# Patient Record
Sex: MALE | Race: WHITE | Employment: OTHER | URBAN - METROPOLITAN AREA
[De-identification: names, ages, dates, MRNs, and addresses within clinical notes are randomized per-mention and may not be internally consistent; named-entity substitution may affect disease eponyms.]

---

## 2017-01-10 RX ORDER — ATORVASTATIN CALCIUM 10 MG/1
TABLET, FILM COATED ORAL
Qty: 90 TABLET | Refills: 0 | Status: SHIPPED | OUTPATIENT
Start: 2017-01-10 | End: 2017-05-14 | Stop reason: SDUPTHER

## 2017-01-10 RX ORDER — LOSARTAN POTASSIUM 50 MG/1
TABLET ORAL
Qty: 90 TABLET | Refills: 0 | Status: SHIPPED | OUTPATIENT
Start: 2017-01-10 | End: 2017-05-20 | Stop reason: SDUPTHER

## 2017-01-11 RX ORDER — DILTIAZEM HYDROCHLORIDE 300 MG/1
CAPSULE, EXTENDED RELEASE ORAL
Qty: 90 CAPSULE | Refills: 0 | Status: SHIPPED | OUTPATIENT
Start: 2017-01-11 | End: 2017-05-14 | Stop reason: SDUPTHER

## 2017-01-12 ENCOUNTER — TELEPHONE (OUTPATIENT)
Dept: INTERNAL MEDICINE CLINIC | Age: 77
End: 2017-01-12

## 2017-01-13 ENCOUNTER — TELEPHONE (OUTPATIENT)
Dept: INTERNAL MEDICINE CLINIC | Age: 77
End: 2017-01-13

## 2017-01-13 RX ORDER — SCOLOPAMINE TRANSDERMAL SYSTEM 1 MG/1
1 PATCH, EXTENDED RELEASE TRANSDERMAL
Qty: 3 PATCH | Refills: 0 | Status: SHIPPED | OUTPATIENT
Start: 2017-01-13 | End: 2018-01-13

## 2017-01-13 RX ORDER — PREDNISONE 10 MG/1
TABLET ORAL
Qty: 28 TABLET | Refills: 0 | Status: SHIPPED | OUTPATIENT
Start: 2017-01-13 | End: 2017-01-23

## 2017-01-13 RX ORDER — LEVOFLOXACIN 500 MG/1
500 TABLET, FILM COATED ORAL DAILY
Qty: 10 TABLET | Refills: 0 | Status: SHIPPED | OUTPATIENT
Start: 2017-01-13 | End: 2017-01-23

## 2017-03-01 ENCOUNTER — OFFICE VISIT (OUTPATIENT)
Dept: INTERNAL MEDICINE CLINIC | Age: 77
End: 2017-03-01

## 2017-03-01 ENCOUNTER — TELEPHONE (OUTPATIENT)
Dept: INTERNAL MEDICINE CLINIC | Age: 77
End: 2017-03-01

## 2017-03-01 VITALS
BODY MASS INDEX: 31.39 KG/M2 | HEIGHT: 67 IN | RESPIRATION RATE: 24 BRPM | OXYGEN SATURATION: 93 % | SYSTOLIC BLOOD PRESSURE: 190 MMHG | WEIGHT: 200 LBS | DIASTOLIC BLOOD PRESSURE: 72 MMHG | HEART RATE: 112 BPM

## 2017-03-01 DIAGNOSIS — J41.0 SIMPLE CHRONIC BRONCHITIS (HCC): ICD-10-CM

## 2017-03-01 DIAGNOSIS — I10 ESSENTIAL HYPERTENSION: Primary | ICD-10-CM

## 2017-03-01 DIAGNOSIS — R06.02 SHORTNESS OF BREATH: ICD-10-CM

## 2017-03-01 PROBLEM — J96.01 ACUTE RESPIRATORY FAILURE WITH HYPOXEMIA (HCC): Status: ACTIVE | Noted: 2017-03-01

## 2017-03-01 PROBLEM — J10.1 INFLUENZA B: Status: ACTIVE | Noted: 2017-03-01

## 2017-03-01 PROCEDURE — 99999 PR OFFICE/OUTPT VISIT,PROCEDURE ONLY: CPT | Performed by: INTERNAL MEDICINE

## 2017-03-01 PROCEDURE — 1036F TOBACCO NON-USER: CPT | Performed by: INTERNAL MEDICINE

## 2017-03-01 ASSESSMENT — ENCOUNTER SYMPTOMS
ABDOMINAL PAIN: 0
SHORTNESS OF BREATH: 1

## 2017-03-02 PROBLEM — G47.33 OSA ON CPAP: Status: ACTIVE | Noted: 2017-03-02

## 2017-03-02 PROBLEM — Z99.89 OSA ON CPAP: Status: ACTIVE | Noted: 2017-03-02

## 2017-03-03 PROBLEM — R73.09 ABNORMAL GLUCOSE: Status: ACTIVE | Noted: 2017-03-03

## 2017-03-06 ENCOUNTER — TELEPHONE (OUTPATIENT)
Dept: INTERNAL MEDICINE CLINIC | Age: 77
End: 2017-03-06

## 2017-03-10 ENCOUNTER — TELEPHONE (OUTPATIENT)
Dept: INTERNAL MEDICINE CLINIC | Age: 77
End: 2017-03-10

## 2017-03-13 ENCOUNTER — TELEPHONE (OUTPATIENT)
Dept: INTERNAL MEDICINE CLINIC | Age: 77
End: 2017-03-13

## 2017-03-16 ENCOUNTER — TELEPHONE (OUTPATIENT)
Dept: INTERNAL MEDICINE CLINIC | Age: 77
End: 2017-03-16

## 2017-03-17 ENCOUNTER — CARE COORDINATION (OUTPATIENT)
Dept: CASE MANAGEMENT | Age: 77
End: 2017-03-17

## 2017-03-17 ENCOUNTER — TELEPHONE (OUTPATIENT)
Dept: PHARMACY | Facility: CLINIC | Age: 77
End: 2017-03-17

## 2017-03-19 ENCOUNTER — EPISODE CHANGES (OUTPATIENT)
Dept: CASE MANAGEMENT | Age: 77
End: 2017-03-19

## 2017-03-27 ENCOUNTER — OFFICE VISIT (OUTPATIENT)
Dept: INTERNAL MEDICINE CLINIC | Age: 77
End: 2017-03-27

## 2017-03-27 VITALS
RESPIRATION RATE: 20 BRPM | OXYGEN SATURATION: 96 % | DIASTOLIC BLOOD PRESSURE: 52 MMHG | SYSTOLIC BLOOD PRESSURE: 116 MMHG | WEIGHT: 191 LBS | BODY MASS INDEX: 29.91 KG/M2 | HEART RATE: 92 BPM

## 2017-03-27 DIAGNOSIS — J96.01 ACUTE RESPIRATORY FAILURE WITH HYPOXEMIA (HCC): Primary | ICD-10-CM

## 2017-03-27 DIAGNOSIS — J18.9 PNEUMONIA OF BOTH LUNGS DUE TO INFECTIOUS ORGANISM, UNSPECIFIED PART OF LUNG: ICD-10-CM

## 2017-03-27 DIAGNOSIS — J44.9 COPD, VERY SEVERE (HCC): ICD-10-CM

## 2017-03-27 DIAGNOSIS — J44.1 COPD EXACERBATION (HCC): ICD-10-CM

## 2017-03-27 DIAGNOSIS — J10.1 INFLUENZA B: ICD-10-CM

## 2017-03-27 LAB
ANION GAP SERPL CALCULATED.3IONS-SCNC: 15 MMOL/L (ref 3–16)
BUN BLDV-MCNC: 12 MG/DL (ref 7–20)
CALCIUM SERPL-MCNC: 9.1 MG/DL (ref 8.3–10.6)
CHLORIDE BLD-SCNC: 97 MMOL/L (ref 99–110)
CO2: 25 MMOL/L (ref 21–32)
CREAT SERPL-MCNC: 0.8 MG/DL (ref 0.8–1.3)
GFR AFRICAN AMERICAN: >60
GFR NON-AFRICAN AMERICAN: >60
GLUCOSE BLD-MCNC: 109 MG/DL (ref 70–99)
POTASSIUM SERPL-SCNC: 4.6 MMOL/L (ref 3.5–5.1)
SODIUM BLD-SCNC: 137 MMOL/L (ref 136–145)

## 2017-03-27 RX ORDER — MOMETASONE FUROATE AND FORMOTEROL FUMARATE DIHYDRATE 200; 5 UG/1; UG/1
AEROSOL RESPIRATORY (INHALATION)
Qty: 39 G | Refills: 0 | Status: SHIPPED | OUTPATIENT
Start: 2017-03-27 | End: 2017-06-13 | Stop reason: SDUPTHER

## 2017-03-27 RX ORDER — POTASSIUM CHLORIDE 20 MEQ/1
20 TABLET, EXTENDED RELEASE ORAL EVERY OTHER DAY
Qty: 60 TABLET | Refills: 3 | Status: SHIPPED
Start: 2017-03-27 | End: 2018-06-01 | Stop reason: SDUPTHER

## 2017-03-27 RX ORDER — FUROSEMIDE 40 MG/1
40 TABLET ORAL EVERY OTHER DAY
Qty: 60 TABLET | Refills: 3 | Status: SHIPPED
Start: 2017-03-27 | End: 2017-05-15 | Stop reason: SDUPTHER

## 2017-03-27 ASSESSMENT — ENCOUNTER SYMPTOMS
SHORTNESS OF BREATH: 0
ABDOMINAL PAIN: 0

## 2017-03-29 ENCOUNTER — OFFICE VISIT (OUTPATIENT)
Dept: PULMONOLOGY | Age: 77
End: 2017-03-29

## 2017-03-29 VITALS
SYSTOLIC BLOOD PRESSURE: 116 MMHG | TEMPERATURE: 97.9 F | RESPIRATION RATE: 18 BRPM | BODY MASS INDEX: 30.13 KG/M2 | WEIGHT: 192 LBS | DIASTOLIC BLOOD PRESSURE: 54 MMHG | HEART RATE: 96 BPM | HEIGHT: 67 IN | OXYGEN SATURATION: 94 %

## 2017-03-29 DIAGNOSIS — G47.33 OSA (OBSTRUCTIVE SLEEP APNEA): ICD-10-CM

## 2017-03-29 DIAGNOSIS — J96.11 CHRONIC HYPOXEMIC RESPIRATORY FAILURE (HCC): ICD-10-CM

## 2017-03-29 DIAGNOSIS — J44.9 COPD, VERY SEVERE (HCC): Primary | ICD-10-CM

## 2017-03-29 PROCEDURE — 1111F DSCHRG MED/CURRENT MED MERGE: CPT | Performed by: INTERNAL MEDICINE

## 2017-03-29 PROCEDURE — 3023F SPIROM DOC REV: CPT | Performed by: INTERNAL MEDICINE

## 2017-03-29 PROCEDURE — G8484 FLU IMMUNIZE NO ADMIN: HCPCS | Performed by: INTERNAL MEDICINE

## 2017-03-29 PROCEDURE — G8417 CALC BMI ABV UP PARAM F/U: HCPCS | Performed by: INTERNAL MEDICINE

## 2017-03-29 PROCEDURE — 1123F ACP DISCUSS/DSCN MKR DOCD: CPT | Performed by: INTERNAL MEDICINE

## 2017-03-29 PROCEDURE — G8926 SPIRO NO PERF OR DOC: HCPCS | Performed by: INTERNAL MEDICINE

## 2017-03-29 PROCEDURE — 1036F TOBACCO NON-USER: CPT | Performed by: INTERNAL MEDICINE

## 2017-03-29 PROCEDURE — 3017F COLORECTAL CA SCREEN DOC REV: CPT | Performed by: INTERNAL MEDICINE

## 2017-03-29 PROCEDURE — 4040F PNEUMOC VAC/ADMIN/RCVD: CPT | Performed by: INTERNAL MEDICINE

## 2017-03-29 PROCEDURE — 99214 OFFICE O/P EST MOD 30 MIN: CPT | Performed by: INTERNAL MEDICINE

## 2017-03-29 PROCEDURE — G8427 DOCREV CUR MEDS BY ELIG CLIN: HCPCS | Performed by: INTERNAL MEDICINE

## 2017-03-29 RX ORDER — CLONIDINE HYDROCHLORIDE 0.1 MG/1
TABLET ORAL
COMMUNITY
Start: 2017-03-16 | End: 2017-05-02

## 2017-03-29 ASSESSMENT — SLEEP AND FATIGUE QUESTIONNAIRES
HOW LIKELY ARE YOU TO NOD OFF OR FALL ASLEEP IN A CAR, WHILE STOPPED FOR A FEW MINUTES IN TRAFFIC: 1
HOW LIKELY ARE YOU TO NOD OFF OR FALL ASLEEP WHILE LYING DOWN TO REST IN THE AFTERNOON WHEN CIRCUMSTANCES PERMIT: 1
ESS TOTAL SCORE: 8
HOW LIKELY ARE YOU TO NOD OFF OR FALL ASLEEP WHILE SITTING QUIETLY AFTER LUNCH WITHOUT ALCOHOL: 1
HOW LIKELY ARE YOU TO NOD OFF OR FALL ASLEEP WHILE SITTING AND READING: 1
HOW LIKELY ARE YOU TO NOD OFF OR FALL ASLEEP WHEN YOU ARE A PASSENGER IN A CAR FOR AN HOUR WITHOUT A BREAK: 1
NECK CIRCUMFERENCE (INCHES): 19.75
HOW LIKELY ARE YOU TO NOD OFF OR FALL ASLEEP WHILE WATCHING TV: 1
HOW LIKELY ARE YOU TO NOD OFF OR FALL ASLEEP WHILE SITTING AND TALKING TO SOMEONE: 1
HOW LIKELY ARE YOU TO NOD OFF OR FALL ASLEEP WHILE SITTING INACTIVE IN A PUBLIC PLACE: 1

## 2017-04-04 ENCOUNTER — TELEPHONE (OUTPATIENT)
Dept: INTERNAL MEDICINE CLINIC | Age: 77
End: 2017-04-04

## 2017-04-06 ENCOUNTER — TELEPHONE (OUTPATIENT)
Dept: INTERNAL MEDICINE CLINIC | Age: 77
End: 2017-04-06

## 2017-04-10 RX ORDER — ESCITALOPRAM OXALATE 10 MG/1
TABLET ORAL
Qty: 45 TABLET | Refills: 2 | Status: SHIPPED | OUTPATIENT
Start: 2017-04-10 | End: 2017-05-02

## 2017-04-12 ENCOUNTER — TELEPHONE (OUTPATIENT)
Dept: INTERNAL MEDICINE CLINIC | Age: 77
End: 2017-04-12

## 2017-04-14 PROCEDURE — 99495 TRANSJ CARE MGMT MOD F2F 14D: CPT | Performed by: INTERNAL MEDICINE

## 2017-04-19 ENCOUNTER — TELEPHONE (OUTPATIENT)
Dept: INTERNAL MEDICINE CLINIC | Age: 77
End: 2017-04-19

## 2017-04-28 ASSESSMENT — ENCOUNTER SYMPTOMS
SHORTNESS OF BREATH: 0
ABDOMINAL PAIN: 0

## 2017-05-02 ENCOUNTER — OFFICE VISIT (OUTPATIENT)
Dept: INTERNAL MEDICINE CLINIC | Age: 77
End: 2017-05-02

## 2017-05-02 VITALS
OXYGEN SATURATION: 94 % | DIASTOLIC BLOOD PRESSURE: 68 MMHG | HEIGHT: 67 IN | WEIGHT: 192 LBS | RESPIRATION RATE: 16 BRPM | HEART RATE: 85 BPM | SYSTOLIC BLOOD PRESSURE: 142 MMHG | BODY MASS INDEX: 30.13 KG/M2

## 2017-05-02 DIAGNOSIS — J41.0 SIMPLE CHRONIC BRONCHITIS (HCC): Primary | ICD-10-CM

## 2017-05-02 DIAGNOSIS — E78.00 HYPERCHOLESTEROLEMIA: ICD-10-CM

## 2017-05-02 DIAGNOSIS — I10 ESSENTIAL HYPERTENSION: ICD-10-CM

## 2017-05-02 DIAGNOSIS — I47.1 PAT (PAROXYSMAL ATRIAL TACHYCARDIA) (HCC): ICD-10-CM

## 2017-05-02 LAB
A/G RATIO: 1.7 (ref 1.1–2.2)
ALBUMIN SERPL-MCNC: 4.3 G/DL (ref 3.4–5)
ALP BLD-CCNC: 74 U/L (ref 40–129)
ALT SERPL-CCNC: 25 U/L (ref 10–40)
ANION GAP SERPL CALCULATED.3IONS-SCNC: 15 MMOL/L (ref 3–16)
AST SERPL-CCNC: 16 U/L (ref 15–37)
BILIRUB SERPL-MCNC: 0.3 MG/DL (ref 0–1)
BUN BLDV-MCNC: 11 MG/DL (ref 7–20)
CALCIUM SERPL-MCNC: 9.9 MG/DL (ref 8.3–10.6)
CHLORIDE BLD-SCNC: 102 MMOL/L (ref 99–110)
CHOLESTEROL, TOTAL: 172 MG/DL (ref 0–199)
CO2: 25 MMOL/L (ref 21–32)
CREAT SERPL-MCNC: 0.9 MG/DL (ref 0.8–1.3)
GFR AFRICAN AMERICAN: >60
GFR NON-AFRICAN AMERICAN: >60
GLOBULIN: 2.6 G/DL
GLUCOSE BLD-MCNC: 107 MG/DL (ref 70–99)
HDLC SERPL-MCNC: 58 MG/DL (ref 40–60)
LDL CHOLESTEROL CALCULATED: 79 MG/DL
POTASSIUM SERPL-SCNC: 4.2 MMOL/L (ref 3.5–5.1)
SODIUM BLD-SCNC: 142 MMOL/L (ref 136–145)
TOTAL PROTEIN: 6.9 G/DL (ref 6.4–8.2)
TRIGL SERPL-MCNC: 175 MG/DL (ref 0–150)
VLDLC SERPL CALC-MCNC: 35 MG/DL

## 2017-05-02 PROCEDURE — 1036F TOBACCO NON-USER: CPT | Performed by: INTERNAL MEDICINE

## 2017-05-02 PROCEDURE — G8428 CUR MEDS NOT DOCUMENT: HCPCS | Performed by: INTERNAL MEDICINE

## 2017-05-02 PROCEDURE — G8417 CALC BMI ABV UP PARAM F/U: HCPCS | Performed by: INTERNAL MEDICINE

## 2017-05-02 PROCEDURE — 3017F COLORECTAL CA SCREEN DOC REV: CPT | Performed by: INTERNAL MEDICINE

## 2017-05-02 PROCEDURE — 1123F ACP DISCUSS/DSCN MKR DOCD: CPT | Performed by: INTERNAL MEDICINE

## 2017-05-02 PROCEDURE — G8926 SPIRO NO PERF OR DOC: HCPCS | Performed by: INTERNAL MEDICINE

## 2017-05-02 PROCEDURE — 4040F PNEUMOC VAC/ADMIN/RCVD: CPT | Performed by: INTERNAL MEDICINE

## 2017-05-02 PROCEDURE — 3023F SPIROM DOC REV: CPT | Performed by: INTERNAL MEDICINE

## 2017-05-02 PROCEDURE — 99214 OFFICE O/P EST MOD 30 MIN: CPT | Performed by: INTERNAL MEDICINE

## 2017-05-07 DIAGNOSIS — J32.1 CHRONIC FRONTAL SINUSITIS: ICD-10-CM

## 2017-05-08 RX ORDER — IPRATROPIUM BROMIDE 21 UG/1
SPRAY, METERED NASAL
Qty: 30 ML | Refills: 0 | Status: SHIPPED | OUTPATIENT
Start: 2017-05-08 | End: 2017-06-27 | Stop reason: SDUPTHER

## 2017-05-12 ENCOUNTER — TELEPHONE (OUTPATIENT)
Dept: INTERNAL MEDICINE CLINIC | Age: 77
End: 2017-05-12

## 2017-05-12 RX ORDER — PREDNISONE 10 MG/1
TABLET ORAL
Qty: 28 TABLET | Refills: 0 | Status: SHIPPED | OUTPATIENT
Start: 2017-05-12 | End: 2017-05-22

## 2017-05-15 RX ORDER — FUROSEMIDE 40 MG/1
TABLET ORAL
Qty: 90 TABLET | Refills: 3 | Status: ON HOLD | OUTPATIENT
Start: 2017-05-15 | End: 2018-02-13

## 2017-05-15 RX ORDER — THEOPHYLLINE 400 MG/1
TABLET, EXTENDED RELEASE ORAL
Qty: 90 TABLET | Refills: 3 | Status: SHIPPED | OUTPATIENT
Start: 2017-05-15 | End: 2018-05-31 | Stop reason: SDUPTHER

## 2017-05-15 RX ORDER — DILTIAZEM HYDROCHLORIDE 300 MG/1
CAPSULE, EXTENDED RELEASE ORAL
Qty: 90 CAPSULE | Refills: 3 | Status: SHIPPED | OUTPATIENT
Start: 2017-05-15 | End: 2017-06-27

## 2017-05-15 RX ORDER — ATORVASTATIN CALCIUM 10 MG/1
TABLET, FILM COATED ORAL
Qty: 90 TABLET | Refills: 3 | Status: SHIPPED | OUTPATIENT
Start: 2017-05-15 | End: 2018-05-10 | Stop reason: SDUPTHER

## 2017-05-22 RX ORDER — LOSARTAN POTASSIUM 50 MG/1
TABLET ORAL
Qty: 90 TABLET | Refills: 3 | Status: SHIPPED | OUTPATIENT
Start: 2017-05-22 | End: 2018-05-10 | Stop reason: SDUPTHER

## 2017-06-10 DIAGNOSIS — I10 HTN (HYPERTENSION): ICD-10-CM

## 2017-06-13 DIAGNOSIS — J44.9 COPD, VERY SEVERE (HCC): ICD-10-CM

## 2017-06-13 DIAGNOSIS — J32.1 CHRONIC FRONTAL SINUSITIS: ICD-10-CM

## 2017-06-13 RX ORDER — IPRATROPIUM BROMIDE 21 UG/1
SPRAY, METERED NASAL
Qty: 1 BOTTLE | Refills: 3 | Status: SHIPPED | OUTPATIENT
Start: 2017-06-13 | End: 2018-01-08 | Stop reason: SDUPTHER

## 2017-06-14 RX ORDER — MOMETASONE FUROATE AND FORMOTEROL FUMARATE DIHYDRATE 200; 5 UG/1; UG/1
AEROSOL RESPIRATORY (INHALATION)
Qty: 1 INHALER | Refills: 5 | Status: SHIPPED | OUTPATIENT
Start: 2017-06-14 | End: 2017-12-18 | Stop reason: SDUPTHER

## 2017-06-27 ENCOUNTER — OFFICE VISIT (OUTPATIENT)
Dept: PULMONOLOGY | Age: 77
End: 2017-06-27

## 2017-06-27 VITALS
BODY MASS INDEX: 31.23 KG/M2 | SYSTOLIC BLOOD PRESSURE: 134 MMHG | RESPIRATION RATE: 16 BRPM | HEIGHT: 67 IN | DIASTOLIC BLOOD PRESSURE: 60 MMHG | OXYGEN SATURATION: 95 % | WEIGHT: 199 LBS | TEMPERATURE: 98.1 F | HEART RATE: 90 BPM

## 2017-06-27 DIAGNOSIS — G47.33 OSA (OBSTRUCTIVE SLEEP APNEA): ICD-10-CM

## 2017-06-27 DIAGNOSIS — J44.9 COPD, VERY SEVERE (HCC): Primary | ICD-10-CM

## 2017-06-27 DIAGNOSIS — Z78.9 FULL CODE STATUS: ICD-10-CM

## 2017-06-27 DIAGNOSIS — J96.11 CHRONIC HYPOXEMIC RESPIRATORY FAILURE (HCC): ICD-10-CM

## 2017-06-27 PROCEDURE — 3023F SPIROM DOC REV: CPT | Performed by: INTERNAL MEDICINE

## 2017-06-27 PROCEDURE — 3017F COLORECTAL CA SCREEN DOC REV: CPT | Performed by: INTERNAL MEDICINE

## 2017-06-27 PROCEDURE — 99214 OFFICE O/P EST MOD 30 MIN: CPT | Performed by: INTERNAL MEDICINE

## 2017-06-27 PROCEDURE — 4040F PNEUMOC VAC/ADMIN/RCVD: CPT | Performed by: INTERNAL MEDICINE

## 2017-06-27 PROCEDURE — 1123F ACP DISCUSS/DSCN MKR DOCD: CPT | Performed by: INTERNAL MEDICINE

## 2017-06-27 PROCEDURE — G8427 DOCREV CUR MEDS BY ELIG CLIN: HCPCS | Performed by: INTERNAL MEDICINE

## 2017-06-27 PROCEDURE — 1036F TOBACCO NON-USER: CPT | Performed by: INTERNAL MEDICINE

## 2017-06-27 PROCEDURE — G8926 SPIRO NO PERF OR DOC: HCPCS | Performed by: INTERNAL MEDICINE

## 2017-06-27 PROCEDURE — G8417 CALC BMI ABV UP PARAM F/U: HCPCS | Performed by: INTERNAL MEDICINE

## 2017-06-27 ASSESSMENT — SLEEP AND FATIGUE QUESTIONNAIRES
HOW LIKELY ARE YOU TO NOD OFF OR FALL ASLEEP WHILE SITTING QUIETLY AFTER LUNCH WITHOUT ALCOHOL: 0
ESS TOTAL SCORE: 4
HOW LIKELY ARE YOU TO NOD OFF OR FALL ASLEEP WHILE SITTING AND READING: 1
HOW LIKELY ARE YOU TO NOD OFF OR FALL ASLEEP WHILE SITTING INACTIVE IN A PUBLIC PLACE: 1
HOW LIKELY ARE YOU TO NOD OFF OR FALL ASLEEP WHILE SITTING AND TALKING TO SOMEONE: 0
HOW LIKELY ARE YOU TO NOD OFF OR FALL ASLEEP IN A CAR, WHILE STOPPED FOR A FEW MINUTES IN TRAFFIC: 0
NECK CIRCUMFERENCE (INCHES): 20.5
HOW LIKELY ARE YOU TO NOD OFF OR FALL ASLEEP WHILE WATCHING TV: 1
HOW LIKELY ARE YOU TO NOD OFF OR FALL ASLEEP WHILE LYING DOWN TO REST IN THE AFTERNOON WHEN CIRCUMSTANCES PERMIT: 0
HOW LIKELY ARE YOU TO NOD OFF OR FALL ASLEEP WHEN YOU ARE A PASSENGER IN A CAR FOR AN HOUR WITHOUT A BREAK: 1

## 2017-06-28 ENCOUNTER — TELEPHONE (OUTPATIENT)
Dept: PULMONOLOGY | Age: 77
End: 2017-06-28

## 2017-07-03 ENCOUNTER — OFFICE VISIT (OUTPATIENT)
Dept: INTERNAL MEDICINE CLINIC | Age: 77
End: 2017-07-03

## 2017-07-03 VITALS
RESPIRATION RATE: 16 BRPM | HEART RATE: 110 BPM | BODY MASS INDEX: 31.01 KG/M2 | TEMPERATURE: 98.4 F | SYSTOLIC BLOOD PRESSURE: 130 MMHG | DIASTOLIC BLOOD PRESSURE: 70 MMHG | HEIGHT: 67 IN | WEIGHT: 197.6 LBS

## 2017-07-03 DIAGNOSIS — H69.82 EUSTACHIAN TUBE DYSFUNCTION, LEFT: Primary | ICD-10-CM

## 2017-07-03 PROCEDURE — 3017F COLORECTAL CA SCREEN DOC REV: CPT | Performed by: INTERNAL MEDICINE

## 2017-07-03 PROCEDURE — 1123F ACP DISCUSS/DSCN MKR DOCD: CPT | Performed by: INTERNAL MEDICINE

## 2017-07-03 PROCEDURE — G8427 DOCREV CUR MEDS BY ELIG CLIN: HCPCS | Performed by: INTERNAL MEDICINE

## 2017-07-03 PROCEDURE — 99213 OFFICE O/P EST LOW 20 MIN: CPT | Performed by: INTERNAL MEDICINE

## 2017-07-03 PROCEDURE — G8417 CALC BMI ABV UP PARAM F/U: HCPCS | Performed by: INTERNAL MEDICINE

## 2017-07-03 PROCEDURE — 4040F PNEUMOC VAC/ADMIN/RCVD: CPT | Performed by: INTERNAL MEDICINE

## 2017-07-03 PROCEDURE — 1036F TOBACCO NON-USER: CPT | Performed by: INTERNAL MEDICINE

## 2017-07-03 RX ORDER — PREDNISONE 10 MG/1
TABLET ORAL
Qty: 22 TABLET | Refills: 0 | Status: SHIPPED | OUTPATIENT
Start: 2017-07-03 | End: 2017-07-21

## 2017-07-03 ASSESSMENT — PATIENT HEALTH QUESTIONNAIRE - PHQ9
SUM OF ALL RESPONSES TO PHQ QUESTIONS 1-9: 0
1. LITTLE INTEREST OR PLEASURE IN DOING THINGS: 0
2. FEELING DOWN, DEPRESSED OR HOPELESS: 0
SUM OF ALL RESPONSES TO PHQ9 QUESTIONS 1 & 2: 0

## 2017-07-05 ENCOUNTER — TELEPHONE (OUTPATIENT)
Dept: PULMONOLOGY | Age: 77
End: 2017-07-05

## 2017-07-05 RX ORDER — DILTIAZEM HYDROCHLORIDE 300 MG/1
300 CAPSULE, COATED, EXTENDED RELEASE ORAL DAILY
Qty: 90 CAPSULE | Refills: 3
Start: 2017-07-05 | End: 2018-06-04 | Stop reason: SDUPTHER

## 2017-07-17 ASSESSMENT — ENCOUNTER SYMPTOMS
ABDOMINAL PAIN: 0
SHORTNESS OF BREATH: 0

## 2017-07-19 ENCOUNTER — TELEPHONE (OUTPATIENT)
Dept: PULMONOLOGY | Age: 77
End: 2017-07-19

## 2017-07-19 DIAGNOSIS — J96.01 ACUTE RESPIRATORY FAILURE WITH HYPOXEMIA (HCC): ICD-10-CM

## 2017-07-19 DIAGNOSIS — J41.0 SIMPLE CHRONIC BRONCHITIS (HCC): Primary | ICD-10-CM

## 2017-07-21 ENCOUNTER — OFFICE VISIT (OUTPATIENT)
Dept: INTERNAL MEDICINE CLINIC | Age: 77
End: 2017-07-21

## 2017-07-21 VITALS
OXYGEN SATURATION: 95 % | RESPIRATION RATE: 24 BRPM | HEIGHT: 67 IN | WEIGHT: 198 LBS | HEART RATE: 96 BPM | BODY MASS INDEX: 31.08 KG/M2 | SYSTOLIC BLOOD PRESSURE: 138 MMHG | DIASTOLIC BLOOD PRESSURE: 74 MMHG

## 2017-07-21 DIAGNOSIS — I10 ESSENTIAL HYPERTENSION: ICD-10-CM

## 2017-07-21 DIAGNOSIS — N40.0 BENIGN NON-NODULAR PROSTATIC HYPERPLASIA WITHOUT LOWER URINARY TRACT SYMPTOMS: ICD-10-CM

## 2017-07-21 DIAGNOSIS — E78.00 HYPERCHOLESTEROLEMIA: ICD-10-CM

## 2017-07-21 DIAGNOSIS — J41.0 SIMPLE CHRONIC BRONCHITIS (HCC): Primary | ICD-10-CM

## 2017-07-21 PROCEDURE — 1036F TOBACCO NON-USER: CPT | Performed by: INTERNAL MEDICINE

## 2017-07-21 PROCEDURE — G8417 CALC BMI ABV UP PARAM F/U: HCPCS | Performed by: INTERNAL MEDICINE

## 2017-07-21 PROCEDURE — 4040F PNEUMOC VAC/ADMIN/RCVD: CPT | Performed by: INTERNAL MEDICINE

## 2017-07-21 PROCEDURE — G8926 SPIRO NO PERF OR DOC: HCPCS | Performed by: INTERNAL MEDICINE

## 2017-07-21 PROCEDURE — G8428 CUR MEDS NOT DOCUMENT: HCPCS | Performed by: INTERNAL MEDICINE

## 2017-07-21 PROCEDURE — 99214 OFFICE O/P EST MOD 30 MIN: CPT | Performed by: INTERNAL MEDICINE

## 2017-07-21 PROCEDURE — 3023F SPIROM DOC REV: CPT | Performed by: INTERNAL MEDICINE

## 2017-07-21 PROCEDURE — 1123F ACP DISCUSS/DSCN MKR DOCD: CPT | Performed by: INTERNAL MEDICINE

## 2017-08-10 ENCOUNTER — HOSPITAL ENCOUNTER (OUTPATIENT)
Dept: OTHER | Age: 77
Discharge: HOME | End: 2017-08-10
Attending: INTERNAL MEDICINE | Admitting: INTERNAL MEDICINE

## 2017-08-10 ENCOUNTER — HOSPITAL ENCOUNTER (OUTPATIENT)
Dept: CARDIAC REHAB | Age: 77
Discharge: HOME | End: 2017-08-11

## 2017-08-10 PROCEDURE — 94620 PR PULMONARY STRESS TESTING,SIMPLE: CPT | Performed by: INTERNAL MEDICINE

## 2017-08-21 ENCOUNTER — TELEPHONE (OUTPATIENT)
Dept: PULMONOLOGY | Age: 77
End: 2017-08-21

## 2017-09-08 RX ORDER — TIOTROPIUM BROMIDE 18 UG/1
CAPSULE ORAL; RESPIRATORY (INHALATION)
Qty: 90 CAPSULE | Refills: 3 | Status: SHIPPED | OUTPATIENT
Start: 2017-09-08 | End: 2018-04-17

## 2017-09-19 ENCOUNTER — OFFICE VISIT (OUTPATIENT)
Dept: PULMONOLOGY | Age: 77
End: 2017-09-19

## 2017-09-19 VITALS
SYSTOLIC BLOOD PRESSURE: 130 MMHG | HEART RATE: 96 BPM | HEIGHT: 67 IN | BODY MASS INDEX: 30.17 KG/M2 | RESPIRATION RATE: 16 BRPM | WEIGHT: 192.2 LBS | DIASTOLIC BLOOD PRESSURE: 64 MMHG | TEMPERATURE: 98.9 F | OXYGEN SATURATION: 95 %

## 2017-09-19 DIAGNOSIS — J44.9 COPD, VERY SEVERE (HCC): Primary | ICD-10-CM

## 2017-09-19 DIAGNOSIS — G47.33 OSA (OBSTRUCTIVE SLEEP APNEA): ICD-10-CM

## 2017-09-19 DIAGNOSIS — Z23 NEEDS FLU SHOT: ICD-10-CM

## 2017-09-19 DIAGNOSIS — J96.11 CHRONIC HYPOXEMIC RESPIRATORY FAILURE (HCC): ICD-10-CM

## 2017-09-19 DIAGNOSIS — J32.1 CHRONIC FRONTAL SINUSITIS: ICD-10-CM

## 2017-09-19 PROCEDURE — G0008 ADMIN INFLUENZA VIRUS VAC: HCPCS | Performed by: INTERNAL MEDICINE

## 2017-09-19 PROCEDURE — 99214 OFFICE O/P EST MOD 30 MIN: CPT | Performed by: INTERNAL MEDICINE

## 2017-09-19 PROCEDURE — G8926 SPIRO NO PERF OR DOC: HCPCS | Performed by: INTERNAL MEDICINE

## 2017-09-19 PROCEDURE — 1123F ACP DISCUSS/DSCN MKR DOCD: CPT | Performed by: INTERNAL MEDICINE

## 2017-09-19 PROCEDURE — G8427 DOCREV CUR MEDS BY ELIG CLIN: HCPCS | Performed by: INTERNAL MEDICINE

## 2017-09-19 PROCEDURE — G8417 CALC BMI ABV UP PARAM F/U: HCPCS | Performed by: INTERNAL MEDICINE

## 2017-09-19 PROCEDURE — 90662 IIV NO PRSV INCREASED AG IM: CPT | Performed by: INTERNAL MEDICINE

## 2017-09-19 PROCEDURE — 1036F TOBACCO NON-USER: CPT | Performed by: INTERNAL MEDICINE

## 2017-09-19 PROCEDURE — 4040F PNEUMOC VAC/ADMIN/RCVD: CPT | Performed by: INTERNAL MEDICINE

## 2017-09-19 PROCEDURE — 3023F SPIROM DOC REV: CPT | Performed by: INTERNAL MEDICINE

## 2017-09-19 RX ORDER — AZITHROMYCIN 250 MG/1
TABLET, FILM COATED ORAL
Qty: 1 PACKET | Refills: 0 | Status: ON HOLD | OUTPATIENT
Start: 2017-09-19 | End: 2018-02-13 | Stop reason: HOSPADM

## 2017-09-19 RX ORDER — PREDNISONE 10 MG/1
TABLET ORAL
Qty: 30 TABLET | Refills: 0 | Status: SHIPPED | OUTPATIENT
Start: 2017-09-19 | End: 2017-09-29

## 2017-09-19 ASSESSMENT — SLEEP AND FATIGUE QUESTIONNAIRES
ESS TOTAL SCORE: 2
HOW LIKELY ARE YOU TO NOD OFF OR FALL ASLEEP WHILE LYING DOWN TO REST IN THE AFTERNOON WHEN CIRCUMSTANCES PERMIT: 0
HOW LIKELY ARE YOU TO NOD OFF OR FALL ASLEEP WHILE WATCHING TV: 1
HOW LIKELY ARE YOU TO NOD OFF OR FALL ASLEEP WHILE SITTING QUIETLY AFTER LUNCH WITHOUT ALCOHOL: 0
HOW LIKELY ARE YOU TO NOD OFF OR FALL ASLEEP WHILE SITTING AND TALKING TO SOMEONE: 0
NECK CIRCUMFERENCE (INCHES): 19
HOW LIKELY ARE YOU TO NOD OFF OR FALL ASLEEP WHILE SITTING AND READING: 1
HOW LIKELY ARE YOU TO NOD OFF OR FALL ASLEEP WHILE SITTING INACTIVE IN A PUBLIC PLACE: 0
HOW LIKELY ARE YOU TO NOD OFF OR FALL ASLEEP WHEN YOU ARE A PASSENGER IN A CAR FOR AN HOUR WITHOUT A BREAK: 0
HOW LIKELY ARE YOU TO NOD OFF OR FALL ASLEEP IN A CAR, WHILE STOPPED FOR A FEW MINUTES IN TRAFFIC: 0

## 2017-10-03 ASSESSMENT — ENCOUNTER SYMPTOMS
SHORTNESS OF BREATH: 0
ABDOMINAL PAIN: 0

## 2017-10-03 NOTE — PROGRESS NOTES
Subjective:      Patient ID: Caitlyn Hansen is a 69 y.o. male. HPI  1. Simple chronic bronchitis ([address] --Stable--no new issues      2. Essential hypertension --Stable--no new issues      3. Hypercholesterolemia --Stable--no new issues----lab noted and reviewed      No med changes -- on fluticasone nasally--stuffy with CPAP at [facility] lemon --now off O2 per dr lemon--shelia have a tank for prn use--   UTD on immun--reviewed   [place]--2016--rek 2019--   Feels stronger now vs sev years ago--   Review of Systems   Respiratory: Negative for shortness of breath. Cardiovascular: Negative for chest pain. Gastrointestinal: Negative for abdominal pain. Objective:   Physical Exam   Constitutional: He appears well-developed. HENT:   Head: Normocephalic. Eyes: Pupils are equal, round, and reactive to light. Neck: Normal range of motion. Cardiovascular: Normal rate and normal heart sounds. Pulmonary/Chest: Effort normal. He has wheezes. He has no rales. Wheeze after RUBY and exertion    Genitourinary: Rectum normal. Rectal exam shows guaiac negative stool. Genitourinary Comments: Tr bph--no masses    Musculoskeletal: He exhibits no edema. Neurological: He is alert. Skin: Skin is warm. Assessment:      1. Simple chronic bronchitis ([address] --cont to see dr Edda Flood     2. Essential hypertension --Continue current therapy      3.  Hypercholesterolemia --Continue current therapy      UTD on immin ands colo--  Amena here in 6 mos   Ret for labs this week       Plan:

## 2017-10-09 ENCOUNTER — OFFICE VISIT (OUTPATIENT)
Dept: INTERNAL MEDICINE CLINIC | Age: 77
End: 2017-10-09

## 2017-10-09 VITALS
BODY MASS INDEX: 30.61 KG/M2 | OXYGEN SATURATION: 96 % | HEIGHT: 67 IN | DIASTOLIC BLOOD PRESSURE: 62 MMHG | WEIGHT: 195 LBS | SYSTOLIC BLOOD PRESSURE: 128 MMHG | RESPIRATION RATE: 24 BRPM | HEART RATE: 92 BPM

## 2017-10-09 DIAGNOSIS — E78.00 HYPERCHOLESTEROLEMIA: ICD-10-CM

## 2017-10-09 DIAGNOSIS — N40.0 BENIGN NON-NODULAR PROSTATIC HYPERPLASIA WITHOUT LOWER URINARY TRACT SYMPTOMS: ICD-10-CM

## 2017-10-09 DIAGNOSIS — I10 ESSENTIAL HYPERTENSION: ICD-10-CM

## 2017-10-09 DIAGNOSIS — J41.0 SIMPLE CHRONIC BRONCHITIS (HCC): Primary | ICD-10-CM

## 2017-10-09 PROCEDURE — G8417 CALC BMI ABV UP PARAM F/U: HCPCS | Performed by: INTERNAL MEDICINE

## 2017-10-09 PROCEDURE — 1036F TOBACCO NON-USER: CPT | Performed by: INTERNAL MEDICINE

## 2017-10-09 PROCEDURE — 1123F ACP DISCUSS/DSCN MKR DOCD: CPT | Performed by: INTERNAL MEDICINE

## 2017-10-09 PROCEDURE — G8926 SPIRO NO PERF OR DOC: HCPCS | Performed by: INTERNAL MEDICINE

## 2017-10-09 PROCEDURE — G8428 CUR MEDS NOT DOCUMENT: HCPCS | Performed by: INTERNAL MEDICINE

## 2017-10-09 PROCEDURE — 4040F PNEUMOC VAC/ADMIN/RCVD: CPT | Performed by: INTERNAL MEDICINE

## 2017-10-09 PROCEDURE — G8484 FLU IMMUNIZE NO ADMIN: HCPCS | Performed by: INTERNAL MEDICINE

## 2017-10-09 PROCEDURE — 3023F SPIROM DOC REV: CPT | Performed by: INTERNAL MEDICINE

## 2017-10-09 PROCEDURE — 99214 OFFICE O/P EST MOD 30 MIN: CPT | Performed by: INTERNAL MEDICINE

## 2017-10-09 RX ORDER — FLUTICASONE PROPIONATE 50 MCG
1 SPRAY, SUSPENSION (ML) NASAL DAILY
Status: ON HOLD | COMMUNITY
End: 2018-02-04

## 2017-10-13 LAB
A/G RATIO: 1.3 (ref 1.1–2.2)
ALBUMIN SERPL-MCNC: 4 G/DL (ref 3.4–5)
ALP BLD-CCNC: 84 U/L (ref 40–129)
ALT SERPL-CCNC: 20 U/L (ref 10–40)
ANION GAP SERPL CALCULATED.3IONS-SCNC: 13 MMOL/L (ref 3–16)
AST SERPL-CCNC: 15 U/L (ref 15–37)
BILIRUB SERPL-MCNC: 0.6 MG/DL (ref 0–1)
BUN BLDV-MCNC: 14 MG/DL (ref 7–20)
CALCIUM SERPL-MCNC: 10 MG/DL (ref 8.3–10.6)
CHLORIDE BLD-SCNC: 98 MMOL/L (ref 99–110)
CHOLESTEROL, TOTAL: 161 MG/DL (ref 0–199)
CO2: 28 MMOL/L (ref 21–32)
CREAT SERPL-MCNC: 0.9 MG/DL (ref 0.8–1.3)
GFR AFRICAN AMERICAN: >60
GFR NON-AFRICAN AMERICAN: >60
GLOBULIN: 3 G/DL
GLUCOSE BLD-MCNC: 107 MG/DL (ref 70–99)
HDLC SERPL-MCNC: 49 MG/DL (ref 40–60)
LDL CHOLESTEROL CALCULATED: 92 MG/DL
POTASSIUM SERPL-SCNC: 4.6 MMOL/L (ref 3.5–5.1)
PROSTATE SPECIFIC ANTIGEN: 0.76 NG/ML (ref 0–4)
SODIUM BLD-SCNC: 139 MMOL/L (ref 136–145)
TOTAL PROTEIN: 7 G/DL (ref 6.4–8.2)
TRIGL SERPL-MCNC: 98 MG/DL (ref 0–150)
VLDLC SERPL CALC-MCNC: 20 MG/DL

## 2017-11-13 DIAGNOSIS — J41.0 SIMPLE CHRONIC BRONCHITIS (HCC): ICD-10-CM

## 2017-11-13 DIAGNOSIS — J45.909 UNCOMPLICATED ASTHMA: ICD-10-CM

## 2017-11-13 RX ORDER — MONTELUKAST SODIUM 10 MG/1
TABLET ORAL
Qty: 90 TABLET | Refills: 3 | Status: SHIPPED | OUTPATIENT
Start: 2017-11-13 | End: 2018-10-29 | Stop reason: SDUPTHER

## 2017-12-08 ENCOUNTER — OFFICE VISIT (OUTPATIENT)
Dept: INTERNAL MEDICINE CLINIC | Age: 77
End: 2017-12-08

## 2017-12-08 VITALS
SYSTOLIC BLOOD PRESSURE: 125 MMHG | WEIGHT: 195.2 LBS | OXYGEN SATURATION: 96 % | TEMPERATURE: 97.6 F | BODY MASS INDEX: 30.64 KG/M2 | DIASTOLIC BLOOD PRESSURE: 60 MMHG | HEART RATE: 83 BPM | RESPIRATION RATE: 16 BRPM | HEIGHT: 67 IN

## 2017-12-08 DIAGNOSIS — J01.80 OTHER ACUTE SINUSITIS, RECURRENCE NOT SPECIFIED: Primary | ICD-10-CM

## 2017-12-08 DIAGNOSIS — J41.0 SIMPLE CHRONIC BRONCHITIS (HCC): ICD-10-CM

## 2017-12-08 PROCEDURE — 99213 OFFICE O/P EST LOW 20 MIN: CPT | Performed by: INTERNAL MEDICINE

## 2017-12-08 RX ORDER — AMOXICILLIN AND CLAVULANATE POTASSIUM 875; 125 MG/1; MG/1
1 TABLET, FILM COATED ORAL 2 TIMES DAILY
Qty: 20 TABLET | Refills: 0 | Status: SHIPPED | OUTPATIENT
Start: 2017-12-08 | End: 2017-12-18

## 2017-12-08 ASSESSMENT — ENCOUNTER SYMPTOMS
SINUS PAIN: 1
COUGH: 0
GASTROINTESTINAL NEGATIVE: 1
EYES NEGATIVE: 1
WHEEZING: 1
HEMOPTYSIS: 0
SPUTUM PRODUCTION: 0
SHORTNESS OF BREATH: 1

## 2017-12-08 NOTE — PATIENT INSTRUCTIONS
(Tylenol) can be harmful. · Breathe warm, moist air from a steamy shower, a hot bath, or a sink filled with hot water. Avoid cold, dry air. Using a humidifier in your home may help. Follow the directions for cleaning the machine. · Use saline (saltwater) nasal washes to help keep your nasal passages open and wash out mucus and bacteria. You can buy saline nose drops at a grocery store or drugstore. Or you can make your own at home by adding 1 teaspoon of salt and 1 teaspoon of baking soda to 2 cups of distilled water. If you make your own, fill a bulb syringe with the solution, insert the tip into your nostril, and squeeze gently. Kelleen Lewis your nose. · Put a hot, wet towel or a warm gel pack on your face 3 or 4 times a day for 5 to 10 minutes each time. · Try a decongestant nasal spray like oxymetazoline (Afrin). Do not use it for more than 3 days in a row. Using it for more than 3 days can make your congestion worse. When should you call for help? Call your doctor now or seek immediate medical care if:  ? · You have new or worse swelling or redness in your face or around your eyes. ? · You have a new or higher fever. ? Watch closely for changes in your health, and be sure to contact your doctor if:  ? · You have new or worse facial pain. ? · The mucus from your nose becomes thicker (like pus) or has new blood in it. ? · You are not getting better as expected. Where can you learn more? Go to https://emerepeNewforma.[employer]. org and sign in to your [employer] account. Enter Z228 in the [facility] box to learn more about \"Sinusitis: Care Instructions. \"     If you do not have an account, please click on the \"Sign Up Now\" link. Current as of: May 12, 2017  Content Version: 11.4  © 9183-8203 Healthwise, Incorporated. Care instructions adapted under license by [facility] ([facility]).  If you have questions about a medical condition or this instruction, always ask your healthcare professional. [employer] disclaims any warranty or liability for your use of this information.

## 2017-12-08 NOTE — PROGRESS NOTES
OUTPATIENT PROGRESS NOTE    Date of Service:  12/8/2017  Address: [address] [place] [address] INTERNAL MEDICINE  [address] [address]  Dept: [phone]    Subjective:      Patient ID: [id number]  Nicole Boss is a 69 y.o. male with:  Chief Complaint   Patient presents with    Sinus Problem     C/o nasal drainage going on for 5 days. Associated with chest congestion, SOB, sinus pressure, watery eyes and sore throat. Pt does have a hx of COPD and all of the drainage is making it hard for him to catch his breath. HPI: Carlos Harper comes in with nasal drainage going on for 5 days. Sinus pressure, watery eyes and sore throat. Pt does have a hx of COPD and all of the drainage is making it hard for him to catch his breath. Review of Systems   Constitutional: Positive for malaise/fatigue. Negative for chills, diaphoresis, fever and weight loss. HENT: Positive for congestion and sinus pain. Eyes: Negative. Respiratory: Positive for shortness of breath and wheezing. Negative for cough, hemoptysis and sputum production. Cardiovascular: Negative. Gastrointestinal: Negative. Genitourinary: Negative. Musculoskeletal: Negative. Skin: Negative. Neurological: Positive for weakness. Endo/Heme/Allergies: Negative. Psychiatric/Behavioral: Negative. Objective: Wt Readings from Last 3 Encounters:   12/08/17 195 lb 3.2 oz (88.5 kg)   10/09/17 195 lb (88.5 kg)   09/19/17 192 lb 3.2 oz (87.2 kg)     BP Readings from Last 3 Encounters:   12/08/17 125/60   10/09/17 128/62   09/19/17 130/64      Vitals:    12/08/17 1035   BP: 125/60   Site: Left Arm   Position: Sitting   Cuff Size: Large Adult   Pulse: 83   Resp: 16   Temp: 97.6 °F (36.4 °C)   TempSrc: Oral   SpO2: 96%   Weight: 195 lb 3.2 oz (88.5 kg)   Height: 5' 7\" (1.702 m)     Body mass index is 30.57 kg/m².     Physical Exam   HENT:   Nose: Right sinus exhibits maxillary sinus tenderness and frontal sinus tenderness. Left sinus exhibits maxillary sinus tenderness and frontal sinus tenderness. Pulmonary/Chest: He has decreased breath sounds in the right lower field and the left lower field. He has wheezes in the right upper field, the right middle field, the left upper field and the left middle field. Assessment/Plan:      Encounter Diagnoses   Name Primary?  Other acute sinusitis, recurrence not specified Yes    Simple chronic bronchitis ([address]        1. Other acute sinusitis, recurrence not specified  He has prednisone at home, advised to start if sx worsen, augmentin 10 days, sinus rinse  - amoxicillin-clavulanate (AUGMENTIN) 875-125 MG per tablet; Take 1 tablet by mouth 2 times daily for 10 days  Dispense: 20 tablet; Refill: 0    2. Simple chronic bronchitis (HCC)  Cont inhalers at home. - amoxicillin-clavulanate (AUGMENTIN) 875-125 MG per tablet; Take 1 tablet by mouth 2 times daily for 10 days  Dispense: 20 tablet; Refill: 0        Additional Orders:      No orders of the defined types were placed in this encounter. Orders Placed This Encounter   Medications    amoxicillin-clavulanate (AUGMENTIN) 875-125 MG per tablet     Sig: Take 1 tablet by mouth 2 times daily for 10 days     Dispense:  20 tablet     Refill:  0       DISPOSITION:      Return if symptoms worsen or fail to improve.   1. Greater than 15 minutes spent with patient and >10 minutes on medication dosing, use and lifestyle modifications, home safety    Na Alba MD

## 2017-12-09 ENCOUNTER — TELEPHONE (OUTPATIENT)
Dept: INTERNAL MEDICINE CLINIC | Age: 77
End: 2017-12-09

## 2017-12-09 NOTE — TELEPHONE ENCOUNTER
I have returned pt call and advised to start taking prednisone after antibiotics if not feeling better. Message was left on voicemail for pt to call in Monday if any questions.

## 2017-12-12 ENCOUNTER — TELEPHONE (OUTPATIENT)
Dept: INTERNAL MEDICINE CLINIC | Age: 77
End: 2017-12-12

## 2017-12-12 RX ORDER — PREDNISONE 10 MG/1
TABLET ORAL
Qty: 28 TABLET | Refills: 0 | Status: SHIPPED | OUTPATIENT
Start: 2017-12-12 | End: 2017-12-22

## 2017-12-12 NOTE — TELEPHONE ENCOUNTER
Pt was seen a few days ago by Dr. Danilo Shelton for chest congestion and told dr he had some Prednisone left at home but pt states he actually only had a few pills left at home and he is out of town and asking if  will call the script to [facility] at [phone], pt is asking if Dr Fabiana De Oliveira will address his message today because pt and his wife will only be near this pharmacy for today only, if  has any questions for pt he can be reached on his cell phone at [phone].

## 2017-12-18 ENCOUNTER — TELEPHONE (OUTPATIENT)
Dept: PULMONOLOGY | Age: 77
End: 2017-12-18

## 2017-12-18 DIAGNOSIS — J44.9 COPD, VERY SEVERE (HCC): ICD-10-CM

## 2017-12-19 ENCOUNTER — OFFICE VISIT (OUTPATIENT)
Dept: PULMONOLOGY | Age: 77
End: 2017-12-19

## 2017-12-19 VITALS
HEIGHT: 67 IN | DIASTOLIC BLOOD PRESSURE: 64 MMHG | HEART RATE: 86 BPM | TEMPERATURE: 98.1 F | OXYGEN SATURATION: 95 % | BODY MASS INDEX: 30.76 KG/M2 | SYSTOLIC BLOOD PRESSURE: 126 MMHG | WEIGHT: 196 LBS | RESPIRATION RATE: 16 BRPM

## 2017-12-19 DIAGNOSIS — J01.90 ACUTE BACTERIAL SINUSITIS: Primary | ICD-10-CM

## 2017-12-19 DIAGNOSIS — J44.9 COPD, VERY SEVERE (HCC): ICD-10-CM

## 2017-12-19 DIAGNOSIS — G47.33 OSA (OBSTRUCTIVE SLEEP APNEA): ICD-10-CM

## 2017-12-19 DIAGNOSIS — K59.00 CONSTIPATION, UNSPECIFIED CONSTIPATION TYPE: ICD-10-CM

## 2017-12-19 DIAGNOSIS — B96.89 ACUTE BACTERIAL SINUSITIS: Primary | ICD-10-CM

## 2017-12-19 PROCEDURE — 3023F SPIROM DOC REV: CPT | Performed by: INTERNAL MEDICINE

## 2017-12-19 PROCEDURE — 99214 OFFICE O/P EST MOD 30 MIN: CPT | Performed by: INTERNAL MEDICINE

## 2017-12-19 PROCEDURE — G8427 DOCREV CUR MEDS BY ELIG CLIN: HCPCS | Performed by: INTERNAL MEDICINE

## 2017-12-19 PROCEDURE — 1123F ACP DISCUSS/DSCN MKR DOCD: CPT | Performed by: INTERNAL MEDICINE

## 2017-12-19 PROCEDURE — 4040F PNEUMOC VAC/ADMIN/RCVD: CPT | Performed by: INTERNAL MEDICINE

## 2017-12-19 PROCEDURE — G8484 FLU IMMUNIZE NO ADMIN: HCPCS | Performed by: INTERNAL MEDICINE

## 2017-12-19 PROCEDURE — 1036F TOBACCO NON-USER: CPT | Performed by: INTERNAL MEDICINE

## 2017-12-19 PROCEDURE — G8417 CALC BMI ABV UP PARAM F/U: HCPCS | Performed by: INTERNAL MEDICINE

## 2017-12-19 PROCEDURE — G8926 SPIRO NO PERF OR DOC: HCPCS | Performed by: INTERNAL MEDICINE

## 2017-12-19 RX ORDER — AMOXICILLIN 250 MG
2 CAPSULE ORAL DAILY PRN
Qty: 60 TABLET | Refills: 0 | Status: ON HOLD | OUTPATIENT
Start: 2017-12-19 | End: 2018-02-04

## 2017-12-19 RX ORDER — AMOXICILLIN AND CLAVULANATE POTASSIUM 875; 125 MG/1; MG/1
1 TABLET, FILM COATED ORAL 2 TIMES DAILY
Qty: 20 TABLET | Refills: 0 | Status: SHIPPED | OUTPATIENT
Start: 2017-12-19 | End: 2017-12-29

## 2017-12-19 NOTE — PROGRESS NOTES
REASON FOR CONSULTATION/CC: ZAHEER      CONSULTING PHYSICIAN: Kaden Nassar MD   PCP: Kaden Nassar MD    HISTORY OF PRESENT ILLNESS: Charlie Huston is a 69y.o. year old male with a history of ZAHEER who presents :     Sleep history:               He is having increased sinus congestion and discharged. treatment with antibiotics and prednisone with 12 day taper. Had significant side effects with pred with bloating, vision changes, difficulty with urinating. Using Flonase  And nasal ipratropium  At this time. ipratropium  Helps a lot. Using mucinex with green phlegm. Copd. Spiriva Dulera theophylline and albuterol prn.     Zaheer. Using cpap. Diamond Sleepiness Scale:    Sleep Medicine 9/19/2017 6/27/2017 3/29/2017 11/28/2016 5/23/2016 11/12/2015 6/9/2015   Sitting and reading 1 1 1 1 1 1 1   Watching TV 1 1 1 2 1 1 1   Sitting, inactive in a public place (e.g. a theatre or a meeting) 0 1 1 0 1 1 1   As a passenger in a car for an hour without a break 0 1 1 0 1 1 1   Lying down to rest in the afternoon when circumstances permit 0 0 1 0 0 1 1   Sitting and talking to someone 0 0 1 0 0 1 1   Sitting quietly after a lunch without alcohol 0 0 1 0 0 1 1   In a car, while stopped for a few minutes in traffic 0 0 1 0 0 1 1   Total score 2 4 8 3 4 8 8   Neck circumference 19 20.5 19.75 - - - -         0 = no chance of dozing  1 = slight chance of dozing  2 = moderate chance of dozing  3 = high chance of dozing    Interpretation:   0-7: It is unlikely that you are abnormally sleepy. 8-9:You have an average amount of daytime sleepiness. 10-15: You may be excessively sleepy depending on the situation. You may want to consider   seeking medical attention. 16-24: You are excessively sleepy and should consider seeking medical attention      PAST MEDICAL HISTORY:  Past Medical History:   Diagnosis Date    Basal cell carcinoma of skin 06/16/2017    right cheek    COPD (chronic obstructive pulmonary

## 2018-01-08 ENCOUNTER — TELEPHONE (OUTPATIENT)
Dept: INTERNAL MEDICINE CLINIC | Age: 78
End: 2018-01-08

## 2018-01-08 DIAGNOSIS — J32.1 CHRONIC FRONTAL SINUSITIS: ICD-10-CM

## 2018-01-08 RX ORDER — IPRATROPIUM BROMIDE 21 UG/1
2 SPRAY, METERED NASAL 4 TIMES DAILY
Qty: 1 BOTTLE | Refills: 3 | Status: SHIPPED | OUTPATIENT
Start: 2018-01-08 | End: 2018-01-09 | Stop reason: SDUPTHER

## 2018-01-09 DIAGNOSIS — J32.1 CHRONIC FRONTAL SINUSITIS: ICD-10-CM

## 2018-01-09 RX ORDER — IPRATROPIUM BROMIDE 21 UG/1
2 SPRAY, METERED NASAL 4 TIMES DAILY
Qty: 3 BOTTLE | Refills: 1 | Status: SHIPPED | OUTPATIENT
Start: 2018-01-09 | End: 2018-08-19 | Stop reason: SDUPTHER

## 2018-01-12 DIAGNOSIS — J44.9 COPD, VERY SEVERE (HCC): ICD-10-CM

## 2018-01-12 RX ORDER — ALBUTEROL SULFATE 2.5 MG/3ML
2.5 SOLUTION RESPIRATORY (INHALATION) EVERY 6 HOURS PRN
Qty: 120 EACH | Refills: 3 | Status: SHIPPED | OUTPATIENT
Start: 2018-01-12 | End: 2018-01-31 | Stop reason: SDUPTHER

## 2018-01-17 ENCOUNTER — HOSPITAL ENCOUNTER (OUTPATIENT)
Dept: OTHER | Age: 78
Discharge: HOME | End: 2018-01-17
Attending: INTERNAL MEDICINE | Admitting: INTERNAL MEDICINE

## 2018-01-17 ENCOUNTER — OFFICE VISIT (OUTPATIENT)
Dept: INTERNAL MEDICINE CLINIC | Age: 78
End: 2018-01-17

## 2018-01-17 VITALS
TEMPERATURE: 97.7 F | DIASTOLIC BLOOD PRESSURE: 60 MMHG | OXYGEN SATURATION: 98 % | RESPIRATION RATE: 16 BRPM | SYSTOLIC BLOOD PRESSURE: 146 MMHG | HEART RATE: 102 BPM

## 2018-01-17 DIAGNOSIS — I10 ESSENTIAL HYPERTENSION: ICD-10-CM

## 2018-01-17 DIAGNOSIS — J41.0 SIMPLE CHRONIC BRONCHITIS (HCC): Primary | ICD-10-CM

## 2018-01-17 DIAGNOSIS — R68.89 FLU-LIKE SYMPTOMS: ICD-10-CM

## 2018-01-17 DIAGNOSIS — J41.0 SIMPLE CHRONIC BRONCHITIS (HCC): ICD-10-CM

## 2018-01-17 PROCEDURE — 3023F SPIROM DOC REV: CPT | Performed by: INTERNAL MEDICINE

## 2018-01-17 PROCEDURE — 1036F TOBACCO NON-USER: CPT | Performed by: INTERNAL MEDICINE

## 2018-01-17 PROCEDURE — G8428 CUR MEDS NOT DOCUMENT: HCPCS | Performed by: INTERNAL MEDICINE

## 2018-01-17 PROCEDURE — 1123F ACP DISCUSS/DSCN MKR DOCD: CPT | Performed by: INTERNAL MEDICINE

## 2018-01-17 PROCEDURE — G8484 FLU IMMUNIZE NO ADMIN: HCPCS | Performed by: INTERNAL MEDICINE

## 2018-01-17 PROCEDURE — 99213 OFFICE O/P EST LOW 20 MIN: CPT | Performed by: INTERNAL MEDICINE

## 2018-01-17 PROCEDURE — 4040F PNEUMOC VAC/ADMIN/RCVD: CPT | Performed by: INTERNAL MEDICINE

## 2018-01-17 PROCEDURE — G8417 CALC BMI ABV UP PARAM F/U: HCPCS | Performed by: INTERNAL MEDICINE

## 2018-01-17 PROCEDURE — G8926 SPIRO NO PERF OR DOC: HCPCS | Performed by: INTERNAL MEDICINE

## 2018-01-17 RX ORDER — PROMETHAZINE HYDROCHLORIDE AND CODEINE PHOSPHATE 6.25; 1 MG/5ML; MG/5ML
5 SYRUP ORAL EVERY 4 HOURS PRN
Qty: 120 ML | Refills: 1 | Status: SHIPPED | OUTPATIENT
Start: 2018-01-17 | End: 2018-01-24

## 2018-01-17 RX ORDER — PREDNISONE 10 MG/1
TABLET ORAL
Qty: 28 TABLET | Refills: 0 | Status: SHIPPED | OUTPATIENT
Start: 2018-01-17 | End: 2018-01-24

## 2018-01-17 RX ORDER — LEVOFLOXACIN 500 MG/1
500 TABLET, FILM COATED ORAL DAILY
Qty: 10 TABLET | Refills: 0 | Status: SHIPPED | OUTPATIENT
Start: 2018-01-17 | End: 2018-01-27

## 2018-01-17 ASSESSMENT — ENCOUNTER SYMPTOMS
SHORTNESS OF BREATH: 0
ABDOMINAL PAIN: 0

## 2018-01-17 NOTE — PROGRESS NOTES
Subjective:      Patient ID: Nehal Shen is a 69 y.o. male. HPI  1. Simple chronic bronchitis (HCC) --COPD--stable     2. Flu-like symptoms --fever and cough --yellow mucus--rt chest kamala--1 day-- fever--none-- took mucinex-- not achy--     3. Essential hypertension --Stable--no new issues      Nasal kamala 2 weeks ago--saw dr lemon--added pred and augmentin     Review of Systems   Respiratory: Negative for shortness of breath. Cardiovascular: Negative for chest pain. Gastrointestinal: Negative for abdominal pain. Objective:   Physical Exam   HENT:   Head: Normocephalic. Mod pnd--yellow mucus    Eyes: Pupils are equal, round, and reactive to light. Neck: Normal range of motion. Cardiovascular: Normal rate and normal heart sounds. Pulmonary/Chest: Effort normal. He has wheezes. He has no rales. Harsh cough    Abdominal: Soft. Musculoskeletal: He exhibits no edema. Neurological: He is alert. Assessment:      1. Simple chronic bronchitis ([address] --Continue current therapy      2. Flu-like symptoms --bronchitis---ck cxr---add pred taper and levaquin x 10 days and P&C    3.  Essential hypertension --Continue current therapy      [place] out if has pna --dwp   Wife with sim sx --rx also       Plan:

## 2018-01-24 ENCOUNTER — TELEPHONE (OUTPATIENT)
Dept: INTERNAL MEDICINE CLINIC | Age: 78
End: 2018-01-24

## 2018-01-24 ENCOUNTER — HOSPITAL ENCOUNTER (OUTPATIENT)
Dept: OTHER | Age: 78
Discharge: HOME | End: 2018-01-24
Attending: INTERNAL MEDICINE | Admitting: INTERNAL MEDICINE

## 2018-01-24 ENCOUNTER — OFFICE VISIT (OUTPATIENT)
Dept: INTERNAL MEDICINE CLINIC | Age: 78
End: 2018-01-24

## 2018-01-24 VITALS
OXYGEN SATURATION: 95 % | HEART RATE: 112 BPM | TEMPERATURE: 98.2 F | SYSTOLIC BLOOD PRESSURE: 160 MMHG | RESPIRATION RATE: 24 BRPM | DIASTOLIC BLOOD PRESSURE: 72 MMHG

## 2018-01-24 DIAGNOSIS — J44.1 COPD EXACERBATION (HCC): ICD-10-CM

## 2018-01-24 DIAGNOSIS — R06.02 SHORTNESS OF BREATH: ICD-10-CM

## 2018-01-24 DIAGNOSIS — J45.909 UNCOMPLICATED ASTHMA, UNSPECIFIED ASTHMA SEVERITY, UNSPECIFIED WHETHER PERSISTENT: ICD-10-CM

## 2018-01-24 DIAGNOSIS — J44.1 COPD EXACERBATION (HCC): Primary | ICD-10-CM

## 2018-01-24 PROCEDURE — G8428 CUR MEDS NOT DOCUMENT: HCPCS | Performed by: INTERNAL MEDICINE

## 2018-01-24 PROCEDURE — G8484 FLU IMMUNIZE NO ADMIN: HCPCS | Performed by: INTERNAL MEDICINE

## 2018-01-24 PROCEDURE — 3023F SPIROM DOC REV: CPT | Performed by: INTERNAL MEDICINE

## 2018-01-24 PROCEDURE — 4040F PNEUMOC VAC/ADMIN/RCVD: CPT | Performed by: INTERNAL MEDICINE

## 2018-01-24 PROCEDURE — 99214 OFFICE O/P EST MOD 30 MIN: CPT | Performed by: INTERNAL MEDICINE

## 2018-01-24 PROCEDURE — 1036F TOBACCO NON-USER: CPT | Performed by: INTERNAL MEDICINE

## 2018-01-24 PROCEDURE — 1123F ACP DISCUSS/DSCN MKR DOCD: CPT | Performed by: INTERNAL MEDICINE

## 2018-01-24 PROCEDURE — G8417 CALC BMI ABV UP PARAM F/U: HCPCS | Performed by: INTERNAL MEDICINE

## 2018-01-24 PROCEDURE — G8926 SPIRO NO PERF OR DOC: HCPCS | Performed by: INTERNAL MEDICINE

## 2018-01-24 RX ORDER — AMOXICILLIN AND CLAVULANATE POTASSIUM 875; 125 MG/1; MG/1
1 TABLET, FILM COATED ORAL 2 TIMES DAILY
Qty: 20 TABLET | Refills: 0 | Status: SHIPPED | OUTPATIENT
Start: 2018-01-24 | End: 2018-02-04 | Stop reason: ALTCHOICE

## 2018-01-24 RX ORDER — PREDNISONE 10 MG/1
TABLET ORAL
Qty: 28 TABLET | Refills: 0 | Status: ON HOLD | OUTPATIENT
Start: 2018-01-24 | End: 2018-02-04 | Stop reason: ALTCHOICE

## 2018-01-24 ASSESSMENT — ENCOUNTER SYMPTOMS
EYES NEGATIVE: 1
GASTROINTESTINAL NEGATIVE: 1
COUGH: 1
SHORTNESS OF BREATH: 1

## 2018-01-31 DIAGNOSIS — J44.9 COPD, VERY SEVERE (HCC): ICD-10-CM

## 2018-01-31 RX ORDER — ALBUTEROL SULFATE 2.5 MG/3ML
2.5 SOLUTION RESPIRATORY (INHALATION) EVERY 6 HOURS PRN
Qty: 120 EACH | Refills: 3 | Status: SHIPPED | OUTPATIENT
Start: 2018-01-31 | End: 2019-06-16 | Stop reason: SDUPTHER

## 2018-02-01 ENCOUNTER — TELEPHONE (OUTPATIENT)
Dept: INTERNAL MEDICINE CLINIC | Age: 78
End: 2018-02-01

## 2018-02-01 RX ORDER — OSELTAMIVIR PHOSPHATE 75 MG/1
75 CAPSULE ORAL DAILY
Qty: 10 CAPSULE | Refills: 0 | Status: ON HOLD | OUTPATIENT
Start: 2018-02-01 | End: 2018-02-13 | Stop reason: HOSPADM

## 2018-02-03 ENCOUNTER — TELEPHONE (OUTPATIENT)
Dept: INTERNAL MEDICINE CLINIC | Age: 78
End: 2018-02-03

## 2018-02-04 PROBLEM — J40 BRONCHITIS: Status: ACTIVE | Noted: 2018-02-04

## 2018-02-05 PROBLEM — R93.89 ABNORMAL CT OF THE CHEST: Status: ACTIVE | Noted: 2018-02-05

## 2018-02-05 PROBLEM — J44.9 COPD, VERY SEVERE (HCC): Status: ACTIVE | Noted: 2018-02-05

## 2018-02-05 PROBLEM — J43.2 CENTRILOBULAR EMPHYSEMA (HCC): Status: ACTIVE | Noted: 2018-02-05

## 2018-02-05 PROBLEM — R91.8 PULMONARY NODULES: Status: ACTIVE | Noted: 2018-02-05

## 2018-02-08 PROBLEM — J18.9 ATYPICAL PNEUMONIA: Status: ACTIVE | Noted: 2018-02-08

## 2018-02-08 PROBLEM — A49.8 INFECTION DUE TO STENOTROPHOMONAS MALTOPHILIA: Status: ACTIVE | Noted: 2018-02-08

## 2018-02-12 ENCOUNTER — TELEPHONE (OUTPATIENT)
Dept: INTERNAL MEDICINE CLINIC | Age: 78
End: 2018-02-12

## 2018-02-14 ENCOUNTER — CARE COORDINATION (OUTPATIENT)
Dept: CASE MANAGEMENT | Age: 78
End: 2018-02-14

## 2018-02-14 DIAGNOSIS — J18.9 ATYPICAL PNEUMONIA: Primary | ICD-10-CM

## 2018-02-14 PROCEDURE — 1111F DSCHRG MED/CURRENT MED MERGE: CPT

## 2018-02-22 ENCOUNTER — TELEPHONE (OUTPATIENT)
Dept: FAMILY MEDICINE CLINIC | Age: 78
End: 2018-02-22

## 2018-02-22 ENCOUNTER — CARE COORDINATION (OUTPATIENT)
Dept: CASE MANAGEMENT | Age: 78
End: 2018-02-22

## 2018-02-22 NOTE — TELEPHONE ENCOUNTER
Struggles at night with breathing when he is up and around. Order for  Evaluation for pt and ot.     Please advise

## 2018-02-22 NOTE — CARE COORDINATION
[facility] Transitions Follow Up Call    2018    Patient: Suraj Primus  Patient : 1940   MRN: [id number]  Reason for Admission: There are no discharge diagnoses documented for the most recent discharge. Discharge Date: 18 RARS: Risk Score: 21.5       Spoke with: Lia Wong (patient)    Care Transitions Subsequent and Final Call    Subsequent and Final Calls  Do you have any ongoing symptoms?:  Yes  Onset of Patient-reported symptoms: In the past 7 days  Patient-reported symptoms:  Shortness of Breath  Interventions for patient-reported symptoms:  Notified PCP/Physician, Notified Home Care  Have your medications changed?:  No  Do you have any questions related to your medications?:  No  Do you currently have any active services?:  Yes  Are you currently active with any services?:  Home Health  Do you have any needs or concerns that I can assist you with?:  No  Identified Barriers:  None  Care Transitions Interventions  Other Interventions: Follow Up: Spoke with Jose Salazar. He states he is doing okay - he feels that in general he is improving a little each day. [facility] remains active - he is expecting a caregiver any minute now. Jose Salazar states he is always SOB - he thinks it might be worse than before coming to the hospital. Writer suggested we call the physician - Jose Salazar declines. He states he is taking the meds as ordered - he sees his PCP next week - and he is using the [facility] for treatments 4 times per day with relief. Jose Salazar uses his cpap routinely. Jose Salazar denies any needs or concerns for writer today. He is agreeable to f/u per CTC.   Future Appointments  Date Time Provider Alka Fregoso   2018 4:45 PM Jamel Estrada MD [place] [facility]   3/14/2018 8:15 AM Lisy Stack MD [facility]   2018 2:20 PM Shel[facility] Hoffman [facility]       Radha Urena RN

## 2018-02-24 ASSESSMENT — ENCOUNTER SYMPTOMS
SHORTNESS OF BREATH: 0
ABDOMINAL PAIN: 0

## 2018-02-27 ENCOUNTER — OFFICE VISIT (OUTPATIENT)
Dept: INTERNAL MEDICINE CLINIC | Age: 78
End: 2018-02-27

## 2018-02-27 ENCOUNTER — CARE COORDINATION (OUTPATIENT)
Dept: CASE MANAGEMENT | Age: 78
End: 2018-02-27

## 2018-02-27 VITALS
RESPIRATION RATE: 24 BRPM | HEART RATE: 115 BPM | BODY MASS INDEX: 30.13 KG/M2 | HEIGHT: 67 IN | SYSTOLIC BLOOD PRESSURE: 134 MMHG | DIASTOLIC BLOOD PRESSURE: 62 MMHG | WEIGHT: 192 LBS | OXYGEN SATURATION: 95 %

## 2018-02-27 DIAGNOSIS — B44.9 ASPERGILLUS PNEUMONIA (HCC): ICD-10-CM

## 2018-02-27 DIAGNOSIS — J96.01 ACUTE RESPIRATORY FAILURE WITH HYPOXEMIA (HCC): Primary | ICD-10-CM

## 2018-02-27 DIAGNOSIS — I10 ESSENTIAL HYPERTENSION: ICD-10-CM

## 2018-02-27 PROCEDURE — 99495 TRANSJ CARE MGMT MOD F2F 14D: CPT | Performed by: INTERNAL MEDICINE

## 2018-03-02 ENCOUNTER — TELEPHONE (OUTPATIENT)
Dept: INTERNAL MEDICINE CLINIC | Age: 78
End: 2018-03-02

## 2018-03-02 NOTE — TELEPHONE ENCOUNTER
Wife is concerned that 2 lpm is not enough oxygen. Don's level was 89 today after use.     Dr. Sushma Carlson said it is okay to increase to 3 LPM.    Wife advised

## 2018-03-05 ENCOUNTER — TELEPHONE (OUTPATIENT)
Dept: INFECTIOUS DISEASES | Age: 78
End: 2018-03-05

## 2018-03-05 ENCOUNTER — CARE COORDINATION (OUTPATIENT)
Dept: CASE MANAGEMENT | Age: 78
End: 2018-03-05

## 2018-03-05 RX ORDER — VORICONAZOLE 200 MG/1
200 TABLET, FILM COATED ORAL 2 TIMES DAILY
Qty: 60 TABLET | Refills: 6 | Status: SHIPPED | OUTPATIENT
Start: 2018-03-05 | End: 2018-04-04

## 2018-03-05 NOTE — CARE COORDINATION
[facility] Trans[facility] Follow Up Call    3/5/2018    Patient: Marii Beckham  Patient : 1940   MRN: [id number]  Reason for Admission: There are no discharge diagnoses documented for the most recent discharge. Discharge Date: 18 RARS: Risk Score: 21.5       Spoke with: no one    Care Transitions Subsequent and Final Call    Subsequent and Final Calls  Are you currently active with any services?:  Home Health  Care Transitions Interventions  Other Interventions: Follow Up: Unable to reach patient. Left message. Informed Francineon Obey this would be the final transition call. Encouraged him to call PCP with any future issues or concerns. Call placed to [facility] to discuss patient.   Future Appointments  Date Time Provider Alka Fregoso   3/14/2018 8:15 AM Mandy Restrepo MD [facility]   3/27/2018 1:00 PM Linnea Carmen MD [place] [facility]   2018 2:20 PM Bernardino Mota MD [facility]       Jossy Grant RN

## 2018-03-09 DIAGNOSIS — A49.8 INFECTION DUE TO STENOTROPHOMONAS MALTOPHILIA: ICD-10-CM

## 2018-03-09 DIAGNOSIS — R93.89 ABNORMAL CT OF THE CHEST: ICD-10-CM

## 2018-03-09 DIAGNOSIS — B44.9 ASPERGILLUS PNEUMONIA (HCC): ICD-10-CM

## 2018-03-09 LAB
A/G RATIO: 1.5 (ref 1.1–2.2)
ALBUMIN SERPL-MCNC: 4.3 G/DL (ref 3.4–5)
ALP BLD-CCNC: 95 U/L (ref 40–129)
ALT SERPL-CCNC: 22 U/L (ref 10–40)
ANION GAP SERPL CALCULATED.3IONS-SCNC: 16 MMOL/L (ref 3–16)
AST SERPL-CCNC: 15 U/L (ref 15–37)
BASOPHILS ABSOLUTE: 0.1 K/UL (ref 0–0.2)
BASOPHILS RELATIVE PERCENT: 1 %
BILIRUB SERPL-MCNC: <0.2 MG/DL (ref 0–1)
BUN BLDV-MCNC: 9 MG/DL (ref 7–20)
CALCIUM SERPL-MCNC: 9.9 MG/DL (ref 8.3–10.6)
CHLORIDE BLD-SCNC: 99 MMOL/L (ref 99–110)
CO2: 27 MMOL/L (ref 21–32)
CREAT SERPL-MCNC: 0.8 MG/DL (ref 0.8–1.3)
EOSINOPHILS ABSOLUTE: 0.2 K/UL (ref 0–0.6)
EOSINOPHILS RELATIVE PERCENT: 2 %
GFR AFRICAN AMERICAN: >60
GFR NON-AFRICAN AMERICAN: >60
GLOBULIN: 2.8 G/DL
GLUCOSE BLD-MCNC: 185 MG/DL (ref 70–99)
HCT VFR BLD CALC: 43.1 % (ref 40.5–52.5)
HEMOGLOBIN: 14.6 G/DL (ref 13.5–17.5)
LYMPHOCYTES ABSOLUTE: 2.6 K/UL (ref 1–5.1)
LYMPHOCYTES RELATIVE PERCENT: 21.3 %
MCH RBC QN AUTO: 29.7 PG (ref 26–34)
MCHC RBC AUTO-ENTMCNC: 33.8 G/DL (ref 31–36)
MCV RBC AUTO: 88.1 FL (ref 80–100)
MONOCYTES ABSOLUTE: 1.4 K/UL (ref 0–1.3)
MONOCYTES RELATIVE PERCENT: 11.3 %
NEUTROPHILS ABSOLUTE: 7.9 K/UL (ref 1.7–7.7)
NEUTROPHILS RELATIVE PERCENT: 64.4 %
PDW BLD-RTO: 14.6 % (ref 12.4–15.4)
PLATELET # BLD: 613 K/UL (ref 135–450)
PMV BLD AUTO: 6.7 FL (ref 5–10.5)
POTASSIUM SERPL-SCNC: 4.9 MMOL/L (ref 3.5–5.1)
RBC # BLD: 4.89 M/UL (ref 4.2–5.9)
SODIUM BLD-SCNC: 142 MMOL/L (ref 136–145)
TOTAL PROTEIN: 7.1 G/DL (ref 6.4–8.2)
WBC # BLD: 12.3 K/UL (ref 4–11)

## 2018-03-14 ENCOUNTER — OFFICE VISIT (OUTPATIENT)
Dept: INFECTIOUS DISEASES | Age: 78
End: 2018-03-14

## 2018-03-14 VITALS
HEIGHT: 67 IN | BODY MASS INDEX: 30.13 KG/M2 | DIASTOLIC BLOOD PRESSURE: 64 MMHG | SYSTOLIC BLOOD PRESSURE: 128 MMHG | HEART RATE: 101 BPM | OXYGEN SATURATION: 95 % | WEIGHT: 192 LBS | TEMPERATURE: 97.5 F

## 2018-03-14 DIAGNOSIS — R93.89 ABNORMAL CT OF THE CHEST: ICD-10-CM

## 2018-03-14 DIAGNOSIS — B44.9 ASPERGILLUS PNEUMONIA (HCC): Primary | ICD-10-CM

## 2018-03-14 DIAGNOSIS — J44.1 ACUTE EXACERBATION OF CHRONIC OBSTRUCTIVE PULMONARY DISEASE (COPD) (HCC): ICD-10-CM

## 2018-03-14 PROCEDURE — 1036F TOBACCO NON-USER: CPT | Performed by: INTERNAL MEDICINE

## 2018-03-14 PROCEDURE — G8417 CALC BMI ABV UP PARAM F/U: HCPCS | Performed by: INTERNAL MEDICINE

## 2018-03-14 PROCEDURE — 99214 OFFICE O/P EST MOD 30 MIN: CPT | Performed by: INTERNAL MEDICINE

## 2018-03-14 PROCEDURE — G8427 DOCREV CUR MEDS BY ELIG CLIN: HCPCS | Performed by: INTERNAL MEDICINE

## 2018-03-14 PROCEDURE — 3023F SPIROM DOC REV: CPT | Performed by: INTERNAL MEDICINE

## 2018-03-14 PROCEDURE — 1123F ACP DISCUSS/DSCN MKR DOCD: CPT | Performed by: INTERNAL MEDICINE

## 2018-03-14 PROCEDURE — 1111F DSCHRG MED/CURRENT MED MERGE: CPT | Performed by: INTERNAL MEDICINE

## 2018-03-14 PROCEDURE — 4040F PNEUMOC VAC/ADMIN/RCVD: CPT | Performed by: INTERNAL MEDICINE

## 2018-03-14 PROCEDURE — G8926 SPIRO NO PERF OR DOC: HCPCS | Performed by: INTERNAL MEDICINE

## 2018-03-14 PROCEDURE — G8482 FLU IMMUNIZE ORDER/ADMIN: HCPCS | Performed by: INTERNAL MEDICINE

## 2018-03-23 ASSESSMENT — ENCOUNTER SYMPTOMS
SHORTNESS OF BREATH: 0
ABDOMINAL PAIN: 0

## 2018-03-27 ENCOUNTER — OFFICE VISIT (OUTPATIENT)
Dept: INTERNAL MEDICINE CLINIC | Age: 78
End: 2018-03-27

## 2018-03-27 VITALS
HEART RATE: 112 BPM | DIASTOLIC BLOOD PRESSURE: 64 MMHG | SYSTOLIC BLOOD PRESSURE: 154 MMHG | OXYGEN SATURATION: 90 % | RESPIRATION RATE: 28 BRPM

## 2018-03-27 DIAGNOSIS — R73.09 ABNORMAL GLUCOSE: ICD-10-CM

## 2018-03-27 DIAGNOSIS — I10 ESSENTIAL HYPERTENSION: ICD-10-CM

## 2018-03-27 DIAGNOSIS — E78.00 HYPERCHOLESTEROLEMIA: ICD-10-CM

## 2018-03-27 DIAGNOSIS — B44.9 ASPERGILLUS PNEUMONIA (HCC): Primary | ICD-10-CM

## 2018-03-27 LAB — HBA1C MFR BLD: 6.9 %

## 2018-03-27 PROCEDURE — 4040F PNEUMOC VAC/ADMIN/RCVD: CPT | Performed by: INTERNAL MEDICINE

## 2018-03-27 PROCEDURE — 1036F TOBACCO NON-USER: CPT | Performed by: INTERNAL MEDICINE

## 2018-03-27 PROCEDURE — G8428 CUR MEDS NOT DOCUMENT: HCPCS | Performed by: INTERNAL MEDICINE

## 2018-03-27 PROCEDURE — G8482 FLU IMMUNIZE ORDER/ADMIN: HCPCS | Performed by: INTERNAL MEDICINE

## 2018-03-27 PROCEDURE — G8417 CALC BMI ABV UP PARAM F/U: HCPCS | Performed by: INTERNAL MEDICINE

## 2018-03-27 PROCEDURE — 99214 OFFICE O/P EST MOD 30 MIN: CPT | Performed by: INTERNAL MEDICINE

## 2018-03-27 PROCEDURE — 1123F ACP DISCUSS/DSCN MKR DOCD: CPT | Performed by: INTERNAL MEDICINE

## 2018-03-27 PROCEDURE — 83036 HEMOGLOBIN GLYCOSYLATED A1C: CPT | Performed by: INTERNAL MEDICINE

## 2018-03-27 NOTE — PROGRESS NOTES
Component Value Date    WBC 12.3 (H) 03/09/2018    HGB 14.6 03/09/2018    HCT 43.1 03/09/2018    MCV 88.1 03/09/2018     (H) 03/09/2018     Lab Results   Component Value Date    CREATININE 0.8 03/09/2018    BUN 9 03/09/2018     03/09/2018    K 4.9 03/09/2018    CL 99 03/09/2018    CO2 27 03/09/2018       Hepatic Function Panel:   Lab Results   Component Value Date    ALKPHOS 95 03/09/2018    ALT 22 03/09/2018    AST 15 03/09/2018    PROT 7.1 03/09/2018    PROT 6.7 03/14/2013    BILITOT <0.2 03/09/2018    LABALBU 4.3 03/09/2018     UA:  Lab Results   Component Value Date    COLORU YELLOW 03/08/2017    CLARITYU Clear 03/08/2017    GLUCOSEU 500 03/08/2017    BILIRUBINUR Negative 03/08/2017    KETUA Negative 03/08/2017    SPECGRAV 1.030 03/08/2017    BLOODU Negative 03/08/2017    PHUR 6.5 03/08/2017    PROTEINU TRACE 03/08/2017    UROBILINOGEN 0.2 03/08/2017    NITRU Negative 03/08/2017    LEUKOCYTESUR Negative 03/08/2017    LABMICR YES 03/08/2017    URINETYPE Not Specified 03/08/2017        No orders to display     .  Lung, Bronchial Washing:  - No malignant cells identified. - Acute inflammation present.  - GMS stain is positive for fungal hyphae, see comment. B.  Lung, Left Lower Lobe, Bronchial Lavage:  - No malignant cells identified. - Acute inflammation present.  - GMS stain is negative for fungal and pneumocystis jirovecii organisms.         12/2018  7:43 AM - Pushpa Waldron Incoming Results [facility]     Component Results     Component Collected Lab   Fungus (Mycology) Culture 02/06/2018  8:54 AM [facility] Lab   Fungus Stain 02/06/2018  8:54 AM [address] Lab   No Fungal elements seen    Organism  (Abnormal) 02/06/2018  8:54 AM [facility] Lab   Aspergillus species     Fungus (Mycology) Culture 02/06/2018  8:54 AM  - [facility],[address] for   Sensitivities completed by [facility]   See reference report for sensitivity results     Organism  (Abnormal) 02/06/2018  8:54 AM

## 2018-03-28 ENCOUNTER — PATIENT MESSAGE (OUTPATIENT)
Dept: INFECTIOUS DISEASES | Age: 78
End: 2018-03-28

## 2018-04-05 ENCOUNTER — TELEPHONE (OUTPATIENT)
Dept: INFECTIOUS DISEASES | Age: 78
End: 2018-04-05

## 2018-04-05 DIAGNOSIS — R09.02 HYPOXEMIA: ICD-10-CM

## 2018-04-05 DIAGNOSIS — B44.9 ASPERGILLUS PNEUMONIA (HCC): Primary | ICD-10-CM

## 2018-04-09 ENCOUNTER — HOSPITAL ENCOUNTER (OUTPATIENT)
Dept: CT IMAGING | Age: 78
Discharge: HOME | End: 2018-04-09
Attending: INTERNAL MEDICINE | Admitting: INTERNAL MEDICINE

## 2018-04-09 DIAGNOSIS — B44.9 ASPERGILLOSIS (HCC): ICD-10-CM

## 2018-04-09 DIAGNOSIS — B44.9 ASPERGILLUS PNEUMONIA (HCC): ICD-10-CM

## 2018-04-10 ENCOUNTER — TELEPHONE (OUTPATIENT)
Dept: PULMONOLOGY | Age: 78
End: 2018-04-10

## 2018-04-10 RX ORDER — PREDNISONE 10 MG/1
TABLET ORAL
Qty: 30 TABLET | Refills: 0 | Status: SHIPPED | OUTPATIENT
Start: 2018-04-10 | End: 2018-04-20

## 2018-04-12 ENCOUNTER — TELEPHONE (OUTPATIENT)
Dept: INFECTIOUS DISEASES | Age: 78
End: 2018-04-12

## 2018-04-13 ENCOUNTER — TELEPHONE (OUTPATIENT)
Dept: INFECTIOUS DISEASES | Age: 78
End: 2018-04-13

## 2018-04-17 ENCOUNTER — OFFICE VISIT (OUTPATIENT)
Dept: PULMONOLOGY | Age: 78
End: 2018-04-17

## 2018-04-17 VITALS
BODY MASS INDEX: 29.29 KG/M2 | TEMPERATURE: 97.1 F | HEIGHT: 67 IN | WEIGHT: 186.6 LBS | SYSTOLIC BLOOD PRESSURE: 104 MMHG | HEART RATE: 100 BPM | RESPIRATION RATE: 16 BRPM | OXYGEN SATURATION: 93 % | DIASTOLIC BLOOD PRESSURE: 58 MMHG

## 2018-04-17 DIAGNOSIS — J44.9 COPD, VERY SEVERE (HCC): Primary | ICD-10-CM

## 2018-04-17 DIAGNOSIS — G47.33 OSA (OBSTRUCTIVE SLEEP APNEA): ICD-10-CM

## 2018-04-17 PROCEDURE — 1123F ACP DISCUSS/DSCN MKR DOCD: CPT | Performed by: INTERNAL MEDICINE

## 2018-04-17 PROCEDURE — 99213 OFFICE O/P EST LOW 20 MIN: CPT | Performed by: INTERNAL MEDICINE

## 2018-04-17 PROCEDURE — G8926 SPIRO NO PERF OR DOC: HCPCS | Performed by: INTERNAL MEDICINE

## 2018-04-17 PROCEDURE — 4040F PNEUMOC VAC/ADMIN/RCVD: CPT | Performed by: INTERNAL MEDICINE

## 2018-04-17 PROCEDURE — 3023F SPIROM DOC REV: CPT | Performed by: INTERNAL MEDICINE

## 2018-04-17 PROCEDURE — G8427 DOCREV CUR MEDS BY ELIG CLIN: HCPCS | Performed by: INTERNAL MEDICINE

## 2018-04-17 PROCEDURE — 1036F TOBACCO NON-USER: CPT | Performed by: INTERNAL MEDICINE

## 2018-04-17 PROCEDURE — G8417 CALC BMI ABV UP PARAM F/U: HCPCS | Performed by: INTERNAL MEDICINE

## 2018-04-17 ASSESSMENT — SLEEP AND FATIGUE QUESTIONNAIRES
HOW LIKELY ARE YOU TO NOD OFF OR FALL ASLEEP WHEN YOU ARE A PASSENGER IN A CAR FOR AN HOUR WITHOUT A BREAK: 1
HOW LIKELY ARE YOU TO NOD OFF OR FALL ASLEEP WHILE SITTING QUIETLY AFTER LUNCH WITHOUT ALCOHOL: 1
HOW LIKELY ARE YOU TO NOD OFF OR FALL ASLEEP WHILE LYING DOWN TO REST IN THE AFTERNOON WHEN CIRCUMSTANCES PERMIT: 1
HOW LIKELY ARE YOU TO NOD OFF OR FALL ASLEEP WHILE SITTING AND READING: 1
HOW LIKELY ARE YOU TO NOD OFF OR FALL ASLEEP IN A CAR, WHILE STOPPED FOR A FEW MINUTES IN TRAFFIC: 1
ESS TOTAL SCORE: 8
HOW LIKELY ARE YOU TO NOD OFF OR FALL ASLEEP WHILE WATCHING TV: 1
HOW LIKELY ARE YOU TO NOD OFF OR FALL ASLEEP WHILE SITTING INACTIVE IN A PUBLIC PLACE: 1
HOW LIKELY ARE YOU TO NOD OFF OR FALL ASLEEP WHILE SITTING AND TALKING TO SOMEONE: 1

## 2018-04-19 ENCOUNTER — TELEPHONE (OUTPATIENT)
Dept: INTERNAL MEDICINE CLINIC | Age: 78
End: 2018-04-19

## 2018-04-24 ENCOUNTER — TELEPHONE (OUTPATIENT)
Dept: INFECTIOUS DISEASES | Age: 78
End: 2018-04-24

## 2018-04-26 ENCOUNTER — TELEPHONE (OUTPATIENT)
Dept: PULMONOLOGY | Age: 78
End: 2018-04-26

## 2018-04-26 DIAGNOSIS — J44.1 ACUTE EXACERBATION OF CHRONIC OBSTRUCTIVE PULMONARY DISEASE (COPD) (HCC): Primary | ICD-10-CM

## 2018-05-01 ENCOUNTER — TELEPHONE (OUTPATIENT)
Dept: INFECTIOUS DISEASES | Age: 78
End: 2018-05-01

## 2018-05-02 DIAGNOSIS — J44.1 ACUTE EXACERBATION OF CHRONIC OBSTRUCTIVE PULMONARY DISEASE (COPD) (HCC): ICD-10-CM

## 2018-05-02 DIAGNOSIS — B44.9 ASPERGILLUS PNEUMONIA (HCC): ICD-10-CM

## 2018-05-02 LAB
A/G RATIO: 1.8 (ref 1.1–2.2)
ALBUMIN SERPL-MCNC: 4.4 G/DL (ref 3.4–5)
ALP BLD-CCNC: 90 U/L (ref 40–129)
ALT SERPL-CCNC: 18 U/L (ref 10–40)
ANION GAP SERPL CALCULATED.3IONS-SCNC: 13 MMOL/L (ref 3–16)
AST SERPL-CCNC: 13 U/L (ref 15–37)
BASOPHILS ABSOLUTE: 0.1 K/UL (ref 0–0.2)
BASOPHILS RELATIVE PERCENT: 0.6 %
BILIRUB SERPL-MCNC: 0.3 MG/DL (ref 0–1)
BUN BLDV-MCNC: 9 MG/DL (ref 7–20)
CALCIUM SERPL-MCNC: 9.6 MG/DL (ref 8.3–10.6)
CHLORIDE BLD-SCNC: 101 MMOL/L (ref 99–110)
CO2: 27 MMOL/L (ref 21–32)
CREAT SERPL-MCNC: 0.7 MG/DL (ref 0.8–1.3)
EOSINOPHILS ABSOLUTE: 0.3 K/UL (ref 0–0.6)
EOSINOPHILS RELATIVE PERCENT: 2.6 %
GFR AFRICAN AMERICAN: >60
GFR NON-AFRICAN AMERICAN: >60
GLOBULIN: 2.5 G/DL
GLUCOSE BLD-MCNC: 78 MG/DL (ref 70–99)
HCT VFR BLD CALC: 42.2 % (ref 40.5–52.5)
HEMOGLOBIN: 14.5 G/DL (ref 13.5–17.5)
LYMPHOCYTES ABSOLUTE: 2.2 K/UL (ref 1–5.1)
LYMPHOCYTES RELATIVE PERCENT: 22 %
MCH RBC QN AUTO: 29.6 PG (ref 26–34)
MCHC RBC AUTO-ENTMCNC: 34.5 G/DL (ref 31–36)
MCV RBC AUTO: 85.9 FL (ref 80–100)
MONOCYTES ABSOLUTE: 1.3 K/UL (ref 0–1.3)
MONOCYTES RELATIVE PERCENT: 13.1 %
NEUTROPHILS ABSOLUTE: 6.1 K/UL (ref 1.7–7.7)
NEUTROPHILS RELATIVE PERCENT: 61.7 %
PDW BLD-RTO: 14 % (ref 12.4–15.4)
PLATELET # BLD: 389 K/UL (ref 135–450)
PMV BLD AUTO: 7 FL (ref 5–10.5)
POTASSIUM SERPL-SCNC: 4.3 MMOL/L (ref 3.5–5.1)
RBC # BLD: 4.91 M/UL (ref 4.2–5.9)
SODIUM BLD-SCNC: 141 MMOL/L (ref 136–145)
TOTAL PROTEIN: 6.9 G/DL (ref 6.4–8.2)
WBC # BLD: 9.9 K/UL (ref 4–11)

## 2018-05-03 ENCOUNTER — TELEPHONE (OUTPATIENT)
Dept: INTERNAL MEDICINE CLINIC | Age: 78
End: 2018-05-03

## 2018-05-03 RX ORDER — BENZONATATE 100 MG/1
100 CAPSULE ORAL 3 TIMES DAILY PRN
Qty: 20 CAPSULE | Refills: 0 | Status: SHIPPED | OUTPATIENT
Start: 2018-05-03 | End: 2018-05-10

## 2018-05-04 ENCOUNTER — OFFICE VISIT (OUTPATIENT)
Dept: INTERNAL MEDICINE CLINIC | Age: 78
End: 2018-05-04

## 2018-05-04 VITALS
SYSTOLIC BLOOD PRESSURE: 138 MMHG | WEIGHT: 188 LBS | TEMPERATURE: 97.7 F | DIASTOLIC BLOOD PRESSURE: 60 MMHG | RESPIRATION RATE: 20 BRPM | OXYGEN SATURATION: 95 % | BODY MASS INDEX: 29.44 KG/M2 | HEART RATE: 75 BPM

## 2018-05-04 DIAGNOSIS — J40 BRONCHITIS: ICD-10-CM

## 2018-05-04 DIAGNOSIS — R05.8 PRODUCTIVE COUGH: Primary | ICD-10-CM

## 2018-05-04 DIAGNOSIS — R73.09 ELEVATED HEMOGLOBIN A1C: ICD-10-CM

## 2018-05-04 DIAGNOSIS — R09.89 CHEST CONGESTION: ICD-10-CM

## 2018-05-04 PROCEDURE — 99214 OFFICE O/P EST MOD 30 MIN: CPT | Performed by: INTERNAL MEDICINE

## 2018-05-04 RX ORDER — AZITHROMYCIN 250 MG/1
TABLET, FILM COATED ORAL
Qty: 1 PACKET | Refills: 0 | Status: SHIPPED | OUTPATIENT
Start: 2018-05-04 | End: 2018-05-14

## 2018-05-04 RX ORDER — METHYLPREDNISOLONE 4 MG/1
TABLET ORAL
Qty: 1 KIT | Refills: 0 | Status: SHIPPED | OUTPATIENT
Start: 2018-05-04 | End: 2018-05-10

## 2018-05-09 ENCOUNTER — OFFICE VISIT (OUTPATIENT)
Dept: INFECTIOUS DISEASES | Age: 78
End: 2018-05-09

## 2018-05-09 VITALS
WEIGHT: 184 LBS | TEMPERATURE: 97.6 F | BODY MASS INDEX: 28.88 KG/M2 | DIASTOLIC BLOOD PRESSURE: 69 MMHG | HEART RATE: 86 BPM | HEIGHT: 67 IN | SYSTOLIC BLOOD PRESSURE: 129 MMHG | OXYGEN SATURATION: 96 %

## 2018-05-09 DIAGNOSIS — R93.89 ABNORMAL CT OF THE CHEST: ICD-10-CM

## 2018-05-09 DIAGNOSIS — B44.9 ASPERGILLUS PNEUMONIA (HCC): Primary | ICD-10-CM

## 2018-05-09 DIAGNOSIS — J44.1 ACUTE EXACERBATION OF CHRONIC OBSTRUCTIVE PULMONARY DISEASE (COPD) (HCC): ICD-10-CM

## 2018-05-09 PROCEDURE — G8926 SPIRO NO PERF OR DOC: HCPCS | Performed by: INTERNAL MEDICINE

## 2018-05-09 PROCEDURE — 1036F TOBACCO NON-USER: CPT | Performed by: INTERNAL MEDICINE

## 2018-05-09 PROCEDURE — 4040F PNEUMOC VAC/ADMIN/RCVD: CPT | Performed by: INTERNAL MEDICINE

## 2018-05-09 PROCEDURE — G8427 DOCREV CUR MEDS BY ELIG CLIN: HCPCS | Performed by: INTERNAL MEDICINE

## 2018-05-09 PROCEDURE — 99214 OFFICE O/P EST MOD 30 MIN: CPT | Performed by: INTERNAL MEDICINE

## 2018-05-09 PROCEDURE — G8417 CALC BMI ABV UP PARAM F/U: HCPCS | Performed by: INTERNAL MEDICINE

## 2018-05-09 PROCEDURE — 1123F ACP DISCUSS/DSCN MKR DOCD: CPT | Performed by: INTERNAL MEDICINE

## 2018-05-09 PROCEDURE — 3023F SPIROM DOC REV: CPT | Performed by: INTERNAL MEDICINE

## 2018-05-10 RX ORDER — ATORVASTATIN CALCIUM 10 MG/1
TABLET, FILM COATED ORAL
Qty: 90 TABLET | Refills: 0 | Status: SHIPPED | OUTPATIENT
Start: 2018-05-10 | End: 2018-08-17 | Stop reason: SDUPTHER

## 2018-05-10 RX ORDER — DILTIAZEM HYDROCHLORIDE 300 MG/1
CAPSULE, EXTENDED RELEASE ORAL
Qty: 90 CAPSULE | Refills: 0 | Status: SHIPPED | OUTPATIENT
Start: 2018-05-10 | End: 2018-08-21 | Stop reason: SDUPTHER

## 2018-05-10 RX ORDER — LOSARTAN POTASSIUM 50 MG/1
TABLET ORAL
Qty: 90 TABLET | Refills: 0 | Status: SHIPPED | OUTPATIENT
Start: 2018-05-10 | End: 2018-08-17 | Stop reason: SDUPTHER

## 2018-05-10 ASSESSMENT — ENCOUNTER SYMPTOMS
SINUS PRESSURE: 1
ABDOMINAL PAIN: 0
SHORTNESS OF BREATH: 0
TROUBLE SWALLOWING: 0
VOMITING: 0
WHEEZING: 0
COUGH: 1
SORE THROAT: 0
NAUSEA: 0
DIARRHEA: 0
RHINORRHEA: 1

## 2018-05-31 RX ORDER — THEOPHYLLINE 400 MG/1
TABLET, EXTENDED RELEASE ORAL
Qty: 90 TABLET | Refills: 0 | Status: SHIPPED | OUTPATIENT
Start: 2018-05-31 | End: 2018-08-17 | Stop reason: SDUPTHER

## 2018-06-01 RX ORDER — POTASSIUM CHLORIDE 20 MEQ/1
TABLET, EXTENDED RELEASE ORAL
Qty: 60 TABLET | Refills: 0 | Status: SHIPPED | OUTPATIENT
Start: 2018-06-01 | End: 2018-06-08 | Stop reason: SDUPTHER

## 2018-06-04 ENCOUNTER — SCHEDULED TELEPHONE ENCOUNTER (OUTPATIENT)
Dept: PHARMACY | Facility: CLINIC | Age: 78
End: 2018-06-04

## 2018-06-05 ASSESSMENT — ENCOUNTER SYMPTOMS
SHORTNESS OF BREATH: 1
ABDOMINAL PAIN: 0

## 2018-06-06 DIAGNOSIS — E78.00 HYPERCHOLESTEROLEMIA: ICD-10-CM

## 2018-06-06 LAB
CHOLESTEROL, TOTAL: 169 MG/DL (ref 0–199)
HDLC SERPL-MCNC: 51 MG/DL (ref 40–60)
LDL CHOLESTEROL CALCULATED: 90 MG/DL
TRIGL SERPL-MCNC: 141 MG/DL (ref 0–150)
VLDLC SERPL CALC-MCNC: 28 MG/DL

## 2018-06-08 ENCOUNTER — OFFICE VISIT (OUTPATIENT)
Dept: INTERNAL MEDICINE CLINIC | Age: 78
End: 2018-06-08

## 2018-06-08 VITALS
RESPIRATION RATE: 16 BRPM | SYSTOLIC BLOOD PRESSURE: 122 MMHG | TEMPERATURE: 97.6 F | BODY MASS INDEX: 28.56 KG/M2 | HEART RATE: 83 BPM | OXYGEN SATURATION: 96 % | DIASTOLIC BLOOD PRESSURE: 78 MMHG | HEIGHT: 67 IN | WEIGHT: 182 LBS

## 2018-06-08 DIAGNOSIS — A49.8 INFECTION DUE TO STENOTROPHOMONAS MALTOPHILIA: ICD-10-CM

## 2018-06-08 DIAGNOSIS — R73.09 ABNORMAL GLUCOSE: ICD-10-CM

## 2018-06-08 DIAGNOSIS — N40.0 BENIGN PROSTATIC HYPERPLASIA WITHOUT LOWER URINARY TRACT SYMPTOMS: ICD-10-CM

## 2018-06-08 DIAGNOSIS — I10 ESSENTIAL HYPERTENSION: Primary | ICD-10-CM

## 2018-06-08 DIAGNOSIS — J44.9 COPD, VERY SEVERE (HCC): ICD-10-CM

## 2018-06-08 DIAGNOSIS — I47.1 PSVT (PAROXYSMAL SUPRAVENTRICULAR TACHYCARDIA) (HCC): ICD-10-CM

## 2018-06-08 DIAGNOSIS — E78.00 HYPERCHOLESTEREMIA: ICD-10-CM

## 2018-06-08 PROCEDURE — 4040F PNEUMOC VAC/ADMIN/RCVD: CPT | Performed by: INTERNAL MEDICINE

## 2018-06-08 PROCEDURE — 3023F SPIROM DOC REV: CPT | Performed by: INTERNAL MEDICINE

## 2018-06-08 PROCEDURE — 1123F ACP DISCUSS/DSCN MKR DOCD: CPT | Performed by: INTERNAL MEDICINE

## 2018-06-08 PROCEDURE — G8417 CALC BMI ABV UP PARAM F/U: HCPCS | Performed by: INTERNAL MEDICINE

## 2018-06-08 PROCEDURE — 99214 OFFICE O/P EST MOD 30 MIN: CPT | Performed by: INTERNAL MEDICINE

## 2018-06-08 PROCEDURE — 1036F TOBACCO NON-USER: CPT | Performed by: INTERNAL MEDICINE

## 2018-06-08 PROCEDURE — G8427 DOCREV CUR MEDS BY ELIG CLIN: HCPCS | Performed by: INTERNAL MEDICINE

## 2018-06-08 PROCEDURE — G8926 SPIRO NO PERF OR DOC: HCPCS | Performed by: INTERNAL MEDICINE

## 2018-06-08 RX ORDER — FUROSEMIDE 40 MG/1
TABLET ORAL
Qty: 90 TABLET | Refills: 3
Start: 2018-06-08 | End: 2018-08-17

## 2018-06-08 RX ORDER — POTASSIUM CHLORIDE 20 MEQ/1
TABLET, EXTENDED RELEASE ORAL
Qty: 60 TABLET | Refills: 0
Start: 2018-06-08 | End: 2018-10-05

## 2018-06-11 PROBLEM — J96.01 ACUTE RESPIRATORY FAILURE WITH HYPOXEMIA (HCC): Status: RESOLVED | Noted: 2017-03-01 | Resolved: 2018-06-11

## 2018-06-11 PROBLEM — J10.1 INFLUENZA B: Status: RESOLVED | Noted: 2017-03-01 | Resolved: 2018-06-11

## 2018-06-12 ENCOUNTER — OFFICE VISIT (OUTPATIENT)
Dept: PULMONOLOGY | Age: 78
End: 2018-06-12

## 2018-06-12 VITALS
OXYGEN SATURATION: 96 % | RESPIRATION RATE: 16 BRPM | HEIGHT: 67 IN | HEART RATE: 80 BPM | TEMPERATURE: 98.1 F | BODY MASS INDEX: 28.25 KG/M2 | DIASTOLIC BLOOD PRESSURE: 62 MMHG | SYSTOLIC BLOOD PRESSURE: 114 MMHG | WEIGHT: 180 LBS

## 2018-06-12 DIAGNOSIS — G47.33 OSA (OBSTRUCTIVE SLEEP APNEA): Primary | ICD-10-CM

## 2018-06-12 DIAGNOSIS — B44.9 ASPERGILLUS PNEUMONIA (HCC): Primary | ICD-10-CM

## 2018-06-12 DIAGNOSIS — B44.9 ASPERGILLUS PNEUMONIA (HCC): ICD-10-CM

## 2018-06-12 DIAGNOSIS — R09.02 HYPOXIA: ICD-10-CM

## 2018-06-12 DIAGNOSIS — J44.9 COPD, VERY SEVERE (HCC): ICD-10-CM

## 2018-06-12 LAB
BASOPHILS ABSOLUTE: 0.1 K/UL (ref 0–0.2)
BASOPHILS RELATIVE PERCENT: 0.9 %
EOSINOPHILS ABSOLUTE: 0.4 K/UL (ref 0–0.6)
EOSINOPHILS RELATIVE PERCENT: 4.1 %
HCT VFR BLD CALC: 43.4 % (ref 40.5–52.5)
HEMOGLOBIN: 15.2 G/DL (ref 13.5–17.5)
LYMPHOCYTES ABSOLUTE: 2.7 K/UL (ref 1–5.1)
LYMPHOCYTES RELATIVE PERCENT: 28.2 %
MCH RBC QN AUTO: 29.5 PG (ref 26–34)
MCHC RBC AUTO-ENTMCNC: 34.9 G/DL (ref 31–36)
MCV RBC AUTO: 84.5 FL (ref 80–100)
MONOCYTES ABSOLUTE: 1 K/UL (ref 0–1.3)
MONOCYTES RELATIVE PERCENT: 10.6 %
NEUTROPHILS ABSOLUTE: 5.4 K/UL (ref 1.7–7.7)
NEUTROPHILS RELATIVE PERCENT: 56.2 %
PDW BLD-RTO: 14.4 % (ref 12.4–15.4)
PLATELET # BLD: 409 K/UL (ref 135–450)
PMV BLD AUTO: 7.2 FL (ref 5–10.5)
RBC # BLD: 5.14 M/UL (ref 4.2–5.9)
WBC # BLD: 9.6 K/UL (ref 4–11)

## 2018-06-12 PROCEDURE — 1036F TOBACCO NON-USER: CPT | Performed by: INTERNAL MEDICINE

## 2018-06-12 PROCEDURE — 3023F SPIROM DOC REV: CPT | Performed by: INTERNAL MEDICINE

## 2018-06-12 PROCEDURE — 99214 OFFICE O/P EST MOD 30 MIN: CPT | Performed by: INTERNAL MEDICINE

## 2018-06-12 PROCEDURE — 4040F PNEUMOC VAC/ADMIN/RCVD: CPT | Performed by: INTERNAL MEDICINE

## 2018-06-12 PROCEDURE — G8417 CALC BMI ABV UP PARAM F/U: HCPCS | Performed by: INTERNAL MEDICINE

## 2018-06-12 PROCEDURE — 1123F ACP DISCUSS/DSCN MKR DOCD: CPT | Performed by: INTERNAL MEDICINE

## 2018-06-12 PROCEDURE — G8427 DOCREV CUR MEDS BY ELIG CLIN: HCPCS | Performed by: INTERNAL MEDICINE

## 2018-06-12 PROCEDURE — G8926 SPIRO NO PERF OR DOC: HCPCS | Performed by: INTERNAL MEDICINE

## 2018-06-12 ASSESSMENT — SLEEP AND FATIGUE QUESTIONNAIRES
HOW LIKELY ARE YOU TO NOD OFF OR FALL ASLEEP WHEN YOU ARE A PASSENGER IN A CAR FOR AN HOUR WITHOUT A BREAK: 1
ESS TOTAL SCORE: 8
NECK CIRCUMFERENCE (INCHES): 19
HOW LIKELY ARE YOU TO NOD OFF OR FALL ASLEEP WHILE WATCHING TV: 1
HOW LIKELY ARE YOU TO NOD OFF OR FALL ASLEEP WHILE SITTING INACTIVE IN A PUBLIC PLACE: 1
HOW LIKELY ARE YOU TO NOD OFF OR FALL ASLEEP WHILE SITTING AND TALKING TO SOMEONE: 1
HOW LIKELY ARE YOU TO NOD OFF OR FALL ASLEEP WHILE SITTING QUIETLY AFTER LUNCH WITHOUT ALCOHOL: 1
HOW LIKELY ARE YOU TO NOD OFF OR FALL ASLEEP WHILE SITTING AND READING: 1
HOW LIKELY ARE YOU TO NOD OFF OR FALL ASLEEP WHILE LYING DOWN TO REST IN THE AFTERNOON WHEN CIRCUMSTANCES PERMIT: 1
HOW LIKELY ARE YOU TO NOD OFF OR FALL ASLEEP IN A CAR, WHILE STOPPED FOR A FEW MINUTES IN TRAFFIC: 1

## 2018-06-13 LAB
A/G RATIO: 1.6 (ref 1.1–2.2)
ALBUMIN SERPL-MCNC: 4.6 G/DL (ref 3.4–5)
ALP BLD-CCNC: 99 U/L (ref 40–129)
ALT SERPL-CCNC: 23 U/L (ref 10–40)
ANION GAP SERPL CALCULATED.3IONS-SCNC: 19 MMOL/L (ref 3–16)
AST SERPL-CCNC: 16 U/L (ref 15–37)
BILIRUB SERPL-MCNC: <0.2 MG/DL (ref 0–1)
BUN BLDV-MCNC: 13 MG/DL (ref 7–20)
CALCIUM SERPL-MCNC: 10.2 MG/DL (ref 8.3–10.6)
CHLORIDE BLD-SCNC: 97 MMOL/L (ref 99–110)
CO2: 24 MMOL/L (ref 21–32)
CREAT SERPL-MCNC: 1 MG/DL (ref 0.8–1.3)
GFR AFRICAN AMERICAN: >60
GFR NON-AFRICAN AMERICAN: >60
GLOBULIN: 2.8 G/DL
GLUCOSE BLD-MCNC: 104 MG/DL (ref 70–99)
POTASSIUM SERPL-SCNC: 4.4 MMOL/L (ref 3.5–5.1)
SODIUM BLD-SCNC: 140 MMOL/L (ref 136–145)
TOTAL PROTEIN: 7.4 G/DL (ref 6.4–8.2)

## 2018-06-20 ENCOUNTER — OFFICE VISIT (OUTPATIENT)
Dept: INFECTIOUS DISEASES | Age: 78
End: 2018-06-20

## 2018-06-20 VITALS
TEMPERATURE: 97.6 F | WEIGHT: 183 LBS | SYSTOLIC BLOOD PRESSURE: 133 MMHG | OXYGEN SATURATION: 98 % | BODY MASS INDEX: 28.72 KG/M2 | HEIGHT: 67 IN | DIASTOLIC BLOOD PRESSURE: 66 MMHG | HEART RATE: 79 BPM

## 2018-06-20 DIAGNOSIS — R93.89 ABNORMAL CT OF THE CHEST: ICD-10-CM

## 2018-06-20 DIAGNOSIS — B44.9 ASPERGILLUS PNEUMONIA (HCC): Primary | ICD-10-CM

## 2018-06-20 DIAGNOSIS — R91.8 PULMONARY NODULES: ICD-10-CM

## 2018-06-20 PROCEDURE — G8427 DOCREV CUR MEDS BY ELIG CLIN: HCPCS | Performed by: INTERNAL MEDICINE

## 2018-06-20 PROCEDURE — G8417 CALC BMI ABV UP PARAM F/U: HCPCS | Performed by: INTERNAL MEDICINE

## 2018-06-20 PROCEDURE — 4040F PNEUMOC VAC/ADMIN/RCVD: CPT | Performed by: INTERNAL MEDICINE

## 2018-06-20 PROCEDURE — 99214 OFFICE O/P EST MOD 30 MIN: CPT | Performed by: INTERNAL MEDICINE

## 2018-06-20 PROCEDURE — 1036F TOBACCO NON-USER: CPT | Performed by: INTERNAL MEDICINE

## 2018-06-20 PROCEDURE — 1123F ACP DISCUSS/DSCN MKR DOCD: CPT | Performed by: INTERNAL MEDICINE

## 2018-06-20 PROCEDURE — 1101F PT FALLS ASSESS-DOCD LE1/YR: CPT | Performed by: INTERNAL MEDICINE

## 2018-07-10 ENCOUNTER — TELEPHONE (OUTPATIENT)
Dept: INFECTIOUS DISEASES | Age: 78
End: 2018-07-10

## 2018-07-10 NOTE — TELEPHONE ENCOUNTER
Patient called wanting to know if he needed lab work done before next follow up visit in August.  He stated that Dr Jaswinder Andrade has wanted this previously. If so, which labs?

## 2018-07-15 NOTE — PROGRESS NOTES
Infectious Diseases Outpatient Follow-up Note      Primary Care Physician:  Juancarlos Reinoso MD       History Obtained From:   Patient, EPIC    CHIEF COMPLAINT / ID problem:    Chief Complaint   Patient presents with    Follow-up     Aspergillus pneumonia ([facility]       HISTORY OF PRESENT ILLNESS / Interval history:  Follow up on Aspergillus PNA and he has severe COPD has been on noctural O2 now using intermittent and breathing slowly improving no fevers no chills intermittent cough some sob WITH extertion, he is on Cresemba for Aspergillus infection as he did not tolerate voriconazole. Repeat CT chest with some nodules and improvement noted. He is due to repeat labs to check lFTS.          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    Skin cancer 2014    both upper thighs    Squamous cell cancer of skin of nose 5/2016       Past Surgical History:    Past Surgical History:   Procedure Laterality Date    CATARACT REMOVAL Bilateral     5/2016    COLONOSCOPY  08/04/2016    dr Chandrakant Santacruz    3 years    SKIN BIOPSY  5/2016    Nose     TONSILLECTOMY AND ADENOIDECTOMY      TOOTH EXTRACTION  5/2016    UPPER GASTROINTESTINAL ENDOSCOPY  12/2/2015    dr Fadi Kim       Current Medications:    Current Outpatient Prescriptions   Medication Sig Dispense Refill    potassium chloride (KLOR-CON M) 20 MEQ extended release tablet TAKE 1 TABLET EVERY OTHER DAY 60 tablet 0    furosemide (LASIX) 40 MG tablet TAKE 1 TABLET QOD 90 tablet 3    theophylline (UNIPHYL) 400 MG extended release tablet TAKE 1/2 TABLET TWICE DAILY 90 tablet 0    atorvastatin (LIPITOR) 10 MG tablet TAKE 1 TABLET BY MOUTH EVERY DAY 90 tablet 0    CARTIA  MG extended release capsule TAKE 1 CAPSULE BY MOUTH DAILY 90 capsule 0    name: N/A    Number of children: N/A    Years of education: N/A     Social History Main Topics    Smoking status: Former Smoker     Packs/day: 1.00     Years: 45.00     Types: Cigarettes     Quit date: 1/1/2002    Smokeless tobacco: Never Used    Alcohol use Yes      Comment: rarely     Drug use: No    Sexual activity: Yes     Partners: Female     Other Topics Concern    None     Social History Narrative    ** Merged History Encounter **          Family History   Problem Relation Age of Onset    Diabetes Mother         DM    Coronary Art Dis Mother          REVIEW OF SYSTEMS:    No fever / chills / sweats. No weight loss. No visual change, eye pain, eye discharge. No oral lesion, sore throat, dysphagia. Denies cough / sputum. Denies chest pain, palpitations. Denies n / v / abd pain. No diarrhea. Denies dysuria or change in urinary function. Denies joint swelling or pain. No myalgia, arthralgia. Denies focal weakness, sensory change or other neurologic symptom  No lymph node swelling or tenderness. No symptoms endocrinopathy. No symptoms hematologic disease.   COUGH + SPUTUM + NO hemoptysis   PHYSICAL EXAM:    Vitals:    /66 (Site: Left Arm, Position: Sitting, Cuff Size: Large Adult)   Pulse 79   Temp 97.6 °F (36.4 °C) (Oral)   Ht 5' 7\" (1.702 m)   Wt 183 lb (83 kg)   SpO2 98%   BMI 28.66 kg/m²   General Appearance: alert,  in no acute distress, +  pallor, no icterus   Skin: warm and dry, no rash or erythema  Head: normocephalic and atraumatic  Eyes: pupils equal, round, and reactive to light, conjunctivae normal  ENT: tympanic membrane, external ear and ear canal normal bilaterally, nose without deformity, nasal mucosa and turbinates normal without polyps  Neck: supple and non-tender without mass, no thyromegaly or thyroid nodules, no cervical lymphadenopathy  Pulmonary/Chest: reduced air entry and some wheeze+, rales or rhonchi, normal air movement,  Cardiovascular: normal 11 mm hepatic lesion within left hepatic lobe.               Labs, Microbiology and  Radiology results pertinent to this visit and from care every where  reviewed in detail by me as part of the consultation     IMPRESSION:     Diagnosis Orders   1. Aspergillus pneumonia ([address]  CT Chest WO Contrast    Comprehensive metabolic panel   2. Abnormal CT of the chest  CT Chest WO Contrast    Comprehensive metabolic panel   3. Pulmonary nodules  CT Chest WO Contrast    Comprehensive metabolic panel   Aspergillus PNA on therapy did not tolerate voriconazole now on Cresemba and tolerating it with out issues, was back on low dose Prednisone due to breathing difficulties thinks its helping and over all improvement with Cresemba. PLAN:    1. Cont Cresemba as before  2. Labs as before   3. CT chest results d/w pt    4. Follow up in x 6 weeks          Thanks for allowing me to participate in your patient's care and please do not hesitate to  call me with any questions or concerns.     Theodore Wong MD  Infectious Disease  [facility]) Physician  Phone: [phone]   Fax : [phone]

## 2018-07-16 DIAGNOSIS — J18.9 ATYPICAL PNEUMONIA: Primary | ICD-10-CM

## 2018-08-10 ENCOUNTER — HOSPITAL ENCOUNTER (OUTPATIENT)
Dept: CT IMAGING | Age: 78
Discharge: HOME | End: 2018-08-10
Attending: INTERNAL MEDICINE | Admitting: INTERNAL MEDICINE

## 2018-08-10 DIAGNOSIS — R93.89 ABNORMAL FINDINGS ON DIAGNOSTIC IMAGING OF OTHER SPECIFIED BODY STRUCTURES: ICD-10-CM

## 2018-08-10 DIAGNOSIS — B44.9 ASPERGILLUS PNEUMONIA (HCC): ICD-10-CM

## 2018-08-10 DIAGNOSIS — R91.8 PULMONARY NODULES: ICD-10-CM

## 2018-08-10 DIAGNOSIS — R93.89 ABNORMAL CT OF THE CHEST: ICD-10-CM

## 2018-08-10 DIAGNOSIS — J18.9 ATYPICAL PNEUMONIA: ICD-10-CM

## 2018-08-10 LAB
A/G RATIO: 1.8 (ref 1.1–2.2)
ALBUMIN SERPL-MCNC: 4.3 G/DL (ref 3.4–5)
ALP BLD-CCNC: 86 U/L (ref 40–129)
ALT SERPL-CCNC: 19 U/L (ref 10–40)
ANION GAP SERPL CALCULATED.3IONS-SCNC: 14 MMOL/L (ref 3–16)
AST SERPL-CCNC: 15 U/L (ref 15–37)
BASOPHILS ABSOLUTE: 0.1 K/UL (ref 0–0.2)
BASOPHILS RELATIVE PERCENT: 0.7 %
BILIRUB SERPL-MCNC: 0.4 MG/DL (ref 0–1)
BUN BLDV-MCNC: 12 MG/DL (ref 7–20)
CALCIUM SERPL-MCNC: 9.9 MG/DL (ref 8.3–10.6)
CHLORIDE BLD-SCNC: 100 MMOL/L (ref 99–110)
CO2: 26 MMOL/L (ref 21–32)
CREAT SERPL-MCNC: 0.9 MG/DL (ref 0.8–1.3)
EOSINOPHILS ABSOLUTE: 0.3 K/UL (ref 0–0.6)
EOSINOPHILS RELATIVE PERCENT: 3.3 %
GFR AFRICAN AMERICAN: >60
GFR NON-AFRICAN AMERICAN: >60
GLOBULIN: 2.4 G/DL
GLUCOSE BLD-MCNC: 93 MG/DL (ref 70–99)
HCT VFR BLD CALC: 42.5 % (ref 40.5–52.5)
HEMOGLOBIN: 14.3 G/DL (ref 13.5–17.5)
LYMPHOCYTES ABSOLUTE: 2.3 K/UL (ref 1–5.1)
LYMPHOCYTES RELATIVE PERCENT: 26.3 %
MCH RBC QN AUTO: 29.4 PG (ref 26–34)
MCHC RBC AUTO-ENTMCNC: 33.7 G/DL (ref 31–36)
MCV RBC AUTO: 87.2 FL (ref 80–100)
MONOCYTES ABSOLUTE: 0.9 K/UL (ref 0–1.3)
MONOCYTES RELATIVE PERCENT: 9.7 %
NEUTROPHILS ABSOLUTE: 5.4 K/UL (ref 1.7–7.7)
NEUTROPHILS RELATIVE PERCENT: 60 %
PDW BLD-RTO: 15.5 % (ref 12.4–15.4)
PLATELET # BLD: 366 K/UL (ref 135–450)
PMV BLD AUTO: 7.2 FL (ref 5–10.5)
POTASSIUM SERPL-SCNC: 4.8 MMOL/L (ref 3.5–5.1)
RBC # BLD: 4.87 M/UL (ref 4.2–5.9)
SODIUM BLD-SCNC: 140 MMOL/L (ref 136–145)
TOTAL PROTEIN: 6.7 G/DL (ref 6.4–8.2)
WBC # BLD: 8.9 K/UL (ref 4–11)

## 2018-08-17 RX ORDER — THEOPHYLLINE 400 MG/1
TABLET, EXTENDED RELEASE ORAL
Qty: 90 TABLET | Refills: 0 | Status: SHIPPED | OUTPATIENT
Start: 2018-08-17 | End: 2018-11-20 | Stop reason: SDUPTHER

## 2018-08-17 RX ORDER — FUROSEMIDE 40 MG/1
TABLET ORAL
Qty: 90 TABLET | Refills: 0 | Status: SHIPPED | OUTPATIENT
Start: 2018-08-17 | End: 2019-03-07 | Stop reason: SDUPTHER

## 2018-08-17 RX ORDER — LOSARTAN POTASSIUM 50 MG/1
TABLET ORAL
Qty: 90 TABLET | Refills: 0 | Status: SHIPPED | OUTPATIENT
Start: 2018-08-17 | End: 2018-10-27 | Stop reason: SDUPTHER

## 2018-08-17 RX ORDER — ATORVASTATIN CALCIUM 10 MG/1
TABLET, FILM COATED ORAL
Qty: 90 TABLET | Refills: 0 | Status: SHIPPED | OUTPATIENT
Start: 2018-08-17 | End: 2018-11-20 | Stop reason: SDUPTHER

## 2018-08-19 DIAGNOSIS — J32.1 CHRONIC FRONTAL SINUSITIS: ICD-10-CM

## 2018-08-20 RX ORDER — IPRATROPIUM BROMIDE 21 UG/1
SPRAY, METERED NASAL
Qty: 90 ML | Refills: 0 | Status: SHIPPED | OUTPATIENT
Start: 2018-08-20 | End: 2018-10-05 | Stop reason: SDUPTHER

## 2018-08-21 RX ORDER — DILTIAZEM HYDROCHLORIDE 300 MG/1
CAPSULE, EXTENDED RELEASE ORAL
Qty: 90 CAPSULE | Refills: 0 | Status: SHIPPED | OUTPATIENT
Start: 2018-08-21 | End: 2018-11-20 | Stop reason: SDUPTHER

## 2018-08-22 ENCOUNTER — OFFICE VISIT (OUTPATIENT)
Dept: INFECTIOUS DISEASES | Age: 78
End: 2018-08-22

## 2018-08-22 VITALS
OXYGEN SATURATION: 97 % | WEIGHT: 178 LBS | SYSTOLIC BLOOD PRESSURE: 122 MMHG | DIASTOLIC BLOOD PRESSURE: 54 MMHG | TEMPERATURE: 98 F | HEART RATE: 88 BPM | BODY MASS INDEX: 27.94 KG/M2 | HEIGHT: 67 IN

## 2018-08-22 DIAGNOSIS — B44.9 ASPERGILLUS PNEUMONIA (HCC): Primary | ICD-10-CM

## 2018-08-22 DIAGNOSIS — R93.89 ABNORMAL CT OF THE CHEST: ICD-10-CM

## 2018-08-22 DIAGNOSIS — R91.8 PULMONARY NODULES: ICD-10-CM

## 2018-08-22 PROCEDURE — 99214 OFFICE O/P EST MOD 30 MIN: CPT | Performed by: INTERNAL MEDICINE

## 2018-08-22 PROCEDURE — G8417 CALC BMI ABV UP PARAM F/U: HCPCS | Performed by: INTERNAL MEDICINE

## 2018-08-22 PROCEDURE — G8427 DOCREV CUR MEDS BY ELIG CLIN: HCPCS | Performed by: INTERNAL MEDICINE

## 2018-08-22 PROCEDURE — 1101F PT FALLS ASSESS-DOCD LE1/YR: CPT | Performed by: INTERNAL MEDICINE

## 2018-08-22 PROCEDURE — 1123F ACP DISCUSS/DSCN MKR DOCD: CPT | Performed by: INTERNAL MEDICINE

## 2018-08-22 PROCEDURE — 4040F PNEUMOC VAC/ADMIN/RCVD: CPT | Performed by: INTERNAL MEDICINE

## 2018-08-22 PROCEDURE — 1036F TOBACCO NON-USER: CPT | Performed by: INTERNAL MEDICINE

## 2018-08-22 RX ORDER — AZITHROMYCIN 250 MG/1
TABLET, FILM COATED ORAL
Qty: 1 PACKET | Refills: 0 | Status: SHIPPED | OUTPATIENT
Start: 2018-08-22 | End: 2018-08-26

## 2018-09-09 NOTE — PROGRESS NOTES
Infectious Diseases Outpatient Follow-up Note      Primary Care Physician:  Benja Christine MD       History Obtained From:   Patient, EPIC    CHIEF COMPLAINT / ID problem:    Chief Complaint   Patient presents with    Follow-up     Aspergillus pneumonia ([address]       HISTORY OF PRESENT ILLNESS / Interval history:    Here for follow up on Aspergillus PNA and Lung nodules on CT scan tolerating Cresemba well no side effects on therapy he has been off O2 and cough is better breathing is better OFF nasal cannula. No fevers no chills and cough intermittent no hospitalization thus far on antifungal therapy plans on vacation coming up worried about PNA and would like to have antibiotics if necessary.  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    Skin cancer 2014    both upper thighs    Squamous cell cancer of skin of nose 5/2016       Past Surgical History:    Past Surgical History:   Procedure Laterality Date    CATARACT REMOVAL Bilateral     5/2016    COLONOSCOPY  08/04/2016    dr Daylin Mcdaniel    3 years    SKIN BIOPSY  5/2016    Nose     TONSILLECTOMY AND ADENOIDECTOMY      TOOTH EXTRACTION  5/2016    UPPER GASTROINTESTINAL ENDOSCOPY  12/2/2015    dr Samuel Caceres       Current Medications:    Current Outpatient Prescriptions   Medication Sig Dispense Refill    CARTIA  MG extended release capsule TAKE 1 CAPSULE BY MOUTH DAILY 90 capsule 0    ipratropium (ATROVENT) 0.03 % nasal spray USE 2 SPRAYS NASALLY FOUR TIMES DAILY 90 mL 0    theophylline (UNIPHYL) 400 MG extended release tablet TAKE 1/2 TABLET BY MOUTH TWICE DAILY 90 tablet 0    losartan (COZAAR) 50 MG tablet TAKE 1 TABLET BY MOUTH DAILY 90 tablet 0    by me as part of the consultation     IMPRESSION:     Diagnosis Orders   1. Aspergillus pneumonia ([address]  MISCELLANEOUS SENDOUT 1   2. Pulmonary nodules     3. Abnormal CT of the chest       Tolerating Cresemba well and given CT chest findings will have to cont Antifungal therapy and will cont x Q 6 weekly labs as before ,   CT results d/w pt follow up labs stable       PLAN:    1. Cont Cresemab as before x 186 mg cap  X bid  2. CMP x q 6 weeks  3. CT chest results noted   4. Follow up 2-3 months  5. Oral Azithromycin for Bronchitis if necessary  6. Side effects d/w pt             Thanks for allowing me to participate in your patient's care and please do not hesitate to  call me with any questions or concerns.     Aracelis Pa MD  Infectious Disease  [facility]) Physician  Phone: [phone]   Fax : [phone]

## 2018-09-13 ENCOUNTER — OFFICE VISIT (OUTPATIENT)
Dept: PULMONOLOGY | Age: 78
End: 2018-09-13

## 2018-09-13 VITALS
HEIGHT: 67 IN | OXYGEN SATURATION: 96 % | TEMPERATURE: 98.2 F | RESPIRATION RATE: 16 BRPM | BODY MASS INDEX: 27.47 KG/M2 | WEIGHT: 175 LBS | SYSTOLIC BLOOD PRESSURE: 114 MMHG | DIASTOLIC BLOOD PRESSURE: 62 MMHG | HEART RATE: 80 BPM

## 2018-09-13 DIAGNOSIS — G47.33 OSA (OBSTRUCTIVE SLEEP APNEA): ICD-10-CM

## 2018-09-13 DIAGNOSIS — R09.02 HYPOXIA: ICD-10-CM

## 2018-09-13 DIAGNOSIS — J44.9 COPD, VERY SEVERE (HCC): ICD-10-CM

## 2018-09-13 PROCEDURE — 1123F ACP DISCUSS/DSCN MKR DOCD: CPT | Performed by: INTERNAL MEDICINE

## 2018-09-13 PROCEDURE — 4040F PNEUMOC VAC/ADMIN/RCVD: CPT | Performed by: INTERNAL MEDICINE

## 2018-09-13 PROCEDURE — 99214 OFFICE O/P EST MOD 30 MIN: CPT | Performed by: INTERNAL MEDICINE

## 2018-09-13 PROCEDURE — G8427 DOCREV CUR MEDS BY ELIG CLIN: HCPCS | Performed by: INTERNAL MEDICINE

## 2018-09-13 PROCEDURE — G8926 SPIRO NO PERF OR DOC: HCPCS | Performed by: INTERNAL MEDICINE

## 2018-09-13 PROCEDURE — 1101F PT FALLS ASSESS-DOCD LE1/YR: CPT | Performed by: INTERNAL MEDICINE

## 2018-09-13 PROCEDURE — 1036F TOBACCO NON-USER: CPT | Performed by: INTERNAL MEDICINE

## 2018-09-13 PROCEDURE — G8417 CALC BMI ABV UP PARAM F/U: HCPCS | Performed by: INTERNAL MEDICINE

## 2018-09-13 PROCEDURE — 3023F SPIROM DOC REV: CPT | Performed by: INTERNAL MEDICINE

## 2018-09-13 ASSESSMENT — SLEEP AND FATIGUE QUESTIONNAIRES
HOW LIKELY ARE YOU TO NOD OFF OR FALL ASLEEP WHILE SITTING AND TALKING TO SOMEONE: 1
HOW LIKELY ARE YOU TO NOD OFF OR FALL ASLEEP WHILE SITTING AND READING: 1
HOW LIKELY ARE YOU TO NOD OFF OR FALL ASLEEP WHILE LYING DOWN TO REST IN THE AFTERNOON WHEN CIRCUMSTANCES PERMIT: 1
HOW LIKELY ARE YOU TO NOD OFF OR FALL ASLEEP IN A CAR, WHILE STOPPED FOR A FEW MINUTES IN TRAFFIC: 1
ESS TOTAL SCORE: 8
HOW LIKELY ARE YOU TO NOD OFF OR FALL ASLEEP WHILE WATCHING TV: 1
HOW LIKELY ARE YOU TO NOD OFF OR FALL ASLEEP WHILE SITTING QUIETLY AFTER LUNCH WITHOUT ALCOHOL: 1
HOW LIKELY ARE YOU TO NOD OFF OR FALL ASLEEP WHILE SITTING INACTIVE IN A PUBLIC PLACE: 1
NECK CIRCUMFERENCE (INCHES): 18.5
HOW LIKELY ARE YOU TO NOD OFF OR FALL ASLEEP WHEN YOU ARE A PASSENGER IN A CAR FOR AN HOUR WITHOUT A BREAK: 1

## 2018-09-13 NOTE — ASSESSMENT & PLAN NOTE
Feeling significant better with his current treatment for Aspergillus and Arnuity and theophylline with albuterol as needed. Check theophylline level on next visit.

## 2018-09-13 NOTE — PROGRESS NOTES
colonization 02/04/2018    Skin cancer 2014    both upper thighs    Squamous cell cancer of skin of nose 5/2016       PAST SURGICAL HISTORY:  Past Surgical History:   Procedure Laterality Date    CATARACT REMOVAL Bilateral     5/2016    COLONOSCOPY  08/04/2016    dr Chiara Calvillo    3 years    SKIN BIOPSY  5/2016    Nose     TONSILLECTOMY AND ADENOIDECTOMY      TOOTH EXTRACTION  5/2016    UPPER GASTROINTESTINAL ENDOSCOPY  12/2/2015    dr Ligia Hoffman       FAMILY HISTORY:  family history includes Coronary Art Dis in his mother; Diabetes in his mother. SOCIAL HISTORY:   reports that he quit smoking about 16 years ago. His smoking use included Cigarettes. He has a 45.00 pack-year smoking history. He has never used smokeless tobacco.      ALLERGIES:  Patient has No Known Allergies. REVIEW OF SYSTEMS:  Constitutional: Negative for fever    HENT: Negative for sore throat + sinus drainage  Eyes: Negative for redness   Respiratory: + for dyspnea, +cough (wax and wane)  Cardiovascular: Negative for chest pain  Gastrointestinal: Negative for vomiting, diarrhea   Genitourinary: Negative for hematuria   Musculoskeletal: Negative for arthralgias   Skin: Negative for rash  Neurological: Negative for syncope  Hematological: Negative for adenopathy  Psychiatric/Behavorial: Negative for anxiety    Objective:   PHYSICAL EXAM:  Blood pressure 114/62, pulse 80, temperature 98.2 °F (36.8 °C), temperature source Oral, resp. rate 16, height 5' 7\" (1.702 m), weight 175 lb (79.4 kg), SpO2 96 %.'  Gen: No distress. Eyes: PERRL. No sclera icterus. No conjunctival injection. ENT: No discharge. Pharynx clear. External appearance of ears and nose normal.    Neck: Trachea midline. No obvious mass. Resp: No accessory muscle use. No crackles. Slight wheezes. No rhonchi. CV: Regular rate. Regular rhythm. No murmur or rub. No edema. Skin: Warm, dry, normal texture and turgor. No nodule on exposed extremities.

## 2018-10-05 ENCOUNTER — TELEPHONE (OUTPATIENT)
Dept: PULMONOLOGY | Age: 78
End: 2018-10-05

## 2018-10-05 DIAGNOSIS — J32.1 CHRONIC FRONTAL SINUSITIS: ICD-10-CM

## 2018-10-05 DIAGNOSIS — R73.09 ABNORMAL GLUCOSE: ICD-10-CM

## 2018-10-05 DIAGNOSIS — N40.0 BENIGN PROSTATIC HYPERPLASIA WITHOUT LOWER URINARY TRACT SYMPTOMS: ICD-10-CM

## 2018-10-05 DIAGNOSIS — E78.00 HYPERCHOLESTEREMIA: ICD-10-CM

## 2018-10-05 DIAGNOSIS — B44.9 ASPERGILLUS PNEUMONIA (HCC): ICD-10-CM

## 2018-10-05 LAB
A/G RATIO: 2 (ref 1.1–2.2)
ALBUMIN SERPL-MCNC: 4.5 G/DL (ref 3.4–5)
ALP BLD-CCNC: 89 U/L (ref 40–129)
ALT SERPL-CCNC: 19 U/L (ref 10–40)
ANION GAP SERPL CALCULATED.3IONS-SCNC: 13 MMOL/L (ref 3–16)
AST SERPL-CCNC: 16 U/L (ref 15–37)
BILIRUB SERPL-MCNC: 0.5 MG/DL (ref 0–1)
BUN BLDV-MCNC: 13 MG/DL (ref 7–20)
CALCIUM SERPL-MCNC: 9.7 MG/DL (ref 8.3–10.6)
CHLORIDE BLD-SCNC: 100 MMOL/L (ref 99–110)
CHOLESTEROL, TOTAL: 183 MG/DL (ref 0–199)
CO2: 28 MMOL/L (ref 21–32)
CREAT SERPL-MCNC: 0.9 MG/DL (ref 0.8–1.3)
ESTIMATED AVERAGE GLUCOSE: 122.6 MG/DL
GFR AFRICAN AMERICAN: >60
GFR NON-AFRICAN AMERICAN: >60
GLOBULIN: 2.3 G/DL
GLUCOSE BLD-MCNC: 102 MG/DL (ref 70–99)
HBA1C MFR BLD: 5.9 %
HDLC SERPL-MCNC: 52 MG/DL (ref 40–60)
LDL CHOLESTEROL CALCULATED: 102 MG/DL
POTASSIUM SERPL-SCNC: 4.4 MMOL/L (ref 3.5–5.1)
PROSTATE SPECIFIC ANTIGEN: 0.61 NG/ML (ref 0–4)
SODIUM BLD-SCNC: 141 MMOL/L (ref 136–145)
TOTAL PROTEIN: 6.8 G/DL (ref 6.4–8.2)
TRIGL SERPL-MCNC: 145 MG/DL (ref 0–150)
VLDLC SERPL CALC-MCNC: 29 MG/DL

## 2018-10-05 RX ORDER — POTASSIUM CHLORIDE 20 MEQ/1
TABLET, EXTENDED RELEASE ORAL
Qty: 60 TABLET | Refills: 0 | Status: SHIPPED | OUTPATIENT
Start: 2018-10-05 | End: 2019-02-10 | Stop reason: SDUPTHER

## 2018-10-05 RX ORDER — IPRATROPIUM BROMIDE 21 UG/1
2 SPRAY, METERED NASAL 4 TIMES DAILY
Qty: 90 ML | Refills: 1 | Status: SHIPPED | OUTPATIENT
Start: 2018-10-05 | End: 2018-10-05 | Stop reason: SDUPTHER

## 2018-10-07 LAB
(1,3)-BETA-D-GLUCAN (FUNGITELL) INTERPRETATION: NEGATIVE
(1,3)-BETA-D-GLUCAN (FUNGITELL): <31 PG/ML

## 2018-10-17 ENCOUNTER — OFFICE VISIT (OUTPATIENT)
Dept: INFECTIOUS DISEASES | Age: 78
End: 2018-10-17
Payer: MEDICARE

## 2018-10-17 VITALS
BODY MASS INDEX: 27.47 KG/M2 | HEART RATE: 82 BPM | DIASTOLIC BLOOD PRESSURE: 54 MMHG | SYSTOLIC BLOOD PRESSURE: 124 MMHG | OXYGEN SATURATION: 97 % | TEMPERATURE: 98 F | HEIGHT: 67 IN | WEIGHT: 175 LBS

## 2018-10-17 DIAGNOSIS — R91.8 PULMONARY NODULES: ICD-10-CM

## 2018-10-17 DIAGNOSIS — B44.9 ASPERGILLUS PNEUMONIA (HCC): Primary | ICD-10-CM

## 2018-10-17 DIAGNOSIS — R93.89 ABNORMAL CT OF THE CHEST: ICD-10-CM

## 2018-10-17 PROCEDURE — 1101F PT FALLS ASSESS-DOCD LE1/YR: CPT | Performed by: INTERNAL MEDICINE

## 2018-10-17 PROCEDURE — G8427 DOCREV CUR MEDS BY ELIG CLIN: HCPCS | Performed by: INTERNAL MEDICINE

## 2018-10-17 PROCEDURE — G8417 CALC BMI ABV UP PARAM F/U: HCPCS | Performed by: INTERNAL MEDICINE

## 2018-10-17 PROCEDURE — 1036F TOBACCO NON-USER: CPT | Performed by: INTERNAL MEDICINE

## 2018-10-17 PROCEDURE — 99213 OFFICE O/P EST LOW 20 MIN: CPT | Performed by: INTERNAL MEDICINE

## 2018-10-17 PROCEDURE — G8482 FLU IMMUNIZE ORDER/ADMIN: HCPCS | Performed by: INTERNAL MEDICINE

## 2018-10-17 PROCEDURE — 4040F PNEUMOC VAC/ADMIN/RCVD: CPT | Performed by: INTERNAL MEDICINE

## 2018-10-17 PROCEDURE — 1123F ACP DISCUSS/DSCN MKR DOCD: CPT | Performed by: INTERNAL MEDICINE

## 2018-10-17 RX ORDER — GUAIFENESIN 600 MG/1
1200 TABLET, EXTENDED RELEASE ORAL 2 TIMES DAILY
Status: ON HOLD | COMMUNITY
End: 2021-10-21 | Stop reason: ALTCHOICE

## 2018-10-17 RX ORDER — PREDNISONE 1 MG/1
5 TABLET ORAL 4 TIMES DAILY
COMMUNITY
End: 2018-12-07

## 2018-10-17 RX ORDER — AZITHROMYCIN 250 MG/1
250 TABLET, FILM COATED ORAL DAILY
COMMUNITY
End: 2018-11-28 | Stop reason: ALTCHOICE

## 2018-10-29 DIAGNOSIS — J45.909 UNCOMPLICATED ASTHMA, UNSPECIFIED ASTHMA SEVERITY, UNSPECIFIED WHETHER PERSISTENT: ICD-10-CM

## 2018-10-29 DIAGNOSIS — J41.0 SIMPLE CHRONIC BRONCHITIS (HCC): ICD-10-CM

## 2018-10-29 RX ORDER — LOSARTAN POTASSIUM 50 MG/1
TABLET ORAL
Qty: 90 TABLET | Refills: 0 | Status: SHIPPED | OUTPATIENT
Start: 2018-10-29 | End: 2019-02-10 | Stop reason: SDUPTHER

## 2018-10-30 RX ORDER — MONTELUKAST SODIUM 10 MG/1
TABLET ORAL
Qty: 90 TABLET | Refills: 3 | Status: SHIPPED | OUTPATIENT
Start: 2018-10-30 | End: 2019-12-03 | Stop reason: SDUPTHER

## 2018-11-04 NOTE — PROGRESS NOTES
1 TABLET DAILY 60 tablet 0    ipratropium (ATROVENT) 0.03 % nasal spray USE 2 SPRAYS IN EACH NOSTRIL FOUR TIMES DAILY 120 mL 1    CARTIA  MG extended release capsule TAKE 1 CAPSULE BY MOUTH DAILY 90 capsule 0    theophylline (UNIPHYL) 400 MG extended release tablet TAKE 1/2 TABLET BY MOUTH TWICE DAILY 90 tablet 0    atorvastatin (LIPITOR) 10 MG tablet TAKE 1 TABLET BY MOUTH EVERY DAY 90 tablet 0    furosemide (LASIX) 40 MG tablet TAKE 1 TABLET DAILY 90 tablet 0    fluticasone (ARNUITY ELLIPTA) 200 MCG/ACT AEPB Inhale 1 Inhaler into the lungs daily 1 each 0    albuterol (PROVENTIL) (2.5 MG/3ML) 0.083% nebulizer solution Take 3 mLs by nebulization every 6 hours as needed for Wheezing or Shortness of Breath 120 each 3    VENTOLIN  (90 BASE) MCG/ACT inhaler USE 2 INHALATIONS ORALLY   INTO THE LUNGS EVERY 6     HOURS AS NEEDED 54 g 3    Nebulizers (COMPRESSOR/NEBULIZER) MISC Use every 6 hours as needed, DX COPD J44.9 1 each 0    omeprazole (PRILOSEC) 40 MG capsule Take 40 mg by mouth daily      Spacer/Aero-Holding Chambers KIT Please dispense 1 spacer 1 kit 0    cycloSPORINE (RESTASIS) 0.05 % ophthalmic emulsion Place 1 drop into both eyes 2 times daily       montelukast (SINGULAIR) 10 MG tablet TAKE 1 TABLET EVERY EVENING 90 tablet 3    losartan (COZAAR) 50 MG tablet TAKE 1 TABLET BY MOUTH DAILY 90 tablet 0    Isavuconazonium Sulfate (CRESEMBA) 186 MG CAPS Take 372 mg by mouth daily Stop voriconazole once you start this medicine 180 capsule 2     No current facility-administered medications for this visit. Allergies:  Patient has no known allergies.       Immunizations :   Immunization History   Administered Date(s) Administered    Influenza Vaccine, unspecified formulation 10/22/2014    Influenza Virus Vaccine 10/13/2013    Influenza Whole 09/29/2010    Influenza, High Dose (Fluzone 65 yrs and older) 09/19/2017, 10/08/2018    Influenza, Quadv, 3 yrs and older, IM, PF (Fluzone 3

## 2018-11-20 RX ORDER — THEOPHYLLINE 400 MG/1
TABLET, EXTENDED RELEASE ORAL
Qty: 90 TABLET | Refills: 0 | Status: SHIPPED | OUTPATIENT
Start: 2018-11-20 | End: 2019-03-07 | Stop reason: SDUPTHER

## 2018-11-20 RX ORDER — DILTIAZEM HYDROCHLORIDE 300 MG/1
CAPSULE, EXTENDED RELEASE ORAL
Qty: 90 CAPSULE | Refills: 0 | Status: SHIPPED | OUTPATIENT
Start: 2018-11-20 | End: 2019-02-10 | Stop reason: SDUPTHER

## 2018-11-20 RX ORDER — ATORVASTATIN CALCIUM 10 MG/1
TABLET, FILM COATED ORAL
Qty: 90 TABLET | Refills: 0 | Status: SHIPPED | OUTPATIENT
Start: 2018-11-20 | End: 2019-02-10 | Stop reason: SDUPTHER

## 2018-11-28 ENCOUNTER — OFFICE VISIT (OUTPATIENT)
Dept: PULMONOLOGY | Age: 78
End: 2018-11-28
Payer: MEDICARE

## 2018-11-28 VITALS
OXYGEN SATURATION: 95 % | DIASTOLIC BLOOD PRESSURE: 86 MMHG | HEART RATE: 100 BPM | RESPIRATION RATE: 106 BRPM | WEIGHT: 177 LBS | TEMPERATURE: 97.9 F | SYSTOLIC BLOOD PRESSURE: 136 MMHG | HEIGHT: 67 IN | BODY MASS INDEX: 27.78 KG/M2

## 2018-11-28 DIAGNOSIS — J44.9 COPD, VERY SEVERE (HCC): Primary | ICD-10-CM

## 2018-11-28 DIAGNOSIS — J06.9 VIRAL URI WITH COUGH: ICD-10-CM

## 2018-11-28 DIAGNOSIS — G47.33 OSA (OBSTRUCTIVE SLEEP APNEA): ICD-10-CM

## 2018-11-28 PROCEDURE — G8926 SPIRO NO PERF OR DOC: HCPCS | Performed by: INTERNAL MEDICINE

## 2018-11-28 PROCEDURE — 4040F PNEUMOC VAC/ADMIN/RCVD: CPT | Performed by: INTERNAL MEDICINE

## 2018-11-28 PROCEDURE — 1101F PT FALLS ASSESS-DOCD LE1/YR: CPT | Performed by: INTERNAL MEDICINE

## 2018-11-28 PROCEDURE — 1036F TOBACCO NON-USER: CPT | Performed by: INTERNAL MEDICINE

## 2018-11-28 PROCEDURE — G8482 FLU IMMUNIZE ORDER/ADMIN: HCPCS | Performed by: INTERNAL MEDICINE

## 2018-11-28 PROCEDURE — G8427 DOCREV CUR MEDS BY ELIG CLIN: HCPCS | Performed by: INTERNAL MEDICINE

## 2018-11-28 PROCEDURE — 99214 OFFICE O/P EST MOD 30 MIN: CPT | Performed by: INTERNAL MEDICINE

## 2018-11-28 PROCEDURE — 1123F ACP DISCUSS/DSCN MKR DOCD: CPT | Performed by: INTERNAL MEDICINE

## 2018-11-28 PROCEDURE — 3023F SPIROM DOC REV: CPT | Performed by: INTERNAL MEDICINE

## 2018-11-28 PROCEDURE — G8417 CALC BMI ABV UP PARAM F/U: HCPCS | Performed by: INTERNAL MEDICINE

## 2018-11-28 RX ORDER — PROMETHAZINE HYDROCHLORIDE AND CODEINE PHOSPHATE 6.25; 1 MG/5ML; MG/5ML
5 SYRUP ORAL EVERY 4 HOURS PRN
Qty: 118 ML | Refills: 0 | Status: SHIPPED | OUTPATIENT
Start: 2018-11-28 | End: 2018-12-05

## 2018-11-28 RX ORDER — PREDNISONE 20 MG/1
40 TABLET ORAL DAILY
Qty: 10 TABLET | Refills: 0 | Status: SHIPPED | OUTPATIENT
Start: 2018-11-28 | End: 2018-12-03

## 2018-11-28 RX ORDER — DOXYCYCLINE HYCLATE 100 MG/1
100 CAPSULE ORAL 2 TIMES DAILY
Qty: 20 CAPSULE | Refills: 0 | Status: SHIPPED | OUTPATIENT
Start: 2018-11-28 | End: 2018-12-07

## 2018-11-28 NOTE — PROGRESS NOTES
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 SOCIAL HISTORY:   reports that he quit smoking about 16 years ago. His smoking use included Cigarettes. He has a 45.00 pack-year smoking history. He has never used smokeless tobacco.      ALLERGIES:  Patient has No Known Allergies. REVIEW OF SYSTEMS:  Constitutional: Negative for fever   HENT: Negative for sore throat  Respiratory: Increased shortness of breath and cough for the last 2 days. Cardiovascular: Negative for chest pain  Gastrointestinal: Negative for vomiting, diarrhea   Skin: Negative for rash  Psychiatric/Behavorial: Negative for anxiety    Objective:   PHYSICAL EXAM:  Blood pressure 136/86, pulse 100, temperature 97.9 °F (36.6 °C), temperature source Oral, resp. rate (!) 106, height 5' 7\" (1.702 m), weight 177 lb (80.3 kg), SpO2 95 %.'  Gen: No distress. Resp: No accessory muscle use. No crackles. ++ wheezes. No rhonchi. CV: Regular rate. Regular rhythm. No murmur or rub. No edema. Skin: Warm, dry, normal texture and turgor. No nodule on exposed extremities. M/S: No cyanosis. No clubbing. No joint deformity. Psych: Oriented x 3. No anxiety. Awake. Alert. Intact judgement and insight. Good Mood / Affect.   Memory appears in tact        Current Outpatient Prescriptions   Medication Sig Dispense Refill    predniSONE (DELTASONE) 20 MG tablet

## 2018-12-06 ENCOUNTER — TELEPHONE (OUTPATIENT)
Dept: PULMONOLOGY | Age: 78
End: 2018-12-06

## 2018-12-07 ENCOUNTER — OFFICE VISIT (OUTPATIENT)
Dept: PULMONOLOGY | Age: 78
End: 2018-12-07
Payer: MEDICARE

## 2018-12-07 VITALS
BODY MASS INDEX: 27.91 KG/M2 | SYSTOLIC BLOOD PRESSURE: 134 MMHG | HEIGHT: 67 IN | OXYGEN SATURATION: 95 % | DIASTOLIC BLOOD PRESSURE: 73 MMHG | TEMPERATURE: 97 F | WEIGHT: 177.8 LBS | HEART RATE: 100 BPM | RESPIRATION RATE: 24 BRPM

## 2018-12-07 DIAGNOSIS — J40 BRONCHITIS: Primary | ICD-10-CM

## 2018-12-07 DIAGNOSIS — J43.2 CENTRILOBULAR EMPHYSEMA (HCC): ICD-10-CM

## 2018-12-07 PROCEDURE — 1101F PT FALLS ASSESS-DOCD LE1/YR: CPT | Performed by: INTERNAL MEDICINE

## 2018-12-07 PROCEDURE — 1123F ACP DISCUSS/DSCN MKR DOCD: CPT | Performed by: INTERNAL MEDICINE

## 2018-12-07 PROCEDURE — G8427 DOCREV CUR MEDS BY ELIG CLIN: HCPCS | Performed by: INTERNAL MEDICINE

## 2018-12-07 PROCEDURE — 3023F SPIROM DOC REV: CPT | Performed by: INTERNAL MEDICINE

## 2018-12-07 PROCEDURE — 1036F TOBACCO NON-USER: CPT | Performed by: INTERNAL MEDICINE

## 2018-12-07 PROCEDURE — 4040F PNEUMOC VAC/ADMIN/RCVD: CPT | Performed by: INTERNAL MEDICINE

## 2018-12-07 PROCEDURE — G8482 FLU IMMUNIZE ORDER/ADMIN: HCPCS | Performed by: INTERNAL MEDICINE

## 2018-12-07 PROCEDURE — G8417 CALC BMI ABV UP PARAM F/U: HCPCS | Performed by: INTERNAL MEDICINE

## 2018-12-07 PROCEDURE — G8926 SPIRO NO PERF OR DOC: HCPCS | Performed by: INTERNAL MEDICINE

## 2018-12-07 PROCEDURE — 99213 OFFICE O/P EST LOW 20 MIN: CPT | Performed by: INTERNAL MEDICINE

## 2018-12-07 RX ORDER — LEVOFLOXACIN 750 MG/1
750 TABLET ORAL DAILY
Qty: 7 TABLET | Refills: 0 | Status: SHIPPED | OUTPATIENT
Start: 2018-12-07 | End: 2018-12-14

## 2018-12-07 RX ORDER — PREDNISONE 20 MG/1
40 TABLET ORAL DAILY
Qty: 10 TABLET | Refills: 0 | Status: SHIPPED | OUTPATIENT
Start: 2018-12-07 | End: 2018-12-12

## 2018-12-07 RX ORDER — MOXIFLOXACIN HYDROCHLORIDE 400 MG/1
400 TABLET ORAL DAILY
Qty: 10 TABLET | Refills: 0 | Status: SHIPPED | OUTPATIENT
Start: 2018-12-07 | End: 2018-12-17

## 2018-12-07 RX ORDER — AZITHROMYCIN 250 MG/1
TABLET, FILM COATED ORAL
Qty: 1 PACKET | Refills: 0 | Status: SHIPPED | OUTPATIENT
Start: 2018-12-07 | End: 2019-01-09

## 2018-12-09 NOTE — ASSESSMENT & PLAN NOTE
Give  prescription for antibiotics but epic is stated multiple antibiotics not formulary so Three prescription sent in and he is to only fill one. The patient expressed understanding for all.

## 2019-01-02 ENCOUNTER — TELEPHONE (OUTPATIENT)
Dept: PULMONOLOGY | Age: 79
End: 2019-01-02

## 2019-01-02 RX ORDER — DOXYCYCLINE HYCLATE 100 MG/1
100 CAPSULE ORAL 2 TIMES DAILY
Qty: 14 CAPSULE | Refills: 0 | Status: SHIPPED | OUTPATIENT
Start: 2019-01-02 | End: 2019-01-09

## 2019-01-08 ENCOUNTER — PATIENT MESSAGE (OUTPATIENT)
Dept: PULMONOLOGY | Age: 79
End: 2019-01-08

## 2019-01-09 RX ORDER — LEVOFLOXACIN 750 MG/1
750 TABLET ORAL DAILY
Qty: 7 TABLET | Refills: 0 | Status: SHIPPED | OUTPATIENT
Start: 2019-01-09 | End: 2019-01-16

## 2019-02-11 RX ORDER — LOSARTAN POTASSIUM 50 MG/1
TABLET ORAL
Qty: 90 TABLET | Refills: 0 | Status: SHIPPED | OUTPATIENT
Start: 2019-02-11 | End: 2019-05-01 | Stop reason: SDUPTHER

## 2019-02-11 RX ORDER — POTASSIUM CHLORIDE 20 MEQ/1
TABLET, EXTENDED RELEASE ORAL
Qty: 60 TABLET | Refills: 0 | Status: SHIPPED | OUTPATIENT
Start: 2019-02-11 | End: 2019-05-01 | Stop reason: SDUPTHER

## 2019-02-11 RX ORDER — DILTIAZEM HYDROCHLORIDE 300 MG/1
CAPSULE, COATED, EXTENDED RELEASE ORAL
Qty: 90 CAPSULE | Refills: 0 | Status: SHIPPED | OUTPATIENT
Start: 2019-02-11 | End: 2019-05-01 | Stop reason: SDUPTHER

## 2019-02-11 RX ORDER — ATORVASTATIN CALCIUM 10 MG/1
TABLET, FILM COATED ORAL
Qty: 90 TABLET | Refills: 0 | Status: SHIPPED | OUTPATIENT
Start: 2019-02-11 | End: 2019-05-24 | Stop reason: SDUPTHER

## 2019-03-07 RX ORDER — THEOPHYLLINE 400 MG/1
TABLET, EXTENDED RELEASE ORAL
Qty: 90 TABLET | Refills: 0 | Status: SHIPPED | OUTPATIENT
Start: 2019-03-07 | End: 2019-05-01 | Stop reason: SDUPTHER

## 2019-03-07 RX ORDER — FUROSEMIDE 40 MG/1
TABLET ORAL
Qty: 90 TABLET | Refills: 0 | Status: ON HOLD | OUTPATIENT
Start: 2019-03-07 | End: 2019-07-31 | Stop reason: SDUPTHER

## 2019-03-14 ENCOUNTER — OFFICE VISIT (OUTPATIENT)
Dept: PULMONOLOGY | Age: 79
End: 2019-03-14
Payer: MEDICARE

## 2019-03-14 VITALS
RESPIRATION RATE: 16 BRPM | WEIGHT: 184 LBS | BODY MASS INDEX: 28.88 KG/M2 | HEIGHT: 67 IN | DIASTOLIC BLOOD PRESSURE: 66 MMHG | HEART RATE: 94 BPM | TEMPERATURE: 97.6 F | OXYGEN SATURATION: 94 % | SYSTOLIC BLOOD PRESSURE: 136 MMHG

## 2019-03-14 DIAGNOSIS — G47.33 OSA (OBSTRUCTIVE SLEEP APNEA): ICD-10-CM

## 2019-03-14 DIAGNOSIS — E87.6 HYPOKALEMIA: ICD-10-CM

## 2019-03-14 DIAGNOSIS — J44.9 COPD, VERY SEVERE (HCC): Primary | ICD-10-CM

## 2019-03-14 DIAGNOSIS — J44.9 COPD, VERY SEVERE (HCC): ICD-10-CM

## 2019-03-14 PROBLEM — R68.89 FLU-LIKE SYMPTOMS: Status: RESOLVED | Noted: 2018-01-17 | Resolved: 2019-03-14

## 2019-03-14 LAB
ALBUMIN SERPL-MCNC: 4.7 G/DL (ref 3.4–5)
ANION GAP SERPL CALCULATED.3IONS-SCNC: 17 MMOL/L (ref 3–16)
BUN BLDV-MCNC: 11 MG/DL (ref 7–20)
CALCIUM SERPL-MCNC: 9.9 MG/DL (ref 8.3–10.6)
CHLORIDE BLD-SCNC: 101 MMOL/L (ref 99–110)
CO2: 25 MMOL/L (ref 21–32)
CREAT SERPL-MCNC: 1 MG/DL (ref 0.8–1.3)
GFR AFRICAN AMERICAN: >60
GFR NON-AFRICAN AMERICAN: >60
GLUCOSE BLD-MCNC: 91 MG/DL (ref 70–99)
PHOSPHORUS: 2.9 MG/DL (ref 2.5–4.9)
POTASSIUM SERPL-SCNC: 4.3 MMOL/L (ref 3.5–5.1)
SODIUM BLD-SCNC: 143 MMOL/L (ref 136–145)
THEOPHYLLINE LEVEL: 5.9 UG/ML (ref 8–20)

## 2019-03-14 PROCEDURE — 4040F PNEUMOC VAC/ADMIN/RCVD: CPT | Performed by: INTERNAL MEDICINE

## 2019-03-14 PROCEDURE — 99214 OFFICE O/P EST MOD 30 MIN: CPT | Performed by: INTERNAL MEDICINE

## 2019-03-14 PROCEDURE — G8417 CALC BMI ABV UP PARAM F/U: HCPCS | Performed by: INTERNAL MEDICINE

## 2019-03-14 PROCEDURE — 1036F TOBACCO NON-USER: CPT | Performed by: INTERNAL MEDICINE

## 2019-03-14 PROCEDURE — 1123F ACP DISCUSS/DSCN MKR DOCD: CPT | Performed by: INTERNAL MEDICINE

## 2019-03-14 PROCEDURE — 1101F PT FALLS ASSESS-DOCD LE1/YR: CPT | Performed by: INTERNAL MEDICINE

## 2019-03-14 PROCEDURE — 3023F SPIROM DOC REV: CPT | Performed by: INTERNAL MEDICINE

## 2019-03-14 PROCEDURE — G8926 SPIRO NO PERF OR DOC: HCPCS | Performed by: INTERNAL MEDICINE

## 2019-03-14 PROCEDURE — G8482 FLU IMMUNIZE ORDER/ADMIN: HCPCS | Performed by: INTERNAL MEDICINE

## 2019-03-14 PROCEDURE — G8427 DOCREV CUR MEDS BY ELIG CLIN: HCPCS | Performed by: INTERNAL MEDICINE

## 2019-03-14 RX ORDER — PREDNISONE 20 MG/1
40 TABLET ORAL DAILY
Qty: 10 TABLET | Refills: 0 | Status: SHIPPED | OUTPATIENT
Start: 2019-03-14 | End: 2019-03-19

## 2019-03-14 ASSESSMENT — SLEEP AND FATIGUE QUESTIONNAIRES
HOW LIKELY ARE YOU TO NOD OFF OR FALL ASLEEP WHILE SITTING INACTIVE IN A PUBLIC PLACE: 1
HOW LIKELY ARE YOU TO NOD OFF OR FALL ASLEEP WHILE SITTING QUIETLY AFTER LUNCH WITHOUT ALCOHOL: 1
HOW LIKELY ARE YOU TO NOD OFF OR FALL ASLEEP WHILE WATCHING TV: 1
HOW LIKELY ARE YOU TO NOD OFF OR FALL ASLEEP WHILE SITTING AND TALKING TO SOMEONE: 1
HOW LIKELY ARE YOU TO NOD OFF OR FALL ASLEEP IN A CAR, WHILE STOPPED FOR A FEW MINUTES IN TRAFFIC: 0
ESS TOTAL SCORE: 6
HOW LIKELY ARE YOU TO NOD OFF OR FALL ASLEEP WHEN YOU ARE A PASSENGER IN A CAR FOR AN HOUR WITHOUT A BREAK: 1
HOW LIKELY ARE YOU TO NOD OFF OR FALL ASLEEP WHILE LYING DOWN TO REST IN THE AFTERNOON WHEN CIRCUMSTANCES PERMIT: 0
HOW LIKELY ARE YOU TO NOD OFF OR FALL ASLEEP WHILE SITTING AND READING: 1

## 2019-03-25 ENCOUNTER — TELEPHONE (OUTPATIENT)
Dept: PULMONOLOGY | Age: 79
End: 2019-03-25

## 2019-05-01 RX ORDER — POTASSIUM CHLORIDE 20 MEQ/1
TABLET, EXTENDED RELEASE ORAL
Qty: 60 TABLET | Refills: 0 | Status: SHIPPED | OUTPATIENT
Start: 2019-05-01 | End: 2019-10-02 | Stop reason: ALTCHOICE

## 2019-05-01 RX ORDER — LOSARTAN POTASSIUM 50 MG/1
TABLET ORAL
Qty: 90 TABLET | Refills: 0 | Status: SHIPPED | OUTPATIENT
Start: 2019-05-01 | End: 2019-07-22 | Stop reason: SDUPTHER

## 2019-05-01 RX ORDER — THEOPHYLLINE 400 MG/1
TABLET, EXTENDED RELEASE ORAL
Qty: 90 TABLET | Refills: 0 | Status: SHIPPED | OUTPATIENT
Start: 2019-05-01 | End: 2019-09-16 | Stop reason: SDUPTHER

## 2019-05-01 RX ORDER — DILTIAZEM HYDROCHLORIDE 300 MG/1
CAPSULE, COATED, EXTENDED RELEASE ORAL
Qty: 90 CAPSULE | Refills: 0 | Status: SHIPPED | OUTPATIENT
Start: 2019-05-01 | End: 2019-08-23 | Stop reason: SDUPTHER

## 2019-05-02 DIAGNOSIS — J44.1 ACUTE EXACERBATION OF CHRONIC OBSTRUCTIVE PULMONARY DISEASE (COPD) (HCC): ICD-10-CM

## 2019-05-24 RX ORDER — ATORVASTATIN CALCIUM 10 MG/1
TABLET, FILM COATED ORAL
Qty: 90 TABLET | Refills: 0 | Status: SHIPPED | OUTPATIENT
Start: 2019-05-24 | End: 2019-08-23 | Stop reason: SDUPTHER

## 2019-06-16 DIAGNOSIS — J32.1 CHRONIC FRONTAL SINUSITIS: ICD-10-CM

## 2019-06-16 DIAGNOSIS — J44.9 COPD, VERY SEVERE (HCC): ICD-10-CM

## 2019-06-17 PROBLEM — J98.01 BRONCHOSPASM: Status: ACTIVE | Noted: 2019-06-17

## 2019-06-17 RX ORDER — ALBUTEROL SULFATE 2.5 MG/3ML
SOLUTION RESPIRATORY (INHALATION)
Qty: 360 ML | Refills: 0 | Status: SHIPPED | OUTPATIENT
Start: 2019-06-17 | End: 2019-07-18 | Stop reason: SDUPTHER

## 2019-06-17 RX ORDER — IPRATROPIUM BROMIDE 21 UG/1
SPRAY, METERED NASAL
Qty: 1 BOTTLE | Refills: 3 | Status: SHIPPED | OUTPATIENT
Start: 2019-06-17 | End: 2019-11-11 | Stop reason: SDUPTHER

## 2019-07-09 ENCOUNTER — TELEPHONE (OUTPATIENT)
Dept: PULMONOLOGY | Age: 79
End: 2019-07-09

## 2019-07-09 ENCOUNTER — HOSPITAL ENCOUNTER (OUTPATIENT)
Dept: GENERAL RADIOLOGY | Age: 79
Discharge: HOME | End: 2019-07-09
Payer: MEDICARE

## 2019-07-09 ENCOUNTER — HOSPITAL ENCOUNTER (OUTPATIENT)
Age: 79
Discharge: HOME | End: 2019-07-09
Payer: MEDICARE

## 2019-07-09 ENCOUNTER — HOSPITAL ENCOUNTER (OUTPATIENT)
Dept: CARDIAC REHAB | Age: 79
Setting detail: THERAPIES SERIES
Discharge: HOME | End: 2019-07-09
Payer: MEDICARE

## 2019-07-09 ENCOUNTER — OFFICE VISIT (OUTPATIENT)
Dept: PULMONOLOGY | Age: 79
End: 2019-07-09
Payer: MEDICARE

## 2019-07-09 VITALS
TEMPERATURE: 98.3 F | BODY MASS INDEX: 29.03 KG/M2 | OXYGEN SATURATION: 92 % | WEIGHT: 185 LBS | RESPIRATION RATE: 18 BRPM | HEART RATE: 100 BPM | HEIGHT: 67 IN | SYSTOLIC BLOOD PRESSURE: 114 MMHG | DIASTOLIC BLOOD PRESSURE: 64 MMHG

## 2019-07-09 DIAGNOSIS — J44.9 COPD, VERY SEVERE (HCC): ICD-10-CM

## 2019-07-09 DIAGNOSIS — J18.9 COMMUNITY ACQUIRED PNEUMONIA OF RIGHT LOWER LOBE OF LUNG: Primary | ICD-10-CM

## 2019-07-09 DIAGNOSIS — J44.9 COPD, VERY SEVERE (HCC): Primary | ICD-10-CM

## 2019-07-09 LAB — THEOPHYLLINE LEVEL: 8.6 UG/ML (ref 8–20)

## 2019-07-09 PROCEDURE — 1036F TOBACCO NON-USER: CPT | Performed by: INTERNAL MEDICINE

## 2019-07-09 PROCEDURE — 3023F SPIROM DOC REV: CPT | Performed by: INTERNAL MEDICINE

## 2019-07-09 PROCEDURE — 94618 PULMONARY STRESS TESTING: CPT

## 2019-07-09 PROCEDURE — 1123F ACP DISCUSS/DSCN MKR DOCD: CPT | Performed by: INTERNAL MEDICINE

## 2019-07-09 PROCEDURE — 4040F PNEUMOC VAC/ADMIN/RCVD: CPT | Performed by: INTERNAL MEDICINE

## 2019-07-09 PROCEDURE — 99214 OFFICE O/P EST MOD 30 MIN: CPT | Performed by: INTERNAL MEDICINE

## 2019-07-09 PROCEDURE — 94618 PULMONARY STRESS TESTING: CPT | Performed by: INTERNAL MEDICINE

## 2019-07-09 PROCEDURE — 71046 X-RAY EXAM CHEST 2 VIEWS: CPT

## 2019-07-09 PROCEDURE — G8417 CALC BMI ABV UP PARAM F/U: HCPCS | Performed by: INTERNAL MEDICINE

## 2019-07-09 PROCEDURE — G8926 SPIRO NO PERF OR DOC: HCPCS | Performed by: INTERNAL MEDICINE

## 2019-07-09 PROCEDURE — G8427 DOCREV CUR MEDS BY ELIG CLIN: HCPCS | Performed by: INTERNAL MEDICINE

## 2019-07-09 RX ORDER — CLARITHROMYCIN 250 MG/1
250 TABLET, FILM COATED ORAL 2 TIMES DAILY
Qty: 14 TABLET | Refills: 0 | Status: SHIPPED | OUTPATIENT
Start: 2019-07-09 | End: 2019-07-16

## 2019-07-09 RX ORDER — PREDNISONE 10 MG/1
TABLET ORAL
Qty: 30 TABLET | Refills: 0 | Status: SHIPPED | OUTPATIENT
Start: 2019-07-09 | End: 2019-07-19

## 2019-07-09 NOTE — PROGRESS NOTES
REASON FOR CONSULTATION/CC: ZAHEER      CONSULTING PHYSICIAN: Christian Garnett MD   PCP: Christian Garnett MD    HISTORY OF PRESENT ILLNESS: Candi Mays is a 69y.o. year old male with a history of ZAHEER who presents :     Sleep history:        COPD  Worsening shortness of breath for the last 2 months. Using neb. Had a lot of dyspnea at [place]. After trip, became sick. This took ~ 1 month to shake. He used prednisone x 2  Times. Using more breathing treatments since. Will wake and test o2 and will 84-87%. Will note tachycardia as well. nocturnal oxygen issues with using cpap. Arnuity and theophylline . He is concerned for pneumonia. ZAHEER  Using nightly. [place] Sleepiness Scale:    Sleep Medicine 3/14/2019 9/13/2018 6/12/2018 4/17/2018 9/19/2017 6/27/2017 3/29/2017   Sitting and reading 1 1 1 1 1 1 1   Watching TV 1 1 1 1 1 1 1   Sitting, inactive in a public place (e.g. a theatre or a meeting) 1 1 1 1 0 1 1   As a passenger in a car for an hour without a break 1 1 1 1 0 1 1   Lying down to rest in the afternoon when circumstances permit 0 1 1 1 0 0 1   Sitting and talking to someone 1 1 1 1 0 0 1   Sitting quietly after a lunch without alcohol 1 1 1 1 0 0 1   In a car, while stopped for a few minutes in traffic 0 1 1 1 0 0 1   Total score 6 8 8 8 2 4 8   Neck circumference - 18.5 19 - 19 20.5 19.75         0 = no chance of dozing  1 = slight chance of dozing  2 = moderate chance of dozing  3 = high chance of dozing    Interpretation:   0-7: It is unlikely that you are abnormally sleepy. 8-9:You have an average amount of daytime sleepiness. 10-15: You may be excessively sleepy depending on the situation. You may want to consider   seeking medical attention. 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 SOCIAL HISTORY:   reports that he quit smoking about 17 years ago. His smoking use included cigarettes. He has a 45.00 pack-year smoking history. He has never used smokeless tobacco.      ALLERGIES:  Patient is allergic to eucerin [aquaderm treatment-moisturizer]. REVIEW OF SYSTEMS:  Constitutional: Negative for fever   HENT: Negative for sore throat  Respiratory: ++ for dyspnea, ++ cough  Cardiovascular: Negative for chest pain  Gastrointestinal: Negative for vomiting, diarrhea   Skin: Negative for rash  Psychiatric/Behavorial: Negative for anxiety   Objective:   PHYSICAL EXAM:  Blood pressure 114/64, pulse 100, temperature 98.3 °F (36.8 °C), temperature source Oral, resp. rate 18, height 5' 7\" (1.702 m), weight 185 lb (83.9 kg), SpO2 92 %.'  Gen: No distress. ENT:   Resp: No accessory muscle use. No crackles. No wheezes. No rhonchi. distant  CV: Regular rate. Regular rhythm. No murmur or rub. No edema. Skin: Warm, dry, normal texture and turgor. No nodule on exposed extremities. M/S: No cyanosis. No clubbing. No joint deformity. Psych: Oriented x 3. No anxiety. Awake. Alert. Intact judgement and insight. Good Mood / Affect.   Memory appears in tact           Current Outpatient Medications   Medication Sig Dispense Refill    umeclidinium-vilanterol (ANORO ELLIPTA) 62.5-25 MCG/INH AEPB inhaler Inhale 1 puff into the lungs daily 3 puff 3    predniSONE (DELTASONE) 10 MG tablet Take 40 mg by mouth for 3 days 30 mg for 3 days 20 mg for 3 days 10 mg for 3 days.  30 tablet 0    umeclidinium-vilanterol (ANORO ELLIPTA) 62.5-25 MCG/INH AEPB inhaler Inhale 1 puff into the lungs daily 1 each 0    albuterol (PROVENTIL) (2.5 MG/3ML) 0.083% nebulizer solution USE 1 VIAL VIA NEBULIZER EVERY 6 HOURS AS NEEDED FOR WHEEZING OR SHORTNESS OF BREATH 360 mL 0    ipratropium (ATROVENT) 0.03 % nasal spray USE 2 SPRAYS NASALLY FOUR TIMES DAILY 1 Bottle 3    atorvastatin (LIPITOR) 10 MG tablet TAKE 1 TABLET BY MOUTH EVERY DAY 90 tablet 0    fluticasone (ARNUITY ELLIPTA) 200 MCG/ACT AEPB Inhale 1 puff into the lungs daily 3 each 3    diltiazem (CARDIZEM CD) 300 MG extended release capsule TAKE 1 CAPSULE BY MOUTH DAILY 90 capsule 0    losartan (COZAAR) 50 MG tablet TAKE 1 TABLET BY MOUTH DAILY 90 tablet 0    theophylline (UNIPHYL) 400 MG extended release tablet TAKE 1/2 TABLET BY MOUTH TWICE DAILY 90 tablet 0    potassium chloride (KLOR-CON M) 20 MEQ extended release tablet TAKE 1 TABLET DAILY 60 tablet 0    furosemide (LASIX) 40 MG tablet TAKE 1 TABLET BY MOUTH DAILY 90 tablet 0    montelukast (SINGULAIR) 10 MG tablet TAKE 1 TABLET EVERY EVENING 90 tablet 3    guaiFENesin (MUCINEX) 600 MG extended release tablet Take 1,200 mg by mouth 2 times daily      VENTOLIN  (90 BASE) MCG/ACT inhaler USE 2 INHALATIONS ORALLY   INTO THE LUNGS EVERY 6     HOURS AS NEEDED 54 g 3    Nebulizers (COMPRESSOR/NEBULIZER) MISC Use every 6 hours as needed, DX COPD J44.9 1 each 0    omeprazole (PRILOSEC) 40 MG capsule Take 40 mg by mouth daily      Spacer/Aero-Holding Chambers KIT Please dispense 1 spacer 1 kit 0    cycloSPORINE (RESTASIS) 0.05 % ophthalmic emulsion Place 1 drop into both eyes 2 times daily        No current facility-administered medications for

## 2019-07-09 NOTE — LETTER
[facility] Pulmonology  [address] [place] [address]. [facility]  Phone: [phone]  Fax: [phone]     7/9/2019    Dr. Yuliet Clifton MD    I have seen your patient, Katy Fragoso on follow up. Here is the assessment and plan for your patient. Any question, please do not hesitate to call. Problem List Items Addressed This Visit     COPD, very severe ([address] - Primary     He has worsening shortness of breath. Is not clear whether this is secondary to worsening COPD, hypoxemia, return of pneumonia/aspergillus pneumonia, or new cardiac issue with issues with lower extremity edema. He prior was doing well with theophylline and Arnuity. I will stop this up adding Anoro to this regimen. Check theophylline level. Follow-up chest x-ray to assess for pneumonia. Prednisone x12 days was given. He currently does not want to take this so he has a paper prescription to fill if needed. I advised him to get a portable nebulizer for travel. I will assess for nocturnal hypoxemia with use of CPAP and daytime exertional hypoxemia. He would like to have a portable concentrator if possible. I will consider a echocardiogram and CT chest if above work-up is unrevealing. Since his shortness of breath happened before going to [place], PE is less likely. 30 minutes spent with patient.          Relevant Medications    umeclidinium-vilanterol (ANORO ELLIPTA) 62.5-25 MCG/INH AEPB inhaler    predniSONE (DELTASONE) 10 MG tablet    umeclidinium-vilanterol (ANORO ELLIPTA) 62.5-25 MCG/INH AEPB inhaler    Other Relevant Orders    XR CHEST STANDARD (2 VW)    THEOPHYLLINE LEVEL    6 Minute Walk Test            Sincerely,    Tr[facility] Olvera [facility]  [phone]

## 2019-07-09 NOTE — TELEPHONE ENCOUNTER
Dr Ronda Bee these are the results of Edwin's 6MWT today  Please advise.     See Epic for CXR results    Also waiting for Overnight oximetry results

## 2019-07-10 DIAGNOSIS — J18.9 COMMUNITY ACQUIRED PNEUMONIA OF RIGHT LOWER LOBE OF LUNG: Primary | ICD-10-CM

## 2019-07-10 RX ORDER — LEVOFLOXACIN 500 MG/1
500 TABLET, FILM COATED ORAL DAILY
Qty: 7 TABLET | Refills: 0 | Status: SHIPPED | OUTPATIENT
Start: 2019-07-10 | End: 2019-07-17

## 2019-07-11 ENCOUNTER — TELEPHONE (OUTPATIENT)
Dept: PULMONOLOGY | Age: 79
End: 2019-07-11

## 2019-07-11 NOTE — TELEPHONE ENCOUNTER
Received results from overnight oximetry on cpap  Scanned into [facility] and routed to Dr Glen Levy to review

## 2019-07-12 ENCOUNTER — TELEPHONE (OUTPATIENT)
Dept: PULMONOLOGY | Age: 79
End: 2019-07-12

## 2019-07-12 DIAGNOSIS — I10 HTN (HYPERTENSION): ICD-10-CM

## 2019-07-12 NOTE — TELEPHONE ENCOUNTER
Patient called and needs a refill of     Patient called requesting a refill for Ventolin /HFA  Dosage 2 puff  Quantity 3   Pharmacy? [facility]  Last OV with provider? 7/9/19  Next scheduled appt?  7/24/19

## 2019-07-14 RX ORDER — ALBUTEROL SULFATE 90 UG/1
2 AEROSOL, METERED RESPIRATORY (INHALATION) EVERY 4 HOURS PRN
Qty: 54 G | Refills: 1 | Status: SHIPPED | OUTPATIENT
Start: 2019-07-14 | End: 2021-09-28 | Stop reason: SDUPTHER

## 2019-07-17 ENCOUNTER — TELEPHONE (OUTPATIENT)
Dept: PULMONOLOGY | Age: 79
End: 2019-07-17

## 2019-07-17 DIAGNOSIS — J18.9 PNEUMONIA OF RIGHT LOWER LOBE DUE TO INFECTIOUS ORGANISM: Primary | ICD-10-CM

## 2019-07-17 RX ORDER — DOXYCYCLINE HYCLATE 100 MG
100 TABLET ORAL 2 TIMES DAILY
Qty: 14 TABLET | Refills: 0 | Status: ON HOLD | OUTPATIENT
Start: 2019-07-17 | End: 2019-07-31 | Stop reason: HOSPADM

## 2019-07-18 ENCOUNTER — TELEPHONE (OUTPATIENT)
Dept: PULMONOLOGY | Age: 79
End: 2019-07-18

## 2019-07-18 DIAGNOSIS — B37.0 THRUSH: Primary | ICD-10-CM

## 2019-07-18 DIAGNOSIS — J44.9 COPD, VERY SEVERE (HCC): ICD-10-CM

## 2019-07-18 RX ORDER — FLUCONAZOLE 100 MG/1
100 TABLET ORAL DAILY
Qty: 3 TABLET | Refills: 0 | Status: SHIPPED | OUTPATIENT
Start: 2019-07-18 | End: 2019-07-21

## 2019-07-18 RX ORDER — ALBUTEROL SULFATE 2.5 MG/3ML
SOLUTION RESPIRATORY (INHALATION)
Qty: 360 ML | Refills: 0 | Status: SHIPPED | OUTPATIENT
Start: 2019-07-18 | End: 2019-09-02 | Stop reason: SDUPTHER

## 2019-07-18 NOTE — TELEPHONE ENCOUNTER
I advised the Magic mouthwash is not really for thrush. It does sound like he has thrush based on message. I sent in Diflucan x3 days. Called but only reached voicemail. Message left.

## 2019-07-22 RX ORDER — LOSARTAN POTASSIUM 50 MG/1
TABLET ORAL
Qty: 90 TABLET | Refills: 0 | Status: SHIPPED | OUTPATIENT
Start: 2019-07-22 | End: 2019-11-26 | Stop reason: SDUPTHER

## 2019-07-24 ENCOUNTER — HOSPITAL ENCOUNTER (INPATIENT)
Age: 79
LOS: 7 days | Discharge: HOME HEALTH | DRG: 193 | End: 2019-07-31
Attending: INTERNAL MEDICINE | Admitting: INTERNAL MEDICINE
Payer: MEDICARE

## 2019-07-24 ENCOUNTER — OFFICE VISIT (OUTPATIENT)
Dept: PULMONOLOGY | Age: 79
End: 2019-07-24
Payer: MEDICARE

## 2019-07-24 ENCOUNTER — APPOINTMENT (OUTPATIENT)
Dept: CT IMAGING | Age: 79
DRG: 193 | End: 2019-07-24
Payer: MEDICARE

## 2019-07-24 ENCOUNTER — OFFICE VISIT (OUTPATIENT)
Dept: INFECTIOUS DISEASES | Age: 79
End: 2019-07-24
Payer: MEDICARE

## 2019-07-24 ENCOUNTER — APPOINTMENT (OUTPATIENT)
Dept: GENERAL RADIOLOGY | Age: 79
DRG: 193 | End: 2019-07-24
Payer: MEDICARE

## 2019-07-24 VITALS
WEIGHT: 180 LBS | RESPIRATION RATE: 22 BRPM | BODY MASS INDEX: 28.25 KG/M2 | DIASTOLIC BLOOD PRESSURE: 64 MMHG | TEMPERATURE: 98.1 F | HEIGHT: 67 IN | HEART RATE: 98 BPM | SYSTOLIC BLOOD PRESSURE: 120 MMHG | OXYGEN SATURATION: 93 %

## 2019-07-24 VITALS
TEMPERATURE: 98.1 F | BODY MASS INDEX: 28.25 KG/M2 | WEIGHT: 180 LBS | HEIGHT: 67 IN | HEART RATE: 112 BPM | DIASTOLIC BLOOD PRESSURE: 64 MMHG | RESPIRATION RATE: 18 BRPM | OXYGEN SATURATION: 92 % | SYSTOLIC BLOOD PRESSURE: 120 MMHG

## 2019-07-24 DIAGNOSIS — G47.33 OSA (OBSTRUCTIVE SLEEP APNEA): ICD-10-CM

## 2019-07-24 DIAGNOSIS — J44.9 COPD, VERY SEVERE (HCC): ICD-10-CM

## 2019-07-24 DIAGNOSIS — J43.2 CENTRILOBULAR EMPHYSEMA (HCC): ICD-10-CM

## 2019-07-24 DIAGNOSIS — R09.02 HYPOXEMIA: ICD-10-CM

## 2019-07-24 DIAGNOSIS — J18.9 PNEUMONIA OF RIGHT LOWER LOBE DUE TO INFECTIOUS ORGANISM: Primary | ICD-10-CM

## 2019-07-24 DIAGNOSIS — R09.02 HYPOXIA: ICD-10-CM

## 2019-07-24 DIAGNOSIS — J18.9 PNEUMONIA OF RIGHT LOWER LOBE DUE TO INFECTIOUS ORGANISM: ICD-10-CM

## 2019-07-24 DIAGNOSIS — R91.8 PULMONARY NODULES: ICD-10-CM

## 2019-07-24 DIAGNOSIS — R06.00 DYSPNEA, UNSPECIFIED TYPE: Primary | ICD-10-CM

## 2019-07-24 DIAGNOSIS — J18.9 COMMUNITY ACQUIRED PNEUMONIA OF LEFT LOWER LOBE OF LUNG: ICD-10-CM

## 2019-07-24 DIAGNOSIS — B44.9 ASPERGILLUS PNEUMONIA (HCC): ICD-10-CM

## 2019-07-24 PROBLEM — J98.01 BRONCHOSPASM: Status: RESOLVED | Noted: 2019-06-17 | Resolved: 2019-07-24

## 2019-07-24 LAB
A/G RATIO: 1.1 (ref 1.1–2.2)
ALBUMIN SERPL-MCNC: 4 G/DL (ref 3.4–5)
ALP BLD-CCNC: 77 U/L (ref 40–129)
ALT SERPL-CCNC: 16 U/L (ref 10–40)
ANION GAP SERPL CALCULATED.3IONS-SCNC: 14 MMOL/L (ref 3–16)
ANION GAP SERPL CALCULATED.3IONS-SCNC: 15 MMOL/L (ref 3–16)
AST SERPL-CCNC: 13 U/L (ref 15–37)
ATYPICAL LYMPHOCYTE RELATIVE PERCENT: 2 % (ref 0–6)
BASOPHILS ABSOLUTE: 0 K/UL (ref 0–0.2)
BASOPHILS RELATIVE PERCENT: 0 %
BILIRUB SERPL-MCNC: 0.3 MG/DL (ref 0–1)
BILIRUBIN URINE: NEGATIVE
BLOOD, URINE: NEGATIVE
BUN BLDV-MCNC: 10 MG/DL (ref 7–20)
BUN BLDV-MCNC: 10 MG/DL (ref 7–20)
CALCIUM SERPL-MCNC: 10.1 MG/DL (ref 8.3–10.6)
CALCIUM SERPL-MCNC: 10.3 MG/DL (ref 8.3–10.6)
CHLORIDE BLD-SCNC: 97 MMOL/L (ref 99–110)
CHLORIDE BLD-SCNC: 98 MMOL/L (ref 99–110)
CLARITY: CLEAR
CO2: 28 MMOL/L (ref 21–32)
CO2: 28 MMOL/L (ref 21–32)
COLOR: YELLOW
CREAT SERPL-MCNC: 0.9 MG/DL (ref 0.8–1.3)
CREAT SERPL-MCNC: 1 MG/DL (ref 0.8–1.3)
EKG ATRIAL RATE: 104 BPM
EKG DIAGNOSIS: NORMAL
EKG P-R INTERVAL: 136 MS
EKG Q-T INTERVAL: 328 MS
EKG QRS DURATION: 82 MS
EKG QTC CALCULATION (BAZETT): 431 MS
EKG R AXIS: 98 DEGREES
EKG T AXIS: 34 DEGREES
EKG VENTRICULAR RATE: 104 BPM
EOSINOPHILS ABSOLUTE: 0.3 K/UL (ref 0–0.6)
EOSINOPHILS RELATIVE PERCENT: 2 %
GFR AFRICAN AMERICAN: >60
GFR AFRICAN AMERICAN: >60
GFR NON-AFRICAN AMERICAN: >60
GFR NON-AFRICAN AMERICAN: >60
GLOBULIN: 3.7 G/DL
GLUCOSE BLD-MCNC: 128 MG/DL (ref 70–99)
GLUCOSE BLD-MCNC: 129 MG/DL (ref 70–99)
GLUCOSE URINE: NEGATIVE MG/DL
HCT VFR BLD CALC: 44.9 % (ref 40.5–52.5)
HEMOGLOBIN: 14.8 G/DL (ref 13.5–17.5)
KETONES, URINE: NEGATIVE MG/DL
LACTIC ACID: 2.3 MMOL/L (ref 0.4–2)
LEUKOCYTE ESTERASE, URINE: NEGATIVE
LYMPHOCYTES ABSOLUTE: 2.5 K/UL (ref 1–5.1)
LYMPHOCYTES RELATIVE PERCENT: 18 %
MCH RBC QN AUTO: 28.3 PG (ref 26–34)
MCHC RBC AUTO-ENTMCNC: 32.9 G/DL (ref 31–36)
MCV RBC AUTO: 86 FL (ref 80–100)
MICROSCOPIC EXAMINATION: NORMAL
MONOCYTES ABSOLUTE: 1.3 K/UL (ref 0–1.3)
MONOCYTES RELATIVE PERCENT: 10 %
NEUTROPHILS ABSOLUTE: 8.6 K/UL (ref 1.7–7.7)
NEUTROPHILS RELATIVE PERCENT: 68 %
NITRITE, URINE: NEGATIVE
PDW BLD-RTO: 14.3 % (ref 12.4–15.4)
PH UA: 7 (ref 5–8)
PLATELET # BLD: 672 K/UL (ref 135–450)
PMV BLD AUTO: 6.6 FL (ref 5–10.5)
POTASSIUM REFLEX MAGNESIUM: 3.9 MMOL/L (ref 3.5–5.1)
POTASSIUM SERPL-SCNC: 3.9 MMOL/L (ref 3.5–5.1)
PRO-BNP: 14 PG/ML (ref 0–449)
PROTEIN UA: NEGATIVE MG/DL
RBC # BLD: 5.21 M/UL (ref 4.2–5.9)
SODIUM BLD-SCNC: 139 MMOL/L (ref 136–145)
SODIUM BLD-SCNC: 141 MMOL/L (ref 136–145)
SPECIFIC GRAVITY UA: 1.01 (ref 1–1.03)
TOTAL PROTEIN: 7.7 G/DL (ref 6.4–8.2)
TROPONIN: <0.01 NG/ML
URINE REFLEX TO CULTURE: NORMAL
URINE TYPE: NORMAL
UROBILINOGEN, URINE: 0.2 E.U./DL
WBC # BLD: 12.7 K/UL (ref 4–11)

## 2019-07-24 PROCEDURE — 84484 ASSAY OF TROPONIN QUANT: CPT

## 2019-07-24 PROCEDURE — 87449 NOS EACH ORGANISM AG IA: CPT

## 2019-07-24 PROCEDURE — 4040F PNEUMOC VAC/ADMIN/RCVD: CPT | Performed by: INTERNAL MEDICINE

## 2019-07-24 PROCEDURE — 2580000003 HC RX 258: Performed by: PHYSICIAN ASSISTANT

## 2019-07-24 PROCEDURE — 6370000000 HC RX 637 (ALT 250 FOR IP): Performed by: EMERGENCY MEDICINE

## 2019-07-24 PROCEDURE — 99214 OFFICE O/P EST MOD 30 MIN: CPT | Performed by: INTERNAL MEDICINE

## 2019-07-24 PROCEDURE — 96361 HYDRATE IV INFUSION ADD-ON: CPT

## 2019-07-24 PROCEDURE — 86606 ASPERGILLUS ANTIBODY: CPT

## 2019-07-24 PROCEDURE — 96365 THER/PROPH/DIAG IV INF INIT: CPT

## 2019-07-24 PROCEDURE — 96375 TX/PRO/DX INJ NEW DRUG ADDON: CPT

## 2019-07-24 PROCEDURE — 81003 URINALYSIS AUTO W/O SCOPE: CPT

## 2019-07-24 PROCEDURE — 1123F ACP DISCUSS/DSCN MKR DOCD: CPT | Performed by: INTERNAL MEDICINE

## 2019-07-24 PROCEDURE — 6360000002 HC RX W HCPCS: Performed by: INTERNAL MEDICINE

## 2019-07-24 PROCEDURE — 96367 TX/PROPH/DG ADDL SEQ IV INF: CPT

## 2019-07-24 PROCEDURE — 1036F TOBACCO NON-USER: CPT | Performed by: INTERNAL MEDICINE

## 2019-07-24 PROCEDURE — 93010 ELECTROCARDIOGRAM REPORT: CPT | Performed by: INTERNAL MEDICINE

## 2019-07-24 PROCEDURE — 2060000000 HC ICU INTERMEDIATE R&B

## 2019-07-24 PROCEDURE — G8417 CALC BMI ABV UP PARAM F/U: HCPCS | Performed by: INTERNAL MEDICINE

## 2019-07-24 PROCEDURE — 6370000000 HC RX 637 (ALT 250 FOR IP): Performed by: PHYSICIAN ASSISTANT

## 2019-07-24 PROCEDURE — 2700000000 HC OXYGEN THERAPY PER DAY

## 2019-07-24 PROCEDURE — 6370000000 HC RX 637 (ALT 250 FOR IP): Performed by: INTERNAL MEDICINE

## 2019-07-24 PROCEDURE — 71046 X-RAY EXAM CHEST 2 VIEWS: CPT

## 2019-07-24 PROCEDURE — 94761 N-INVAS EAR/PLS OXIMETRY MLT: CPT

## 2019-07-24 PROCEDURE — 3023F SPIROM DOC REV: CPT | Performed by: INTERNAL MEDICINE

## 2019-07-24 PROCEDURE — 99223 1ST HOSP IP/OBS HIGH 75: CPT | Performed by: INTERNAL MEDICINE

## 2019-07-24 PROCEDURE — 71250 CT THORAX DX C-: CPT

## 2019-07-24 PROCEDURE — 83880 ASSAY OF NATRIURETIC PEPTIDE: CPT

## 2019-07-24 PROCEDURE — G8926 SPIRO NO PERF OR DOC: HCPCS | Performed by: INTERNAL MEDICINE

## 2019-07-24 PROCEDURE — 6360000002 HC RX W HCPCS: Performed by: PHYSICIAN ASSISTANT

## 2019-07-24 PROCEDURE — 87040 BLOOD CULTURE FOR BACTERIA: CPT

## 2019-07-24 PROCEDURE — 83605 ASSAY OF LACTIC ACID: CPT

## 2019-07-24 PROCEDURE — 87305 ASPERGILLUS AG IA: CPT

## 2019-07-24 PROCEDURE — 86635 COCCIDIOIDES ANTIBODY: CPT

## 2019-07-24 PROCEDURE — G8427 DOCREV CUR MEDS BY ELIG CLIN: HCPCS | Performed by: INTERNAL MEDICINE

## 2019-07-24 PROCEDURE — 93005 ELECTROCARDIOGRAM TRACING: CPT | Performed by: INTERNAL MEDICINE

## 2019-07-24 PROCEDURE — 99285 EMERGENCY DEPT VISIT HI MDM: CPT

## 2019-07-24 PROCEDURE — 86698 HISTOPLASMA ANTIBODY: CPT

## 2019-07-24 PROCEDURE — 2580000003 HC RX 258: Performed by: INTERNAL MEDICINE

## 2019-07-24 PROCEDURE — 85025 COMPLETE CBC W/AUTO DIFF WBC: CPT

## 2019-07-24 PROCEDURE — 94640 AIRWAY INHALATION TREATMENT: CPT

## 2019-07-24 PROCEDURE — 86612 BLASTOMYCES ANTIBODY: CPT

## 2019-07-24 PROCEDURE — 80053 COMPREHEN METABOLIC PANEL: CPT

## 2019-07-24 RX ORDER — POLYVINYL ALCOHOL 14 MG/ML
2 SOLUTION/ DROPS OPHTHALMIC EVERY 4 HOURS PRN
Status: DISCONTINUED | OUTPATIENT
Start: 2019-07-24 | End: 2019-07-31 | Stop reason: HOSPADM

## 2019-07-24 RX ORDER — IPRATROPIUM BROMIDE AND ALBUTEROL SULFATE 2.5; .5 MG/3ML; MG/3ML
1 SOLUTION RESPIRATORY (INHALATION) ONCE
Status: COMPLETED | OUTPATIENT
Start: 2019-07-24 | End: 2019-07-24

## 2019-07-24 RX ORDER — FLUTICASONE PROPIONATE 110 UG/1
1 AEROSOL, METERED RESPIRATORY (INHALATION) 2 TIMES DAILY
Status: DISCONTINUED | OUTPATIENT
Start: 2019-07-25 | End: 2019-07-31 | Stop reason: HOSPADM

## 2019-07-24 RX ORDER — SODIUM CHLORIDE 0.9 % (FLUSH) 0.9 %
10 SYRINGE (ML) INJECTION EVERY 12 HOURS SCHEDULED
Status: DISCONTINUED | OUTPATIENT
Start: 2019-07-24 | End: 2019-07-31 | Stop reason: HOSPADM

## 2019-07-24 RX ORDER — SODIUM CHLORIDE 9 MG/ML
INJECTION, SOLUTION INTRAVENOUS CONTINUOUS
Status: DISCONTINUED | OUTPATIENT
Start: 2019-07-24 | End: 2019-07-26

## 2019-07-24 RX ORDER — METHYLPREDNISOLONE SODIUM SUCCINATE 125 MG/2ML
125 INJECTION, POWDER, LYOPHILIZED, FOR SOLUTION INTRAMUSCULAR; INTRAVENOUS ONCE
Status: COMPLETED | OUTPATIENT
Start: 2019-07-24 | End: 2019-07-24

## 2019-07-24 RX ORDER — 0.9 % SODIUM CHLORIDE 0.9 %
1000 INTRAVENOUS SOLUTION INTRAVENOUS ONCE
Status: DISCONTINUED | OUTPATIENT
Start: 2019-07-24 | End: 2019-07-24

## 2019-07-24 RX ORDER — DILTIAZEM HYDROCHLORIDE 300 MG/1
300 CAPSULE, COATED, EXTENDED RELEASE ORAL DAILY
Status: DISCONTINUED | OUTPATIENT
Start: 2019-07-25 | End: 2019-07-31 | Stop reason: HOSPADM

## 2019-07-24 RX ORDER — SODIUM CHLORIDE 9 MG/ML
INJECTION, SOLUTION INTRAVENOUS ONCE
Status: COMPLETED | OUTPATIENT
Start: 2019-07-24 | End: 2019-07-24

## 2019-07-24 RX ORDER — LOSARTAN POTASSIUM 50 MG/1
50 TABLET ORAL DAILY
Status: DISCONTINUED | OUTPATIENT
Start: 2019-07-25 | End: 2019-07-31 | Stop reason: HOSPADM

## 2019-07-24 RX ORDER — IPRATROPIUM BROMIDE AND ALBUTEROL SULFATE 2.5; .5 MG/3ML; MG/3ML
2 SOLUTION RESPIRATORY (INHALATION) ONCE
Status: COMPLETED | OUTPATIENT
Start: 2019-07-24 | End: 2019-07-24

## 2019-07-24 RX ORDER — SODIUM CHLORIDE 0.9 % (FLUSH) 0.9 %
10 SYRINGE (ML) INJECTION PRN
Status: DISCONTINUED | OUTPATIENT
Start: 2019-07-24 | End: 2019-07-31 | Stop reason: HOSPADM

## 2019-07-24 RX ORDER — MONTELUKAST SODIUM 10 MG/1
10 TABLET ORAL NIGHTLY
Status: DISCONTINUED | OUTPATIENT
Start: 2019-07-24 | End: 2019-07-31 | Stop reason: HOSPADM

## 2019-07-24 RX ORDER — METHYLPREDNISOLONE SODIUM SUCCINATE 125 MG/2ML
80 INJECTION, POWDER, LYOPHILIZED, FOR SOLUTION INTRAMUSCULAR; INTRAVENOUS EVERY 8 HOURS
Status: DISCONTINUED | OUTPATIENT
Start: 2019-07-24 | End: 2019-07-26

## 2019-07-24 RX ORDER — ACETAMINOPHEN 325 MG/1
650 TABLET ORAL EVERY 4 HOURS PRN
Status: DISCONTINUED | OUTPATIENT
Start: 2019-07-24 | End: 2019-07-31 | Stop reason: HOSPADM

## 2019-07-24 RX ORDER — FORMOTEROL FUMARATE 20 UG/2ML
20 SOLUTION RESPIRATORY (INHALATION) 2 TIMES DAILY
Status: DISCONTINUED | OUTPATIENT
Start: 2019-07-25 | End: 2019-07-31 | Stop reason: HOSPADM

## 2019-07-24 RX ORDER — ATORVASTATIN CALCIUM 10 MG/1
10 TABLET, FILM COATED ORAL DAILY
Status: DISCONTINUED | OUTPATIENT
Start: 2019-07-25 | End: 2019-07-31 | Stop reason: HOSPADM

## 2019-07-24 RX ORDER — FUROSEMIDE 40 MG/1
40 TABLET ORAL DAILY
Status: DISCONTINUED | OUTPATIENT
Start: 2019-07-25 | End: 2019-07-26

## 2019-07-24 RX ORDER — ONDANSETRON 2 MG/ML
4 INJECTION INTRAMUSCULAR; INTRAVENOUS EVERY 6 HOURS PRN
Status: DISCONTINUED | OUTPATIENT
Start: 2019-07-24 | End: 2019-07-31 | Stop reason: HOSPADM

## 2019-07-24 RX ORDER — THEOPHYLLINE 400 MG/1
200 TABLET, EXTENDED RELEASE ORAL 2 TIMES DAILY
Status: DISCONTINUED | OUTPATIENT
Start: 2019-07-24 | End: 2019-07-31 | Stop reason: HOSPADM

## 2019-07-24 RX ORDER — PANTOPRAZOLE SODIUM 40 MG/1
40 TABLET, DELAYED RELEASE ORAL
Status: DISCONTINUED | OUTPATIENT
Start: 2019-07-25 | End: 2019-07-31 | Stop reason: HOSPADM

## 2019-07-24 RX ADMIN — THEOPHYLLINE 200 MG: 400 TABLET, EXTENDED RELEASE ORAL at 20:56

## 2019-07-24 RX ADMIN — IPRATROPIUM BROMIDE AND ALBUTEROL SULFATE 1 AMPULE: .5; 3 SOLUTION RESPIRATORY (INHALATION) at 12:35

## 2019-07-24 RX ADMIN — MONTELUKAST 10 MG: 10 TABLET, FILM COATED ORAL at 20:57

## 2019-07-24 RX ADMIN — SODIUM CHLORIDE: 9 INJECTION, SOLUTION INTRAVENOUS at 19:10

## 2019-07-24 RX ADMIN — FLUTICASONE PROPIONATE 1 PUFF: 110 AEROSOL, METERED RESPIRATORY (INHALATION) at 21:05

## 2019-07-24 RX ADMIN — Medication 10 ML: at 21:42

## 2019-07-24 RX ADMIN — ENOXAPARIN SODIUM 30 MG: 30 INJECTION SUBCUTANEOUS at 20:56

## 2019-07-24 RX ADMIN — VANCOMYCIN HYDROCHLORIDE 1000 MG: 1 INJECTION, POWDER, LYOPHILIZED, FOR SOLUTION INTRAVENOUS at 13:22

## 2019-07-24 RX ADMIN — DEXTROSE MONOHYDRATE 485 MG: 50 INJECTION, SOLUTION INTRAVENOUS at 21:42

## 2019-07-24 RX ADMIN — CEFEPIME HYDROCHLORIDE 1 G: 1 INJECTION, POWDER, FOR SOLUTION INTRAMUSCULAR; INTRAVENOUS at 14:40

## 2019-07-24 RX ADMIN — FORMOTEROL FUMARATE DIHYDRATE 20 MCG: 20 SOLUTION RESPIRATORY (INHALATION) at 21:04

## 2019-07-24 RX ADMIN — POLYVINYL ALCOHOL 2 DROP: 14 SOLUTION/ DROPS OPHTHALMIC at 23:26

## 2019-07-24 RX ADMIN — METHYLPREDNISOLONE SODIUM SUCCINATE 125 MG: 125 INJECTION, POWDER, FOR SOLUTION INTRAMUSCULAR; INTRAVENOUS at 13:29

## 2019-07-24 RX ADMIN — IPRATROPIUM BROMIDE AND ALBUTEROL SULFATE 2 AMPULE: .5; 3 SOLUTION RESPIRATORY (INHALATION) at 13:32

## 2019-07-24 RX ADMIN — SODIUM CHLORIDE: 9 INJECTION, SOLUTION INTRAVENOUS at 14:41

## 2019-07-24 RX ADMIN — METHYLPREDNISOLONE SODIUM SUCCINATE 80 MG: 125 INJECTION, POWDER, FOR SOLUTION INTRAMUSCULAR; INTRAVENOUS at 20:56

## 2019-07-24 ASSESSMENT — PAIN DESCRIPTION - LOCATION
LOCATION: ABDOMEN
LOCATION: ABDOMEN

## 2019-07-24 ASSESSMENT — ENCOUNTER SYMPTOMS
NAUSEA: 0
CHOKING: 0
BACK PAIN: 0
EYE DISCHARGE: 0
EYE REDNESS: 0
FACIAL SWELLING: 0
ABDOMINAL PAIN: 0
APNEA: 0
VOMITING: 0
SHORTNESS OF BREATH: 1

## 2019-07-24 ASSESSMENT — PAIN DESCRIPTION - FREQUENCY: FREQUENCY: CONTINUOUS

## 2019-07-24 ASSESSMENT — PAIN DESCRIPTION - ONSET: ONSET: ON-GOING

## 2019-07-24 ASSESSMENT — PAIN DESCRIPTION - PAIN TYPE
TYPE: ACUTE PAIN
TYPE: ACUTE PAIN

## 2019-07-24 ASSESSMENT — PAIN SCALES - GENERAL
PAINLEVEL_OUTOF10: 4
PAINLEVEL_OUTOF10: 3
PAINLEVEL_OUTOF10: 4
PAINLEVEL_OUTOF10: 0

## 2019-07-24 ASSESSMENT — PAIN DESCRIPTION - ORIENTATION: ORIENTATION: MID

## 2019-07-24 ASSESSMENT — PAIN DESCRIPTION - PROGRESSION: CLINICAL_PROGRESSION: NOT CHANGED

## 2019-07-24 ASSESSMENT — PAIN - FUNCTIONAL ASSESSMENT: PAIN_FUNCTIONAL_ASSESSMENT: PREVENTS OR INTERFERES SOME ACTIVE ACTIVITIES AND ADLS

## 2019-07-24 ASSESSMENT — PAIN DESCRIPTION - DESCRIPTORS
DESCRIPTORS: ACHING
DESCRIPTORS: ACHING

## 2019-07-24 NOTE — PROGRESS NOTES
Patient arrived to room 5253 via stretcher at [phone]. Telemetry applied. [facility] verified sinus tach. Oriented to room and call light. VSS.   Electronically signed by Stephen Harper RN on 7/24/2019 at 4:58 PM

## 2019-07-24 NOTE — ED TRIAGE NOTES
Pt to ED with c/o increased sob. Recently dx with pneumonia. On 2L o2/nc at baseline. O2 sats 88-90% upon ED arrival, placed on 4 Lo2/nc with sats increasing to 97%. RT called and PA at bedside upon arrival to .

## 2019-07-24 NOTE — ED PROVIDER NOTES
LABS:  Labs Reviewed   CBC WITH AUTO DIFFERENTIAL - Abnormal; Notable for the following components:       Result Value    WBC 12.7 (*)     Platelets 893 (*)     Neutrophils # 8.6 (*)     All other components within normal limits    Narrative:     Performed at:  [address] [employer]   Phone [phone]   BASIC METABOLIC PANEL W/ REFLEX TO MG FOR LOW K - Abnormal; Notable for the following components:    Chloride 98 (*)     Glucose 128 (*)     All other components within normal limits    Narrative:     Performed at:  [address] [employer]   Phone [phone]   COMPREHENSIVE METABOLIC PANEL - Abnormal; Notable for the following components:    Chloride 97 (*)     Glucose 129 (*)     AST 13 (*)     All other components within normal limits    Narrative:     Performed at:  [address] [employer]   Phone [phone]   LACTIC ACID, PLASMA - Abnormal; Notable for the following components:    Lactic Acid 2.3 (*)     All other components within normal limits    Narrative:     Performed at:  [address] [employer]   Phone [phone]   CULTURE BLOOD #1   CULTURE BLOOD #2   BRAIN NATRIURETIC PEPTIDE    Narrative:     Performed at:  [address] [employer]   Phone [phone]   TROPONIN    Narrative:     Performed at:  [facility]  [address] [place] [address] [employer]   Phone [phone]   URINE RT REFLEX TO CULTURE    Narrative:     Performed at:  [address] [employer]   Phone [phone] of labs reviewed and werenegative at this time or not returned at the time

## 2019-07-24 NOTE — PROGRESS NOTES
Respiratory eval completed in ER, two duonebs ordered per protocol.   4lpm 98% 103HR wheezes t/o  Electronically signed by Michelle Hill on 7/24/2019 at 12:38 PM

## 2019-07-24 NOTE — PROGRESS NOTES
Infectious Diseases Outpatient Follow-up Note      Primary Care Physician:  Kendra Saab MD       History Obtained From:   Patient, EPIC    CHIEF COMPLAINT / ID problem:    Chief Complaint   Patient presents with    Follow-up     Pneumonia and Resp failure not responding to oral antibiotics     HISTORY OF PRESENT ILLNESS / Interval history:    Here for acute evaluation of Pneumonia and resp failure, he has severe COPD and now using O2 nasal cannula problems started x 6 weeks ago his wife had URI and Bronchitis and he felt ill after that and never recovered and progressed to PNA with Rt LL infiltrate on CXR from  7/9 was seen by  and placed on a course of Doxycycline and Prednisone taper and had to be on Levofloxacin and Clarithromycin course with out improvement and not  Much sputum cough+ wheeze+ sob+ and no fever but HR is high and poor appetite and did not feel well for the past week. Here to see Radha Poplar as well. He has h/o Aspergillus Lung infection, MRSA colonization and Stenotrophomonas in the past         Past Medical History:    Past Medical History:   Diagnosis Date    Basal cell carcinoma of skin 06/16/2017    right cheek    COPD (chronic obstructive pulmonary disease) ([facility] [address]     Hyperlipidemia     Hypertension 10/2012    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/04/2018    Skin cancer 2014    both upper thighs    Squamous cell cancer of skin of nose 5/2016       Past Surgical History:    Past Surgical History:   Procedure Laterality Date    CATARACT REMOVAL Bilateral     5/2016    COLONOSCOPY  08/04/2016    dr Coley Begun    3 years    SKIN BIOPSY  5/2016    Nose     TONSILLECTOMY AND ADENOIDECTOMY      TOOTH EXTRACTION  5/2016    UPPER GASTROINTESTINAL ENDOSCOPY  12/2/2015    dr Sharmaine Clark       Current Medications:    No current facility-administered medications for this visit. No current outpatient medications on file.      Facility-Administered Medications Ordered in Other BMI 28.19 kg/m²   General Appearance: alert,  in  acute distress, no pallor, no icterus sitting in wheelchair, ill appearing man  nasal cannula  Skin: warm and dry, no rash or erythema  Head: normocephalic and atraumatic  Eyes: pupils equal, round, and reactive to light, conjunctivae normal  ENT: tympanic membrane, external ear and ear canal normal bilaterally, nose without deformity, nasal mucosa and turbinates normal without polyps  Neck: supple and non-tender without mass, no thyromegaly or thyroid nodules, no cervical lymphadenopathy  Pulmonary/Chest: Bibasilar crepitus auscultation bilaterally- ++ wheezes, rales or rhonchi, reduced air  movement,  Cardiovascular: Heart rate high S1 and S2, no murmurs, rubs, clicks, or gallops,   Abdomen: soft, non-tender, non-distended, normal bowel sounds, no masses or organomegaly  Extremities: no cyanosis, clubbing or edema  Musculoskeletal: normal range of motion, no joint swelling, deformity or tenderness  Neurologic: reflexes normal and symmetric, no cranial nerve deficit,   Psych:  Orientation, sensorium, mood normal           DATA:    See EPIC  Lab Results   Component Value Date    WBC 12.7 (H) 07/24/2019    HGB 14.8 07/24/2019    HCT 44.9 07/24/2019    MCV 86.0 07/24/2019     (H) 07/24/2019     Lab Results   Component Value Date    CREATININE 1.0 07/24/2019    CREATININE 0.9 07/24/2019    BUN 10 07/24/2019    BUN 10 07/24/2019     07/24/2019     07/24/2019    K 3.9 07/24/2019    K 3.9 07/24/2019    CL 98 (L) 07/24/2019    CL 97 (L) 07/24/2019    CO2 28 07/24/2019    CO2 28 07/24/2019       Hepatic Function Panel:   Lab Results   Component Value Date    ALKPHOS 77 07/24/2019    ALT 16 07/24/2019    AST 13 07/24/2019    PROT 7.7 07/24/2019    PROT 6.7 03/14/2013    BILITOT 0.3 07/24/2019    LABALBU 4.0 07/24/2019     UA:  Lab Results   Component Value Date    COLORU YELLOW 07/24/2019    CLARITYU Clear 07/24/2019    GLUCOSEU Negative 07/24/2019 BILIRUBINUR Negative 07/24/2019    KETUA Negative 07/24/2019    SPECGRAV 1.010 07/24/2019    BLOODU Negative 07/24/2019    PHUR 7.0 07/24/2019    PROTEINU Negative 07/24/2019    UROBILINOGEN 0.2 07/24/2019    NITRU Negative 07/24/2019    LEUKOCYTESUR Negative 07/24/2019    LABMICR Not Indicated 07/24/2019    URINETYPE Not Specified 07/24/2019        No orders to display     FINDINGS:   There is airspace disease involving the medial right lung base which appears   to correspond to the right lower lobe.  Remainder of the lungs are clear. Mild biapical pleural thickening.  Trachea midline.  Atherosclerotic   calcification of the thoracic aorta.  Cardiac size within normal limits.  No   sizable pleural effusions.  No pneumothorax.  No free air.  Mild diffuse   degenerative changes throughout the spine.           Impression   Findings concerning for a right lower lobe pneumonia.  Follow-up is   recommended to document resolution.       The findings were sent to the Radiology Results [address] at 12:46   pm on 7/9/2019to be communicated to a licensed caregiver.           Labs, Microbiology and  Radiology results pertinent to this visit and from care every where  reviewed in detail by me as part of the consultation     IMPRESSION:     Diagnosis Orders   1. Pneumonia of right lower lobe due to infectious organism ([address]     2. Pulmonary nodules     3. Aspergillus pneumonia ([address]     4. Hypoxemia     5. COPD, very severe ([address]       Ongoing respiratory symptoms of pneumonia not responding to oral antibiotic therapy and outpatient, he has significant history of aspergillus pneumonia in the past completed antifungal therapy now comes back with worsening of lung symptoms with setting of pneumonia with a severe COPD, concern would be relapse of the fungal infection versus atypical infection.   Given his clinical exam I think he will need admission to the hospital, Dr. Itz Fowler -pulmonologist agrees with the decision, most likely he may do bronchoscopy tomorrow to get BAL cultures to help with antibiotic therapy. PLAN:    1. Recommend admission to the hospital  2. I spoke to ER physician about IV antibiotics  3. Start IV cefepime x 2 gm Q 12 HRS  4.  Start IV vancomycin  5. Restart loading dose of IV voriconazole  6. Check sputum culture  7. Fungal serologies, B-D glucan, Aspergillus antigen  8. MRSA probe  9. Steroids per pulmonary  10. May go for bronchoscopy tomorrow    D/W      More than 50% of Face-to- Face time was spent in counseling and/or coordination of care including  reviewing data, discussing multiple problems and medications, formulating a plan. D/w Patient in detail at  the time of consultation. Thanks for allowing me to participate in your patient's care and please do not hesitate to  call me with any questions or concerns.     Brayden Culver MD  Infectious Disease  [facility]) Physician  Phone: [phone]   Fax : [phone]

## 2019-07-25 ENCOUNTER — APPOINTMENT (OUTPATIENT)
Dept: GENERAL RADIOLOGY | Age: 79
DRG: 193 | End: 2019-07-25
Payer: MEDICARE

## 2019-07-25 PROBLEM — R06.00 DYSPNEA: Status: ACTIVE | Noted: 2017-03-01

## 2019-07-25 LAB
A/G RATIO: 1 (ref 1.1–2.2)
ALBUMIN SERPL-MCNC: 3.6 G/DL (ref 3.4–5)
ALP BLD-CCNC: 72 U/L (ref 40–129)
ALT SERPL-CCNC: 15 U/L (ref 10–40)
ANION GAP SERPL CALCULATED.3IONS-SCNC: 14 MMOL/L (ref 3–16)
AST SERPL-CCNC: 13 U/L (ref 15–37)
BASOPHILS ABSOLUTE: 0 K/UL (ref 0–0.2)
BASOPHILS RELATIVE PERCENT: 0.2 %
BILIRUB SERPL-MCNC: <0.2 MG/DL (ref 0–1)
BUN BLDV-MCNC: 10 MG/DL (ref 7–20)
CALCIUM SERPL-MCNC: 9.9 MG/DL (ref 8.3–10.6)
CHLORIDE BLD-SCNC: 99 MMOL/L (ref 99–110)
CO2: 23 MMOL/L (ref 21–32)
CREAT SERPL-MCNC: 0.8 MG/DL (ref 0.8–1.3)
EOSINOPHILS ABSOLUTE: 0 K/UL (ref 0–0.6)
EOSINOPHILS RELATIVE PERCENT: 0 %
GFR AFRICAN AMERICAN: >60
GFR NON-AFRICAN AMERICAN: >60
GLOBULIN: 3.7 G/DL
GLUCOSE BLD-MCNC: 170 MG/DL (ref 70–99)
HCT VFR BLD CALC: 42.6 % (ref 40.5–52.5)
HEMOGLOBIN: 14.3 G/DL (ref 13.5–17.5)
L. PNEUMOPHILA SEROGP 1 UR AG: NORMAL
LACTIC ACID: 3.3 MMOL/L (ref 0.4–2)
LYMPHOCYTES ABSOLUTE: 1.1 K/UL (ref 1–5.1)
LYMPHOCYTES RELATIVE PERCENT: 8.6 %
MCH RBC QN AUTO: 28.7 PG (ref 26–34)
MCHC RBC AUTO-ENTMCNC: 33.5 G/DL (ref 31–36)
MCV RBC AUTO: 85.7 FL (ref 80–100)
MONOCYTES ABSOLUTE: 0.5 K/UL (ref 0–1.3)
MONOCYTES RELATIVE PERCENT: 3.7 %
NEUTROPHILS ABSOLUTE: 11.5 K/UL (ref 1.7–7.7)
NEUTROPHILS RELATIVE PERCENT: 87.5 %
PDW BLD-RTO: 14.2 % (ref 12.4–15.4)
PLATELET # BLD: 614 K/UL (ref 135–450)
PMV BLD AUTO: 6.5 FL (ref 5–10.5)
POTASSIUM SERPL-SCNC: 4.4 MMOL/L (ref 3.5–5.1)
PROCALCITONIN: 0.04 NG/ML (ref 0–0.15)
RBC # BLD: 4.97 M/UL (ref 4.2–5.9)
SODIUM BLD-SCNC: 136 MMOL/L (ref 136–145)
STREP PNEUMONIAE ANTIGEN, URINE: NORMAL
TOTAL PROTEIN: 7.3 G/DL (ref 6.4–8.2)
WBC # BLD: 13.1 K/UL (ref 4–11)

## 2019-07-25 PROCEDURE — 6360000002 HC RX W HCPCS: Performed by: INTERNAL MEDICINE

## 2019-07-25 PROCEDURE — 7100000011 HC PHASE II RECOVERY - ADDTL 15 MIN: Performed by: INTERNAL MEDICINE

## 2019-07-25 PROCEDURE — 87206 SMEAR FLUORESCENT/ACID STAI: CPT

## 2019-07-25 PROCEDURE — 6370000000 HC RX 637 (ALT 250 FOR IP): Performed by: INTERNAL MEDICINE

## 2019-07-25 PROCEDURE — 87641 MR-STAPH DNA AMP PROBE: CPT

## 2019-07-25 PROCEDURE — 87102 FUNGUS ISOLATION CULTURE: CPT

## 2019-07-25 PROCEDURE — 87252 VIRUS INOCULATION TISSUE: CPT

## 2019-07-25 PROCEDURE — 83605 ASSAY OF LACTIC ACID: CPT

## 2019-07-25 PROCEDURE — 88185 FLOWCYTOMETRY/TC ADD-ON: CPT

## 2019-07-25 PROCEDURE — 2580000003 HC RX 258: Performed by: INTERNAL MEDICINE

## 2019-07-25 PROCEDURE — 87015 SPECIMEN INFECT AGNT CONCNTJ: CPT

## 2019-07-25 PROCEDURE — 99152 MOD SED SAME PHYS/QHP 5/>YRS: CPT | Performed by: INTERNAL MEDICINE

## 2019-07-25 PROCEDURE — 85025 COMPLETE CBC W/AUTO DIFF WBC: CPT

## 2019-07-25 PROCEDURE — 36415 COLL VENOUS BLD VENIPUNCTURE: CPT

## 2019-07-25 PROCEDURE — 94761 N-INVAS EAR/PLS OXIMETRY MLT: CPT

## 2019-07-25 PROCEDURE — 87254 VIRUS INOCULATION SHELL VIA: CPT

## 2019-07-25 PROCEDURE — 31624 DX BRONCHOSCOPE/LAVAGE: CPT | Performed by: INTERNAL MEDICINE

## 2019-07-25 PROCEDURE — 2700000000 HC OXYGEN THERAPY PER DAY

## 2019-07-25 PROCEDURE — 99223 1ST HOSP IP/OBS HIGH 75: CPT | Performed by: INTERNAL MEDICINE

## 2019-07-25 PROCEDURE — 2500000003 HC RX 250 WO HCPCS: Performed by: INTERNAL MEDICINE

## 2019-07-25 PROCEDURE — 7100000010 HC PHASE II RECOVERY - FIRST 15 MIN: Performed by: INTERNAL MEDICINE

## 2019-07-25 PROCEDURE — G0500 MOD SEDAT ENDO SERVICE >5YRS: HCPCS | Performed by: INTERNAL MEDICINE

## 2019-07-25 PROCEDURE — 71045 X-RAY EXAM CHEST 1 VIEW: CPT

## 2019-07-25 PROCEDURE — 94640 AIRWAY INHALATION TREATMENT: CPT

## 2019-07-25 PROCEDURE — 87116 MYCOBACTERIA CULTURE: CPT

## 2019-07-25 PROCEDURE — 0B9F8ZX DRAINAGE OF RIGHT LOWER LUNG LOBE, VIA NATURAL OR ARTIFICIAL OPENING ENDOSCOPIC, DIAGNOSTIC: ICD-10-PCS | Performed by: INTERNAL MEDICINE

## 2019-07-25 PROCEDURE — 2709999900 HC NON-CHARGEABLE SUPPLY: Performed by: INTERNAL MEDICINE

## 2019-07-25 PROCEDURE — 99233 SBSQ HOSP IP/OBS HIGH 50: CPT | Performed by: INTERNAL MEDICINE

## 2019-07-25 PROCEDURE — 1200000000 HC SEMI PRIVATE

## 2019-07-25 PROCEDURE — 87081 CULTURE SCREEN ONLY: CPT

## 2019-07-25 PROCEDURE — 84145 PROCALCITONIN (PCT): CPT

## 2019-07-25 PROCEDURE — 87205 SMEAR GRAM STAIN: CPT

## 2019-07-25 PROCEDURE — 3609010800 HC BRONCHOSCOPY ALVEOLAR LAVAGE: Performed by: INTERNAL MEDICINE

## 2019-07-25 PROCEDURE — 80053 COMPREHEN METABOLIC PANEL: CPT

## 2019-07-25 PROCEDURE — 0BDM8ZX EXTRACTION OF BILATERAL LUNGS, VIA NATURAL OR ARTIFICIAL OPENING ENDOSCOPIC, DIAGNOSTIC: ICD-10-PCS | Performed by: INTERNAL MEDICINE

## 2019-07-25 PROCEDURE — 87070 CULTURE OTHR SPECIMN AEROBIC: CPT

## 2019-07-25 PROCEDURE — 88184 FLOWCYTOMETRY/ TC 1 MARKER: CPT

## 2019-07-25 RX ORDER — LIDOCAINE HYDROCHLORIDE 20 MG/ML
INJECTION, SOLUTION EPIDURAL; INFILTRATION; INTRACAUDAL; PERINEURAL
Status: COMPLETED | OUTPATIENT
Start: 2019-07-25 | End: 2019-07-25

## 2019-07-25 RX ORDER — MIDAZOLAM HYDROCHLORIDE 1 MG/ML
INJECTION INTRAMUSCULAR; INTRAVENOUS
Status: COMPLETED | OUTPATIENT
Start: 2019-07-25 | End: 2019-07-25

## 2019-07-25 RX ORDER — LIDOCAINE HYDROCHLORIDE 40 MG/ML
INJECTION, SOLUTION RETROBULBAR; TOPICAL
Status: COMPLETED | OUTPATIENT
Start: 2019-07-25 | End: 2019-07-25

## 2019-07-25 RX ORDER — FENTANYL CITRATE 50 UG/ML
INJECTION, SOLUTION INTRAMUSCULAR; INTRAVENOUS
Status: COMPLETED | OUTPATIENT
Start: 2019-07-25 | End: 2019-07-25

## 2019-07-25 RX ORDER — ALBUTEROL SULFATE 2.5 MG/3ML
2.5 SOLUTION RESPIRATORY (INHALATION) EVERY 4 HOURS PRN
Status: DISCONTINUED | OUTPATIENT
Start: 2019-07-25 | End: 2019-07-31 | Stop reason: HOSPADM

## 2019-07-25 RX ORDER — POLYETHYLENE GLYCOL 3350 17 G/17G
17 POWDER, FOR SOLUTION ORAL DAILY
Status: DISCONTINUED | OUTPATIENT
Start: 2019-07-25 | End: 2019-07-31 | Stop reason: HOSPADM

## 2019-07-25 RX ORDER — ALBUTEROL SULFATE 2.5 MG/3ML
2.5 SOLUTION RESPIRATORY (INHALATION)
Status: DISCONTINUED | OUTPATIENT
Start: 2019-07-25 | End: 2019-07-31 | Stop reason: HOSPADM

## 2019-07-25 RX ADMIN — METHYLPREDNISOLONE SODIUM SUCCINATE 80 MG: 125 INJECTION, POWDER, FOR SOLUTION INTRAMUSCULAR; INTRAVENOUS at 05:52

## 2019-07-25 RX ADMIN — POLYETHYLENE GLYCOL 3350 17 G: 17 POWDER, FOR SOLUTION ORAL at 10:40

## 2019-07-25 RX ADMIN — METHYLPREDNISOLONE SODIUM SUCCINATE 80 MG: 125 INJECTION, POWDER, FOR SOLUTION INTRAMUSCULAR; INTRAVENOUS at 21:26

## 2019-07-25 RX ADMIN — MONTELUKAST 10 MG: 10 TABLET, FILM COATED ORAL at 21:24

## 2019-07-25 RX ADMIN — CEFEPIME HYDROCHLORIDE 2 G: 2 INJECTION, POWDER, FOR SOLUTION INTRAVENOUS at 13:30

## 2019-07-25 RX ADMIN — TIOTROPIUM BROMIDE 18 MCG: 18 CAPSULE ORAL; RESPIRATORY (INHALATION) at 11:21

## 2019-07-25 RX ADMIN — METHYLPREDNISOLONE SODIUM SUCCINATE 80 MG: 125 INJECTION, POWDER, FOR SOLUTION INTRAMUSCULAR; INTRAVENOUS at 13:30

## 2019-07-25 RX ADMIN — THEOPHYLLINE 200 MG: 400 TABLET, EXTENDED RELEASE ORAL at 10:40

## 2019-07-25 RX ADMIN — PANTOPRAZOLE SODIUM 40 MG: 40 TABLET, DELAYED RELEASE ORAL at 10:40

## 2019-07-25 RX ADMIN — ATORVASTATIN CALCIUM 10 MG: 10 TABLET, FILM COATED ORAL at 10:40

## 2019-07-25 RX ADMIN — DEXTROSE MONOHYDRATE 325 MG: 50 INJECTION, SOLUTION INTRAVENOUS at 21:49

## 2019-07-25 RX ADMIN — DEXTROSE MONOHYDRATE 485 MG: 50 INJECTION, SOLUTION INTRAVENOUS at 09:54

## 2019-07-25 RX ADMIN — CEFEPIME HYDROCHLORIDE 2 G: 2 INJECTION, POWDER, FOR SOLUTION INTRAVENOUS at 02:01

## 2019-07-25 RX ADMIN — ALBUTEROL SULFATE 2.5 MG: 2.5 SOLUTION RESPIRATORY (INHALATION) at 11:21

## 2019-07-25 RX ADMIN — VANCOMYCIN HYDROCHLORIDE 750 MG: 750 INJECTION, POWDER, LYOPHILIZED, FOR SOLUTION INTRAVENOUS at 11:44

## 2019-07-25 RX ADMIN — FLUTICASONE PROPIONATE 1 PUFF: 110 AEROSOL, METERED RESPIRATORY (INHALATION) at 20:35

## 2019-07-25 RX ADMIN — FLUTICASONE PROPIONATE 1 PUFF: 110 AEROSOL, METERED RESPIRATORY (INHALATION) at 11:21

## 2019-07-25 RX ADMIN — FORMOTEROL FUMARATE DIHYDRATE 20 MCG: 20 SOLUTION RESPIRATORY (INHALATION) at 20:35

## 2019-07-25 RX ADMIN — LOSARTAN POTASSIUM 50 MG: 50 TABLET ORAL at 10:40

## 2019-07-25 RX ADMIN — ALBUTEROL SULFATE 2.5 MG: 2.5 SOLUTION RESPIRATORY (INHALATION) at 20:35

## 2019-07-25 RX ADMIN — POLYVINYL ALCOHOL 2 DROP: 14 SOLUTION/ DROPS OPHTHALMIC at 06:55

## 2019-07-25 RX ADMIN — FUROSEMIDE 40 MG: 40 TABLET ORAL at 10:40

## 2019-07-25 RX ADMIN — SODIUM CHLORIDE: 9 INJECTION, SOLUTION INTRAVENOUS at 05:55

## 2019-07-25 RX ADMIN — DILTIAZEM HYDROCHLORIDE 300 MG: 300 CAPSULE, COATED, EXTENDED RELEASE ORAL at 10:40

## 2019-07-25 RX ADMIN — ALBUTEROL SULFATE 2.5 MG: 2.5 SOLUTION RESPIRATORY (INHALATION) at 16:15

## 2019-07-25 RX ADMIN — ENOXAPARIN SODIUM 30 MG: 30 INJECTION SUBCUTANEOUS at 21:24

## 2019-07-25 RX ADMIN — FORMOTEROL FUMARATE DIHYDRATE 20 MCG: 20 SOLUTION RESPIRATORY (INHALATION) at 11:21

## 2019-07-25 RX ADMIN — Medication 10 ML: at 21:34

## 2019-07-25 RX ADMIN — THEOPHYLLINE 200 MG: 400 TABLET, EXTENDED RELEASE ORAL at 21:24

## 2019-07-25 ASSESSMENT — PAIN - FUNCTIONAL ASSESSMENT: PAIN_FUNCTIONAL_ASSESSMENT: 0-10

## 2019-07-25 ASSESSMENT — PAIN SCALES - WONG BAKER
WONGBAKER_NUMERICALRESPONSE: 0
WONGBAKER_NUMERICALRESPONSE: 0

## 2019-07-25 ASSESSMENT — PAIN SCALES - GENERAL
PAINLEVEL_OUTOF10: 0

## 2019-07-25 NOTE — OP NOTE
Bronchoscopy Procedure Note    Date of Operation: 7/25/2019    Pre-op Diagnosis: pneumonia     Post-op Diagnosis: same      Surgeon: Marylu Loya    Anesthesia: Versed 3 mg Fentanyl 75 mcg    ASA: 3    Mallampati : 3    Operation: Flexible fiberoptic bronchoscopy, diagnostic / therapeutic    Estimated Blood Loss: Minimal    Complications: None    Indications and History:  The patient is a 67 y.o. male with pneumonia . The risks, benefits, complications, treatment options and expected outcomes were discussed with the patient. The possibilities of reaction to medication, pulmonary aspiration, perforation of a viscus(Pneumothorax), bleeding, respiratory failure and failure to diagnose a condition and creating a complication requiring transfusion or operation were discussed with the patient who freely signed the consent. Description of Procedure: The patient was taken to the endoscopy suite, identified as Antonio Clements and the procedure verified as Flexible Fiberoptic Bronchoscopy. A Time Out was held and the above information confirmed. After the induction of topical nasopharyngeal anesthesia, the patient was positioned supine and the bronchoscope was passed through the mouth . The vocal cords were visualized, and 1% lidocaine was topically placed onto the cords. The cords were normal . The scope was then passed into the trachea. Additional 1% lidocaine was used topically within the airway. Careful inspection of the tracheal lumen was accomplished. The scope was sequentially passed into all airways.        Endobronchial findings:    Trachea: Normal mucosa  Miley: Normal mucosa  Right main bronchus: Normal mucosa  Right upper lobe bronchus including anterior, apical and posterior until the fourth generation bronchi: Normal mucosa  Right intermedius bronchus: Normal mucosa  Right middle lobe: Normal mucosa  Right lower lobe superior segment uptill 3rd generation bronchi: Normal mucosa  Left

## 2019-07-25 NOTE — PROGRESS NOTES
[facility] Progress Note  7/25/2019 8:43 AM  Subjective:   Admit Date: 7/24/2019      Chief Complaint: Sl less cough    Interval History: Stable overnite--for bronchoscopy today --down there now --idid visit in RR --alert and conversant   Labs are stable   dw wife at bedside     Diet: DIET CARDIAC;  Medications:   Scheduled Meds:   albuterol  2.5 mg Nebulization Q4H WA    atorvastatin  10 mg Oral Daily    diltiazem  300 mg Oral Daily    furosemide  40 mg Oral Daily    losartan  50 mg Oral Daily    montelukast  10 mg Oral Nightly    pantoprazole  40 mg Oral QAM AC    theophylline  200 mg Oral BID    sodium chloride flush  10 mL Intravenous 2 times per day    enoxaparin  30 mg Subcutaneous Nightly    cefepime  2 g Intravenous Q12H    vancomycin  750 mg Intravenous Q24H    voriconazole  6 mg/kg Intravenous Q12H    voriconazole  4 mg/kg Intravenous Q12H    fluticasone  1 puff Inhalation BID    tiotropium  18 mcg Inhalation Daily    formoterol  20 mcg Nebulization BID    vancomycin (VANCOCIN) intermittent dosing (placeholder)   Other RX Placeholder    methylPREDNISolone  80 mg Intravenous Q8H     Continuous Infusions:   sodium chloride 50 mL/hr at 07/25/19 0555       Review of Systems -   General ROS: afebrile  Respiratory ROS: positive for - cough  Cardiovascular ROS: no chest pain  Musculoskeletal ROS:positive for - low back pain  Neurological ROS: no TIA or stroke symptoms    Objective:   Vitals: BP (!) 157/63   Pulse 96   Temp 97.8 °F (36.6 °C) (Axillary)   Resp 16   Ht 5' 6\" (1.676 m)   Wt 179 lb 0.2 oz (81.2 kg)   SpO2 100%   BMI 28.89 kg/m²   General appearance: alert and cooperative with exam  HEENT: Head: Normocephalic, no lesions, without obvious abnormality.   Neck: no adenopathy, no carotid bruit, no JVD, supple, symmetrical, trachea midline and thyroid not enlarged, symmetric, no tenderness/mass/nodules  Lungs: rhonchi bilaterally  Heart: regular rate and rhythm, S1, S2 normal, no

## 2019-07-25 NOTE — H&P
H&P dictated--IMO: Principal Problem:    Pneumonia--RLL -atypical--persisting sx x 2 weeks--non-responsive to o/p rx--prior hx aspergillus pna---  Active Problems:    ZAHEER (obstructive sleep apnea)--now req O2 for tx     Lactic acidosis--swathi in AM after iv fluids     COPD, very severe (HCC)--cont HHN/O2/IV steroids     Essential hypertension--Continue current therapy      Centrilobular emphysema (HCC)  Resolved Problems:    * No resolved hospital problems.  *    Admit--iv cefepime/vanco/steroids/HHN--pulm and ID consults --    Marcus Alexis
139,  potassium 3.9, blood sugar was 128. ProBNP was 43 and troponin less  than 0.01. Serum lactate was elevated at 2.3. IMPRESSION AT THE TIME OF THIS DICTATION:  1. Pneumonia with right lower lobe, atypical, persisting, and  nonresponsive to outpatient therapy. Prior history of aspergillosis  pneumonia, treated by Dr. Evangelista of the Infectious Disease Service. 2.  Obstructive sleep apnea. 3.  Lactic acidosis. 4.  Severe COPD. 5.  Hypertension. 6.  Centrilobular emphysema. PLAN:  He will be admitted. He will be begun on IV cefepime, IV  vancomycin, IV Solu-Medrol and handheld nebulizers. Pulmonary and  Infectious Disease have been consulted.         Crystal Ospina MD    D: 07/24/2019 19:11:16       T: 07/24/2019 22:13:19     [id number]  Job#: [id number]     Doc#: [id number]    CC:  Abel Cole MD

## 2019-07-25 NOTE — PLAN OF CARE
Nutrition Problem: Inadequate oral intake  Intervention: Food and/or Nutrient Delivery: Continue current diet, Start ONS(pt declined supplementation)  Nutritional Goals: consume >/= to 50 %

## 2019-07-25 NOTE — CONSULTS
REASON FOR CONSULTATION/CC: copd / pneumonia       Consult at request of  Melissa Santos MD     PCP: Cynthia Rausch MD  Established Pulmonologist: Dr. Betancourt Night: Gifty Brown is a 67y.o. year old male with a history of COPD who was seen in the office yesterday for increasing shortness of breath cough phlegm and fatigue who was sent to the emergency room for further evaluation. He was admitted for community acquired pneumonia. PAST MEDICAL HISTORY:  Past Medical History:   Diagnosis Date    Basal cell carcinoma of skin 06/16/2017    right cheek    COPD (chronic obstructive pulmonary disease) ([address]     Hyperlipidemia     Hypertension 10/2012    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/04/2018    Skin cancer 2014    both upper thighs    Squamous cell cancer of skin of nose 5/2016       PAST SURGICAL HISTORY:  Past Surgical History:   Procedure Laterality Date    CATARACT REMOVAL Bilateral     5/2016    COLONOSCOPY  08/04/2016    dr Collette Montague    3 years    SKIN BIOPSY  5/2016    Nose     TONSILLECTOMY AND ADENOIDECTOMY      TOOTH EXTRACTION  5/2016    UPPER GASTROINTESTINAL ENDOSCOPY  12/2/2015    dr Aldair Harris       FAMILY HISTORY:  family history includes Coronary Art Dis in his mother; Diabetes in his mother. SOCIAL HISTORY:   reports that he quit smoking about 17 years ago. His smoking use included cigarettes. He has a 45.00 pack-year smoking history.  He has never used smokeless tobacco.    Scheduled Meds:   atorvastatin  10 mg Oral Daily    diltiazem  300 mg Oral Daily    furosemide  40 mg Oral Daily    losartan  50 mg Oral Daily    montelukast  10 mg Oral Nightly    pantoprazole  40 mg Oral QAM AC    theophylline  200 mg Oral BID    sodium chloride flush  10 mL Intravenous 2 times per day    enoxaparin  30 mg Subcutaneous Nightly    cefepime  2 g Intravenous Q12H    vancomycin  750 mg Intravenous Q24H    voriconazole  6 mg/kg Intravenous Q12H    voriconazole  4 mg/kg Intravenous Q12H    fluticasone  1 puff Inhalation BID    tiotropium  18 mcg Inhalation Daily    formoterol  20 mcg Nebulization BID    vancomycin (VANCOCIN) intermittent dosing (placeholder)   Other RX Placeholder    methylPREDNISolone  80 mg Intravenous Q8H       Continuous Infusions:   sodium chloride 50 mL/hr at 07/25/19 0555       PRN Meds:  polyvinyl alcohol, sodium chloride flush, magnesium hydroxide, ondansetron, acetaminophen    ALLERGIES:  Patient is allergic to eucerin [aquaderm treatment-moisturizer]. REVIEW OF SYSTEMS:  Constitutional: Negative for fever  HENT: Negative for sore throat  Eyes: Negative for redness   Respiratory: Shortness of breath and cough  Cardiovascular: Negative for chest pain  Gastrointestinal: Negative for vomiting, diarrhea   Genitourinary: Negative for hematuria   Musculoskeletal: Negative for arthralgias   Skin: Negative for rash  Neurological: Negative for syncope  Hematological: Negative for adenopathy  Psychiatric/Behavorial: Negative for anxiety    Objective:   PHYSICAL EXAM:  Blood pressure 138/75, pulse 88, temperature 97.7 °F (36.5 °C), temperature source Oral, resp. rate 18, height 5' 6\" (1.676 m), weight 179 lb 0.2 oz (81.2 kg), SpO2 96 %.'  Gen: No distress. Eyes: PERRL. No sclera icterus. No conjunctival injection. ENT: No discharge. Pharynx clear. External appearance of ears and nose normal.  Neck: Trachea midline. No obvious mass. Resp: No accessory muscle use. No crackles. ++ wheezes. No rhonchi. CV: Regular rate. Regular rhythm. No murmur or rub. No edema. GI: Non-tender. Non-distended. No hernia. Skin: Warm, dry, normal texture and turgor. No nodule on exposed extremities. Lymph: No cervical LAD. No supraclavicular LAD. M/S: No cyanosis. No clubbing. No joint deformity. Neuro: Moves all four extremities. Psych: Oriented x 3. No anxiety. Awake. Alert. Intact judgement and insight.       Data Reviewed: LABS:  CBC:   Recent Labs     07/24/19  1303   WBC 12.7*   HGB 14.8   HCT 44.9   MCV 86.0   *     BMP:   Recent Labs     07/24/19  1303     141   K 3.9  3.9   CL 97*  98*   CO2 28  28   BUN 10  10   CREATININE 0.9  1.0     LIVER PROFILE:   Recent Labs     07/24/19  1303   AST 13*   ALT 16   BILITOT 0.3   ALKPHOS 77     PT/INR: No results for input(s): PROTIME, INR in the last 72 hours. APTT: No results for input(s): APTT in the last 72 hours. UA:  Recent Labs     07/24/19  1303   COLORU YELLOW   PHUR 7.0   CLARITYU Clear   SPECGRAV 1.010   LEUKOCYTESUR Negative   UROBILINOGEN 0.2   BILIRUBINUR Negative   BLOODU Negative   GLUCOSEU Negative     No results for input(s): PHART, REL3ZPK, PO2ART in the last 72 hours. Radiology Review:  Pertinent images / reports were reviewed as a part of this visit. CT Chest w/ contrast: No results found for this or any previous visit. CT Chest w/o contrast:   Results for orders placed during the hospital encounter of 07/24/19   CT CHEST WO CONTRAST    Narrative EXAMINATION:  CT OF THE CHEST WITHOUT CONTRAST 7/24/2019 1:57 pm    TECHNIQUE:  CT of the chest was performed without the administration of intravenous  contrast. Multiplanar reformatted images are provided for review. Dose  modulation, iterative reconstruction, and/or weight based adjustment of the  mA/kV was utilized to reduce the radiation dose to as low as reasonably  achievable. COMPARISON:  Multiple prior CT studies between 08/10/2018 and 09/23/2014    HISTORY:  ORDERING SYSTEM PROVIDED HISTORY: Shortness of breath with hypoxia  TECHNOLOGIST PROVIDED HISTORY:  Reason for Exam: Shortness of Breath (Dx with pneumonia, increasing shortness  of breath)  Acuity: Acute  Type of Exam: Initial    FINDINGS:  Mediastinum: Suspected 2.2 cm left thyroid nodule that is stable from prior  imaging. This is isodense to the surrounding thyroid parenchyma.   Shotty  mediastinal and hilar lymph nodes are relatively stable with an 11 mm index  right suprahilar node. Moderate atherosclerotic calcification of the aortic  valve. Mild atherosclerotic calcification of coronary arteries. Heart size  is normal.  Aorta and pulmonary arteries are normal in caliber. The  esophagus is normal.    Lungs/pleura: The airways are patent. No pleural fluid. Moderate  centrilobular emphysema with biapical fibrotic changes. There is a dense  band parenchymal change in the right lower lobe posterior segment. Cluster  of tree in bud nodularity seen within the right lower lobe adjacent to this  band. Innumerable tiny pulmonary nodules are otherwise observed  predominantly in the left lower lobe in a centrilobular distribution. The  largest individual nodule measures 5 mm in the left lower lobe on image 57. This is stable. Upper Abdomen: Mild adrenal thickening appears stable with no discrete mass  or nodule. Technical note is made that the adrenal glands are not completely  included in the field of view on the current study. Several hepatic cysts  are observed measuring up to 1.3 cm in the left hepatic lobe. Soft Tissues/Bones: No skeletal abnormalities are appreciated within the  chest.      Impression 1. Dense band of parenchymal opacification in the right lower lobe with  surrounding tree in bud nodularity. This is new from prior imaging and is  suspected to represent acute pneumonia. Follow-up imaging to ensure  resolution after a course of therapy advised. 2. Otherwise stable chronic changes of centrilobular emphysema with numerous  centrilobular nodules, predominantly in the left lower lobe. This pattern is  suspected to represent a chronic infectious or inflammatory process. 3. 2.2 cm left thyroid nodule suspected, stable from prior imaging. Ultrasound may be considered for follow-up, but stability would imply a  benign process.   4. Limited evaluation of the adrenal glands in the field of view of right lower lobe capacity. Bronchoscopy this morning with his history of stenotrophomonas, fungal infection, and multiple bacterial infections. COPD with exacerbation  -This appears to be triggered by infection  -Continue Flovent, Perforomist, Spiriva, theophylline  -Solu-Medrol 80 every 8,  -Albuterol scheduled every 4 while awake and every 4 as needed      Chronic hypoxemia  -We did 90% saturation. This is a recent need. Has pneumonia and COPD improve, he may build to weaned off to room air. Sleep apnea  -CPAP at night       This note was transcribed using [id number] [employer]. Please disregard any translational errors.     Thank you for the consult    Triny Olvera Pulmonary, Sleep and Critical Care  [phone]

## 2019-07-25 NOTE — PLAN OF CARE
Nutrition Problem: Inadequate oral intake  Intervention: Food / Nutrition delivery  start supplementation  Nutritional Goals: consume >/= to 50 %

## 2019-07-25 NOTE — CONSULTS
lb 0.2 oz (81.2 kg)   SpO2 93%   BMI 28.89 kg/m²     General Appearance: alert,  in  acute distress, no pallor, no icterus, ill appearing man  nasal cannula  Skin: warm and dry, no rash or erythema  Head: normocephalic and atraumatic  Eyes: pupils equal, round, and reactive to light, conjunctivae normal  ENT: tympanic membrane, external ear and ear canal normal bilaterally, nose without deformity, nasal mucosa and turbinates normal without polyps  Neck: supple and non-tender without mass, no thyromegaly or thyroid nodules, no cervical lymphadenopathy  Pulmonary/Chest: Bibasilar crepitus auscultation bilaterally- ++ wheezes, rales or rhonchi, reduced air  movement,  Cardiovascular: Heart rate high S1 and S2, no murmurs, rubs, clicks, or gallops,   Abdomen: soft, non-tender, non-distended, normal bowel sounds, no masses or organomegaly  Extremities: no cyanosis, clubbing or edema  Musculoskeletal: normal range of motion, no joint swelling, deformity or tenderness  Neurologic: reflexes normal and symmetric, no cranial nerve deficit,   Psych:  Orientation, sensorium, mood normal        DATA:    Lab Results   Component Value Date    WBC 13.1 (H) 07/25/2019    HGB 14.3 07/25/2019    HCT 42.6 07/25/2019    MCV 85.7 07/25/2019     (H) 07/25/2019     Lab Results   Component Value Date    CREATININE 0.8 07/25/2019    BUN 10 07/25/2019     07/25/2019    K 4.4 07/25/2019    CL 99 07/25/2019    CO2 23 07/25/2019       Hepatic Function Panel:   Lab Results   Component Value Date    ALKPHOS 72 07/25/2019    ALT 15 07/25/2019    AST 13 07/25/2019    PROT 7.3 07/25/2019    PROT 6.7 03/14/2013    BILITOT <0.2 07/25/2019    LABALBU 3.6 07/25/2019     UA:  Lab Results   Component Value Date    COLORU YELLOW 07/24/2019    CLARITYU Clear 07/24/2019    GLUCOSEU Negative 07/24/2019    BILIRUBINUR Negative 07/24/2019    KETUA Negative 07/24/2019    SPECGRAV 1.010 07/24/2019    BLOODU Negative 07/24/2019    PHUR 7.0 07/24/2019 Bronchial Washing Updated: 07/25/19 0954   Fungus Culture [[id number]] Collected: 07/25/19 0329   Order Status: Sent Specimen: Respiratory from BAL- Bronch. Lavage Updated: 07/25/19 0947   CMV Culture [[id number]] Collected: 07/25/19 0629   Order Status: Sent Specimen: Respiratory from BAL- Bronch. Lavage Updated: 07/25/19 0946   Legionella Culture [[id number]] Collected: 07/25/19 0907   Order Status: Sent Specimen: Respiratory from BAL- Bronch. Lavage Updated: 07/25/19 0945   Acid Fast Culture With Smear [[id number]] Collected: 07/25/19 0827   Order Status: Sent Specimen: Respiratory from BAL- Bronch. Lavage Updated: 07/25/19 0944   Culture, Viral Respiratory [[id number]] Collected: 07/25/19 0907   Order Status: Sent Specimen: Respiratory from BAL- Bronch. Lavage Updated: 07/25/19 0943   Respiratory Culture [[id number]] Collected: 07/25/19 0907   Order Status: Sent Specimen: Respiratory from BAL- Bronch. Lavage Updated: 07/25/19 0941   MRSA DNA Probe, Nasal [[id number]]    Order Status: Sent Specimen: Nose from Nares    Respiratory Culture [[id number]]    Order Status: Sent Specimen: Sputum Expectorated    Culture Blood #2 [[id number]] Collected: 07/24/19 1303   Order Status: Sent Specimen: Blood Updated: 07/24/19 1342   Culture Blood #1 [[id number]] Collected: 07/24/19 1303   Order Status: Sent Specimen: Blood Updated: 07/24/19 1342         Urine Culture  No results for input(s): Theresa Perches in the last 72 hours. Imaging:   XR CHEST PORTABLE   Final Result   Mild interval worsening of infiltrate in the right lower lobe. CT CHEST WO CONTRAST   Final Result   1. Dense band of parenchymal opacification in the right lower lobe with   surrounding tree in bud nodularity. This is new from prior imaging and is   suspected to represent acute pneumonia. Follow-up imaging to ensure   resolution after a course of therapy advised.    2. Otherwise stable chronic changes of centrilobular emphysema with numerous   centrilobular

## 2019-07-26 LAB
(1,3)-BETA-D-GLUCAN (FUNGITELL) INTERPRETATION: NEGATIVE
(1,3)-BETA-D-GLUCAN (FUNGITELL): <31 PG/ML
ASPERGILLUS GALACTO AG: NEGATIVE
ASPERGILLUS GALACTO INDEX: 0.27
MRSA SCREEN RT-PCR: NORMAL
PROCALCITONIN: 0.05 NG/ML (ref 0–0.15)
VANCOMYCIN TROUGH: 12.5 UG/ML (ref 10–20)

## 2019-07-26 PROCEDURE — 84145 PROCALCITONIN (PCT): CPT

## 2019-07-26 PROCEDURE — 6360000002 HC RX W HCPCS: Performed by: INTERNAL MEDICINE

## 2019-07-26 PROCEDURE — 99233 SBSQ HOSP IP/OBS HIGH 50: CPT | Performed by: INTERNAL MEDICINE

## 2019-07-26 PROCEDURE — 94761 N-INVAS EAR/PLS OXIMETRY MLT: CPT

## 2019-07-26 PROCEDURE — 2580000003 HC RX 258: Performed by: INTERNAL MEDICINE

## 2019-07-26 PROCEDURE — 94660 CPAP INITIATION&MGMT: CPT

## 2019-07-26 PROCEDURE — 6370000000 HC RX 637 (ALT 250 FOR IP): Performed by: INTERNAL MEDICINE

## 2019-07-26 PROCEDURE — 2700000000 HC OXYGEN THERAPY PER DAY

## 2019-07-26 PROCEDURE — 1200000000 HC SEMI PRIVATE

## 2019-07-26 PROCEDURE — 36415 COLL VENOUS BLD VENIPUNCTURE: CPT

## 2019-07-26 PROCEDURE — 94640 AIRWAY INHALATION TREATMENT: CPT

## 2019-07-26 PROCEDURE — 80202 ASSAY OF VANCOMYCIN: CPT

## 2019-07-26 RX ORDER — POTASSIUM CHLORIDE 20 MEQ/1
20 TABLET, EXTENDED RELEASE ORAL EVERY OTHER DAY
Status: DISCONTINUED | OUTPATIENT
Start: 2019-07-26 | End: 2019-07-31 | Stop reason: HOSPADM

## 2019-07-26 RX ORDER — FUROSEMIDE 40 MG/1
40 TABLET ORAL EVERY OTHER DAY
Status: DISCONTINUED | OUTPATIENT
Start: 2019-07-26 | End: 2019-07-31 | Stop reason: HOSPADM

## 2019-07-26 RX ORDER — METHYLPREDNISOLONE SODIUM SUCCINATE 125 MG/2ML
60 INJECTION, POWDER, LYOPHILIZED, FOR SOLUTION INTRAMUSCULAR; INTRAVENOUS EVERY 8 HOURS
Status: DISCONTINUED | OUTPATIENT
Start: 2019-07-26 | End: 2019-07-28

## 2019-07-26 RX ADMIN — POLYVINYL ALCOHOL 2 DROP: 14 SOLUTION/ DROPS OPHTHALMIC at 08:18

## 2019-07-26 RX ADMIN — METHYLPREDNISOLONE SODIUM SUCCINATE 60 MG: 125 INJECTION, POWDER, FOR SOLUTION INTRAMUSCULAR; INTRAVENOUS at 21:18

## 2019-07-26 RX ADMIN — FLUTICASONE PROPIONATE 1 PUFF: 110 AEROSOL, METERED RESPIRATORY (INHALATION) at 09:13

## 2019-07-26 RX ADMIN — ALBUTEROL SULFATE 2.5 MG: 2.5 SOLUTION RESPIRATORY (INHALATION) at 09:14

## 2019-07-26 RX ADMIN — TIOTROPIUM BROMIDE 18 MCG: 18 CAPSULE ORAL; RESPIRATORY (INHALATION) at 09:14

## 2019-07-26 RX ADMIN — DILTIAZEM HYDROCHLORIDE 300 MG: 300 CAPSULE, COATED, EXTENDED RELEASE ORAL at 08:17

## 2019-07-26 RX ADMIN — POTASSIUM CHLORIDE 20 MEQ: 20 TABLET, EXTENDED RELEASE ORAL at 08:17

## 2019-07-26 RX ADMIN — CEFEPIME HYDROCHLORIDE 2 G: 2 INJECTION, POWDER, FOR SOLUTION INTRAVENOUS at 15:00

## 2019-07-26 RX ADMIN — Medication 10 ML: at 08:18

## 2019-07-26 RX ADMIN — LOSARTAN POTASSIUM 50 MG: 50 TABLET ORAL at 08:17

## 2019-07-26 RX ADMIN — MAGNESIUM HYDROXIDE 30 ML: 400 SUSPENSION ORAL at 10:06

## 2019-07-26 RX ADMIN — THEOPHYLLINE 200 MG: 400 TABLET, EXTENDED RELEASE ORAL at 08:17

## 2019-07-26 RX ADMIN — METHYLPREDNISOLONE SODIUM SUCCINATE 60 MG: 125 INJECTION, POWDER, FOR SOLUTION INTRAMUSCULAR; INTRAVENOUS at 15:00

## 2019-07-26 RX ADMIN — ALBUTEROL SULFATE 2.5 MG: 2.5 SOLUTION RESPIRATORY (INHALATION) at 16:39

## 2019-07-26 RX ADMIN — METHYLPREDNISOLONE SODIUM SUCCINATE 80 MG: 125 INJECTION, POWDER, FOR SOLUTION INTRAMUSCULAR; INTRAVENOUS at 06:37

## 2019-07-26 RX ADMIN — Medication 10 ML: at 21:18

## 2019-07-26 RX ADMIN — MONTELUKAST 10 MG: 10 TABLET, FILM COATED ORAL at 21:17

## 2019-07-26 RX ADMIN — DEXTROSE MONOHYDRATE 325 MG: 50 INJECTION, SOLUTION INTRAVENOUS at 21:17

## 2019-07-26 RX ADMIN — FORMOTEROL FUMARATE DIHYDRATE 20 MCG: 20 SOLUTION RESPIRATORY (INHALATION) at 09:13

## 2019-07-26 RX ADMIN — CEFEPIME HYDROCHLORIDE 2 G: 2 INJECTION, POWDER, FOR SOLUTION INTRAVENOUS at 02:53

## 2019-07-26 RX ADMIN — PANTOPRAZOLE SODIUM 40 MG: 40 TABLET, DELAYED RELEASE ORAL at 06:38

## 2019-07-26 RX ADMIN — THEOPHYLLINE 200 MG: 400 TABLET, EXTENDED RELEASE ORAL at 21:17

## 2019-07-26 RX ADMIN — FUROSEMIDE 40 MG: 40 TABLET ORAL at 08:17

## 2019-07-26 RX ADMIN — VANCOMYCIN HYDROCHLORIDE 750 MG: 750 INJECTION, POWDER, LYOPHILIZED, FOR SOLUTION INTRAVENOUS at 11:24

## 2019-07-26 RX ADMIN — POLYETHYLENE GLYCOL 3350 17 G: 17 POWDER, FOR SOLUTION ORAL at 08:18

## 2019-07-26 RX ADMIN — ALBUTEROL SULFATE 2.5 MG: 2.5 SOLUTION RESPIRATORY (INHALATION) at 12:51

## 2019-07-26 RX ADMIN — POLYVINYL ALCOHOL 2 DROP: 14 SOLUTION/ DROPS OPHTHALMIC at 00:01

## 2019-07-26 RX ADMIN — ATORVASTATIN CALCIUM 10 MG: 10 TABLET, FILM COATED ORAL at 08:17

## 2019-07-26 RX ADMIN — ALBUTEROL SULFATE 2.5 MG: 2.5 SOLUTION RESPIRATORY (INHALATION) at 19:47

## 2019-07-26 RX ADMIN — FORMOTEROL FUMARATE DIHYDRATE 20 MCG: 20 SOLUTION RESPIRATORY (INHALATION) at 19:47

## 2019-07-26 RX ADMIN — DEXTROSE MONOHYDRATE 325 MG: 50 INJECTION, SOLUTION INTRAVENOUS at 08:55

## 2019-07-26 RX ADMIN — FLUTICASONE PROPIONATE 1 PUFF: 110 AEROSOL, METERED RESPIRATORY (INHALATION) at 19:49

## 2019-07-26 RX ADMIN — ENOXAPARIN SODIUM 30 MG: 30 INJECTION SUBCUTANEOUS at 21:17

## 2019-07-26 ASSESSMENT — PAIN SCALES - GENERAL
PAINLEVEL_OUTOF10: 0
PAINLEVEL_OUTOF10: 0

## 2019-07-26 ASSESSMENT — PAIN SCALES - WONG BAKER
WONGBAKER_NUMERICALRESPONSE: 0

## 2019-07-26 NOTE — PROGRESS NOTES
[facility] Progress Note  7/26/2019 7:45 AM  Subjective:   Admit Date: 7/24/2019      Chief Complaint: Sl less SOB    Interval History: Bronch yest--cx pending--ID and pulm are following   He feels sl leee SOB   Appetite is sl improved--?? Steroid effect? ?   Diet: DIET CARDIAC; Dietary Nutrition Supplements: Standard High Calorie Oral Supplement  Medications:   Scheduled Meds:   albuterol  2.5 mg Nebulization Q4H WA    polyethylene glycol  17 g Oral Daily    atorvastatin  10 mg Oral Daily    diltiazem  300 mg Oral Daily    furosemide  40 mg Oral Daily    losartan  50 mg Oral Daily    montelukast  10 mg Oral Nightly    pantoprazole  40 mg Oral QAM AC    theophylline  200 mg Oral BID    sodium chloride flush  10 mL Intravenous 2 times per day    enoxaparin  30 mg Subcutaneous Nightly    cefepime  2 g Intravenous Q12H    vancomycin  750 mg Intravenous Q24H    voriconazole  4 mg/kg Intravenous Q12H    fluticasone  1 puff Inhalation BID    tiotropium  18 mcg Inhalation Daily    formoterol  20 mcg Nebulization BID    vancomycin (VANCOCIN) intermittent dosing (placeholder)   Other RX Placeholder    methylPREDNISolone  80 mg Intravenous Q8H     Continuous Infusions:   sodium chloride 50 mL/hr at 07/25/19 0555       Review of Systems -   General ROS: afebrile  Respiratory ROS: positive for - cough  Cardiovascular ROS: no chest pain  Musculoskeletal ROS:positive for - :joint pain  Neurological ROS: no TIA or stroke symptoms    Objective:   Vitals: BP (!) 116/54   Pulse 76   Temp 97.7 °F (36.5 °C) (Oral)   Resp 18   Ht 5' 6\" (1.676 m)   Wt 179 lb 7.3 oz (81.4 kg)   SpO2 96%   BMI 28.96 kg/m²   General appearance: alert and cooperative with exam  HEENT: Head: Normocephalic, no lesions, without obvious abnormality.   Neck: no adenopathy, no carotid bruit, no JVD, supple, symmetrical, trachea midline and thyroid not enlarged, symmetric, no tenderness/mass/nodules  Lungs: rhonchi bilaterally  Heart:

## 2019-07-26 NOTE — PROGRESS NOTES
treatment-moisturizer]    Immunizations :   Immunization History   Administered Date(s) Administered    Influenza Vaccine, unspecified formulation 10/22/2014    Influenza Virus Vaccine 10/13/2013    Influenza Whole 2010    Influenza, High Dose (Fluzone 65 yrs and older) 2017, 10/08/2018    Influenza, Amy Hurst, 3 yrs and older, IM, PF (Fluzone 3 yrs and older or Afluria 5 yrs and older) 2016    Pneumococcal Conjugate 13-valent ([id number]) 2015    Pneumococcal Polysaccharide ([id number]) 2016    Tdap (Boostrix, Adacel) 2012    Zoster Live (Zostavax) 2012    Zoster Recombinant (Shingrix) 2019       Social History:     Social History     Tobacco Use    Smoking status: Former Smoker     Packs/day: 1.00     Years: 45.00     Pack years: 45.00     Types: Cigarettes     Last attempt to quit: 2002     Years since quittin.5    Smokeless tobacco: Never Used   Substance Use Topics    Alcohol use: Yes     Comment: rarely     Drug use: No     Social History     Tobacco Use   Smoking Status Former Smoker    Packs/day: 1.00    Years: 45.00    Pack years: 45.00    Types: Cigarettes    Last attempt to quit: 2002    Years since quittin.5   Smokeless Tobacco Never Used      Family History   Problem Relation Age of Onset    Diabetes Mother         DM    Coronary Art Dis Mother          REVIEW OF SYSTEMS:    No fever / chills / sweats. No weight loss. No visual change, eye pain, eye discharge. No oral lesion, sore throat, dysphagia. Denies cough / sputum/Sob   Denies chest pain, palpitations/ dizziness  Denies nausea/ vomiting/abdominal pain/diarrhea. Denies dysuria or change in urinary function. Denies joint swelling or pain. No myalgia, arthralgia. No rashes, skin lesions   Denies focal weakness, sensory change or other neurologic symptoms  No lymph node swelling or tenderness.     Cough, sob+ wheeze+ poor appetite    PHYSICAL EXAM: draw pediatric bottle. ~Blood Culture #1  Performed at:  [facility]  [address]   Phone [phone]   Culture Blood #2 [[id number]] Collected: 07/24/19 1303   Order Status: Completed Specimen: Blood Updated: 07/25/19 1515    Culture, Blood 2 No Growth to date.  Any change in status will be called. Narrative:     ORDER#: [id number]                          ORDERED BY: Michael Yi  SOURCE: CXBL2                              COLLECTED:  07/24/19 13:03  ANTIBIOTICS AT [facility]. :                      RECEIVED :  07/24/19 13:33  If child <=2 yrs old please draw pediatric bottle. ~Blood Culture #2  Performed at:  [facility]  [address]   Phone [phone]   Strep Pneumoniae Antigen [[id number]] Collected: 07/24/19 1715   Order Status: Completed Specimen: Urine, clean catch Updated: 07/25/19 1038    STREP PNEUMONIAE ANTIGEN, URINE --    Presumptive Negative   Presumptive negative suggests no current or recent   pneumococcal infection. Infection due to Strep pneumoniae   cannot be ruled out since the antigen present in the sample   may be below the detection limit of the test.   Normal Range:Presumptive Negative    Narrative:     ORDER#: [id number]                          ORDERED BY: Chaparrita Melgar  SOURCE: Urine Clean Catch                  COLLECTED:  07/24/19 17:15  ANTIBIOTICS AT [facility]. :                      RECEIVED :  07/24/19 17:38  Performed at:  [facility]  [address]   Phone [phone]   Legionella Antigen, Urine [[id number]] Collected: 07/24/19 1715   Order Status: Completed Specimen: Urine, clean catch Updated: 07/25/19 1038    L. pneumophila Serogp 1 Ur Ag --    Presumptive Negative   No Legionella pneumophilia serogroup 1 antigens detected.    A negative result does not exclude infection with   Legionella

## 2019-07-26 NOTE — PROGRESS NOTES
hypoxia  TECHNOLOGIST PROVIDED HISTORY:  Reason for Exam: Shortness of Breath (Dx with pneumonia, increasing shortness  of breath)  Acuity: Acute  Type of Exam: Initial    FINDINGS:  Mediastinum: Suspected 2.2 cm left thyroid nodule that is stable from prior  imaging. This is isodense to the surrounding thyroid parenchyma. Shotty  mediastinal and hilar lymph nodes are relatively stable with an 11 mm index  right suprahilar node. Moderate atherosclerotic calcification of the aortic  valve. Mild atherosclerotic calcification of coronary arteries. Heart size  is normal.  Aorta and pulmonary arteries are normal in caliber. The  esophagus is normal.    Lungs/pleura: The airways are patent. No pleural fluid. Moderate  centrilobular emphysema with biapical fibrotic changes. There is a dense  band parenchymal change in the right lower lobe posterior segment. Cluster  of tree in bud nodularity seen within the right lower lobe adjacent to this  band. Innumerable tiny pulmonary nodules are otherwise observed  predominantly in the left lower lobe in a centrilobular distribution. The  largest individual nodule measures 5 mm in the left lower lobe on image 57. This is stable. Upper Abdomen: Mild adrenal thickening appears stable with no discrete mass  or nodule. Technical note is made that the adrenal glands are not completely  included in the field of view on the current study. Several hepatic cysts  are observed measuring up to 1.3 cm in the left hepatic lobe. Soft Tissues/Bones: No skeletal abnormalities are appreciated within the  chest.      Impression 1. Dense band of parenchymal opacification in the right lower lobe with  surrounding tree in bud nodularity. This is new from prior imaging and is  suspected to represent acute pneumonia. Follow-up imaging to ensure  resolution after a course of therapy advised.   2. Otherwise stable chronic changes of centrilobular emphysema with numerous  centrilobular nodules, predominantly in the left lower lobe. This pattern is  suspected to represent a chronic infectious or inflammatory process. 3. 2.2 cm left thyroid nodule suspected, stable from prior imaging. Ultrasound may be considered for follow-up, but stability would imply a  benign process. 4. Limited evaluation of the adrenal glands in the field of view of this  study. Prior nodules can not be evaluated. CTPA: No results found for this or any previous visit. CXR PA/LAT:   Results for orders placed during the hospital encounter of 07/24/19   XR CHEST STANDARD (2 VW)    Narrative EXAMINATION:  TWO XRAY VIEWS OF THE CHEST    7/24/2019 12:55 pm    COMPARISON:  07/09/2019    HISTORY:  ORDERING SYSTEM PROVIDED HISTORY: Shortness of breath  TECHNOLOGIST PROVIDED HISTORY:  Reason for exam:->Shortness of breath  Reason for Exam: sob  Acuity: Acute  Type of Exam: Initial  Relevant Medical/Surgical History: sob    FINDINGS:  Interval resolution the right basilar consolidation. No pneumothorax or  pleural effusion. No new area of lung consolidation. Heart size is normal.      Impression 1. Interval resolution of right basilar consolidation. 2. No new abnormality. CXR portable:   Results for orders placed during the hospital encounter of 07/24/19   XR CHEST PORTABLE    Narrative EXAMINATION:  ONE XRAY VIEW OF THE CHEST    7/25/2019 4:37 am    COMPARISON:  Chest radiograph dated 07/24/2019    HISTORY:  ORDERING SYSTEM PROVIDED HISTORY: pneumonia  TECHNOLOGIST PROVIDED HISTORY:  Reason for exam:->pneumonia  Reason for Exam: pneumonia  Acuity: Acute  Type of Exam: Initial    FINDINGS:  Mild interval worsening of infiltrate in the right lower lobe. No pleural  effusions. No pneumothorax. No free air below the diaphragm. Heart size is  normal.      Impression Mild interval worsening of infiltrate in the right lower lobe.           Assessment:         Community acquired pneumonia  COPD with

## 2019-07-27 LAB
ANION GAP SERPL CALCULATED.3IONS-SCNC: 10 MMOL/L (ref 3–16)
BUN BLDV-MCNC: 18 MG/DL (ref 7–20)
CALCIUM SERPL-MCNC: 9.2 MG/DL (ref 8.3–10.6)
CHLORIDE BLD-SCNC: 101 MMOL/L (ref 99–110)
CO2: 27 MMOL/L (ref 21–32)
CREAT SERPL-MCNC: 1 MG/DL (ref 0.8–1.3)
CULTURE, RESPIRATORY: NORMAL
CULTURE, RESPIRATORY: NORMAL
GFR AFRICAN AMERICAN: >60
GFR NON-AFRICAN AMERICAN: >60
GLUCOSE BLD-MCNC: 178 MG/DL (ref 70–99)
GRAM STAIN RESULT: NORMAL
GRAM STAIN RESULT: NORMAL
LACTIC ACID: 2.4 MMOL/L (ref 0.4–2)
POTASSIUM SERPL-SCNC: 5.1 MMOL/L (ref 3.5–5.1)
PROCALCITONIN: 0.07 NG/ML (ref 0–0.15)
SODIUM BLD-SCNC: 138 MMOL/L (ref 136–145)
VANCOMYCIN TROUGH: <4 UG/ML (ref 10–20)

## 2019-07-27 PROCEDURE — 83605 ASSAY OF LACTIC ACID: CPT

## 2019-07-27 PROCEDURE — 2700000000 HC OXYGEN THERAPY PER DAY

## 2019-07-27 PROCEDURE — 84145 PROCALCITONIN (PCT): CPT

## 2019-07-27 PROCEDURE — 94660 CPAP INITIATION&MGMT: CPT

## 2019-07-27 PROCEDURE — 1200000000 HC SEMI PRIVATE

## 2019-07-27 PROCEDURE — 6370000000 HC RX 637 (ALT 250 FOR IP): Performed by: INTERNAL MEDICINE

## 2019-07-27 PROCEDURE — 2580000003 HC RX 258: Performed by: INTERNAL MEDICINE

## 2019-07-27 PROCEDURE — 6360000002 HC RX W HCPCS: Performed by: INTERNAL MEDICINE

## 2019-07-27 PROCEDURE — 80202 ASSAY OF VANCOMYCIN: CPT

## 2019-07-27 PROCEDURE — 94640 AIRWAY INHALATION TREATMENT: CPT

## 2019-07-27 PROCEDURE — 94760 N-INVAS EAR/PLS OXIMETRY 1: CPT

## 2019-07-27 PROCEDURE — 99232 SBSQ HOSP IP/OBS MODERATE 35: CPT | Performed by: NURSE PRACTITIONER

## 2019-07-27 PROCEDURE — 80048 BASIC METABOLIC PNL TOTAL CA: CPT

## 2019-07-27 PROCEDURE — 99233 SBSQ HOSP IP/OBS HIGH 50: CPT | Performed by: INTERNAL MEDICINE

## 2019-07-27 PROCEDURE — 36415 COLL VENOUS BLD VENIPUNCTURE: CPT

## 2019-07-27 RX ADMIN — ALBUTEROL SULFATE 2.5 MG: 2.5 SOLUTION RESPIRATORY (INHALATION) at 09:03

## 2019-07-27 RX ADMIN — ALBUTEROL SULFATE 2.5 MG: 2.5 SOLUTION RESPIRATORY (INHALATION) at 12:16

## 2019-07-27 RX ADMIN — THEOPHYLLINE 200 MG: 400 TABLET, EXTENDED RELEASE ORAL at 20:15

## 2019-07-27 RX ADMIN — Medication 10 ML: at 08:42

## 2019-07-27 RX ADMIN — POLYETHYLENE GLYCOL 3350 17 G: 17 POWDER, FOR SOLUTION ORAL at 10:16

## 2019-07-27 RX ADMIN — METHYLPREDNISOLONE SODIUM SUCCINATE 60 MG: 125 INJECTION, POWDER, FOR SOLUTION INTRAMUSCULAR; INTRAVENOUS at 14:17

## 2019-07-27 RX ADMIN — FORMOTEROL FUMARATE DIHYDRATE 20 MCG: 20 SOLUTION RESPIRATORY (INHALATION) at 09:03

## 2019-07-27 RX ADMIN — ENOXAPARIN SODIUM 30 MG: 30 INJECTION SUBCUTANEOUS at 20:15

## 2019-07-27 RX ADMIN — POLYVINYL ALCOHOL 2 DROP: 14 SOLUTION/ DROPS OPHTHALMIC at 07:01

## 2019-07-27 RX ADMIN — ALBUTEROL SULFATE 2.5 MG: 2.5 SOLUTION RESPIRATORY (INHALATION) at 19:55

## 2019-07-27 RX ADMIN — METHYLPREDNISOLONE SODIUM SUCCINATE 60 MG: 125 INJECTION, POWDER, FOR SOLUTION INTRAMUSCULAR; INTRAVENOUS at 05:01

## 2019-07-27 RX ADMIN — POLYETHYLENE GLYCOL 3350 17 G: 17 POWDER, FOR SOLUTION ORAL at 07:08

## 2019-07-27 RX ADMIN — FORMOTEROL FUMARATE DIHYDRATE 20 MCG: 20 SOLUTION RESPIRATORY (INHALATION) at 19:55

## 2019-07-27 RX ADMIN — LOSARTAN POTASSIUM 50 MG: 50 TABLET ORAL at 08:40

## 2019-07-27 RX ADMIN — Medication 10 ML: at 20:16

## 2019-07-27 RX ADMIN — CEFEPIME HYDROCHLORIDE 2 G: 2 INJECTION, POWDER, FOR SOLUTION INTRAVENOUS at 14:17

## 2019-07-27 RX ADMIN — POLYETHYLENE GLYCOL 3350 17 G: 17 POWDER, FOR SOLUTION ORAL at 08:39

## 2019-07-27 RX ADMIN — ALBUTEROL SULFATE 2.5 MG: 2.5 SOLUTION RESPIRATORY (INHALATION) at 16:51

## 2019-07-27 RX ADMIN — ATORVASTATIN CALCIUM 10 MG: 10 TABLET, FILM COATED ORAL at 08:40

## 2019-07-27 RX ADMIN — DEXTROSE MONOHYDRATE 325 MG: 50 INJECTION, SOLUTION INTRAVENOUS at 10:17

## 2019-07-27 RX ADMIN — FLUTICASONE PROPIONATE 1 PUFF: 110 AEROSOL, METERED RESPIRATORY (INHALATION) at 09:04

## 2019-07-27 RX ADMIN — METHYLPREDNISOLONE SODIUM SUCCINATE 60 MG: 125 INJECTION, POWDER, FOR SOLUTION INTRAMUSCULAR; INTRAVENOUS at 20:16

## 2019-07-27 RX ADMIN — TIOTROPIUM BROMIDE 18 MCG: 18 CAPSULE ORAL; RESPIRATORY (INHALATION) at 09:04

## 2019-07-27 RX ADMIN — CEFEPIME HYDROCHLORIDE 2 G: 2 INJECTION, POWDER, FOR SOLUTION INTRAVENOUS at 01:20

## 2019-07-27 RX ADMIN — FLUTICASONE PROPIONATE 1 PUFF: 110 AEROSOL, METERED RESPIRATORY (INHALATION) at 19:55

## 2019-07-27 RX ADMIN — THEOPHYLLINE 200 MG: 400 TABLET, EXTENDED RELEASE ORAL at 08:36

## 2019-07-27 RX ADMIN — PANTOPRAZOLE SODIUM 40 MG: 40 TABLET, DELAYED RELEASE ORAL at 05:01

## 2019-07-27 RX ADMIN — DEXTROSE MONOHYDRATE 325 MG: 50 INJECTION, SOLUTION INTRAVENOUS at 21:45

## 2019-07-27 RX ADMIN — DILTIAZEM HYDROCHLORIDE 300 MG: 300 CAPSULE, COATED, EXTENDED RELEASE ORAL at 08:40

## 2019-07-27 RX ADMIN — MONTELUKAST 10 MG: 10 TABLET, FILM COATED ORAL at 20:16

## 2019-07-27 RX ADMIN — VANCOMYCIN HYDROCHLORIDE 750 MG: 750 INJECTION, POWDER, LYOPHILIZED, FOR SOLUTION INTRAVENOUS at 11:34

## 2019-07-27 RX ADMIN — MAGNESIUM HYDROXIDE 30 ML: 400 SUSPENSION ORAL at 10:17

## 2019-07-27 ASSESSMENT — PAIN SCALES - GENERAL
PAINLEVEL_OUTOF10: 0

## 2019-07-27 ASSESSMENT — ENCOUNTER SYMPTOMS
SHORTNESS OF BREATH: 1
COUGH: 1
CHEST TIGHTNESS: 0
WHEEZING: 1
TROUBLE SWALLOWING: 0
GASTROINTESTINAL NEGATIVE: 1

## 2019-07-27 NOTE — PROGRESS NOTES
Pulmonary/Critical Care Progress Note    CC:  Follow-up:  Community acquired pneumonia  COPD with exacerbation  Chronic hypoxic respiratory failure  Sleep apnea  Chronic sinusitis    Subjective: On 3L NC  Lactic acid improving  Breathing still feels tight  Slept with CPAP last night  Coughing up thin mucus    ROS  + productive cough  + wheezing  No fever    Intake/Output Summary (Last 24 hours) at 7/27/2019 2082  Last data filed at 7/27/2019 0852  Gross per 24 hour   Intake 1630 ml   Output 1325 ml   Net 305 ml         PHYSICAL EXAM:  Blood pressure 139/71, pulse 79, temperature 97.5 °F (36.4 °C), temperature source Oral, resp. rate 16, height 5' 6\" (1.676 m), weight 186 lb 11.7 oz (84.7 kg), SpO2 94 %.'  Gen: No distress. Eyes: PERRL. No sclera icterus. No conjunctival injection. ENT: No discharge. Pharynx clear. External appearance of ears and nose normal.  Neck: Trachea midline. No obvious mass. Resp: Expiratory wheezing. No rhonchi. No dullness on percussion. Diminished   CV: Regular rate. Regular rhythm. No murmur or rub. No edema  GI: Non-tender. Non-distended. No hernia. Skin: Warm, dry, normal texture and turgor. No abnormalities on exposed extremities. M/S: No cyanosis. No clubbing. No joint deformity. Neuro: Moves all four extremities.    Psych:  Normal mood and affect; exhibits normal insight and judgement     Medications:    Scheduled Meds:   potassium chloride  20 mEq Oral Every Other Day    furosemide  40 mg Oral Every Other Day    methylPREDNISolone  60 mg Intravenous Q8H    albuterol  2.5 mg Nebulization Q4H WA    polyethylene glycol  17 g Oral Daily    atorvastatin  10 mg Oral Daily    diltiazem  300 mg Oral Daily    losartan  50 mg Oral Daily    montelukast  10 mg Oral Nightly    pantoprazole  40 mg Oral QAM AC    theophylline  200 mg Oral BID    sodium chloride flush  10 mL Intravenous 2 times per day    enoxaparin  30 mg Subcutaneous Nightly    cefepime  2 g

## 2019-07-28 LAB
ASPERGILLUS ANTIBODY ID: NORMAL
BLASTOMYCES ANTIBODY ID: NORMAL
COCCIDIOIDES ANTIBODY ID: NORMAL
FINAL REPORT: NORMAL
HISTOPLASMA ABS, ID: NORMAL
LACTIC ACID: 3.2 MMOL/L (ref 0.4–2)
PRELIMINARY: NORMAL
PROCALCITONIN: 0.08 NG/ML (ref 0–0.15)
VANCOMYCIN RANDOM: 4.3 UG/ML

## 2019-07-28 PROCEDURE — 6360000002 HC RX W HCPCS: Performed by: NURSE PRACTITIONER

## 2019-07-28 PROCEDURE — 6360000002 HC RX W HCPCS: Performed by: INTERNAL MEDICINE

## 2019-07-28 PROCEDURE — 1200000000 HC SEMI PRIVATE

## 2019-07-28 PROCEDURE — 99232 SBSQ HOSP IP/OBS MODERATE 35: CPT | Performed by: INTERNAL MEDICINE

## 2019-07-28 PROCEDURE — 6370000000 HC RX 637 (ALT 250 FOR IP): Performed by: INTERNAL MEDICINE

## 2019-07-28 PROCEDURE — 2580000003 HC RX 258: Performed by: INTERNAL MEDICINE

## 2019-07-28 PROCEDURE — 94660 CPAP INITIATION&MGMT: CPT

## 2019-07-28 PROCEDURE — 2700000000 HC OXYGEN THERAPY PER DAY

## 2019-07-28 PROCEDURE — 6370000000 HC RX 637 (ALT 250 FOR IP)

## 2019-07-28 PROCEDURE — 99232 SBSQ HOSP IP/OBS MODERATE 35: CPT | Performed by: NURSE PRACTITIONER

## 2019-07-28 PROCEDURE — 94640 AIRWAY INHALATION TREATMENT: CPT

## 2019-07-28 PROCEDURE — 99233 SBSQ HOSP IP/OBS HIGH 50: CPT | Performed by: INTERNAL MEDICINE

## 2019-07-28 PROCEDURE — 83605 ASSAY OF LACTIC ACID: CPT

## 2019-07-28 PROCEDURE — 36415 COLL VENOUS BLD VENIPUNCTURE: CPT

## 2019-07-28 PROCEDURE — 94761 N-INVAS EAR/PLS OXIMETRY MLT: CPT

## 2019-07-28 PROCEDURE — 84145 PROCALCITONIN (PCT): CPT

## 2019-07-28 PROCEDURE — 80202 ASSAY OF VANCOMYCIN: CPT

## 2019-07-28 RX ORDER — IPRATROPIUM BROMIDE AND ALBUTEROL SULFATE 2.5; .5 MG/3ML; MG/3ML
SOLUTION RESPIRATORY (INHALATION)
Status: COMPLETED
Start: 2019-07-28 | End: 2019-07-28

## 2019-07-28 RX ORDER — METHYLPREDNISOLONE SODIUM SUCCINATE 40 MG/ML
40 INJECTION, POWDER, LYOPHILIZED, FOR SOLUTION INTRAMUSCULAR; INTRAVENOUS EVERY 8 HOURS
Status: DISCONTINUED | OUTPATIENT
Start: 2019-07-28 | End: 2019-07-29

## 2019-07-28 RX ORDER — ALBUTEROL SULFATE 2.5 MG/3ML
SOLUTION RESPIRATORY (INHALATION)
Status: DISPENSED
Start: 2019-07-28 | End: 2019-07-29

## 2019-07-28 RX ADMIN — FLUTICASONE PROPIONATE 1 PUFF: 110 AEROSOL, METERED RESPIRATORY (INHALATION) at 08:57

## 2019-07-28 RX ADMIN — METHYLPREDNISOLONE SODIUM SUCCINATE 60 MG: 125 INJECTION, POWDER, FOR SOLUTION INTRAMUSCULAR; INTRAVENOUS at 05:21

## 2019-07-28 RX ADMIN — FUROSEMIDE 40 MG: 40 TABLET ORAL at 08:40

## 2019-07-28 RX ADMIN — CEFEPIME HYDROCHLORIDE 2 G: 2 INJECTION, POWDER, FOR SOLUTION INTRAVENOUS at 02:29

## 2019-07-28 RX ADMIN — ALBUTEROL SULFATE 2.5 MG: 2.5 SOLUTION RESPIRATORY (INHALATION) at 20:13

## 2019-07-28 RX ADMIN — METHYLPREDNISOLONE SODIUM SUCCINATE 40 MG: 40 INJECTION, POWDER, FOR SOLUTION INTRAMUSCULAR; INTRAVENOUS at 21:43

## 2019-07-28 RX ADMIN — ATORVASTATIN CALCIUM 10 MG: 10 TABLET, FILM COATED ORAL at 08:41

## 2019-07-28 RX ADMIN — FORMOTEROL FUMARATE DIHYDRATE 20 MCG: 20 SOLUTION RESPIRATORY (INHALATION) at 20:13

## 2019-07-28 RX ADMIN — MONTELUKAST 10 MG: 10 TABLET, FILM COATED ORAL at 21:44

## 2019-07-28 RX ADMIN — PANTOPRAZOLE SODIUM 40 MG: 40 TABLET, DELAYED RELEASE ORAL at 05:21

## 2019-07-28 RX ADMIN — Medication 10 ML: at 21:44

## 2019-07-28 RX ADMIN — TIOTROPIUM BROMIDE 18 MCG: 18 CAPSULE ORAL; RESPIRATORY (INHALATION) at 08:57

## 2019-07-28 RX ADMIN — LOSARTAN POTASSIUM 50 MG: 50 TABLET ORAL at 08:40

## 2019-07-28 RX ADMIN — ENOXAPARIN SODIUM 30 MG: 30 INJECTION SUBCUTANEOUS at 21:47

## 2019-07-28 RX ADMIN — VANCOMYCIN HYDROCHLORIDE 1000 MG: 1 INJECTION, POWDER, LYOPHILIZED, FOR SOLUTION INTRAVENOUS at 11:27

## 2019-07-28 RX ADMIN — IPRATROPIUM BROMIDE AND ALBUTEROL SULFATE 3 ML: .5; 3 SOLUTION RESPIRATORY (INHALATION) at 12:48

## 2019-07-28 RX ADMIN — CEFEPIME HYDROCHLORIDE 2 G: 2 INJECTION, POWDER, FOR SOLUTION INTRAVENOUS at 14:16

## 2019-07-28 RX ADMIN — POTASSIUM CHLORIDE 20 MEQ: 20 TABLET, EXTENDED RELEASE ORAL at 08:41

## 2019-07-28 RX ADMIN — THEOPHYLLINE 200 MG: 400 TABLET, EXTENDED RELEASE ORAL at 08:40

## 2019-07-28 RX ADMIN — ALBUTEROL SULFATE 2.5 MG: 2.5 SOLUTION RESPIRATORY (INHALATION) at 12:50

## 2019-07-28 RX ADMIN — DEXTROSE MONOHYDRATE 325 MG: 50 INJECTION, SOLUTION INTRAVENOUS at 09:14

## 2019-07-28 RX ADMIN — DEXTROSE MONOHYDRATE 325 MG: 50 INJECTION, SOLUTION INTRAVENOUS at 21:49

## 2019-07-28 RX ADMIN — METHYLPREDNISOLONE SODIUM SUCCINATE 40 MG: 40 INJECTION, POWDER, FOR SOLUTION INTRAMUSCULAR; INTRAVENOUS at 14:16

## 2019-07-28 RX ADMIN — FORMOTEROL FUMARATE DIHYDRATE 20 MCG: 20 SOLUTION RESPIRATORY (INHALATION) at 08:57

## 2019-07-28 RX ADMIN — THEOPHYLLINE 200 MG: 400 TABLET, EXTENDED RELEASE ORAL at 21:43

## 2019-07-28 RX ADMIN — ALBUTEROL SULFATE 2.5 MG: 2.5 SOLUTION RESPIRATORY (INHALATION) at 16:39

## 2019-07-28 RX ADMIN — FLUTICASONE PROPIONATE 1 PUFF: 110 AEROSOL, METERED RESPIRATORY (INHALATION) at 20:13

## 2019-07-28 RX ADMIN — POLYETHYLENE GLYCOL 3350 17 G: 17 POWDER, FOR SOLUTION ORAL at 08:40

## 2019-07-28 RX ADMIN — Medication 10 ML: at 08:42

## 2019-07-28 RX ADMIN — ALBUTEROL SULFATE 2.5 MG: 2.5 SOLUTION RESPIRATORY (INHALATION) at 08:57

## 2019-07-28 RX ADMIN — DILTIAZEM HYDROCHLORIDE 300 MG: 300 CAPSULE, COATED, EXTENDED RELEASE ORAL at 08:40

## 2019-07-28 RX ADMIN — VANCOMYCIN HYDROCHLORIDE 1000 MG: 1 INJECTION, POWDER, LYOPHILIZED, FOR SOLUTION INTRAVENOUS at 22:57

## 2019-07-28 ASSESSMENT — ENCOUNTER SYMPTOMS
GASTROINTESTINAL NEGATIVE: 1
WHEEZING: 1
COUGH: 0
TROUBLE SWALLOWING: 0
SHORTNESS OF BREATH: 1
SORE THROAT: 0
CHEST TIGHTNESS: 0

## 2019-07-28 ASSESSMENT — PAIN SCALES - GENERAL
PAINLEVEL_OUTOF10: 0

## 2019-07-28 NOTE — PROGRESS NOTES
Pulmonary/Critical Care Progress Note    CC:  Follow-up:  Community acquired pneumonia  COPD with exacerbation  Chronic hypoxic respiratory failure  Sleep apnea  Chronic sinusitis    Subjective: On 3L NC  Patient states he is feeling stronger  Slept with CPAP last night    ROS  No pain  Feeling stronger  Wheezing improved    Intake/Output Summary (Last 24 hours) at 7/28/2019 0840  Last data filed at 7/28/2019 0600  Gross per 24 hour   Intake 1080 ml   Output 1725 ml   Net -645 ml         PHYSICAL EXAM:  Blood pressure (!) 148/63, pulse 98, temperature 97.4 °F (36.3 °C), temperature source Oral, resp. rate 16, height 5' 6\" (1.676 m), weight 193 lb 12.6 oz (87.9 kg), SpO2 96 %.'  Gen: No distress. Eyes: PERRL. No sclera icterus. No conjunctival injection. ENT: No discharge. Pharynx clear. External appearance of ears and nose normal.  Neck: Trachea midline. No obvious mass. Resp: No rhonchi. No dullness on percussion. Diminished   CV: Regular rate. Regular rhythm. No murmur or rub. No edema  GI: Non-tender. Non-distended. No hernia. Skin: Warm, dry, normal texture and turgor. No abnormalities on exposed extremities. M/S: No cyanosis. No clubbing. No joint deformity. Neuro: Moves all four extremities.    Psych:  Normal mood and affect; exhibits normal insight and judgement     Medications:    Scheduled Meds:   potassium chloride  20 mEq Oral Every Other Day    furosemide  40 mg Oral Every Other Day    methylPREDNISolone  60 mg Intravenous Q8H    albuterol  2.5 mg Nebulization Q4H WA    polyethylene glycol  17 g Oral Daily    atorvastatin  10 mg Oral Daily    diltiazem  300 mg Oral Daily    losartan  50 mg Oral Daily    montelukast  10 mg Oral Nightly    pantoprazole  40 mg Oral QAM AC    theophylline  200 mg Oral BID    sodium chloride flush  10 mL Intravenous 2 times per day    enoxaparin  30 mg Subcutaneous Nightly    cefepime  2 g Intravenous Q12H    vancomycin  750 mg Intravenous Q24H  voriconazole  4 mg/kg Intravenous Q12H    fluticasone  1 puff Inhalation BID    tiotropium  18 mcg Inhalation Daily    formoterol  20 mcg Nebulization BID    vancomycin (VANCOCIN) intermittent dosing (placeholder)   Other RX Placeholder       Continuous Infusions:      PRN Meds:  albuterol, polyvinyl alcohol, sodium chloride flush, magnesium hydroxide, ondansetron, acetaminophen    Labs:  CBC:   Recent Labs     07/25/19  1036   WBC 13.1*   HGB 14.3   HCT 42.6   MCV 85.7   *     BMP:   Recent Labs     07/25/19  1026 07/27/19  0612    138   K 4.4 5.1   CL 99 101   CO2 23 27   BUN 10 18   CREATININE 0.8 1.0     LIVER PROFILE:   Recent Labs     07/25/19  1026   AST 13*   ALT 15   BILITOT <0.2   ALKPHOS 72     PT/INR: No results for input(s): PROTIME, INR in the last 72 hours. APTT: No results for input(s): APTT in the last 72 hours. UA:  No results for input(s): NITRITE, COLORU, PHUR, LABCAST, WBCUA, RBCUA, MUCUS, TRICHOMONAS, YEAST, BACTERIA, CLARITYU, SPECGRAV, LEUKOCYTESUR, UROBILINOGEN, BILIRUBINUR, BLOODU, GLUCOSEU, AMORPHOUS in the last 72 hours. Invalid input(s): Elva Brayden  No results for input(s): PH, PCO2, PO2 in the last 72 hours. Films:  Chest imaging and associated reports were personally reviewed and showed CXR 7/25: worsening right lower lobe infiltrate    Cultures:  Blood: no growth  Sputum: report to follow  Strep/Legionella Antigens: negative  MRSA probe: negative    ECHO  3/8/2017   Summary   Normal left ventricular size and wall thickness. Normal left ventricular   systolic function with an estimated ejection fraction of 50-55% . No   regional wall motion abnormalities.   The right ventricle is not well visualized.     Assessment:   Community acquired pneumonia  COPD with exacerbation  Chronic hypoxic respiratory failure  Sleep apnea  Chronic sinusitis  Lactic acidosis    Plan:  · Maintain oxygen saturations >90% with supplemental oxygen and wean as tolerated  · Antibiotic

## 2019-07-29 LAB
BLOOD CULTURE, ROUTINE: NORMAL
CREAT SERPL-MCNC: 0.9 MG/DL (ref 0.8–1.3)
CULTURE, BLOOD 2: NORMAL
GFR AFRICAN AMERICAN: >60
GFR NON-AFRICAN AMERICAN: >60
PLATELET # BLD: 545 K/UL (ref 135–450)
REASON FOR REJECTION: NORMAL
REJECTED TEST: NORMAL
VANCOMYCIN RANDOM: 22.8 UG/ML

## 2019-07-29 PROCEDURE — 94640 AIRWAY INHALATION TREATMENT: CPT

## 2019-07-29 PROCEDURE — 94760 N-INVAS EAR/PLS OXIMETRY 1: CPT

## 2019-07-29 PROCEDURE — 6360000002 HC RX W HCPCS: Performed by: NURSE PRACTITIONER

## 2019-07-29 PROCEDURE — 99233 SBSQ HOSP IP/OBS HIGH 50: CPT | Performed by: INTERNAL MEDICINE

## 2019-07-29 PROCEDURE — 94660 CPAP INITIATION&MGMT: CPT

## 2019-07-29 PROCEDURE — 6370000000 HC RX 637 (ALT 250 FOR IP): Performed by: INTERNAL MEDICINE

## 2019-07-29 PROCEDURE — 2580000003 HC RX 258: Performed by: INTERNAL MEDICINE

## 2019-07-29 PROCEDURE — 6360000002 HC RX W HCPCS: Performed by: INTERNAL MEDICINE

## 2019-07-29 PROCEDURE — 80202 ASSAY OF VANCOMYCIN: CPT

## 2019-07-29 PROCEDURE — 99232 SBSQ HOSP IP/OBS MODERATE 35: CPT | Performed by: INTERNAL MEDICINE

## 2019-07-29 PROCEDURE — 6360000002 HC RX W HCPCS: Performed by: PHARMACIST

## 2019-07-29 PROCEDURE — 85049 AUTOMATED PLATELET COUNT: CPT

## 2019-07-29 PROCEDURE — 82565 ASSAY OF CREATININE: CPT

## 2019-07-29 PROCEDURE — 1200000000 HC SEMI PRIVATE

## 2019-07-29 PROCEDURE — 36415 COLL VENOUS BLD VENIPUNCTURE: CPT

## 2019-07-29 PROCEDURE — 2700000000 HC OXYGEN THERAPY PER DAY

## 2019-07-29 RX ORDER — METHYLPREDNISOLONE SODIUM SUCCINATE 40 MG/ML
40 INJECTION, POWDER, LYOPHILIZED, FOR SOLUTION INTRAMUSCULAR; INTRAVENOUS EVERY 12 HOURS
Status: DISCONTINUED | OUTPATIENT
Start: 2019-07-30 | End: 2019-07-30

## 2019-07-29 RX ADMIN — DEXTROSE MONOHYDRATE 325 MG: 50 INJECTION, SOLUTION INTRAVENOUS at 08:56

## 2019-07-29 RX ADMIN — Medication 10 ML: at 22:20

## 2019-07-29 RX ADMIN — FORMOTEROL FUMARATE DIHYDRATE 20 MCG: 20 SOLUTION RESPIRATORY (INHALATION) at 20:04

## 2019-07-29 RX ADMIN — FLUTICASONE PROPIONATE 1 PUFF: 110 AEROSOL, METERED RESPIRATORY (INHALATION) at 20:04

## 2019-07-29 RX ADMIN — PANTOPRAZOLE SODIUM 40 MG: 40 TABLET, DELAYED RELEASE ORAL at 06:31

## 2019-07-29 RX ADMIN — THEOPHYLLINE 200 MG: 400 TABLET, EXTENDED RELEASE ORAL at 22:19

## 2019-07-29 RX ADMIN — DILTIAZEM HYDROCHLORIDE 300 MG: 300 CAPSULE, COATED, EXTENDED RELEASE ORAL at 08:17

## 2019-07-29 RX ADMIN — ATORVASTATIN CALCIUM 10 MG: 10 TABLET, FILM COATED ORAL at 08:17

## 2019-07-29 RX ADMIN — THEOPHYLLINE 200 MG: 400 TABLET, EXTENDED RELEASE ORAL at 08:17

## 2019-07-29 RX ADMIN — POLYETHYLENE GLYCOL 3350 17 G: 17 POWDER, FOR SOLUTION ORAL at 08:17

## 2019-07-29 RX ADMIN — MAGNESIUM HYDROXIDE 30 ML: 400 SUSPENSION ORAL at 22:21

## 2019-07-29 RX ADMIN — VANCOMYCIN HYDROCHLORIDE 1000 MG: 1 INJECTION, POWDER, LYOPHILIZED, FOR SOLUTION INTRAVENOUS at 10:13

## 2019-07-29 RX ADMIN — ALBUTEROL SULFATE 2.5 MG: 2.5 SOLUTION RESPIRATORY (INHALATION) at 12:39

## 2019-07-29 RX ADMIN — ALBUTEROL SULFATE 2.5 MG: 2.5 SOLUTION RESPIRATORY (INHALATION) at 07:58

## 2019-07-29 RX ADMIN — FORMOTEROL FUMARATE DIHYDRATE 20 MCG: 20 SOLUTION RESPIRATORY (INHALATION) at 07:58

## 2019-07-29 RX ADMIN — ENOXAPARIN SODIUM 40 MG: 40 INJECTION SUBCUTANEOUS at 22:16

## 2019-07-29 RX ADMIN — ALBUTEROL SULFATE 2.5 MG: 2.5 SOLUTION RESPIRATORY (INHALATION) at 20:04

## 2019-07-29 RX ADMIN — Medication 10 ML: at 08:18

## 2019-07-29 RX ADMIN — FLUTICASONE PROPIONATE 1 PUFF: 110 AEROSOL, METERED RESPIRATORY (INHALATION) at 07:58

## 2019-07-29 RX ADMIN — MONTELUKAST 10 MG: 10 TABLET, FILM COATED ORAL at 22:16

## 2019-07-29 RX ADMIN — DEXTROSE MONOHYDRATE 325 MG: 50 INJECTION, SOLUTION INTRAVENOUS at 22:21

## 2019-07-29 RX ADMIN — LOSARTAN POTASSIUM 50 MG: 50 TABLET ORAL at 08:17

## 2019-07-29 RX ADMIN — METHYLPREDNISOLONE SODIUM SUCCINATE 40 MG: 40 INJECTION, POWDER, FOR SOLUTION INTRAMUSCULAR; INTRAVENOUS at 13:47

## 2019-07-29 RX ADMIN — TIOTROPIUM BROMIDE 18 MCG: 18 CAPSULE ORAL; RESPIRATORY (INHALATION) at 07:58

## 2019-07-29 RX ADMIN — ALBUTEROL SULFATE 2.5 MG: 2.5 SOLUTION RESPIRATORY (INHALATION) at 17:48

## 2019-07-29 RX ADMIN — METHYLPREDNISOLONE SODIUM SUCCINATE 40 MG: 40 INJECTION, POWDER, FOR SOLUTION INTRAMUSCULAR; INTRAVENOUS at 06:31

## 2019-07-29 RX ADMIN — CEFEPIME HYDROCHLORIDE 2 G: 2 INJECTION, POWDER, FOR SOLUTION INTRAVENOUS at 13:47

## 2019-07-29 RX ADMIN — CEFEPIME HYDROCHLORIDE 2 G: 2 INJECTION, POWDER, FOR SOLUTION INTRAVENOUS at 02:30

## 2019-07-29 ASSESSMENT — PAIN SCALES - GENERAL
PAINLEVEL_OUTOF10: 0

## 2019-07-29 NOTE — PLAN OF CARE
Problem: Pain:  Description  Pain management should include both nonpharmacologic and pharmacologic interventions. Goal: Pain level will decrease  Description  Pain level will decrease  7/28/2019 2327 by Gabriel Interiano RN  Outcome: Ongoing     Problem: Pain:  Description  Pain management should include both nonpharmacologic and pharmacologic interventions. Goal: Control of acute pain  Description  Control of acute pain  7/28/2019 2327 by Gabriel Interiano RN  Outcome: Ongoing     Problem: Pain:  Description  Pain management should include both nonpharmacologic and pharmacologic interventions.   Goal: Control of chronic pain  Description  Control of chronic pain  7/28/2019 2327 by Gabriel Interiano RN  Outcome: Ongoing     Problem: Falls - Risk of:  Goal: Will remain free from falls  Description  Will remain free from falls  7/28/2019 2327 by Gabriel Interiano RN  Outcome: Ongoing     Problem: Falls - Risk of:  Goal: Absence of physical injury  Description  Absence of physical injury  7/28/2019 2327 by Gabriel Interiano RN  Outcome: Ongoing     Problem: Discharge Planning:  Goal: Participates in care planning  Description  Participates in care planning  7/28/2019 2327 by Gabriel Interiano RN  Outcome: Ongoing     Problem: Airway Clearance - Ineffective:  Goal: Clear lung sounds  Description  Clear lung sounds  7/28/2019 2327 by Gabriel Interiano RN  Outcome: Ongoing     Problem: Airway Clearance - Ineffective:  Goal: Ability to maintain a clear airway will improve  Description  Ability to maintain a clear airway will improve  7/28/2019 2327 by Gabriel Interiano RN  Outcome: Ongoing     Problem: Fluid Volume - Deficit:  Goal: Achieves intake and output within specified parameters  Description  Achieves intake and output within specified parameters  7/28/2019 2327 by Gabriel Interiano RN  Outcome: Ongoing     Problem: Gas Exchange - Impaired:  Goal: Levels of oxygenation will improve  Description  Levels

## 2019-07-29 NOTE — PROGRESS NOTES
Administered    Influenza Vaccine, unspecified formulation 10/22/2014    Influenza Virus Vaccine 10/13/2013    Influenza Whole 2010    Influenza, High Dose (Fluzone 65 yrs and older) 2017, 10/08/2018    Influenza, Alex Pinks, 3 yrs and older, IM, PF (Fluzone 3 yrs and older or Afluria 5 yrs and older) 2016    Pneumococcal Conjugate 13-valent ([id number]) 2015    Pneumococcal Polysaccharide ([id number]) 2016    Tdap (Boostrix, Adacel) 2012    Zoster Live (Zostavax) 2012    Zoster Recombinant (Shingrix) 2019       Social History:     Social History     Tobacco Use    Smoking status: Former Smoker     Packs/day: 1.00     Years: 45.00     Pack years: 45.00     Types: Cigarettes     Last attempt to quit: 2002     Years since quittin.5    Smokeless tobacco: Never Used   Substance Use Topics    Alcohol use: Yes     Comment: rarely     Drug use: No     Social History     Tobacco Use   Smoking Status Former Smoker    Packs/day: 1.00    Years: 45.00    Pack years: 45.00    Types: Cigarettes    Last attempt to quit: 2002    Years since quittin.5   Smokeless Tobacco Never Used      Family History   Problem Relation Age of Onset    Diabetes Mother         DM    Coronary Art Dis Mother          REVIEW OF SYSTEMS:    No fever / chills / sweats. No weight loss. No visual change, eye pain, eye discharge. No oral lesion, sore throat, dysphagia. Denies cough / sputum/Sob   Denies chest pain, palpitations/ dizziness  Denies nausea/ vomiting/abdominal pain/diarrhea. Denies dysuria or change in urinary function. Denies joint swelling or pain. No myalgia, arthralgia. No rashes, skin lesions   Denies focal weakness, sensory change or other neurologic symptoms  No lymph node swelling or tenderness.     Cough, sob+ wheeze+ poor appetite    PHYSICAL EXAM:      Vitals:    BP (!) 142/72   Pulse 79   Temp 97.7 °F (36.5 °C) (Oral)   Resp 16   Ht 5' 6\" isolated by culture include: adenovirus;   cytomegalovirus;   enteroviruses; herpes simplex virus; influenza A and B viruses;   parainfluenza   virus types 1, 2, 3; respiratory syncytial virus; and varicella-zoster   virus. Performed by [facility]   [place] , [address] [facility] [phone]   www. Krysta Lucio MD - Lab. Director       Narrative:     Referred out by:  [facility]  [address] [employer] [address]   Phone [phone]   Respiratory Culture [[id number]] Collected: 07/25/19 0913   Order Status: Completed Specimen: Respiratory from Bronchial Washing Updated: 07/26/19 1035    CULTURE, RESPIRATORY Further report to follow    Gram Stain Result 1+ Epithelial Cells   2+ Gram positive cocci   No WBC's seen    Narrative:     ORDER#: [id number]                          ORDERED BY: Emma Mercedes  SOURCE: Bronchial Washing                  COLLECTED:  07/25/19 09:13  ANTIBIOTICS AT RAYO. :                      RECEIVED :  07/25/19 09:54  Performed at:  [facility]  [address] [place], [place] [employer] [address]   Phone [phone]   Acid Fast Culture With Smear [[id number]] Collected: 07/25/19 2951   Order Status: Sent Specimen: Respiratory from BAL- Bronch. Lavage Updated: 07/26/19 1030    AFB Smear Further report to follow   Narrative:     ORDER#: [id number]                          ORDERED BY: Emma Mercedes  SOURCE: Bronchial Alveolar Lavage          COLLECTED:  07/25/19 09:07  ANTIBIOTICS AT RAYO. :                      RECEIVED :  07/25/19 09:44  Performed at:  [facility]  [address] [place], [place] [address]   Phone [phone]   Acid Fast Culture With Smear [[id number]] Collected: 07/25/19 0913   Order Status: Sent Specimen: Respiratory from Bronchial Washing Updated: 07/26/19 1030    AFB Smear Further report to follow   Narrative:     ORDER#: [id number]                       Laboratory  [address] [place] [employer] [address] [address]   Phone [phone]   Culture Blood #2 [[id number]] Collected: 07/24/19 1303   Order Status: Completed Specimen: Blood Updated: 07/25/19 1515    Culture, Blood 2 No Growth to date.  Any change in status will be called. Narrative:     ORDER#: [id number]                          ORDERED BY: Dalia Hsu  SOURCE: CXBL2                              COLLECTED:  07/24/19 13:03  ANTIBIOTICS AT RAYO. :                      RECEIVED :  07/24/19 13:33  If child <=2 yrs old please draw pediatric bottle. ~Blood Culture #2  Performed at:  [facility]  [address] [place], [place] [address] [address]   Phone [phone]   Strep Pneumoniae Antigen [[id number]] Collected: 07/24/19 1715   Order Status: Completed Specimen: Urine, clean catch Updated: 07/25/19 1038    STREP PNEUMONIAE ANTIGEN, URINE --    Presumptive Negative   Presumptive negative suggests no current or recent   pneumococcal infection. Infection due to Strep pneumoniae   cannot be ruled out since the antigen present in the sample   may be below the detection limit of the test.   Normal Range:Presumptive Negative    Narrative:     ORDER#: [id number]                          ORDERED BY: Elier Noland  SOURCE: Urine Clean Catch                  COLLECTED:  07/24/19 17:15  ANTIBIOTICS AT RAYO. :                      RECEIVED :  07/24/19 17:38  Performed at:  [facility]  [address] [place] [employer] [address]   Phone [phone]   Legionella Antigen, Urine [[id number]] Collected: 07/24/19 1715   Order Status: Completed Specimen: Urine, clean catch Updated: 07/25/19 1038    L. pneumophila Serogp 1 Ur Ag --    Presumptive Negative   No Legionella pneumophilia serogroup 1 antigens detected.    A negative result does not exclude infection with   Legionella pneumophila serogroup 1 nor does it rule out   other microbial-caused respiratory infections

## 2019-07-29 NOTE — PROGRESS NOTES
Pulmonary Progress Note    Date of Admission: 7/24/2019   LOS: 5 days     Chief Complaint   Patient presents with    Shortness of Breath     Dx with pneumonia, increasing shortness of breath       Assessment:     Acute Exacerbation of COPD  Community Acquired Pneumonia  Chronic Hypoxemic Respiratory Failure with SAO2 <90% on Room Air  Sleep Apnea  Lactic ACidosis    Plan:      -Wean supplemental oxygen to goal saturation of >90%  -Currently on Cefepime,vanc and Vfend  -wean solumedrol Q12h  -CPAP nightly  -flovent, formoterol, spiriva, theophylline    24 Hour Events/Subjective  Reports improving dyspnea, on 3lpm  Wants to walk the hallways    ROS:   No nausea  No Vomiting  No chest pain      Intake/Output Summary (Last 24 hours) at 7/29/2019 1521  Last data filed at 7/29/2019 1104  Gross per 24 hour   Intake 1090 ml   Output 2250 ml   Net -1160 ml         PHYSICAL EXAM:   Blood pressure (!) 160/70, pulse 93, temperature 97.6 °F (36.4 °C), temperature source Oral, resp. rate 18, height 5' 6\" (1.676 m), weight 195 lb 1.7 oz (88.5 kg), SpO2 96 %.'  Gen:  No acute distress. Eyes: PERRL. Anicteric sclera. No conjunctival injection. ENT: No discharge. Posterior oropharynx clear. External appearance of ears and nose normal.  Neck: Trachea midline. No mass   Resp:  No crackles. +wheezes. No rhonchi. No dullness on percussion. CV: Regular rate. Regular rhythm. No murmur or rub. No edema. GI: Soft, Non-tender. Non-distended. +BS  Skin: Warm, dry, w/o erythema. Lymph: No cervical or supraclavicular LAD. M/S: No cyanosis. No clubbing. Neuro:   no focal neurologic deficit. Moves all extremities  Psych: Awake and alert, Oriented x 3. Judgement and insight appropriate. Mood stable.       Medications:    Scheduled Meds:   enoxaparin  40 mg Subcutaneous Nightly    [START ON 7/30/2019] methylPREDNISolone  40 mg Intravenous Q12H    vancomycin  1,000 mg Intravenous Q12H    potassium chloride  20 mEq Oral Every Other Day    furosemide  40 mg Oral Every Other Day    albuterol  2.5 mg Nebulization Q4H WA    polyethylene glycol  17 g Oral Daily    atorvastatin  10 mg Oral Daily    diltiazem  300 mg Oral Daily    losartan  50 mg Oral Daily    montelukast  10 mg Oral Nightly    pantoprazole  40 mg Oral QAM AC    theophylline  200 mg Oral BID    sodium chloride flush  10 mL Intravenous 2 times per day    cefepime  2 g Intravenous Q12H    voriconazole  4 mg/kg Intravenous Q12H    fluticasone  1 puff Inhalation BID    tiotropium  18 mcg Inhalation Daily    formoterol  20 mcg Nebulization BID    vancomycin (VANCOCIN) intermittent dosing (placeholder)   Other RX Placeholder       Continuous Infusions:      PRN Meds:  albuterol, polyvinyl alcohol, sodium chloride flush, magnesium hydroxide, ondansetron, acetaminophen    Labs reviewed:  CBC:   Recent Labs     07/29/19  1128   *     BMP:   Recent Labs     07/27/19  0612 07/29/19  1222     --    K 5.1  --      --    CO2 27  --    BUN 18  --    CREATININE 1.0 0.9     LIVER PROFILE: No results for input(s): AST, ALT, LIPASE, BILIDIR, BILITOT, ALKPHOS in the last 72 hours. Invalid input(s): AMYLASE,  ALB  PT/INR: No results for input(s): PROTIME, INR in the last 72 hours. APTT: No results for input(s): APTT in the last 72 hours. UA:No results for input(s): NITRITE, COLORU, PHUR, LABCAST, WBCUA, RBCUA, MUCUS, TRICHOMONAS, YEAST, BACTERIA, CLARITYU, SPECGRAV, LEUKOCYTESUR, UROBILINOGEN, BILIRUBINUR, BLOODU, GLUCOSEU, AMORPHOUS in the last 72 hours. Invalid input(s): Samara Quan  No results for input(s): PH, PCO2, PO2 in the last 72 hours. Films:  Radiology Review:  Pertinent images / reports were reviewed as a part of this visit. CT Chest w/ contrast: No results found for this or any previous visit.     CT Chest w/o contrast:   Results for orders placed during the hospital encounter of 07/24/19   CT CHEST WO CONTRAST    Narrative hepatic cysts  are observed measuring up to 1.3 cm in the left hepatic lobe. Soft Tissues/Bones: No skeletal abnormalities are appreciated within the  chest.      Impression 1. Dense band of parenchymal opacification in the right lower lobe with  surrounding tree in bud nodularity. This is new from prior imaging and is  suspected to represent acute pneumonia. Follow-up imaging to ensure  resolution after a course of therapy advised. 2. Otherwise stable chronic changes of centrilobular emphysema with numerous  centrilobular nodules, predominantly in the left lower lobe. This pattern is  suspected to represent a chronic infectious or inflammatory process. 3. 2.2 cm left thyroid nodule suspected, stable from prior imaging. Ultrasound may be considered for follow-up, but stability would imply a  benign process. 4. Limited evaluation of the adrenal glands in the field of view of this  study. Prior nodules can not be evaluated. CTPA: No results found for this or any previous visit. CXR PA/LAT:   Results for orders placed during the hospital encounter of 07/24/19   XR CHEST STANDARD (2 VW)    Narrative EXAMINATION:  TWO XRAY VIEWS OF THE CHEST    7/24/2019 12:55 pm    COMPARISON:  07/09/2019    HISTORY:  ORDERING SYSTEM PROVIDED HISTORY: Shortness of breath  TECHNOLOGIST PROVIDED HISTORY:  Reason for exam:->Shortness of breath  Reason for Exam: sob  Acuity: Acute  Type of Exam: Initial  Relevant Medical/Surgical History: sob    FINDINGS:  Interval resolution the right basilar consolidation. No pneumothorax or  pleural effusion. No new area of lung consolidation. Heart size is normal.      Impression 1. Interval resolution of right basilar consolidation. 2. No new abnormality.          CXR portable:   Results for orders placed during the hospital encounter of 07/24/19   XR CHEST PORTABLE    Narrative EXAMINATION:  ONE XRAY VIEW OF THE CHEST    7/25/2019 4:37 am    COMPARISON:  Chest radiograph dated 07/24/2019    HISTORY:  ORDERING SYSTEM PROVIDED HISTORY: pneumonia  TECHNOLOGIST PROVIDED HISTORY:  Reason for exam:->pneumonia  Reason for Exam: pneumonia  Acuity: Acute  Type of Exam: Initial    FINDINGS:  Mild interval worsening of infiltrate in the right lower lobe. No pleural  effusions. No pneumothorax. No free air below the diaphragm. Heart size is  normal.      Impression Mild interval worsening of infiltrate in the right lower lobe. This note was transcribed using [id number] [employer]. Please disregard any translational errors.     Thank you for this consult,    Awa Schumacher [address] [facility]

## 2019-07-29 NOTE — PROGRESS NOTES
Lani GRIMES [facility] Progress Note  7/29/2019 7:51 AM  Subjective:   Admit Date: 7/24/2019      Chief Complaint: congestion     Interval History: Sl less congestion  Now off vanco--     Diet: DIET CARDIAC; Dietary Nutrition Supplements: Standard High Calorie Oral Supplement  Medications:   Scheduled Meds:   enoxaparin  40 mg Subcutaneous Nightly    vancomycin  1,000 mg Intravenous Q12H    methylPREDNISolone  40 mg Intravenous Q8H    potassium chloride  20 mEq Oral Every Other Day    furosemide  40 mg Oral Every Other Day    albuterol  2.5 mg Nebulization Q4H WA    polyethylene glycol  17 g Oral Daily    atorvastatin  10 mg Oral Daily    diltiazem  300 mg Oral Daily    losartan  50 mg Oral Daily    montelukast  10 mg Oral Nightly    pantoprazole  40 mg Oral QAM AC    theophylline  200 mg Oral BID    sodium chloride flush  10 mL Intravenous 2 times per day    cefepime  2 g Intravenous Q12H    voriconazole  4 mg/kg Intravenous Q12H    fluticasone  1 puff Inhalation BID    tiotropium  18 mcg Inhalation Daily    formoterol  20 mcg Nebulization BID    vancomycin (VANCOCIN) intermittent dosing (placeholder)   Other RX Placeholder     Continuous Infusions:    Review of Systems -   General ROS: afebrile  Respiratory ROS: positive for - cough and shortness of breath  Cardiovascular ROS: no chest pain  Musculoskeletal ROS:positive for - :joint pain  Neurological ROS: no TIA or stroke symptoms    Objective:   Vitals: BP (!) 142/72   Pulse 79   Temp 97.7 °F (36.5 °C) (Oral)   Resp 16   Ht 5' 6\" (1.676 m)   Wt 195 lb 1.7 oz (88.5 kg)   SpO2 97%   BMI 31.49 kg/m²   General appearance: alert and cooperative with exam  HEENT: Head: Normocephalic, no lesions, without obvious abnormality.   Neck: no adenopathy, no carotid bruit, no JVD, supple, symmetrical, trachea midline and thyroid not enlarged, symmetric, no tenderness/mass/nodules  Lungs: diminished breath sounds bibasilar  Heart: regular rate and rhythm, S1, S2 mmol/L Final    Potassium reflex Magnesium 07/24/2019 3.9  3.5 - 5.1 mmol/L Final    Chloride 07/24/2019 98* 99 - 110 mmol/L Final    CO2 07/24/2019 28  21 - 32 mmol/L Final    Anion Gap 07/24/2019 15  3 - 16 Final    Glucose 07/24/2019 128* 70 - 99 mg/dL Final    BUN 07/24/2019 10  7 - 20 mg/dL Final    CREATININE 07/24/2019 1.0  0.8 - 1.3 mg/dL Final    GFR Non- 07/24/2019 >60  >60 Final    Comment: >60 mL/min/1.73m2 EGFR, calc. for ages 25 and older using the  MDRD formula (not corrected for weight), is valid for stable  renal function.  GFR  07/24/2019 >60  >60 Final    Comment: Chronic Kidney Disease: less than 60 ml/min/1.73 sq.m. Kidney Failure: less than 15 ml/min/1.73 sq.m. Results valid for patients 18 years and older.  Calcium 07/24/2019 10.1  8.3 - 10.6 mg/dL Final    Pro-BNP 07/24/2019 14  0 - 449 pg/mL Final    Comment: Methodology by NT-proBNP    An age-independent cutoff point of 300 pg/ml has a 98%  negative predictive value excluding acute heart failure. Values exceeding the age-related cutoff values (450 pg/mL if  age<50, 900 if 50-75 and 1800 if >75) has 90% sensitivity and  84% specificity for diagnosing acute HF. In patients with  renal compromise (eGFR<60) values greater than 1200pg/ml have  a diagnostic sensitivity and specificity of 89% and 72% for  acute HF.       Troponin 07/24/2019 <0.01  <0.01 ng/mL Final    Methodology by Troponin T    Sodium 07/24/2019 139  136 - 145 mmol/L Final    Potassium 07/24/2019 3.9  3.5 - 5.1 mmol/L Final    Chloride 07/24/2019 97* 99 - 110 mmol/L Final    CO2 07/24/2019 28  21 - 32 mmol/L Final    Anion Gap 07/24/2019 14  3 - 16 Final    Glucose 07/24/2019 129* 70 - 99 mg/dL Final    BUN 07/24/2019 10  7 - 20 mg/dL Final    CREATININE 07/24/2019 0.9  0.8 - 1.3 mg/dL Final    GFR Non- 07/24/2019 >60  >60 Final    Comment: >60 mL/min/1.73m2 EGFR, calc. for ages 25 and older 0.5  0.0 - 1.3 K/uL Final    Eosinophils # 07/25/2019 0.0  0.0 - 0.6 K/uL Final    Basophils # 07/25/2019 0.0  0.0 - 0.2 K/uL Final    Sodium 07/25/2019 136  136 - 145 mmol/L Final    Potassium 07/25/2019 4.4  3.5 - 5.1 mmol/L Final    Chloride 07/25/2019 99  99 - 110 mmol/L Final    CO2 07/25/2019 23  21 - 32 mmol/L Final    Anion Gap 07/25/2019 14  3 - 16 Final    Glucose 07/25/2019 170* 70 - 99 mg/dL Final    BUN 07/25/2019 10  7 - 20 mg/dL Final    CREATININE 07/25/2019 0.8  0.8 - 1.3 mg/dL Final    GFR Non- 07/25/2019 >60  >60 Final    Comment: >60 mL/min/1.73m2 EGFR, calc. for ages 25 and older using the  MDRD formula (not corrected for weight), is valid for stable  renal function.  GFR  07/25/2019 >60  >60 Final    Comment: Chronic Kidney Disease: less than 60 ml/min/1.73 sq.m. Kidney Failure: less than 15 ml/min/1.73 sq.m. Results valid for patients 18 years and older.  Calcium 07/25/2019 9.9  8.3 - 10.6 mg/dL Final    Total Protein 07/25/2019 7.3  6.4 - 8.2 g/dL Final    Alb 07/25/2019 3.6  3.4 - 5.0 g/dL Final    Albumin/Globulin Ratio 07/25/2019 1.0* 1.1 - 2.2 Final    Total Bilirubin 07/25/2019 <0.2  0.0 - 1.0 mg/dL Final    Alkaline Phosphatase 07/25/2019 72  40 - 129 U/L Final    ALT 07/25/2019 15  10 - 40 U/L Final    AST 07/25/2019 13* 15 - 37 U/L Final    Globulin 07/25/2019 3.7  g/dL Final    Lactic Acid 07/25/2019 3.3* 0.4 - 2.0 mmol/L Final    Procalcitonin 07/25/2019 0.04  0.00 - 0.15 ng/mL Final    Comment: Reference Values:  Adults:                   <=0.15 ng/mL  Children >=72 hours old:  <=0.15 ng/mL  Children <72 hours old:   <2.0 ng/mL at birth, rises to                            <=20 ng/mL at 18-30 hours of age,                            then falls to <=0.15 ng/mL by                            72 hours of age.   Interpretation:  General considerations in children older  than 72 hours and in adults:    <0.15 medullary  thyroid carcinoma, small cell carcinoma, and renal failure.  Sodium 07/27/2019 138  136 - 145 mmol/L Final    Potassium 07/27/2019 5.1  3.5 - 5.1 mmol/L Final    Chloride 07/27/2019 101  99 - 110 mmol/L Final    CO2 07/27/2019 27  21 - 32 mmol/L Final    Anion Gap 07/27/2019 10  3 - 16 Final    Glucose 07/27/2019 178* 70 - 99 mg/dL Final    BUN 07/27/2019 18  7 - 20 mg/dL Final    CREATININE 07/27/2019 1.0  0.8 - 1.3 mg/dL Final    GFR Non- 07/27/2019 >60  >60 Final    Comment: >60 mL/min/1.73m2 EGFR, calc. for ages 25 and older using the  MDRD formula (not corrected for weight), is valid for stable  renal function.  GFR  07/27/2019 >60  >60 Final    Comment: Chronic Kidney Disease: less than 60 ml/min/1.73 sq.m. Kidney Failure: less than 15 ml/min/1.73 sq.m. Results valid for patients 18 years and older.  Calcium 07/27/2019 9.2  8.3 - 10.6 mg/dL Final    Lactic Acid 07/27/2019 2.4* 0.4 - 2.0 mmol/L Final    Vancomycin Tr 07/27/2019 <4.0* 10.0 - 20.0 ug/mL Final    Comment: Trough - 10-20  Toxic  - >20.0      Procalcitonin 07/28/2019 0.08  0.00 - 0.15 ng/mL Final    Comment: Reference Values:  Adults:                   <=0.15 ng/mL  Children >=72 hours old:  <=0.15 ng/mL  Children <72 hours old:   <2.0 ng/mL at birth, rises to                            <=20 ng/mL at 18-30 hours of age,                            then falls to <=0.15 ng/mL by                            72 hours of age. Interpretation:  General considerations in children older  than 72 hours and in adults:    <0.15    Significant bacterial infection unlikely. 0.15-2.0 Localized infections may be associated with           such borderline levels. >2.0     Highly suggestive of systemic bacterial           infections/sepsis or severe localized bacterial           infection such as severe pneumonia, meningitis,           or peritonitis.   With successful antibiotic therapy, PCT levels should fall  immediately. (Half-life of 24 to 36 hours). Elevated PCT can occur after major burns, severe trauma, acute  multiorgan failure, major abdominal or cardiothoracic surgery. In cases of noninfectiou                           s elevations, PCT levels should begin to  fall after 24-48 hours. Autoimmune diseases, chronic  inflammatory processes, viral infections, and mild localized  bacterial infections rarely lead to elevations of PCT  of >0.5 ng/mL. PCT levels may also be elevated in medullary  thyroid carcinoma, small cell carcinoma, and renal failure.  Vancomycin Rm 07/28/2019 4.3  ug/mL Final    Comment: Trough - 10-20  Toxic  - >20.0      Lactic Acid 07/28/2019 3.2* 0.4 - 2.0 mmol/L Final         Assessment & Plan:   Principal Problem:    Pneumonia--IS asissting--cefepime and VFend--off vanco   Active Problems:    ZAHEER (obstructive sleep apnea)--Continue current therapy      Lactic acidosis    COPD, very severe (HCC)--solumedrol 40 q 8 h     Essential hypertension--Continue current therapy      Centrilobular emphysema ([address]    Dyspnea    Community acquired pneumonia of right lower lobe of lung ([address]    Acute exacerbation of chronic obstructive pulmonary disease (COPD) ([address]    Acute respiratory failure with hypoxia (HCC)  Resolved Problems:    * No resolved hospital problems.  *  Incr activity-=-Off vanco--await id reccs   Please note that over 35 minutes was spent in evaluating the patient, review of records and results, discussion with staff/family, etc.    Marylynn Soulier, MD

## 2019-07-30 ENCOUNTER — APPOINTMENT (OUTPATIENT)
Dept: GENERAL RADIOLOGY | Age: 79
DRG: 193 | End: 2019-07-30
Payer: MEDICARE

## 2019-07-30 LAB
ANION GAP SERPL CALCULATED.3IONS-SCNC: 10 MMOL/L (ref 3–16)
BANDED NEUTROPHILS RELATIVE PERCENT: 1 % (ref 0–7)
BASOPHILS ABSOLUTE: 0 K/UL (ref 0–0.2)
BASOPHILS RELATIVE PERCENT: 0 %
BUN BLDV-MCNC: 20 MG/DL (ref 7–20)
CALCIUM SERPL-MCNC: 8.9 MG/DL (ref 8.3–10.6)
CHLORIDE BLD-SCNC: 100 MMOL/L (ref 99–110)
CO2: 30 MMOL/L (ref 21–32)
CREAT SERPL-MCNC: 0.8 MG/DL (ref 0.8–1.3)
EOSINOPHILS ABSOLUTE: 0 K/UL (ref 0–0.6)
EOSINOPHILS RELATIVE PERCENT: 0 %
GFR AFRICAN AMERICAN: >60
GFR NON-AFRICAN AMERICAN: >60
GLUCOSE BLD-MCNC: 179 MG/DL (ref 70–99)
HCT VFR BLD CALC: 39.5 % (ref 40.5–52.5)
HEMOGLOBIN: 12.9 G/DL (ref 13.5–17.5)
LYMPHOCYTES ABSOLUTE: 0.7 K/UL (ref 1–5.1)
LYMPHOCYTES RELATIVE PERCENT: 4 %
MCH RBC QN AUTO: 27.9 PG (ref 26–34)
MCHC RBC AUTO-ENTMCNC: 32.7 G/DL (ref 31–36)
MCV RBC AUTO: 85.3 FL (ref 80–100)
METAMYELOCYTES RELATIVE PERCENT: 1 %
MONOCYTES ABSOLUTE: 1.1 K/UL (ref 0–1.3)
MONOCYTES RELATIVE PERCENT: 6 %
NEUTROPHILS ABSOLUTE: 16.7 K/UL (ref 1.7–7.7)
NEUTROPHILS RELATIVE PERCENT: 88 %
PDW BLD-RTO: 14.5 % (ref 12.4–15.4)
PLATELET # BLD: 506 K/UL (ref 135–450)
PMV BLD AUTO: 7.1 FL (ref 5–10.5)
POTASSIUM SERPL-SCNC: 4.5 MMOL/L (ref 3.5–5.1)
RBC # BLD: 4.63 M/UL (ref 4.2–5.9)
SODIUM BLD-SCNC: 140 MMOL/L (ref 136–145)
WBC # BLD: 18.5 K/UL (ref 4–11)

## 2019-07-30 PROCEDURE — 80048 BASIC METABOLIC PNL TOTAL CA: CPT

## 2019-07-30 PROCEDURE — 2580000003 HC RX 258: Performed by: INTERNAL MEDICINE

## 2019-07-30 PROCEDURE — 99233 SBSQ HOSP IP/OBS HIGH 50: CPT | Performed by: INTERNAL MEDICINE

## 2019-07-30 PROCEDURE — 6370000000 HC RX 637 (ALT 250 FOR IP): Performed by: INTERNAL MEDICINE

## 2019-07-30 PROCEDURE — 6360000002 HC RX W HCPCS: Performed by: INTERNAL MEDICINE

## 2019-07-30 PROCEDURE — 36415 COLL VENOUS BLD VENIPUNCTURE: CPT

## 2019-07-30 PROCEDURE — 99232 SBSQ HOSP IP/OBS MODERATE 35: CPT | Performed by: INTERNAL MEDICINE

## 2019-07-30 PROCEDURE — 71045 X-RAY EXAM CHEST 1 VIEW: CPT

## 2019-07-30 PROCEDURE — 94660 CPAP INITIATION&MGMT: CPT

## 2019-07-30 PROCEDURE — 1200000000 HC SEMI PRIVATE

## 2019-07-30 PROCEDURE — 2700000000 HC OXYGEN THERAPY PER DAY

## 2019-07-30 PROCEDURE — 85025 COMPLETE CBC W/AUTO DIFF WBC: CPT

## 2019-07-30 PROCEDURE — 6360000002 HC RX W HCPCS: Performed by: PHARMACIST

## 2019-07-30 PROCEDURE — 94640 AIRWAY INHALATION TREATMENT: CPT

## 2019-07-30 PROCEDURE — 94761 N-INVAS EAR/PLS OXIMETRY MLT: CPT

## 2019-07-30 RX ORDER — ALBUTEROL SULFATE 2.5 MG/3ML
SOLUTION RESPIRATORY (INHALATION)
Status: DISPENSED
Start: 2019-07-30 | End: 2019-07-31

## 2019-07-30 RX ORDER — PREDNISONE 20 MG/1
40 TABLET ORAL DAILY
Status: DISCONTINUED | OUTPATIENT
Start: 2019-07-30 | End: 2019-07-31 | Stop reason: HOSPADM

## 2019-07-30 RX ADMIN — DEXTROSE MONOHYDRATE 325 MG: 50 INJECTION, SOLUTION INTRAVENOUS at 08:56

## 2019-07-30 RX ADMIN — CEFEPIME HYDROCHLORIDE 2 G: 2 INJECTION, POWDER, FOR SOLUTION INTRAVENOUS at 02:45

## 2019-07-30 RX ADMIN — THEOPHYLLINE 200 MG: 400 TABLET, EXTENDED RELEASE ORAL at 22:03

## 2019-07-30 RX ADMIN — LOSARTAN POTASSIUM 50 MG: 50 TABLET ORAL at 08:53

## 2019-07-30 RX ADMIN — ALBUTEROL SULFATE 2.5 MG: 2.5 SOLUTION RESPIRATORY (INHALATION) at 19:48

## 2019-07-30 RX ADMIN — METHYLPREDNISOLONE SODIUM SUCCINATE 40 MG: 40 INJECTION, POWDER, FOR SOLUTION INTRAMUSCULAR; INTRAVENOUS at 02:45

## 2019-07-30 RX ADMIN — ALBUTEROL SULFATE 2.5 MG: 2.5 SOLUTION RESPIRATORY (INHALATION) at 16:10

## 2019-07-30 RX ADMIN — TIOTROPIUM BROMIDE 18 MCG: 18 CAPSULE ORAL; RESPIRATORY (INHALATION) at 07:43

## 2019-07-30 RX ADMIN — ALBUTEROL SULFATE 2.5 MG: 2.5 SOLUTION RESPIRATORY (INHALATION) at 11:48

## 2019-07-30 RX ADMIN — PREDNISONE 40 MG: 20 TABLET ORAL at 17:06

## 2019-07-30 RX ADMIN — FORMOTEROL FUMARATE DIHYDRATE 20 MCG: 20 SOLUTION RESPIRATORY (INHALATION) at 19:49

## 2019-07-30 RX ADMIN — DILTIAZEM HYDROCHLORIDE 300 MG: 300 CAPSULE, COATED, EXTENDED RELEASE ORAL at 08:52

## 2019-07-30 RX ADMIN — Medication 10 ML: at 22:04

## 2019-07-30 RX ADMIN — ALBUTEROL SULFATE 2.5 MG: 2.5 SOLUTION RESPIRATORY (INHALATION) at 07:43

## 2019-07-30 RX ADMIN — CEFEPIME HYDROCHLORIDE 2 G: 2 INJECTION, POWDER, FOR SOLUTION INTRAVENOUS at 14:37

## 2019-07-30 RX ADMIN — FORMOTEROL FUMARATE DIHYDRATE 20 MCG: 20 SOLUTION RESPIRATORY (INHALATION) at 07:43

## 2019-07-30 RX ADMIN — POLYETHYLENE GLYCOL 3350 17 G: 17 POWDER, FOR SOLUTION ORAL at 08:53

## 2019-07-30 RX ADMIN — FLUTICASONE PROPIONATE 1 PUFF: 110 AEROSOL, METERED RESPIRATORY (INHALATION) at 07:43

## 2019-07-30 RX ADMIN — FLUTICASONE PROPIONATE 1 PUFF: 110 AEROSOL, METERED RESPIRATORY (INHALATION) at 19:49

## 2019-07-30 RX ADMIN — MONTELUKAST 10 MG: 10 TABLET, FILM COATED ORAL at 22:03

## 2019-07-30 RX ADMIN — POTASSIUM CHLORIDE 20 MEQ: 20 TABLET, EXTENDED RELEASE ORAL at 08:52

## 2019-07-30 RX ADMIN — FUROSEMIDE 40 MG: 40 TABLET ORAL at 08:52

## 2019-07-30 RX ADMIN — BISACODYL 5 MG: 5 TABLET, COATED ORAL at 09:00

## 2019-07-30 RX ADMIN — THEOPHYLLINE 200 MG: 400 TABLET, EXTENDED RELEASE ORAL at 08:52

## 2019-07-30 RX ADMIN — PANTOPRAZOLE SODIUM 40 MG: 40 TABLET, DELAYED RELEASE ORAL at 06:42

## 2019-07-30 RX ADMIN — ATORVASTATIN CALCIUM 10 MG: 10 TABLET, FILM COATED ORAL at 08:52

## 2019-07-30 RX ADMIN — Medication 10 ML: at 08:56

## 2019-07-30 RX ADMIN — ENOXAPARIN SODIUM 40 MG: 40 INJECTION SUBCUTANEOUS at 22:03

## 2019-07-30 ASSESSMENT — PAIN SCALES - GENERAL
PAINLEVEL_OUTOF10: 0

## 2019-07-30 NOTE — PROGRESS NOTES
Final Result   1. Interval resolution of right basilar consolidation. 2. No new abnormality.                All the pertinent images and reports for the current Hospitalization were reviewed by me     Scheduled Meds:   predniSONE  40 mg Oral Daily    enoxaparin  40 mg Subcutaneous Nightly    potassium chloride  20 mEq Oral Every Other Day    furosemide  40 mg Oral Every Other Day    albuterol  2.5 mg Nebulization Q4H WA    polyethylene glycol  17 g Oral Daily    atorvastatin  10 mg Oral Daily    diltiazem  300 mg Oral Daily    losartan  50 mg Oral Daily    montelukast  10 mg Oral Nightly    pantoprazole  40 mg Oral QAM AC    theophylline  200 mg Oral BID    sodium chloride flush  10 mL Intravenous 2 times per day    cefepime  2 g Intravenous Q12H    voriconazole  4 mg/kg Intravenous Q12H    fluticasone  1 puff Inhalation BID    tiotropium  18 mcg Inhalation Daily    formoterol  20 mcg Nebulization BID       Continuous Infusions:      PRN Meds:  bisacodyl, albuterol, polyvinyl alcohol, sodium chloride flush, magnesium hydroxide, ondansetron, acetaminophen      Assessment:   Rt LL Pneumonia  Duration of illness x 6 weeks  Resp failure  COPD severe  Nasal cannula  WBC elevation   Lactic acidosis  Past h/o Aspergillus PNA       Ongoing respiratory symptoms of pneumonia not responding to oral antibiotic therapy and outpatient, 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     S/p Bronch and await cx to guide therapy and would cont iv abx broad coverage pending clinical improvement    Started on antifungal therapy given his prior history and if Fungal cx negative and serologies negative may be able to d/c the coverage following clinical improvement     Thus far fungal cx negative and BAL

## 2019-07-30 NOTE — PROGRESS NOTES
Lani Alberts Progress Note  7/30/2019 8:39 AM  Subjective:   Admit Date: 7/24/2019      Chief Complaint: sl stronger     Interval History: Feels sl stronger  Wants to incr activity--will amb more in halls   As per IB--fungal cx--neg--cont vfend for now --have dc vanco--cont cefepime     Diet: DIET CARDIAC; Dietary Nutrition Supplements: Standard High Calorie Oral Supplement  Medications:   Scheduled Meds:   enoxaparin  40 mg Subcutaneous Nightly    methylPREDNISolone  40 mg Intravenous Q12H    potassium chloride  20 mEq Oral Every Other Day    furosemide  40 mg Oral Every Other Day    albuterol  2.5 mg Nebulization Q4H WA    polyethylene glycol  17 g Oral Daily    atorvastatin  10 mg Oral Daily    diltiazem  300 mg Oral Daily    losartan  50 mg Oral Daily    montelukast  10 mg Oral Nightly    pantoprazole  40 mg Oral QAM AC    theophylline  200 mg Oral BID    sodium chloride flush  10 mL Intravenous 2 times per day    cefepime  2 g Intravenous Q12H    voriconazole  4 mg/kg Intravenous Q12H    fluticasone  1 puff Inhalation BID    tiotropium  18 mcg Inhalation Daily    formoterol  20 mcg Nebulization BID     Continuous Infusions:    Review of Systems -   General ROS: afebrile  Respiratory ROS: positive for - shortness of breath  Cardiovascular ROS: no chest pain  Musculoskeletal ROS:positive for - :joint pain  Neurological ROS: no TIA or stroke symptoms    Objective:   Vitals: BP (!) 151/65   Pulse 94   Temp 98.4 °F (36.9 °C) (Oral)   Resp 22   Ht 5' 6\" (1.676 m)   Wt 195 lb 1.7 oz (88.5 kg)   SpO2 95%   BMI 31.49 kg/m²   General appearance: alert and cooperative with exam  HEENT: Head: Normocephalic, no lesions, without obvious abnormality.   Neck: no adenopathy, no carotid bruit, no JVD, supple, symmetrical, trachea midline and thyroid not enlarged, symmetric, no tenderness/mass/nodules  Lungs: rhonchi bilaterally  Heart: regular rate and rhythm, S1, S2 normal, no murmur, click, rub or

## 2019-07-30 NOTE — PROGRESS NOTES
Pulmonary Progress Note    Date of Admission: 7/24/2019   LOS: 6 days     Chief Complaint   Patient presents with    Shortness of Breath     Dx with pneumonia, increasing shortness of breath       Assessment:     Acute Exacerbation of COPD  Community Acquired Pneumonia  Chronic Hypoxemic Respiratory Failure with SAO2 <90% on Room Air  Sleep Apnea  Lactic Acidosis    Plan:      -Wean supplemental oxygen to goal saturation of >90%  -Currently on Cefepime, Vfend. Ho aspergillus PNA but cultures unremarkable here and galactomannan and bd-glucan negative.  -wean steroids to 40mg po daily  -CPAP nightly  -flovent, formoterol, spiriva, theophylline    24 Hour Events/Subjective  Walking hallways, o2 weaned to 2Lpm.  He came in on 1 L/min. ROS:   No nausea  No Vomiting  No chest pain      Intake/Output Summary (Last 24 hours) at 7/30/2019 0921  Last data filed at 7/30/2019 0600  Gross per 24 hour   Intake 1134.7 ml   Output 2575 ml   Net -1440.3 ml         PHYSICAL EXAM:   Blood pressure (!) 151/65, pulse 94, temperature 98.4 °F (36.9 °C), temperature source Oral, resp. rate 22, height 5' 6\" (1.676 m), weight 195 lb 1.7 oz (88.5 kg), SpO2 95 %.'  Gen:  No acute distress. Eyes: PERRL. Anicteric sclera. No conjunctival injection. ENT: No discharge. Posterior oropharynx clear. External appearance of ears and nose normal.  Neck: Trachea midline. No mass   Resp:  No crackles. Faint wheezes. No rhonchi. No dullness on percussion. CV: Regular rate. Regular rhythm. No murmur or rub. No edema. GI: Soft, Non-tender. Non-distended. +BS  Skin: Warm, dry, w/o erythema. Lymph: No cervical or supraclavicular LAD. M/S: No cyanosis. No clubbing. Neuro:   no focal neurologic deficit. Moves all extremities  Psych: Awake and alert, Oriented x 3. Judgement and insight appropriate. Mood stable.       Medications:    Scheduled Meds:   predniSONE  40 mg Oral Daily    enoxaparin  40 mg Subcutaneous Nightly    potassium chloride  20 mEq Oral Every Other Day    furosemide  40 mg Oral Every Other Day    albuterol  2.5 mg Nebulization Q4H WA    polyethylene glycol  17 g Oral Daily    atorvastatin  10 mg Oral Daily    diltiazem  300 mg Oral Daily    losartan  50 mg Oral Daily    montelukast  10 mg Oral Nightly    pantoprazole  40 mg Oral QAM AC    theophylline  200 mg Oral BID    sodium chloride flush  10 mL Intravenous 2 times per day    cefepime  2 g Intravenous Q12H    voriconazole  4 mg/kg Intravenous Q12H    fluticasone  1 puff Inhalation BID    tiotropium  18 mcg Inhalation Daily    formoterol  20 mcg Nebulization BID       Continuous Infusions:      PRN Meds:  bisacodyl, albuterol, polyvinyl alcohol, sodium chloride flush, magnesium hydroxide, ondansetron, acetaminophen    Labs reviewed:  CBC:   Recent Labs     07/29/19  1128 07/30/19  0531   WBC  --  18.5*   HGB  --  12.9*   HCT  --  39.5*   MCV  --  85.3   * 506*     BMP:   Recent Labs     07/29/19  1222 07/30/19  0531   NA  --  140   K  --  4.5   CL  --  100   CO2  --  30   BUN  --  20   CREATININE 0.9 0.8     LIVER PROFILE: No results for input(s): AST, ALT, LIPASE, BILIDIR, BILITOT, ALKPHOS in the last 72 hours. Invalid input(s): AMYLASE,  ALB  PT/INR: No results for input(s): PROTIME, INR in the last 72 hours. APTT: No results for input(s): APTT in the last 72 hours. UA:No results for input(s): NITRITE, COLORU, PHUR, LABCAST, WBCUA, RBCUA, MUCUS, TRICHOMONAS, YEAST, BACTERIA, CLARITYU, SPECGRAV, LEUKOCYTESUR, UROBILINOGEN, BILIRUBINUR, BLOODU, GLUCOSEU, AMORPHOUS in the last 72 hours. Invalid input(s): [employer]  No results for input(s): PH, PCO2, PO2 in the last 72 hours. Films:  Radiology Review:  Pertinent images / reports were reviewed as a part of this visit. CT Chest w/ contrast: No results found for this or any previous visit.     CT Chest w/o contrast:   Results for orders placed during the hospital encounter of 07/24/19   CT CHEST WO CONTRAST    Narrative EXAMINATION:  CT OF THE CHEST WITHOUT CONTRAST 7/24/2019 1:57 pm    TECHNIQUE:  CT of the chest was performed without the administration of intravenous  contrast. Multiplanar reformatted images are provided for review. Dose  modulation, iterative reconstruction, and/or weight based adjustment of the  mA/kV was utilized to reduce the radiation dose to as low as reasonably  achievable. COMPARISON:  Multiple prior CT studies between 08/10/2018 and 09/23/2014    HISTORY:  ORDERING SYSTEM PROVIDED HISTORY: Shortness of breath with hypoxia  TECHNOLOGIST PROVIDED HISTORY:  Reason for Exam: Shortness of Breath (Dx with pneumonia, increasing shortness  of breath)  Acuity: Acute  Type of Exam: Initial    FINDINGS:  Mediastinum: Suspected 2.2 cm left thyroid nodule that is stable from prior  imaging. This is isodense to the surrounding thyroid parenchyma. Shotty  mediastinal and hilar lymph nodes are relatively stable with an 11 mm index  right suprahilar node. Moderate atherosclerotic calcification of the aortic  valve. Mild atherosclerotic calcification of coronary arteries. Heart size  is normal.  Aorta and pulmonary arteries are normal in caliber. The  esophagus is normal.    Lungs/pleura: The airways are patent. No pleural fluid. Moderate  centrilobular emphysema with biapical fibrotic changes. There is a dense  band parenchymal change in the right lower lobe posterior segment. Cluster  of tree in bud nodularity seen within the right lower lobe adjacent to this  band. Innumerable tiny pulmonary nodules are otherwise observed  predominantly in the left lower lobe in a centrilobular distribution. The  largest individual nodule measures 5 mm in the left lower lobe on image 57. This is stable. Upper Abdomen: Mild adrenal thickening appears stable with no discrete mass  or nodule.   Technical note is made that the adrenal glands are not completely  included in the field of view am    COMPARISON:  Chest radiograph dated 07/24/2019    HISTORY:  ORDERING SYSTEM PROVIDED HISTORY: pneumonia  TECHNOLOGIST PROVIDED HISTORY:  Reason for exam:->pneumonia  Reason for Exam: pneumonia  Acuity: Acute  Type of Exam: Initial    FINDINGS:  Mild interval worsening of infiltrate in the right lower lobe. No pleural  effusions. No pneumothorax. No free air below the diaphragm. Heart size is  normal.      Impression Mild interval worsening of infiltrate in the right lower lobe. This note was transcribed using [id number] [employer]. Please disregard any translational errors.     Thank you for this consult,    Awa Schumacher [address] [facility]

## 2019-07-31 VITALS
OXYGEN SATURATION: 94 % | RESPIRATION RATE: 12 BRPM | WEIGHT: 195.99 LBS | SYSTOLIC BLOOD PRESSURE: 158 MMHG | BODY MASS INDEX: 31.5 KG/M2 | TEMPERATURE: 97.6 F | HEART RATE: 89 BPM | HEIGHT: 66 IN | DIASTOLIC BLOOD PRESSURE: 63 MMHG

## 2019-07-31 PROCEDURE — 2700000000 HC OXYGEN THERAPY PER DAY

## 2019-07-31 PROCEDURE — 6370000000 HC RX 637 (ALT 250 FOR IP): Performed by: INTERNAL MEDICINE

## 2019-07-31 PROCEDURE — 6360000002 HC RX W HCPCS: Performed by: INTERNAL MEDICINE

## 2019-07-31 PROCEDURE — 94640 AIRWAY INHALATION TREATMENT: CPT

## 2019-07-31 PROCEDURE — 99239 HOSP IP/OBS DSCHRG MGMT >30: CPT | Performed by: INTERNAL MEDICINE

## 2019-07-31 PROCEDURE — 94660 CPAP INITIATION&MGMT: CPT

## 2019-07-31 PROCEDURE — 2580000003 HC RX 258: Performed by: INTERNAL MEDICINE

## 2019-07-31 PROCEDURE — 94761 N-INVAS EAR/PLS OXIMETRY MLT: CPT

## 2019-07-31 RX ORDER — PREDNISONE 10 MG/1
TABLET ORAL
Qty: 28 TABLET | Refills: 0 | Status: SHIPPED | OUTPATIENT
Start: 2019-07-31 | End: 2019-10-02 | Stop reason: ALTCHOICE

## 2019-07-31 RX ORDER — FUROSEMIDE 40 MG/1
TABLET ORAL
Qty: 90 TABLET | Refills: 0 | Status: SHIPPED | OUTPATIENT
Start: 2019-07-31 | End: 2021-09-28

## 2019-07-31 RX ADMIN — FORMOTEROL FUMARATE DIHYDRATE 20 MCG: 20 SOLUTION RESPIRATORY (INHALATION) at 07:44

## 2019-07-31 RX ADMIN — TIOTROPIUM BROMIDE 18 MCG: 18 CAPSULE ORAL; RESPIRATORY (INHALATION) at 07:44

## 2019-07-31 RX ADMIN — FLUTICASONE PROPIONATE 1 PUFF: 110 AEROSOL, METERED RESPIRATORY (INHALATION) at 07:44

## 2019-07-31 RX ADMIN — ALBUTEROL SULFATE 2.5 MG: 2.5 SOLUTION RESPIRATORY (INHALATION) at 12:00

## 2019-07-31 RX ADMIN — THEOPHYLLINE 200 MG: 400 TABLET, EXTENDED RELEASE ORAL at 08:44

## 2019-07-31 RX ADMIN — LOSARTAN POTASSIUM 50 MG: 50 TABLET ORAL at 08:44

## 2019-07-31 RX ADMIN — Medication 10 ML: at 08:44

## 2019-07-31 RX ADMIN — ATORVASTATIN CALCIUM 10 MG: 10 TABLET, FILM COATED ORAL at 08:44

## 2019-07-31 RX ADMIN — DILTIAZEM HYDROCHLORIDE 300 MG: 300 CAPSULE, COATED, EXTENDED RELEASE ORAL at 08:44

## 2019-07-31 RX ADMIN — POLYETHYLENE GLYCOL 3350 17 G: 17 POWDER, FOR SOLUTION ORAL at 08:44

## 2019-07-31 RX ADMIN — PANTOPRAZOLE SODIUM 40 MG: 40 TABLET, DELAYED RELEASE ORAL at 06:25

## 2019-07-31 RX ADMIN — ALBUTEROL SULFATE 2.5 MG: 2.5 SOLUTION RESPIRATORY (INHALATION) at 07:42

## 2019-07-31 RX ADMIN — CEFEPIME HYDROCHLORIDE 2 G: 2 INJECTION, POWDER, FOR SOLUTION INTRAVENOUS at 02:42

## 2019-07-31 RX ADMIN — PREDNISONE 40 MG: 20 TABLET ORAL at 08:44

## 2019-07-31 ASSESSMENT — PAIN SCALES - GENERAL
PAINLEVEL_OUTOF10: 0

## 2019-07-31 NOTE — DISCHARGE SUMMARY
[address] [place] [address]                               DISCHARGE SUMMARY    PATIENT NAME: Jesse Sorenson                :        1940  MED REC NO:   [id number]                          ROOM:       5253  ACCOUNT NO:   [de-identified]                           ADMIT DATE: 2019  PROVIDER:     Aliyah Horton MD                  DISCHARGE DATE:  2019      DIAGNOSIS ON ADMISSION:  Pneumonia. DIAGNOSES ON DISCHARGE:  1. Atypical pneumonia. 2.  Lactic acidosis. 3.  Severe COPD.  4.  Obstructive sleep apnea. 5.  Community-acquired pneumonia. 6.  Exacerbation of COPD. 7.  Acute respiratory failure with hypoxemia. HISTORY OF PRESENT ILLNESS:  The patient is a 50-year-old white male  admitted through ER. He presented from Dr. Armando Knowles office where he  was being evaluated. He was noted at that time to have a two to  three-week history of cough, wheezing, shortness of breath, hypoxemia,  and weakness. He had outpatient antibiotics including Zithromax and  several other antibiotics and steroids, all without improvement. He was  on oxygen at 1 L that had been increased to 2 L. He was admitted from  Dr. Armando Knowles office through ER by Dr. Lakeshia Martínez. 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 Daria Rivera. Please see the HPI for  further details. This admission, he had a right lower lobe infiltrate. It was  nonresponsive to outpatient therapy so he was admitted. He was begun on  IV cefepime, IV vancomycin, IV Solu-Medrol, handheld nebulizers. He was  seen in Pulmonary consultation by Dr. Jarred Jenkins and Dr. De Oliveira Members and also by  Dr. Daria Rivera of the Infectious Disease Department.   He underwent  bronchoscopy by Dr. Jarred Jenkins the day after admission and at the time of  his bronchoscopy,

## 2019-07-31 NOTE — PROGRESS NOTES
Discharge orders acknowledged by RN . Discharge teaching completed with pt and family. AVS reviewed and all questions answered. New prescriptions and current medication regimen reviewed. Pt was instructed [facility] and understands schedule. IV removed. Telemonitor removed and returned to [facility]. 60+ minutes of CHF educated completed with pt. All other education completed. Pt vitals WDL. Pt discharged with all belongings to private residence with spouse and home care. Pt transported off of unit with PCA and family. No complications.  Electronically signed by Giulia Hodge RN on 7/31/2019 at 2:05 PM

## 2019-08-01 ENCOUNTER — CARE COORDINATION (OUTPATIENT)
Dept: CASE MANAGEMENT | Age: 79
End: 2019-08-01

## 2019-08-01 ENCOUNTER — TELEPHONE (OUTPATIENT)
Dept: PULMONOLOGY | Age: 79
End: 2019-08-01

## 2019-08-02 ENCOUNTER — CARE COORDINATION (OUTPATIENT)
Dept: CASE MANAGEMENT | Age: 79
End: 2019-08-02

## 2019-08-02 LAB
FINAL REPORT: NORMAL
PRELIMINARY: NORMAL

## 2019-08-05 ENCOUNTER — TELEPHONE (OUTPATIENT)
Dept: SLEEP MEDICINE | Age: 79
End: 2019-08-05

## 2019-08-05 LAB
FINAL REPORT: NORMAL
PRELIMINARY: NORMAL

## 2019-08-06 ENCOUNTER — TELEPHONE (OUTPATIENT)
Dept: PULMONOLOGY | Age: 79
End: 2019-08-06

## 2019-08-08 ENCOUNTER — OFFICE VISIT (OUTPATIENT)
Dept: PULMONOLOGY | Age: 79
End: 2019-08-08
Payer: MEDICARE

## 2019-08-08 VITALS
TEMPERATURE: 98.2 F | SYSTOLIC BLOOD PRESSURE: 148 MMHG | OXYGEN SATURATION: 92 % | WEIGHT: 186 LBS | HEIGHT: 66 IN | BODY MASS INDEX: 29.89 KG/M2 | RESPIRATION RATE: 16 BRPM | HEART RATE: 104 BPM | DIASTOLIC BLOOD PRESSURE: 68 MMHG

## 2019-08-08 DIAGNOSIS — J18.9 COMMUNITY ACQUIRED PNEUMONIA OF RIGHT LOWER LOBE OF LUNG: ICD-10-CM

## 2019-08-08 DIAGNOSIS — G47.33 OSA (OBSTRUCTIVE SLEEP APNEA): Primary | ICD-10-CM

## 2019-08-08 DIAGNOSIS — R93.89 ABNORMAL CT OF THE CHEST: ICD-10-CM

## 2019-08-08 DIAGNOSIS — J44.9 COPD, VERY SEVERE (HCC): ICD-10-CM

## 2019-08-08 DIAGNOSIS — J96.01 ACUTE RESPIRATORY FAILURE WITH HYPOXIA (HCC): ICD-10-CM

## 2019-08-08 PROCEDURE — 1111F DSCHRG MED/CURRENT MED MERGE: CPT | Performed by: INTERNAL MEDICINE

## 2019-08-08 PROCEDURE — G8417 CALC BMI ABV UP PARAM F/U: HCPCS | Performed by: INTERNAL MEDICINE

## 2019-08-08 PROCEDURE — 1123F ACP DISCUSS/DSCN MKR DOCD: CPT | Performed by: INTERNAL MEDICINE

## 2019-08-08 PROCEDURE — 1036F TOBACCO NON-USER: CPT | Performed by: INTERNAL MEDICINE

## 2019-08-08 PROCEDURE — G8427 DOCREV CUR MEDS BY ELIG CLIN: HCPCS | Performed by: INTERNAL MEDICINE

## 2019-08-08 PROCEDURE — 99214 OFFICE O/P EST MOD 30 MIN: CPT | Performed by: INTERNAL MEDICINE

## 2019-08-08 PROCEDURE — 3023F SPIROM DOC REV: CPT | Performed by: INTERNAL MEDICINE

## 2019-08-08 PROCEDURE — 4040F PNEUMOC VAC/ADMIN/RCVD: CPT | Performed by: INTERNAL MEDICINE

## 2019-08-08 PROCEDURE — G8926 SPIRO NO PERF OR DOC: HCPCS | Performed by: INTERNAL MEDICINE

## 2019-08-08 ASSESSMENT — SLEEP AND FATIGUE QUESTIONNAIRES
HOW LIKELY ARE YOU TO NOD OFF OR FALL ASLEEP WHILE LYING DOWN TO REST IN THE AFTERNOON WHEN CIRCUMSTANCES PERMIT: 1
HOW LIKELY ARE YOU TO NOD OFF OR FALL ASLEEP WHILE WATCHING TV: 1
HOW LIKELY ARE YOU TO NOD OFF OR FALL ASLEEP WHEN YOU ARE A PASSENGER IN A CAR FOR AN HOUR WITHOUT A BREAK: 1
HOW LIKELY ARE YOU TO NOD OFF OR FALL ASLEEP IN A CAR, WHILE STOPPED FOR A FEW MINUTES IN TRAFFIC: 1
HOW LIKELY ARE YOU TO NOD OFF OR FALL ASLEEP WHILE SITTING INACTIVE IN A PUBLIC PLACE: 1
HOW LIKELY ARE YOU TO NOD OFF OR FALL ASLEEP WHILE SITTING AND READING: 1
HOW LIKELY ARE YOU TO NOD OFF OR FALL ASLEEP WHILE SITTING QUIETLY AFTER LUNCH WITHOUT ALCOHOL: 1
HOW LIKELY ARE YOU TO NOD OFF OR FALL ASLEEP WHILE SITTING AND TALKING TO SOMEONE: 1
ESS TOTAL SCORE: 8

## 2019-08-08 NOTE — PROGRESS NOTES
breath and dyspnea with exertion. Having wheezing. He is currently using Anoro, nebulizer, theophylline and weaning off of prednisone. He is supposed to be on Arnuity but is not using this. He has weaned himself down from the nebulizer despite worsening shortness of breath. He was advised to use his Arnuity and increase his nebulizer back to 3-4 times a day. Given his progression, he would likely benefit from BiPAP at night and as needed. He currently has a CPAP. Therefore, I will order a titration study to change his machine to a BiPAP. We also talked about the L2K ventilation. He is not excited about this idea. Given his age of 67, he likely would not do well lung transplantation. Community acquired pneumonia of right lower lobe of lung ([address]     This appears to be resolving based on chest x-ray. Acute respiratory failure with hypoxia (HCC)     Improving. Using at night with a bleeding with his CPAP. As needed during the day. Abnormal CT of the chest     Appears to be developing discoid atelectasis secondary to scar secondary to his recent pneumonia                 This note was transcribed using [id number]. Please disregard any translational errors.

## 2019-08-09 NOTE — ASSESSMENT & PLAN NOTE
Worsening shortness of breath and dyspnea with exertion. Having wheezing. He is currently using Anoro, nebulizer, theophylline and weaning off of prednisone. He is supposed to be on Arnuity but is not using this. He has weaned himself down from the nebulizer despite worsening shortness of breath. He was advised to use his Arnuity and increase his nebulizer back to 3-4 times a day. Given his progression, he would likely benefit from BiPAP at night and as needed. He currently has a CPAP. Therefore, I will order a titration study to change his machine to a BiPAP. We also talked about the L2K ventilation. He is not excited about this idea. Given his age of 67, he likely would not do well lung transplantation.

## 2019-08-14 ENCOUNTER — CARE COORDINATION (OUTPATIENT)
Dept: CARE COORDINATION | Age: 79
End: 2019-08-14

## 2019-08-14 ENCOUNTER — CARE COORDINATION (OUTPATIENT)
Dept: CASE MANAGEMENT | Age: 79
End: 2019-08-14

## 2019-08-14 NOTE — CARE COORDINATION
[facility] Transitions Follow Up Call    2019    Patient: Gifty Brown  Patient : 1940   MRN: [id number]  Reason for Admission: Dyspnea  Discharge Date: 19 RARS: Readmission Risk Score: 16         Spoke with: Lynn Escobar (patient)    Care Transitions Subsequent and Final Call    Subsequent and Final Calls  Do you have any ongoing symptoms?:  Yes  Onset of Patient-reported symptoms: In the past 7 days  Patient-reported symptoms:  Shortness of Breath  Interventions for patient-reported symptoms:  Notified PCP/Physician  Have your medications changed?:  No  Do you have any questions related to your medications?:  No  Do you currently have any active services?:  No  Are you currently active with any services?:  Home Health  Do you have any needs or concerns that I can assist you with?:  No  Identified Barriers:  None  Care Transitions Interventions  Other Interventions: Follow Up: Final CTN outreach. Spoke with Yony Levine. He states his progress is slow. He continues to use home oxygen therapy @ 1lpm at night and prn with activity. He has a cpap. He is working with  to get that replaced. Daniela Booker has Yony Levine scheduled for a titration sleep study for possible advancement to bipap. Yony Levine continues to c/o SOB with exertion. He denies any fever or cough. Yony Levine saw his PCP - Tyrese Nguyen asked the ACM to reach out to him regarding his BG level. Ynoy Levine denies any needs today. Informed him this would be the final CTN outreach. Fadicinthia Levine has CTN contact info and states he will reach out to his PCP or Pulmonologist with any future issues.   Future Appointments   Date Time Provider Alka Fregoso   2019 10:15 AM Katharine Crawford MD [place] [facility]   9/10/2019 12:30 PM Melissa Santos MD [facility]   2019  8:30 PM SCHEDULE, [facility] SLEEP ROOM [address] [facility] SLEEP None   10/11/2019 11:20 AM Kelli Lerma MD [address] [place] [address] [facility]   10/11/2019  1:00 PM Melissa Santso MD [facility] [id number] Virginia Gallagher, RN

## 2019-08-14 NOTE — CARE COORDINATION
Called pt regarding his concern for higher BS--states he noted lab results on My Chart. Pt states he wasn't fasting for the lab. Informed pt BS was higher because of this and he is on Prednisone for COPD which elevates BS's. Pt verbalized understanding and did not have any other concerns. Told pt Dr Rachel Hernandez was not concerned about the BS. Pt states he stopped taking  potassium as instructed.    Kelvin FLYNN

## 2019-08-23 RX ORDER — DILTIAZEM HYDROCHLORIDE 300 MG/1
CAPSULE, COATED, EXTENDED RELEASE ORAL
Qty: 90 CAPSULE | Refills: 0 | Status: SHIPPED | OUTPATIENT
Start: 2019-08-23 | End: 2019-11-26 | Stop reason: SDUPTHER

## 2019-08-23 RX ORDER — ATORVASTATIN CALCIUM 10 MG/1
TABLET, FILM COATED ORAL
Qty: 90 TABLET | Refills: 0 | Status: SHIPPED | OUTPATIENT
Start: 2019-08-23 | End: 2019-11-26 | Stop reason: SDUPTHER

## 2019-08-26 LAB
FUNGUS (MYCOLOGY) CULTURE: NORMAL
FUNGUS (MYCOLOGY) CULTURE: NORMAL
FUNGUS STAIN: NORMAL
FUNGUS STAIN: NORMAL

## 2019-08-30 ENCOUNTER — HOSPITAL ENCOUNTER (OUTPATIENT)
Dept: SLEEP CENTER | Age: 79
Discharge: HOME | End: 2019-08-30
Payer: MEDICARE

## 2019-08-30 PROCEDURE — 95811 POLYSOM 6/>YRS CPAP 4/> PARM: CPT

## 2019-08-30 PROCEDURE — 95811 POLYSOM 6/>YRS CPAP 4/> PARM: CPT | Performed by: PSYCHIATRY & NEUROLOGY

## 2019-09-02 DIAGNOSIS — J44.9 COPD, VERY SEVERE (HCC): ICD-10-CM

## 2019-09-03 RX ORDER — ALBUTEROL SULFATE 2.5 MG/3ML
SOLUTION RESPIRATORY (INHALATION)
Qty: 360 ML | Refills: 0 | Status: SHIPPED | OUTPATIENT
Start: 2019-09-03 | End: 2019-09-30 | Stop reason: SDUPTHER

## 2019-09-04 ENCOUNTER — HOSPITAL ENCOUNTER (OUTPATIENT)
Dept: CT IMAGING | Age: 79
Discharge: HOME | End: 2019-09-04
Payer: MEDICARE

## 2019-09-04 ENCOUNTER — OFFICE VISIT (OUTPATIENT)
Dept: INFECTIOUS DISEASES | Age: 79
End: 2019-09-04
Payer: MEDICARE

## 2019-09-04 VITALS
SYSTOLIC BLOOD PRESSURE: 142 MMHG | RESPIRATION RATE: 24 BRPM | BODY MASS INDEX: 28.88 KG/M2 | DIASTOLIC BLOOD PRESSURE: 70 MMHG | WEIGHT: 184 LBS | HEART RATE: 105 BPM | HEIGHT: 67 IN | TEMPERATURE: 98 F | OXYGEN SATURATION: 91 %

## 2019-09-04 DIAGNOSIS — R91.8 PULMONARY NODULES: ICD-10-CM

## 2019-09-04 DIAGNOSIS — J18.9 PNEUMONIA OF RIGHT LOWER LOBE DUE TO INFECTIOUS ORGANISM: Primary | ICD-10-CM

## 2019-09-04 DIAGNOSIS — J96.01 ACUTE RESPIRATORY FAILURE WITH HYPOXIA (HCC): ICD-10-CM

## 2019-09-04 DIAGNOSIS — J18.9 PNEUMONIA OF RIGHT LOWER LOBE DUE TO INFECTIOUS ORGANISM: ICD-10-CM

## 2019-09-04 DIAGNOSIS — R09.02 HYPOXEMIA: ICD-10-CM

## 2019-09-04 DIAGNOSIS — R93.89 ABNORMAL CT OF THE CHEST: ICD-10-CM

## 2019-09-04 DIAGNOSIS — J44.9 COPD, VERY SEVERE (HCC): ICD-10-CM

## 2019-09-04 LAB
ANION GAP SERPL CALCULATED.3IONS-SCNC: 11 MMOL/L (ref 3–16)
BASOPHILS ABSOLUTE: 0.1 K/UL (ref 0–0.2)
BASOPHILS RELATIVE PERCENT: 1.4 %
BUN BLDV-MCNC: 10 MG/DL (ref 7–20)
CALCIUM SERPL-MCNC: 10.5 MG/DL (ref 8.3–10.6)
CHLORIDE BLD-SCNC: 103 MMOL/L (ref 99–110)
CO2: 30 MMOL/L (ref 21–32)
CREAT SERPL-MCNC: 0.9 MG/DL (ref 0.8–1.3)
EOSINOPHILS ABSOLUTE: 0.3 K/UL (ref 0–0.6)
EOSINOPHILS RELATIVE PERCENT: 2.5 %
GFR AFRICAN AMERICAN: >60
GFR NON-AFRICAN AMERICAN: >60
GLUCOSE BLD-MCNC: 141 MG/DL (ref 70–99)
HCT VFR BLD CALC: 41.5 % (ref 40.5–52.5)
HEMOGLOBIN: 13.9 G/DL (ref 13.5–17.5)
LYMPHOCYTES ABSOLUTE: 1.9 K/UL (ref 1–5.1)
LYMPHOCYTES RELATIVE PERCENT: 18.2 %
MCH RBC QN AUTO: 27.9 PG (ref 26–34)
MCHC RBC AUTO-ENTMCNC: 33.5 G/DL (ref 31–36)
MCV RBC AUTO: 83.3 FL (ref 80–100)
MONOCYTES ABSOLUTE: 1.3 K/UL (ref 0–1.3)
MONOCYTES RELATIVE PERCENT: 12.1 %
NEUTROPHILS ABSOLUTE: 6.9 K/UL (ref 1.7–7.7)
NEUTROPHILS RELATIVE PERCENT: 65.8 %
PDW BLD-RTO: 15.3 % (ref 12.4–15.4)
PLATELET # BLD: 569 K/UL (ref 135–450)
PMV BLD AUTO: 6.9 FL (ref 5–10.5)
POTASSIUM SERPL-SCNC: 4.8 MMOL/L (ref 3.5–5.1)
RBC # BLD: 4.99 M/UL (ref 4.2–5.9)
SODIUM BLD-SCNC: 144 MMOL/L (ref 136–145)
WBC # BLD: 10.5 K/UL (ref 4–11)

## 2019-09-04 PROCEDURE — 1036F TOBACCO NON-USER: CPT | Performed by: INTERNAL MEDICINE

## 2019-09-04 PROCEDURE — 3023F SPIROM DOC REV: CPT | Performed by: INTERNAL MEDICINE

## 2019-09-04 PROCEDURE — 1123F ACP DISCUSS/DSCN MKR DOCD: CPT | Performed by: INTERNAL MEDICINE

## 2019-09-04 PROCEDURE — 71250 CT THORAX DX C-: CPT

## 2019-09-04 PROCEDURE — 4040F PNEUMOC VAC/ADMIN/RCVD: CPT | Performed by: INTERNAL MEDICINE

## 2019-09-04 PROCEDURE — G8926 SPIRO NO PERF OR DOC: HCPCS | Performed by: INTERNAL MEDICINE

## 2019-09-04 PROCEDURE — 99214 OFFICE O/P EST MOD 30 MIN: CPT | Performed by: INTERNAL MEDICINE

## 2019-09-04 PROCEDURE — G8417 CALC BMI ABV UP PARAM F/U: HCPCS | Performed by: INTERNAL MEDICINE

## 2019-09-04 PROCEDURE — G8427 DOCREV CUR MEDS BY ELIG CLIN: HCPCS | Performed by: INTERNAL MEDICINE

## 2019-09-04 NOTE — PROGRESS NOTES
facility-administered medications for this visit.         Allergies:  Eucerin [aquaderm treatment-moisturizer]      Immunizations :   Immunization History   Administered Date(s) Administered    Influenza Vaccine, unspecified formulation 10/22/2014    Influenza Virus Vaccine 10/13/2013    Influenza Whole 2010    Influenza, High Dose (Fluzone 65 yrs and older) 2017, 10/08/2018    Influenza, Quadv, IM, PF (6 mo and older Fluzone, Flulaval, Fluarix, and 3 yrs and older Afluria) 2016    Pneumococcal Conjugate 13-valent (Nehpmlh28) 2015    Pneumococcal Polysaccharide (Ngchmpgby56) 2016    Tdap (Boostrix, Adacel) 2012    Zoster Live (Zostavax) 2012    Zoster Recombinant (Shingrix) 2019           Social History:    Social History     Socioeconomic History    Marital status:      Spouse name: None    Number of children: None    Years of education: None    Highest education level: None   Occupational History    None   Social Needs    Financial resource strain: None    Food insecurity:     Worry: None     Inability: None    Transportation needs:     Medical: None     Non-medical: None   Tobacco Use    Smoking status: Former Smoker     Packs/day: 1.00     Years: 45.00     Pack years: 45.00     Types: Cigarettes     Last attempt to quit: 2002     Years since quittin.7    Smokeless tobacco: Never Used   Substance and Sexual Activity    Alcohol use: Yes     Comment: rarely     Drug use: No    Sexual activity: Yes     Partners: Female   Lifestyle    Physical activity:     Days per week: None     Minutes per session: None    Stress: None   Relationships    Social connections:     Talks on phone: None     Gets together: None     Attends Taoist service: None     Active member of club or organization: None     Attends meetings of clubs or organizations: None     Relationship status: None    Intimate partner violence:     Fear of current or ex please do not hesitate to  call me with any questions or concerns.     Stephanie Arciniega MD  Infectious Disease  [facility] ([facility]) Physician  Phone: [phone]   Fax : [phone]

## 2019-09-05 LAB
L. PNEUMOPHILA SEROGP 1 UR AG: NORMAL
STREP PNEUMONIAE ANTIGEN, URINE: NORMAL

## 2019-09-06 LAB
(1,3)-BETA-D-GLUCAN (FUNGITELL) INTERPRETATION: NEGATIVE
(1,3)-BETA-D-GLUCAN (FUNGITELL): <31 PG/ML
HISTOPLASMA ANTIGEN URINE INTERP: NOT DETECTED
HISTOPLASMA ANTIGEN URINE: NOT DETECTED NG/ML

## 2019-09-09 ENCOUNTER — PATIENT MESSAGE (OUTPATIENT)
Dept: PULMONOLOGY | Age: 79
End: 2019-09-09

## 2019-09-10 LAB
AFB CULTURE (MYCOBACTERIA): NORMAL
AFB CULTURE (MYCOBACTERIA): NORMAL
AFB SMEAR: NORMAL
AFB SMEAR: NORMAL

## 2019-09-16 RX ORDER — THEOPHYLLINE 400 MG/1
TABLET, EXTENDED RELEASE ORAL
Qty: 90 TABLET | Refills: 0 | Status: SHIPPED | OUTPATIENT
Start: 2019-09-16 | End: 2019-12-11 | Stop reason: SDUPTHER

## 2019-09-30 DIAGNOSIS — J44.9 COPD, VERY SEVERE (HCC): ICD-10-CM

## 2019-09-30 RX ORDER — ALBUTEROL SULFATE 2.5 MG/3ML
SOLUTION RESPIRATORY (INHALATION)
Qty: 360 ML | Refills: 0 | Status: SHIPPED | OUTPATIENT
Start: 2019-09-30 | End: 2022-01-04 | Stop reason: SDUPTHER

## 2019-10-02 ENCOUNTER — OFFICE VISIT (OUTPATIENT)
Dept: PULMONOLOGY | Age: 79
End: 2019-10-02
Payer: MEDICARE

## 2019-10-02 VITALS
SYSTOLIC BLOOD PRESSURE: 150 MMHG | TEMPERATURE: 98.2 F | OXYGEN SATURATION: 94 % | DIASTOLIC BLOOD PRESSURE: 60 MMHG | RESPIRATION RATE: 16 BRPM | HEART RATE: 100 BPM | HEIGHT: 67 IN | BODY MASS INDEX: 29.03 KG/M2 | WEIGHT: 185 LBS

## 2019-10-02 DIAGNOSIS — J44.9 COPD, VERY SEVERE (HCC): Primary | ICD-10-CM

## 2019-10-02 DIAGNOSIS — R09.02 HYPOXIA: ICD-10-CM

## 2019-10-02 DIAGNOSIS — G47.33 OSA (OBSTRUCTIVE SLEEP APNEA): ICD-10-CM

## 2019-10-02 DIAGNOSIS — Z66 DNR (DO NOT RESUSCITATE): ICD-10-CM

## 2019-10-02 DIAGNOSIS — J96.12 CHRONIC HYPERCAPNIC RESPIRATORY FAILURE (HCC): ICD-10-CM

## 2019-10-02 PROCEDURE — G8427 DOCREV CUR MEDS BY ELIG CLIN: HCPCS | Performed by: INTERNAL MEDICINE

## 2019-10-02 PROCEDURE — 1123F ACP DISCUSS/DSCN MKR DOCD: CPT | Performed by: INTERNAL MEDICINE

## 2019-10-02 PROCEDURE — G8417 CALC BMI ABV UP PARAM F/U: HCPCS | Performed by: INTERNAL MEDICINE

## 2019-10-02 PROCEDURE — 4040F PNEUMOC VAC/ADMIN/RCVD: CPT | Performed by: INTERNAL MEDICINE

## 2019-10-02 PROCEDURE — G8482 FLU IMMUNIZE ORDER/ADMIN: HCPCS | Performed by: INTERNAL MEDICINE

## 2019-10-02 PROCEDURE — G8926 SPIRO NO PERF OR DOC: HCPCS | Performed by: INTERNAL MEDICINE

## 2019-10-02 PROCEDURE — 3023F SPIROM DOC REV: CPT | Performed by: INTERNAL MEDICINE

## 2019-10-02 PROCEDURE — 99214 OFFICE O/P EST MOD 30 MIN: CPT | Performed by: INTERNAL MEDICINE

## 2019-10-02 PROCEDURE — 1036F TOBACCO NON-USER: CPT | Performed by: INTERNAL MEDICINE

## 2019-10-02 RX ORDER — NEBULIZER
1 EACH MISCELLANEOUS DAILY
Qty: 1 EACH | Refills: 0 | Status: ON HOLD | OUTPATIENT
Start: 2019-10-02 | End: 2021-10-21

## 2019-10-03 ENCOUNTER — TELEPHONE (OUTPATIENT)
Dept: PULMONOLOGY | Age: 79
End: 2019-10-03

## 2019-11-07 ENCOUNTER — TELEPHONE (OUTPATIENT)
Dept: PULMONOLOGY | Age: 79
End: 2019-11-07

## 2019-11-11 DIAGNOSIS — J32.1 CHRONIC FRONTAL SINUSITIS: ICD-10-CM

## 2019-11-11 RX ORDER — IPRATROPIUM BROMIDE 21 UG/1
SPRAY, METERED NASAL
Qty: 1 BOTTLE | Refills: 3 | Status: SHIPPED | OUTPATIENT
Start: 2019-11-11 | End: 2020-05-18

## 2019-11-14 ENCOUNTER — HOSPITAL ENCOUNTER (OUTPATIENT)
Dept: CARDIAC REHAB | Age: 79
Setting detail: THERAPIES SERIES
Discharge: HOME | End: 2019-11-14
Payer: MEDICARE

## 2019-11-14 DIAGNOSIS — J44.9 COPD, VERY SEVERE (HCC): ICD-10-CM

## 2019-11-14 PROCEDURE — 94618 PULMONARY STRESS TESTING: CPT | Performed by: INTERNAL MEDICINE

## 2019-11-14 PROCEDURE — 94618 PULMONARY STRESS TESTING: CPT

## 2019-11-21 ENCOUNTER — PATIENT MESSAGE (OUTPATIENT)
Dept: PULMONOLOGY | Age: 79
End: 2019-11-21

## 2019-11-22 ENCOUNTER — TELEPHONE (OUTPATIENT)
Dept: PULMONOLOGY | Age: 79
End: 2019-11-22

## 2019-11-26 ENCOUNTER — TELEPHONE (OUTPATIENT)
Dept: PULMONOLOGY | Age: 79
End: 2019-11-26

## 2019-11-26 RX ORDER — DILTIAZEM HYDROCHLORIDE 300 MG/1
CAPSULE, EXTENDED RELEASE ORAL
Qty: 90 CAPSULE | Refills: 0 | Status: SHIPPED | OUTPATIENT
Start: 2019-11-26 | End: 2020-02-14

## 2019-11-26 RX ORDER — ATORVASTATIN CALCIUM 10 MG/1
TABLET, FILM COATED ORAL
Qty: 90 TABLET | Refills: 0 | Status: SHIPPED | OUTPATIENT
Start: 2019-11-26 | End: 2020-03-03

## 2019-11-26 RX ORDER — LOSARTAN POTASSIUM 50 MG/1
TABLET ORAL
Qty: 90 TABLET | Refills: 0 | Status: SHIPPED | OUTPATIENT
Start: 2019-11-26 | End: 2020-02-14

## 2019-11-27 ENCOUNTER — TELEPHONE (OUTPATIENT)
Dept: PULMONOLOGY | Age: 79
End: 2019-11-27

## 2019-12-03 DIAGNOSIS — J41.0 SIMPLE CHRONIC BRONCHITIS (HCC): ICD-10-CM

## 2019-12-03 DIAGNOSIS — J45.909 UNCOMPLICATED ASTHMA, UNSPECIFIED ASTHMA SEVERITY, UNSPECIFIED WHETHER PERSISTENT: ICD-10-CM

## 2019-12-04 ENCOUNTER — OFFICE VISIT (OUTPATIENT)
Dept: PULMONOLOGY | Age: 79
End: 2019-12-04
Payer: MEDICARE

## 2019-12-04 VITALS
HEART RATE: 90 BPM | WEIGHT: 188 LBS | BODY MASS INDEX: 29.51 KG/M2 | HEIGHT: 67 IN | DIASTOLIC BLOOD PRESSURE: 64 MMHG | SYSTOLIC BLOOD PRESSURE: 130 MMHG | TEMPERATURE: 97.8 F | RESPIRATION RATE: 16 BRPM | OXYGEN SATURATION: 96 %

## 2019-12-04 DIAGNOSIS — J44.9 COPD, VERY SEVERE (HCC): Primary | ICD-10-CM

## 2019-12-04 DIAGNOSIS — J96.12 CHRONIC HYPERCAPNIC RESPIRATORY FAILURE (HCC): ICD-10-CM

## 2019-12-04 DIAGNOSIS — G47.33 OSA (OBSTRUCTIVE SLEEP APNEA): ICD-10-CM

## 2019-12-04 DIAGNOSIS — R09.02 HYPOXIA: ICD-10-CM

## 2019-12-04 DIAGNOSIS — J44.9 COPD, VERY SEVERE (HCC): ICD-10-CM

## 2019-12-04 PROBLEM — J18.9 COMMUNITY ACQUIRED PNEUMONIA OF RIGHT LOWER LOBE OF LUNG: Status: RESOLVED | Noted: 2019-07-24 | Resolved: 2019-12-04

## 2019-12-04 LAB — THEOPHYLLINE LEVEL: 7.1 UG/ML (ref 8–20)

## 2019-12-04 PROCEDURE — G8482 FLU IMMUNIZE ORDER/ADMIN: HCPCS | Performed by: INTERNAL MEDICINE

## 2019-12-04 PROCEDURE — G8417 CALC BMI ABV UP PARAM F/U: HCPCS | Performed by: INTERNAL MEDICINE

## 2019-12-04 PROCEDURE — 1036F TOBACCO NON-USER: CPT | Performed by: INTERNAL MEDICINE

## 2019-12-04 PROCEDURE — 4040F PNEUMOC VAC/ADMIN/RCVD: CPT | Performed by: INTERNAL MEDICINE

## 2019-12-04 PROCEDURE — 1123F ACP DISCUSS/DSCN MKR DOCD: CPT | Performed by: INTERNAL MEDICINE

## 2019-12-04 PROCEDURE — 99214 OFFICE O/P EST MOD 30 MIN: CPT | Performed by: INTERNAL MEDICINE

## 2019-12-04 PROCEDURE — G8926 SPIRO NO PERF OR DOC: HCPCS | Performed by: INTERNAL MEDICINE

## 2019-12-04 PROCEDURE — 3023F SPIROM DOC REV: CPT | Performed by: INTERNAL MEDICINE

## 2019-12-04 PROCEDURE — G8427 DOCREV CUR MEDS BY ELIG CLIN: HCPCS | Performed by: INTERNAL MEDICINE

## 2019-12-04 RX ORDER — MONTELUKAST SODIUM 10 MG/1
TABLET ORAL
Qty: 90 TABLET | Refills: 3 | Status: SHIPPED | OUTPATIENT
Start: 2019-12-04 | End: 2020-12-11

## 2019-12-04 ASSESSMENT — SLEEP AND FATIGUE QUESTIONNAIRES
HOW LIKELY ARE YOU TO NOD OFF OR FALL ASLEEP WHILE WATCHING TV: 1
HOW LIKELY ARE YOU TO NOD OFF OR FALL ASLEEP WHILE SITTING INACTIVE IN A PUBLIC PLACE: 1
HOW LIKELY ARE YOU TO NOD OFF OR FALL ASLEEP IN A CAR, WHILE STOPPED FOR A FEW MINUTES IN TRAFFIC: 1
HOW LIKELY ARE YOU TO NOD OFF OR FALL ASLEEP WHILE LYING DOWN TO REST IN THE AFTERNOON WHEN CIRCUMSTANCES PERMIT: 0
HOW LIKELY ARE YOU TO NOD OFF OR FALL ASLEEP WHILE SITTING AND TALKING TO SOMEONE: 1
ESS TOTAL SCORE: 7
HOW LIKELY ARE YOU TO NOD OFF OR FALL ASLEEP WHILE SITTING QUIETLY AFTER LUNCH WITHOUT ALCOHOL: 1
HOW LIKELY ARE YOU TO NOD OFF OR FALL ASLEEP WHEN YOU ARE A PASSENGER IN A CAR FOR AN HOUR WITHOUT A BREAK: 1
HOW LIKELY ARE YOU TO NOD OFF OR FALL ASLEEP WHILE SITTING AND READING: 1

## 2019-12-11 RX ORDER — THEOPHYLLINE 400 MG/1
TABLET, EXTENDED RELEASE ORAL
Qty: 90 TABLET | Refills: 0 | Status: SHIPPED | OUTPATIENT
Start: 2019-12-11 | End: 2020-03-06 | Stop reason: SDUPTHER

## 2019-12-17 PROBLEM — H92.09 OTALGIA: Status: ACTIVE | Noted: 2019-12-17

## 2020-02-14 RX ORDER — DILTIAZEM HYDROCHLORIDE 300 MG/1
CAPSULE, COATED, EXTENDED RELEASE ORAL
Qty: 90 CAPSULE | Refills: 0 | Status: SHIPPED | OUTPATIENT
Start: 2020-02-14 | End: 2020-08-18

## 2020-02-14 RX ORDER — LOSARTAN POTASSIUM 50 MG/1
TABLET ORAL
Qty: 90 TABLET | Refills: 0 | Status: SHIPPED | OUTPATIENT
Start: 2020-02-14 | End: 2020-05-22

## 2020-03-03 RX ORDER — ATORVASTATIN CALCIUM 10 MG/1
TABLET, FILM COATED ORAL
Qty: 90 TABLET | Refills: 0 | Status: SHIPPED | OUTPATIENT
Start: 2020-03-03 | End: 2020-05-22

## 2020-03-12 PROCEDURE — 9900000065 HC CARDIAC REHAB PHASE 3 - 1 VISIT

## 2020-03-12 PROCEDURE — 9900000038 HC CARDIAC REHAB PHASE 3 - MONTHLY

## 2020-03-30 ENCOUNTER — HOSPITAL ENCOUNTER (OUTPATIENT)
Dept: CARDIAC REHAB | Age: 80
Discharge: HOME | End: 2020-03-30

## 2020-04-06 ENCOUNTER — TELEPHONE (OUTPATIENT)
Dept: PULMONOLOGY | Age: 80
End: 2020-04-06

## 2020-05-18 RX ORDER — IPRATROPIUM BROMIDE 21 UG/1
2 SPRAY, METERED NASAL 4 TIMES DAILY
Qty: 30 ML | Refills: 5 | Status: SHIPPED | OUTPATIENT
Start: 2020-05-18 | End: 2020-12-28

## 2020-05-22 RX ORDER — LOSARTAN POTASSIUM 50 MG/1
TABLET ORAL
Qty: 90 TABLET | Refills: 0 | Status: SHIPPED | OUTPATIENT
Start: 2020-05-22 | End: 2020-08-18

## 2020-05-22 RX ORDER — ATORVASTATIN CALCIUM 10 MG/1
TABLET, FILM COATED ORAL
Qty: 90 TABLET | Refills: 0 | Status: SHIPPED | OUTPATIENT
Start: 2020-05-22 | End: 2020-08-18

## 2020-07-01 RX ORDER — FLUTICASONE FUROATE, UMECLIDINIUM BROMIDE AND VILANTEROL TRIFENATATE 100; 62.5; 25 UG/1; UG/1; UG/1
POWDER RESPIRATORY (INHALATION)
Qty: 1 EACH | Refills: 3 | Status: SHIPPED | OUTPATIENT
Start: 2020-07-01 | End: 2021-07-16 | Stop reason: SDUPTHER

## 2020-09-18 ENCOUNTER — TELEPHONE (OUTPATIENT)
Dept: PULMONOLOGY | Age: 80
End: 2020-09-18

## 2020-09-18 NOTE — TELEPHONE ENCOUNTER
Patient calls requesting a supply order be sent to [facility] . He hasn't been seen since November 2019 and has another appt this coming November. I advised him his insurance may  Not cover the supplies since he hasn't been seen this year but that we would try and see if it will go through.   Please sign order

## 2020-09-21 NOTE — TELEPHONE ENCOUNTER
Wing Sprain         : 1940    Diagnosis: [x] ZAHEER (G47.33) [] CSA (G47.31) [] Apnea (G47.30)   Length of Need: [x] 12 Months [] 99 Months [] Other:    Machine (GILL!): [] Respironics Dream Station      Auto [] ResMed AirSense     Auto [] Other:     []  CPAP () [] Bilevel ()   Mode: [] Auto [] Spontaneous    Mode: [] Auto [] Spontaneous      @[facility]([id number])@ @[facility]([id number])@   @[facility]([id number])@ @FLOW([id number])@  @PJRL([id number])@          Comfort Settings:   - Ramp Pressure:   cmH2O                                        - Ramp time: 15 min                                     -  Flex/EPR - 3 full time                                    - For ResMed Bilevel (TiMax-4 sec   TiMin- 0.2 sec)     Humidifier: [] Heated ()        [] Water chamber replacement ()/ 1 per 6 months        Mask:   [x] Nasal () /1 per 3 months [] Full Face () /1 per 3 months   [x] Patient choice -Size and fit mask [] Patient Choice - Size and fit mask   [] Dispense:  [] Dispense:    [x] Headgear () / 1 per 3 months [] Headgear () / 1 per 3 months   [] Replacement Nasal Cushion ()/2 per month [] Interface Replacement ()/1 per month   [x] Replacement Nasal Pillows ()/2 per month         Tubing: [x] Heated ()/1 per 3 months    [] Standard ()/1 per 3 months [] Other:           Filters: [x] Non-disposable ()/1 per 6 months     [x] Ultra-Fine, Disposable ()/2 per month        Miscellaneous: [] Chin Strap ()/ 1 per 6 months [] O2 bleed-in:       LPM   [] Oximetry on CPAP/Bilevel []  Other:          Start Order Date: 20    MEDICAL JUSTIFICATION:  I, the undersigned, certify that the above prescribed supplies are medically necessary for this patients wellbeing. In my opinion, the supplies are both reasonable and necessary in reference to accepted standards of medicalpractice in treatment of this patients condition.     Janyth Gottron, MD NPI:  [unfilled]       Order Signed Date: 09/21/20    Electronically signed by Gerhardt Lima, MD on 9/21/2020 at 3:45 PM

## 2020-09-21 NOTE — TELEPHONE ENCOUNTER
Order faxed and notified the patient that the order was faxed to [facility]. He verbalized understanding.

## 2020-11-23 ENCOUNTER — OFFICE VISIT (OUTPATIENT)
Dept: PULMONOLOGY | Age: 80
End: 2020-11-23
Payer: MEDICARE

## 2020-11-23 VITALS
TEMPERATURE: 98.4 F | RESPIRATION RATE: 16 BRPM | HEIGHT: 67 IN | DIASTOLIC BLOOD PRESSURE: 68 MMHG | BODY MASS INDEX: 29.98 KG/M2 | SYSTOLIC BLOOD PRESSURE: 134 MMHG | OXYGEN SATURATION: 95 % | WEIGHT: 191 LBS | HEART RATE: 82 BPM

## 2020-11-23 DIAGNOSIS — J44.9 COPD, VERY SEVERE (HCC): ICD-10-CM

## 2020-11-23 PROBLEM — J18.9 ATYPICAL PNEUMONIA: Status: RESOLVED | Noted: 2018-02-08 | Resolved: 2020-11-23

## 2020-11-23 PROCEDURE — 1036F TOBACCO NON-USER: CPT | Performed by: INTERNAL MEDICINE

## 2020-11-23 PROCEDURE — G8482 FLU IMMUNIZE ORDER/ADMIN: HCPCS | Performed by: INTERNAL MEDICINE

## 2020-11-23 PROCEDURE — G8417 CALC BMI ABV UP PARAM F/U: HCPCS | Performed by: INTERNAL MEDICINE

## 2020-11-23 PROCEDURE — G8427 DOCREV CUR MEDS BY ELIG CLIN: HCPCS | Performed by: INTERNAL MEDICINE

## 2020-11-23 PROCEDURE — 4040F PNEUMOC VAC/ADMIN/RCVD: CPT | Performed by: INTERNAL MEDICINE

## 2020-11-23 PROCEDURE — 99214 OFFICE O/P EST MOD 30 MIN: CPT | Performed by: INTERNAL MEDICINE

## 2020-11-23 PROCEDURE — G8926 SPIRO NO PERF OR DOC: HCPCS | Performed by: INTERNAL MEDICINE

## 2020-11-23 PROCEDURE — 3023F SPIROM DOC REV: CPT | Performed by: INTERNAL MEDICINE

## 2020-11-23 PROCEDURE — 1123F ACP DISCUSS/DSCN MKR DOCD: CPT | Performed by: INTERNAL MEDICINE

## 2020-11-23 ASSESSMENT — SLEEP AND FATIGUE QUESTIONNAIRES
HOW LIKELY ARE YOU TO NOD OFF OR FALL ASLEEP WHILE SITTING AND READING: 1
HOW LIKELY ARE YOU TO NOD OFF OR FALL ASLEEP WHILE WATCHING TV: 1
HOW LIKELY ARE YOU TO NOD OFF OR FALL ASLEEP WHILE SITTING INACTIVE IN A PUBLIC PLACE: 1
HOW LIKELY ARE YOU TO NOD OFF OR FALL ASLEEP WHEN YOU ARE A PASSENGER IN A CAR FOR AN HOUR WITHOUT A BREAK: 1
HOW LIKELY ARE YOU TO NOD OFF OR FALL ASLEEP IN A CAR, WHILE STOPPED FOR A FEW MINUTES IN TRAFFIC: 1
HOW LIKELY ARE YOU TO NOD OFF OR FALL ASLEEP WHILE SITTING QUIETLY AFTER LUNCH WITHOUT ALCOHOL: 1
HOW LIKELY ARE YOU TO NOD OFF OR FALL ASLEEP WHILE LYING DOWN TO REST IN THE AFTERNOON WHEN CIRCUMSTANCES PERMIT: 1
HOW LIKELY ARE YOU TO NOD OFF OR FALL ASLEEP WHILE SITTING AND TALKING TO SOMEONE: 1
ESS TOTAL SCORE: 8

## 2020-11-23 NOTE — ASSESSMENT & PLAN NOTE
Controlled with Trelegy and theophylline. Check level now. Consider weaning off theophylline at next visit.

## 2020-11-23 NOTE — ASSESSMENT & PLAN NOTE
Very well controlled   Mena Hilt  is deriving benefit from PAP demonstrated by improved Spencer, AHI, symptoms.     PAP download information:  Usage > 4 hours  100 %   Pressure setting  14/8 cwp    AHI with usage  0.9

## 2020-11-23 NOTE — PROGRESS NOTES
REASON FOR CONSULTATION/CC: ZAHEER      CONSULTING PHYSICIAN: Karen Duffy MD   PCP: Karen Duffy MD    HISTORY OF PRESENT ILLNESS: Monroe Graham is a 79y.o. year old male with a history of ZAHEER who presents :     Sleep history:              ZAHEER  Using every night. Able to use despite facial surgery. COPD  Well controlled with Trelegy. Still using theophylline. Chronic Hypoxemia   No using oxygen. Has POC but does not use it. [place] Sleepiness Scale:    Sleep Medicine 11/23/2020 12/4/2019 8/8/2019 3/14/2019 9/13/2018 6/12/2018 4/17/2018   Sitting and reading 1 1 1 1 1 1 1   Watching TV 1 1 1 1 1 1 1   Sitting, inactive in a public place (e.g. a theatre or a meeting) 1 1 1 1 1 1 1   As a passenger in a car for an hour without a break 1 1 1 1 1 1 1   Lying down to rest in the afternoon when circumstances permit 1 0 1 0 1 1 1   Sitting and talking to someone 1 1 1 1 1 1 1   Sitting quietly after a lunch without alcohol 1 1 1 1 1 1 1   In a car, while stopped for a few minutes in traffic 1 1 1 0 1 1 1   Total score 8 7 8 6 8 8 8   Neck circumference - - - - 18.5 19 -         0 = no chance of dozing  1 = slight chance of dozing  2 = moderate chance of dozing  3 = high chance of dozing    Interpretation:   0-7: It is unlikely that you are abnormally sleepy. 8-9:You have an average amount of daytime sleepiness. 10-15: You may be excessively sleepy depending on the situation. You may want to consider   seeking medical attention. 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    Skin cancer 2014    both upper thighs    Squamous cell cancer of skin of nose 5/2016       PAST SURGICAL HISTORY:  Past Surgical History:   Procedure Laterality Date    BRONCHOSCOPY N/A 7/25/2019    BRONCHOSCOPY WITH BRONCHOALVEOLAR LAVAGE performed by Little Camilo [facility]     5/2016    COLONOSCOPY  08/04/2016    dr Mervat Adkins    3 years    SKIN BIOPSY  5/2016    Nose     TONSILLECTOMY AND ADENOIDECTOMY      TOOTH EXTRACTION  5/2016    UPPER GASTROINTESTINAL ENDOSCOPY  12/2/2015    dr Devin Gannon       FAMILY HISTORY:  family history includes Coronary Art Dis in his mother; Diabetes in his mother. SOCIAL HISTORY:   reports that he quit smoking about 18 years ago. His smoking use included cigarettes. He has a 45.00 pack-year smoking history. He has never used smokeless tobacco.      ALLERGIES:  Patient has No Known Allergies. REVIEW OF SYSTEMS:    Constitutional: Negative for fever   HENT: Negative for sore throat  Respiratory: Improved shortness of breath and cough  Cardiovascular: Negative for chest pain  Gastrointestinal: Negative for vomiting, diarrhea   Skin: Negative for rash  Psychiatric/Behavorial: Negative for anxiety    Objective:   PHYSICAL EXAM:  Blood pressure 134/68, pulse 82, temperature 98.4 °F (36.9 °C), temperature source Oral, resp. rate 16, height 5' 7\" (1.702 m), weight 191 lb (86.6 kg), SpO2 95 %.'  Gen: No distress. ENT:   Resp: No accessory muscle use. No crackles. No wheezes. No rhonchi. distant  CV: Regular rate. Regular rhythm. No murmur or rub. No edema. Skin: Warm, dry, normal texture and turgor. No nodule on exposed extremities. M/S: No cyanosis. No clubbing. No joint deformity. Psych: Oriented x 3. No anxiety. Awake. Alert. Intact judgement and insight. Good Mood / Affect.   Memory appears in tact           Current Outpatient Medications   Medication Sig Dispense Refill    atorvastatin (LIPITOR) 10 MG  08/14/2020    K 4.3 08/14/2020    K 3.9 07/24/2019     08/14/2020    CO2 24 08/14/2020    CO2 30 09/04/2019    CO2 29 08/07/2019    BUN 10 08/14/2020    CREATININE 0.8 08/14/2020    GLUCOSE 108 08/14/2020    CALCIUM 9.6 08/14/2020       Last ABG  POC Blood Gas:   No results found for: POCPH  No results for input(s): PH, PCO2, PO2, HCO3, BE, O2SAT in the last 72 hours. Assessment:     ·  COPD, very severe , FEV1 35%  · ZAHEER  · Chronic Sinusitis  · Code status: DNR CCA October 2, 2018    Plan:              Problem List Items Addressed This Visit     ZAHEER (obstructive sleep apnea)     Very well controlled   Annalisa Licea  is deriving benefit from PAP demonstrated by improved [place], AHI, symptoms. PAP download information:  Usage > 4 hours  100 %   Pressure setting  14/8 cwp    AHI with usage  0.9               COPD, very severe (HCC) - Primary     Controlled with Trelegy and theophylline. Check level now. Consider weaning off theophylline at next visit. Relevant Orders    THEOPHYLLINE LEVEL    Chronic hypercapnic respiratory failure (HCC)     Has POC but has not needed oxygen for long time. This note was transcribed using [id number]. Please disregard any translational errors.

## 2020-11-24 LAB — THEOPHYLLINE LEVEL: 7.3 UG/ML (ref 8–20)

## 2020-12-11 RX ORDER — MONTELUKAST SODIUM 10 MG/1
TABLET ORAL
Qty: 90 TABLET | Refills: 3 | Status: SHIPPED | OUTPATIENT
Start: 2020-12-11 | End: 2021-09-29 | Stop reason: CLARIF

## 2020-12-28 RX ORDER — IPRATROPIUM BROMIDE 21 UG/1
2 SPRAY, METERED NASAL 4 TIMES DAILY
Qty: 30 ML | Refills: 5 | Status: SHIPPED | OUTPATIENT
Start: 2020-12-28 | End: 2021-08-24

## 2021-03-12 DIAGNOSIS — G60.9 IDIOPATHIC PERIPHERAL NEUROPATHY: ICD-10-CM

## 2021-03-12 DIAGNOSIS — E78.00 HYPERCHOLESTEROLEMIA: ICD-10-CM

## 2021-03-12 DIAGNOSIS — R73.09 ABNORMAL GLUCOSE: ICD-10-CM

## 2021-03-12 LAB
A/G RATIO: 1.4 (ref 1.1–2.2)
ALBUMIN SERPL-MCNC: 4.2 G/DL (ref 3.4–5)
ALP BLD-CCNC: 88 U/L (ref 40–129)
ALT SERPL-CCNC: 21 U/L (ref 10–40)
ANION GAP SERPL CALCULATED.3IONS-SCNC: 13 MMOL/L (ref 3–16)
AST SERPL-CCNC: 15 U/L (ref 15–37)
BILIRUB SERPL-MCNC: 0.4 MG/DL (ref 0–1)
BUN BLDV-MCNC: 11 MG/DL (ref 7–20)
CALCIUM SERPL-MCNC: 9.7 MG/DL (ref 8.3–10.6)
CHLORIDE BLD-SCNC: 102 MMOL/L (ref 99–110)
CHOLESTEROL, TOTAL: 158 MG/DL (ref 0–199)
CO2: 24 MMOL/L (ref 21–32)
CREAT SERPL-MCNC: 1 MG/DL (ref 0.8–1.3)
FOLATE: 15.44 NG/ML (ref 4.78–24.2)
GFR AFRICAN AMERICAN: >60
GFR NON-AFRICAN AMERICAN: >60
GLOBULIN: 3.1 G/DL
GLUCOSE BLD-MCNC: 93 MG/DL (ref 70–99)
HDLC SERPL-MCNC: 43 MG/DL (ref 40–60)
LDL CHOLESTEROL CALCULATED: 92 MG/DL
POTASSIUM SERPL-SCNC: 4.6 MMOL/L (ref 3.5–5.1)
SODIUM BLD-SCNC: 139 MMOL/L (ref 136–145)
TOTAL PROTEIN: 7.3 G/DL (ref 6.4–8.2)
TRIGL SERPL-MCNC: 116 MG/DL (ref 0–150)
TSH SERPL DL<=0.05 MIU/L-ACNC: 3.19 UIU/ML (ref 0.27–4.2)
VITAMIN B-12: 728 PG/ML (ref 211–911)
VLDLC SERPL CALC-MCNC: 23 MG/DL

## 2021-03-13 LAB
ESTIMATED AVERAGE GLUCOSE: 122.6 MG/DL
HBA1C MFR BLD: 5.9 %

## 2021-03-22 DIAGNOSIS — Z00.00 ROUTINE GENERAL MEDICAL EXAMINATION AT A HEALTH CARE FACILITY: ICD-10-CM

## 2021-03-22 LAB
HEPATITIS C ANTIBODY INTERPRETATION: NORMAL
MAGNESIUM: 2.3 MG/DL (ref 1.8–2.4)

## 2021-05-28 ENCOUNTER — OFFICE VISIT (OUTPATIENT)
Dept: PULMONOLOGY | Age: 81
End: 2021-05-28
Payer: MEDICARE

## 2021-05-28 VITALS
SYSTOLIC BLOOD PRESSURE: 140 MMHG | HEART RATE: 80 BPM | HEIGHT: 67 IN | OXYGEN SATURATION: 96 % | RESPIRATION RATE: 16 BRPM | DIASTOLIC BLOOD PRESSURE: 70 MMHG | WEIGHT: 192 LBS | BODY MASS INDEX: 30.13 KG/M2 | TEMPERATURE: 97.5 F

## 2021-05-28 DIAGNOSIS — G47.33 OSA (OBSTRUCTIVE SLEEP APNEA): ICD-10-CM

## 2021-05-28 DIAGNOSIS — J44.9 COPD, VERY SEVERE (HCC): Primary | ICD-10-CM

## 2021-05-28 PROCEDURE — G8926 SPIRO NO PERF OR DOC: HCPCS | Performed by: INTERNAL MEDICINE

## 2021-05-28 PROCEDURE — G8417 CALC BMI ABV UP PARAM F/U: HCPCS | Performed by: INTERNAL MEDICINE

## 2021-05-28 PROCEDURE — 99214 OFFICE O/P EST MOD 30 MIN: CPT | Performed by: INTERNAL MEDICINE

## 2021-05-28 PROCEDURE — 1123F ACP DISCUSS/DSCN MKR DOCD: CPT | Performed by: INTERNAL MEDICINE

## 2021-05-28 PROCEDURE — G8427 DOCREV CUR MEDS BY ELIG CLIN: HCPCS | Performed by: INTERNAL MEDICINE

## 2021-05-28 PROCEDURE — 4040F PNEUMOC VAC/ADMIN/RCVD: CPT | Performed by: INTERNAL MEDICINE

## 2021-05-28 PROCEDURE — 1036F TOBACCO NON-USER: CPT | Performed by: INTERNAL MEDICINE

## 2021-05-28 PROCEDURE — 3023F SPIROM DOC REV: CPT | Performed by: INTERNAL MEDICINE

## 2021-05-28 ASSESSMENT — COPD QUESTIONNAIRES
QUESTION7_SLEEPQUALITY: 3
QUESTION5_HOMEACTIVITIES: 4

## 2021-05-28 NOTE — ASSESSMENT & PLAN NOTE
PAP download information:  Usage > 4 hours  100 %   Pressure setting  14/8 cwp    AHI with usage  0.9        No titration needed.

## 2021-05-28 NOTE — PROGRESS NOTES
REASON FOR CONSULTATION/CC: ZAHEER      CONSULTING PHYSICIAN: Manda Richter MD   PCP: Manda Richter MD    HISTORY OF PRESENT ILLNESS: Carol Escamilla is a 79y.o. year old male with a history of ZAHEER who presents :     Sleep history:                 Chronic Hypoxemia   No using oxygen. Has POC but does not use it.       covid 19  Received covid 19 vaccine. COPD  shortness of breath with mask. Using Trelegy and theophylline. Worsening dyspnea on exertion. Never use neb and rare albuterol. ZAHEER  Using cpap nightly > 4 hours per day. No issues. No complaints mask fit or humidification issues. Knows to changes mask and hoses every 6 months. Bicarb 27 on last renal panel. [place] Sleepiness Scale:    Sleep Medicine 11/23/2020 12/4/2019 8/8/2019 3/14/2019 9/13/2018 6/12/2018 4/17/2018   Sitting and reading 1 1 1 1 1 1 1   Watching TV 1 1 1 1 1 1 1   Sitting, inactive in a public place (e.g. a theatre or a meeting) 1 1 1 1 1 1 1   As a passenger in a car for an hour without a break 1 1 1 1 1 1 1   Lying down to rest in the afternoon when circumstances permit 1 0 1 0 1 1 1   Sitting and talking to someone 1 1 1 1 1 1 1   Sitting quietly after a lunch without alcohol 1 1 1 1 1 1 1   In a car, while stopped for a few minutes in traffic 1 1 1 0 1 1 1   Total score 8 7 8 6 8 8 8   Neck circumference - - - - 18.5 19 -         0 = no chance of dozing  1 = slight chance of dozing  2 = moderate chance of dozing  3 = high chance of dozing    Interpretation:   0-7: It is unlikely that you are abnormally sleepy. 8-9:You have an average amount of daytime sleepiness. 10-15: You may be excessively sleepy depending on the situation. You may want to consider   seeking medical attention. 16-24: You are excessively sleepy and should consider seeking medical attention     REVIEW OF SYSTEMS:    Constitutional: Negative for fever   HENT: Negative for sore throat  Respiratory: Improved shortness of

## 2021-05-29 RX ORDER — BUDESONIDE, GLYCOPYRROLATE, AND FORMOTEROL FUMARATE 160; 9; 4.8 UG/1; UG/1; UG/1
1 AEROSOL, METERED RESPIRATORY (INHALATION) 2 TIMES DAILY
Qty: 1 INHALER | Refills: 0 | COMMUNITY
Start: 2021-05-29 | End: 2021-07-16

## 2021-05-29 NOTE — ASSESSMENT & PLAN NOTE
He is having increased shortness of breath. He is currnetly on Trelegy and theophylline . While he believes Trelegy is working, will give sample of Breztri and monitor symptoms. He is currently using BiPAP for ZAHEER. Therefore, we consider   NIV but bipap appears to be controlling hypercapnia bases on bicarb on renal panel. Pulmonary hypertension considered. He has access to oxygen but has not needed to use it.

## 2021-06-24 NOTE — TELEPHONE ENCOUNTER
Patient is calling to ask If you will prescribe tamiflu as a preventative to take with him on a cruise he is leaving on Saturday please send to [address], [facility] [phone] Sylvain Gregory no

## 2021-07-16 ENCOUNTER — TELEPHONE (OUTPATIENT)
Dept: PULMONOLOGY | Age: 81
End: 2021-07-16

## 2021-07-16 ENCOUNTER — HOSPITAL ENCOUNTER (OUTPATIENT)
Dept: PULMONOLOGY | Age: 81
Discharge: HOME | End: 2021-07-16
Payer: MEDICARE

## 2021-07-16 DIAGNOSIS — J44.9 COPD, VERY SEVERE (HCC): ICD-10-CM

## 2021-07-16 LAB
DLCO %PRED: 61 %
DLCO PRED: NORMAL
DLCO/VA %PRED: NORMAL
DLCO/VA PRED: NORMAL
DLCO/VA: NORMAL
DLCO: NORMAL
EXPIRATORY TIME-POST: NORMAL
EXPIRATORY TIME: NORMAL
FEF 25-75% %CHNG: NORMAL
FEF 25-75% %PRED-POST: NORMAL
FEF 25-75% %PRED-PRE: NORMAL
FEF 25-75% PRED: NORMAL
FEF 25-75%-POST: NORMAL
FEF 25-75%-PRE: NORMAL
FEV1 %PRED-POST: 41 %
FEV1 %PRED-PRE: 39 %
FEV1 PRED: NORMAL
FEV1-POST: NORMAL
FEV1-PRE: NORMAL
FEV1/FVC %PRED-POST: NORMAL
FEV1/FVC %PRED-PRE: NORMAL
FEV1/FVC PRED: NORMAL
FEV1/FVC-POST: 48 %
FEV1/FVC-PRE: 49 %
FVC %PRED-POST: NORMAL
FVC %PRED-PRE: NORMAL
FVC PRED: NORMAL
FVC-POST: NORMAL
FVC-PRE: NORMAL
GAW %PRED: NORMAL
GAW PRED: NORMAL
GAW: NORMAL
IC %PRED: NORMAL
IC PRED: NORMAL
IC: NORMAL
MEP: NORMAL
MIP: NORMAL
MVV %PRED-PRE: NORMAL
MVV PRED: NORMAL
MVV-PRE: NORMAL
PEF %PRED-POST: NORMAL
PEF %PRED-PRE: NORMAL
PEF PRED: NORMAL
PEF%CHNG: NORMAL
PEF-POST: NORMAL
PEF-PRE: NORMAL
RAW %PRED: NORMAL
RAW PRED: NORMAL
RAW: NORMAL
RV %PRED: NORMAL
RV PRED: NORMAL
RV: NORMAL
SVC %PRED: NORMAL
SVC PRED: NORMAL
SVC: NORMAL
TLC %PRED: 104 %
TLC PRED: NORMAL
TLC: NORMAL
VA %PRED: NORMAL
VA PRED: NORMAL
VA: NORMAL
VTG %PRED: NORMAL
VTG PRED: NORMAL
VTG: NORMAL

## 2021-07-16 PROCEDURE — 94640 AIRWAY INHALATION TREATMENT: CPT

## 2021-07-16 PROCEDURE — 94729 DIFFUSING CAPACITY: CPT | Performed by: INTERNAL MEDICINE

## 2021-07-16 PROCEDURE — 94726 PLETHYSMOGRAPHY LUNG VOLUMES: CPT

## 2021-07-16 PROCEDURE — 94726 PLETHYSMOGRAPHY LUNG VOLUMES: CPT | Performed by: INTERNAL MEDICINE

## 2021-07-16 PROCEDURE — 94729 DIFFUSING CAPACITY: CPT

## 2021-07-16 PROCEDURE — 94060 EVALUATION OF WHEEZING: CPT | Performed by: INTERNAL MEDICINE

## 2021-07-16 PROCEDURE — 6370000000 HC RX 637 (ALT 250 FOR IP): Performed by: INTERNAL MEDICINE

## 2021-07-16 PROCEDURE — 94060 EVALUATION OF WHEEZING: CPT

## 2021-07-16 RX ORDER — ALBUTEROL SULFATE 90 UG/1
4 AEROSOL, METERED RESPIRATORY (INHALATION) ONCE
Status: COMPLETED | OUTPATIENT
Start: 2021-07-16 | End: 2021-07-16

## 2021-07-16 RX ORDER — FLUTICASONE FUROATE, UMECLIDINIUM BROMIDE AND VILANTEROL TRIFENATATE 100; 62.5; 25 UG/1; UG/1; UG/1
1 POWDER RESPIRATORY (INHALATION) DAILY
Qty: 3 EACH | Refills: 3 | Status: SHIPPED | OUTPATIENT
Start: 2021-07-16 | End: 2021-11-11

## 2021-07-16 RX ADMIN — ALBUTEROL SULFATE 4 PUFF: 90 AEROSOL, METERED RESPIRATORY (INHALATION) at 09:54

## 2021-07-16 ASSESSMENT — PULMONARY FUNCTION TESTS
FEV1_PERCENT_PREDICTED_POST: 41
FEV1/FVC_PRE: 49
FEV1/FVC_POST: 48
FEV1_PERCENT_PREDICTED_PRE: 39

## 2021-07-16 NOTE — PROCEDURES
Spirometry was acceptable and reproducible by ATS standards    Spirometry  1. Spirometry shows severe obstructive defect. Bronchodilator  1. There is no response to bronchodilator demonstrated. [Increase in FEV1 and/or FVC => 12% of control and => 200 ml]    Lung Volumes  2. Lung volumes are normal.    Diffusing Capacity  3. Diffusing capacity isdecreased. [>60% and <LLN]        Overall Interpretation  PFT does  demonstrates obstruction with FEV1 of 39 % FVC of 79%  without bronchodilator response with associated decrease in diffusion capacity. Air trapping is not present. Hyperinflation is not present. This consistent with Severe COPD.   Clinical correlation needed     Pulmonary Function Testing Results:    FEV1 %Pred-Pre   Date Value Ref Range Status   07/16/2021 39 % Final     FEV1 %Pred-Post   Date Value Ref Range Status   07/16/2021 41 % Final     FEV1/FVC-Pre   Date Value Ref Range Status   07/16/2021 49 % Final     FEV1/FVC-Post   Date Value Ref Range Status   07/16/2021 48 % Final     TLC %Pred   Date Value Ref Range Status   07/16/2021 104 % Final     DLCO %Pred   Date Value Ref Range Status   07/16/2021 61 % Final             OBSTRUCTION % Predicted FEV1   MILD >70%   MODERATE 60-69%   MODERATELY-SEVERE 50-59%   SEVERE 35-49%   VERY SEVERE <35%         RESTRICTION % Predicted TLC   MILD Less than LLN but > than 70%   MODERATE 60-69%   MODERATELY-SEVERE <60%                 DIFFUSION CAPACITY DL,CO % Pred   MILD >60% AND < LLN   MODERATE 40-60%   SEVERE <40%       PFT data will be scanned into the procedure tab in epic under this encounter    MD CORNELIO Fernandez [facility] Pulmonology

## 2021-09-28 ENCOUNTER — OFFICE VISIT (OUTPATIENT)
Dept: PULMONOLOGY | Age: 81
End: 2021-09-28
Payer: MEDICARE

## 2021-09-28 VITALS
BODY MASS INDEX: 29.66 KG/M2 | DIASTOLIC BLOOD PRESSURE: 62 MMHG | WEIGHT: 189 LBS | HEART RATE: 84 BPM | RESPIRATION RATE: 16 BRPM | SYSTOLIC BLOOD PRESSURE: 130 MMHG | TEMPERATURE: 97.8 F | OXYGEN SATURATION: 95 % | HEIGHT: 67 IN

## 2021-09-28 DIAGNOSIS — J30.89 OTHER ALLERGIC RHINITIS: ICD-10-CM

## 2021-09-28 DIAGNOSIS — J96.12 CHRONIC HYPERCAPNIC RESPIRATORY FAILURE (HCC): ICD-10-CM

## 2021-09-28 DIAGNOSIS — J44.9 COPD, VERY SEVERE (HCC): Primary | ICD-10-CM

## 2021-09-28 DIAGNOSIS — G47.33 OSA (OBSTRUCTIVE SLEEP APNEA): ICD-10-CM

## 2021-09-28 PROBLEM — J43.2 CENTRILOBULAR EMPHYSEMA (HCC): Status: RESOLVED | Noted: 2018-02-05 | Resolved: 2021-09-28

## 2021-09-28 PROCEDURE — 1036F TOBACCO NON-USER: CPT | Performed by: INTERNAL MEDICINE

## 2021-09-28 PROCEDURE — 99214 OFFICE O/P EST MOD 30 MIN: CPT | Performed by: INTERNAL MEDICINE

## 2021-09-28 PROCEDURE — 3023F SPIROM DOC REV: CPT | Performed by: INTERNAL MEDICINE

## 2021-09-28 PROCEDURE — G8417 CALC BMI ABV UP PARAM F/U: HCPCS | Performed by: INTERNAL MEDICINE

## 2021-09-28 PROCEDURE — G8427 DOCREV CUR MEDS BY ELIG CLIN: HCPCS | Performed by: INTERNAL MEDICINE

## 2021-09-28 PROCEDURE — G8926 SPIRO NO PERF OR DOC: HCPCS | Performed by: INTERNAL MEDICINE

## 2021-09-28 PROCEDURE — 1123F ACP DISCUSS/DSCN MKR DOCD: CPT | Performed by: INTERNAL MEDICINE

## 2021-09-28 PROCEDURE — 4040F PNEUMOC VAC/ADMIN/RCVD: CPT | Performed by: INTERNAL MEDICINE

## 2021-09-28 RX ORDER — ALBUTEROL SULFATE 90 UG/1
2 AEROSOL, METERED RESPIRATORY (INHALATION) EVERY 4 HOURS PRN
Qty: 1 EACH | Refills: 11 | Status: SHIPPED | OUTPATIENT
Start: 2021-09-28 | End: 2021-09-29 | Stop reason: SDUPTHER

## 2021-09-28 RX ORDER — PSYLLIUM HUSK (WITH SUGAR) 3 G/12 G
1 POWDER (GRAM) ORAL DAILY
COMMUNITY
End: 2022-01-07

## 2021-09-28 RX ORDER — PREDNISONE 10 MG/1
TABLET ORAL
Qty: 30 TABLET | Refills: 0 | Status: SHIPPED | OUTPATIENT
Start: 2021-09-28 | End: 2021-09-29 | Stop reason: CLARIF

## 2021-09-28 ASSESSMENT — COPD QUESTIONNAIRES
CAT_TOTALSCORE: 24
QUESTION4_WALKINCLINE: 4
QUESTION1_COUGHFREQUENCY: 1
QUESTION8_ENERGYLEVEL: 4
QUESTION3_CHESTTIGHTNESS: 4
QUESTION5_HOMEACTIVITIES: 3
QUESTION7_SLEEPQUALITY: 2
QUESTION2_CHESTPHLEGM: 2
QUESTION6_LEAVINGHOUSE: 4

## 2021-09-28 ASSESSMENT — SLEEP AND FATIGUE QUESTIONNAIRES
HOW LIKELY ARE YOU TO NOD OFF OR FALL ASLEEP WHILE SITTING AND TALKING TO SOMEONE: 1
HOW LIKELY ARE YOU TO NOD OFF OR FALL ASLEEP WHILE WATCHING TV: 1
HOW LIKELY ARE YOU TO NOD OFF OR FALL ASLEEP WHILE LYING DOWN TO REST IN THE AFTERNOON WHEN CIRCUMSTANCES PERMIT: 1
HOW LIKELY ARE YOU TO NOD OFF OR FALL ASLEEP WHEN YOU ARE A PASSENGER IN A CAR FOR AN HOUR WITHOUT A BREAK: 1
HOW LIKELY ARE YOU TO NOD OFF OR FALL ASLEEP WHILE SITTING QUIETLY AFTER LUNCH WITHOUT ALCOHOL: 1
HOW LIKELY ARE YOU TO NOD OFF OR FALL ASLEEP IN A CAR, WHILE STOPPED FOR A FEW MINUTES IN TRAFFIC: 1
ESS TOTAL SCORE: 8
HOW LIKELY ARE YOU TO NOD OFF OR FALL ASLEEP WHILE SITTING INACTIVE IN A PUBLIC PLACE: 1
HOW LIKELY ARE YOU TO NOD OFF OR FALL ASLEEP WHILE SITTING AND READING: 1

## 2021-09-28 NOTE — ASSESSMENT & PLAN NOTE
Jeremías Menjivar  is deriving benefit from PAP demonstrated by improved Helen, AHI, symptoms.     PAP download information:  Usage > 4 hours  100 %   Pressure setting  14/8 cwp    AHI with usage  0.8

## 2021-09-28 NOTE — PROGRESS NOTES
REASON FOR CONSULTATION/CC: ZAHEER      CONSULTING PHYSICIAN: Milka Masterson MD   PCP: Milka Masterson MD    HISTORY OF PRESENT ILLNESS: Sabina Loser is a [de-identified]y.o. year old male with a history of ZAHEER who presents :     Sleep history:                    dry mouth   Does not know setting - advised to increase       zaheer  Doing well. Using nightly . hypersomnia  Controlled. COPD   Trelegy   theophylline   symptoms controlled. Will repeat theophylline level at next visit. Will desaturate to 90% but increase with breathing. albuterol prn.       allergic rhinitis  Using using singular - will trial off medication   ipratropium  Nasal spray  Having sinus congestion at night. [place] Sleepiness Scale:    Sleep Medicine 9/28/2021 11/23/2020 12/4/2019 8/8/2019 3/14/2019 9/13/2018 6/12/2018   Sitting and reading 1 1 1 1 1 1 1   Watching TV 1 1 1 1 1 1 1   Sitting, inactive in a public place (e.g. a theatre or a meeting) 1 1 1 1 1 1 1   As a passenger in a car for an hour without a break 1 1 1 1 1 1 1   Lying down to rest in the afternoon when circumstances permit 1 1 0 1 0 1 1   Sitting and talking to someone 1 1 1 1 1 1 1   Sitting quietly after a lunch without alcohol 1 1 1 1 1 1 1   In a car, while stopped for a few minutes in traffic 1 1 1 1 0 1 1   Total score 8 8 7 8 6 8 8   Neck circumference - - - - - 18.5 19         0 = no chance of dozing  1 = slight chance of dozing  2 = moderate chance of dozing  3 = high chance of dozing    Interpretation:   0-7: It is unlikely that you are abnormally sleepy. 8-9:You have an average amount of daytime sleepiness. 10-15: You may be excessively sleepy depending on the situation. You may want to consider   seeking medical attention. 16-24: You are excessively sleepy and should consider seeking medical attention     REVIEW OF SYSTEMS:    Constitutional: Negative for fever   HENT: Negative for sore throat  Respiratory: slight shortness of breath and cough  Cardiovascular: Negative for chest pain  Gastrointestinal: Negative for vomiting, diarrhea   Skin: Negative for rash  Psychiatric/Behavorial: Negative for anxiety    Objective:   PHYSICAL EXAM:  Blood pressure 130/62, pulse 84, temperature 97.8 °F (36.6 °C), temperature source Infrared, resp. rate 16, height 5' 7\" (1.702 m), weight 189 lb (85.7 kg), SpO2 95 %.'  Gen: No distress. ENT:   Resp: No accessory muscle use. No crackles. Slight wheezes. No rhonchi. distant  CV: Regular rate. Regular rhythm. No murmur or rub. No edema. Skin: Warm, dry, normal texture and turgor. No nodule on exposed extremities. M/S: No cyanosis. No clubbing. No joint deformity. Psych: Oriented x 3. No anxiety. Awake. Alert. Intact judgement and insight. Good Mood / Affect. Memory appears in tact         Data Reviewed:     Assessment:     ·  COPD, very severe , FEV1 39%, 2021  · ZAHEER  · Chronic Sinusitis  · Code status: DNR CCA October 2, 2018    Plan:              Problem List Items Addressed This Visit     Other allergic rhinitis     Controlled with Singulair , ipratropium          Relevant Medications    albuterol sulfate HFA (VENTOLIN HFA) 108 (90 Base) MCG/ACT inhaler    ZAHEER (obstructive sleep apnea)     Surya Montoya  is deriving benefit from PAP demonstrated by improved [place], AHI, symptoms. PAP download information:  Usage > 4 hours  100 %   Pressure setting  14/8 cwp    AHI with usage  0.8               COPD, very severe (HCC) - Primary     Stable symptoms on Trelegy and theophylline. Reviewed pulmonary function tests. Having desaturation to 90% but not needing oxygen at this point.           Relevant Medications    predniSONE (DELTASONE) 10 MG tablet    albuterol sulfate HFA (VENTOLIN HFA) 108 (90 Base) MCG/ACT inhaler    Chronic hypercapnic respiratory failure (HCC)     Continue bipap at night            Other Visit Diagnoses     HTN (hypertension)        Relevant Medications    albuterol sulfate HFA (VENTOLIN HFA) 108 (90 Base) MCG/ACT inhaler            This note was transcribed using [id number]. Please disregard any translational errors.

## 2021-10-20 ENCOUNTER — TELEPHONE (OUTPATIENT)
Dept: PULMONOLOGY | Age: 81
End: 2021-10-20

## 2021-10-20 ENCOUNTER — HOSPITAL ENCOUNTER (INPATIENT)
Age: 81
LOS: 4 days | Discharge: HOME | DRG: 191 | End: 2021-10-25
Attending: EMERGENCY MEDICINE | Admitting: INTERNAL MEDICINE
Payer: MEDICARE

## 2021-10-20 ENCOUNTER — APPOINTMENT (OUTPATIENT)
Dept: GENERAL RADIOLOGY | Age: 81
DRG: 191 | End: 2021-10-20
Payer: MEDICARE

## 2021-10-20 DIAGNOSIS — J96.01 ACUTE RESPIRATORY FAILURE WITH HYPOXIA (HCC): ICD-10-CM

## 2021-10-20 DIAGNOSIS — R06.09 DYSPNEA ON EXERTION: ICD-10-CM

## 2021-10-20 DIAGNOSIS — J44.1 COPD EXACERBATION (HCC): Primary | ICD-10-CM

## 2021-10-20 DIAGNOSIS — J44.9 COPD (CHRONIC OBSTRUCTIVE PULMONARY DISEASE) (HCC): ICD-10-CM

## 2021-10-20 PROCEDURE — 84484 ASSAY OF TROPONIN QUANT: CPT

## 2021-10-20 PROCEDURE — 71046 X-RAY EXAM CHEST 2 VIEWS: CPT

## 2021-10-20 PROCEDURE — 93005 ELECTROCARDIOGRAM TRACING: CPT | Performed by: EMERGENCY MEDICINE

## 2021-10-20 PROCEDURE — 99283 EMERGENCY DEPT VISIT LOW MDM: CPT

## 2021-10-20 PROCEDURE — 96374 THER/PROPH/DIAG INJ IV PUSH: CPT

## 2021-10-20 PROCEDURE — 83036 HEMOGLOBIN GLYCOSYLATED A1C: CPT

## 2021-10-20 PROCEDURE — 80048 BASIC METABOLIC PNL TOTAL CA: CPT

## 2021-10-20 PROCEDURE — 6360000002 HC RX W HCPCS: Performed by: EMERGENCY MEDICINE

## 2021-10-20 PROCEDURE — 83880 ASSAY OF NATRIURETIC PEPTIDE: CPT

## 2021-10-20 RX ORDER — METHYLPREDNISOLONE SODIUM SUCCINATE 40 MG/ML
40 INJECTION, POWDER, LYOPHILIZED, FOR SOLUTION INTRAMUSCULAR; INTRAVENOUS ONCE
Status: COMPLETED | OUTPATIENT
Start: 2021-10-20 | End: 2021-10-20

## 2021-10-20 RX ORDER — IPRATROPIUM BROMIDE AND ALBUTEROL SULFATE 2.5; .5 MG/3ML; MG/3ML
1 SOLUTION RESPIRATORY (INHALATION) ONCE
Status: COMPLETED | OUTPATIENT
Start: 2021-10-20 | End: 2021-10-21

## 2021-10-20 RX ADMIN — METHYLPREDNISOLONE SODIUM SUCCINATE 40 MG: 40 INJECTION, POWDER, FOR SOLUTION INTRAMUSCULAR; INTRAVENOUS at 23:56

## 2021-10-20 ASSESSMENT — ENCOUNTER SYMPTOMS
CHEST TIGHTNESS: 0
EYES NEGATIVE: 1
SHORTNESS OF BREATH: 1
WHEEZING: 1
RHINORRHEA: 0
COUGH: 1
HEMOPTYSIS: 0
VOMITING: 0
SPUTUM PRODUCTION: 0
DIARRHEA: 0
ABDOMINAL PAIN: 0
COLOR CHANGE: 0
NAUSEA: 0
SORE THROAT: 0

## 2021-10-21 PROBLEM — J44.1 COPD EXACERBATION (HCC): Status: ACTIVE | Noted: 2021-10-21

## 2021-10-21 LAB
ANION GAP SERPL CALCULATED.3IONS-SCNC: 11 MMOL/L (ref 3–16)
BASE EXCESS VENOUS: 0.7 MMOL/L
BASOPHILS ABSOLUTE: 0 K/UL (ref 0–0.2)
BASOPHILS RELATIVE PERCENT: 0.3 %
BUN BLDV-MCNC: 11 MG/DL (ref 7–20)
CALCIUM SERPL-MCNC: 10 MG/DL (ref 8.3–10.6)
CARBOXYHEMOGLOBIN: 1.2 %
CHLORIDE BLD-SCNC: 98 MMOL/L (ref 99–110)
CO2: 25 MMOL/L (ref 21–32)
CREAT SERPL-MCNC: 0.9 MG/DL (ref 0.8–1.3)
EKG ATRIAL RATE: 103 BPM
EKG DIAGNOSIS: NORMAL
EKG P AXIS: 82 DEGREES
EKG P-R INTERVAL: 134 MS
EKG Q-T INTERVAL: 322 MS
EKG QRS DURATION: 88 MS
EKG QTC CALCULATION (BAZETT): 421 MS
EKG R AXIS: 93 DEGREES
EKG T AXIS: 31 DEGREES
EKG VENTRICULAR RATE: 103 BPM
EOSINOPHILS ABSOLUTE: 0.1 K/UL (ref 0–0.6)
EOSINOPHILS RELATIVE PERCENT: 0.6 %
ESTIMATED AVERAGE GLUCOSE: 128.4 MG/DL
GFR AFRICAN AMERICAN: >60
GFR NON-AFRICAN AMERICAN: >60
GLUCOSE BLD-MCNC: 109 MG/DL (ref 70–99)
GLUCOSE BLD-MCNC: 190 MG/DL (ref 70–99)
GLUCOSE BLD-MCNC: 202 MG/DL (ref 70–99)
GLUCOSE BLD-MCNC: 225 MG/DL (ref 70–99)
HBA1C MFR BLD: 6.1 %
HCO3 VENOUS: 27 MMOL/L (ref 23–29)
HCT VFR BLD CALC: 46.7 % (ref 40.5–52.5)
HEMOGLOBIN: 15 G/DL (ref 13.5–17.5)
LYMPHOCYTES ABSOLUTE: 0.5 K/UL (ref 1–5.1)
LYMPHOCYTES RELATIVE PERCENT: 4.2 %
MCH RBC QN AUTO: 27.9 PG (ref 26–34)
MCHC RBC AUTO-ENTMCNC: 32.1 G/DL (ref 31–36)
MCV RBC AUTO: 87.1 FL (ref 80–100)
METHEMOGLOBIN VENOUS: 0.7 %
MONOCYTES ABSOLUTE: 0.3 K/UL (ref 0–1.3)
MONOCYTES RELATIVE PERCENT: 2.2 %
NEUTROPHILS ABSOLUTE: 11.5 K/UL (ref 1.7–7.7)
NEUTROPHILS RELATIVE PERCENT: 92.7 %
O2 SAT, VEN: 72 %
O2 THERAPY: NORMAL
PCO2, VEN: 47.1 MMHG (ref 40–50)
PDW BLD-RTO: 14.7 % (ref 12.4–15.4)
PERFORMED ON: ABNORMAL
PH VENOUS: 7.37 (ref 7.35–7.45)
PLATELET # BLD: 331 K/UL (ref 135–450)
PMV BLD AUTO: 6.7 FL (ref 5–10.5)
PO2, VEN: 37 MMHG
POTASSIUM REFLEX MAGNESIUM: 4.3 MMOL/L (ref 3.5–5.1)
PRO-BNP: 37 PG/ML (ref 0–449)
RBC # BLD: 5.37 M/UL (ref 4.2–5.9)
SODIUM BLD-SCNC: 134 MMOL/L (ref 136–145)
TCO2 CALC VENOUS: 28 MMOL/L
TROPONIN: <0.01 NG/ML
WBC # BLD: 12.4 K/UL (ref 4–11)

## 2021-10-21 PROCEDURE — 6370000000 HC RX 637 (ALT 250 FOR IP): Performed by: EMERGENCY MEDICINE

## 2021-10-21 PROCEDURE — 6370000000 HC RX 637 (ALT 250 FOR IP): Performed by: INTERNAL MEDICINE

## 2021-10-21 PROCEDURE — 36415 COLL VENOUS BLD VENIPUNCTURE: CPT

## 2021-10-21 PROCEDURE — 6360000002 HC RX W HCPCS: Performed by: INTERNAL MEDICINE

## 2021-10-21 PROCEDURE — 99223 1ST HOSP IP/OBS HIGH 75: CPT | Performed by: INTERNAL MEDICINE

## 2021-10-21 PROCEDURE — 82803 BLOOD GASES ANY COMBINATION: CPT

## 2021-10-21 PROCEDURE — 94640 AIRWAY INHALATION TREATMENT: CPT

## 2021-10-21 PROCEDURE — 93010 ELECTROCARDIOGRAM REPORT: CPT | Performed by: INTERNAL MEDICINE

## 2021-10-21 PROCEDURE — 2700000000 HC OXYGEN THERAPY PER DAY

## 2021-10-21 PROCEDURE — 2060000000 HC ICU INTERMEDIATE R&B

## 2021-10-21 PROCEDURE — 94761 N-INVAS EAR/PLS OXIMETRY MLT: CPT

## 2021-10-21 PROCEDURE — 85025 COMPLETE CBC W/AUTO DIFF WBC: CPT

## 2021-10-21 PROCEDURE — 6370000000 HC RX 637 (ALT 250 FOR IP): Performed by: NURSE PRACTITIONER

## 2021-10-21 RX ORDER — POLYVINYL ALCOHOL 14 MG/ML
2 SOLUTION/ DROPS OPHTHALMIC EVERY 6 HOURS PRN
Status: DISCONTINUED | OUTPATIENT
Start: 2021-10-21 | End: 2021-10-25 | Stop reason: HOSPADM

## 2021-10-21 RX ORDER — THEOPHYLLINE 400 MG/1
200 TABLET, EXTENDED RELEASE ORAL 2 TIMES DAILY
Status: DISCONTINUED | OUTPATIENT
Start: 2021-10-21 | End: 2021-10-25 | Stop reason: HOSPADM

## 2021-10-21 RX ORDER — DEXTROSE MONOHYDRATE 50 MG/ML
100 INJECTION, SOLUTION INTRAVENOUS PRN
Status: DISCONTINUED | OUTPATIENT
Start: 2021-10-21 | End: 2021-10-25 | Stop reason: HOSPADM

## 2021-10-21 RX ORDER — OXYMETAZOLINE HYDROCHLORIDE 0.05 G/100ML
2 SPRAY NASAL 2 TIMES DAILY PRN
Status: DISPENSED | OUTPATIENT
Start: 2021-10-21 | End: 2021-10-24

## 2021-10-21 RX ORDER — DOCUSATE SODIUM 100 MG/1
100 CAPSULE, LIQUID FILLED ORAL 2 TIMES DAILY
Status: DISCONTINUED | OUTPATIENT
Start: 2021-10-21 | End: 2021-10-25 | Stop reason: HOSPADM

## 2021-10-21 RX ORDER — BUDESONIDE AND FORMOTEROL FUMARATE DIHYDRATE 160; 4.5 UG/1; UG/1
2 AEROSOL RESPIRATORY (INHALATION) 2 TIMES DAILY
Status: DISCONTINUED | OUTPATIENT
Start: 2021-10-21 | End: 2021-10-25 | Stop reason: HOSPADM

## 2021-10-21 RX ORDER — DEXTROSE MONOHYDRATE 25 G/50ML
12.5 INJECTION, SOLUTION INTRAVENOUS PRN
Status: DISCONTINUED | OUTPATIENT
Start: 2021-10-21 | End: 2021-10-25 | Stop reason: HOSPADM

## 2021-10-21 RX ORDER — PANTOPRAZOLE SODIUM 40 MG/1
40 TABLET, DELAYED RELEASE ORAL
Status: DISCONTINUED | OUTPATIENT
Start: 2021-10-21 | End: 2021-10-25 | Stop reason: HOSPADM

## 2021-10-21 RX ORDER — FLUCONAZOLE 100 MG/1
100 TABLET ORAL DAILY
Status: DISCONTINUED | OUTPATIENT
Start: 2021-10-21 | End: 2021-10-21

## 2021-10-21 RX ORDER — DILTIAZEM HYDROCHLORIDE 300 MG/1
300 CAPSULE, COATED, EXTENDED RELEASE ORAL DAILY
Status: DISCONTINUED | OUTPATIENT
Start: 2021-10-21 | End: 2021-10-25 | Stop reason: HOSPADM

## 2021-10-21 RX ORDER — ATORVASTATIN CALCIUM 10 MG/1
10 TABLET, FILM COATED ORAL DAILY
Status: DISCONTINUED | OUTPATIENT
Start: 2021-10-21 | End: 2021-10-25 | Stop reason: HOSPADM

## 2021-10-21 RX ORDER — IPRATROPIUM BROMIDE AND ALBUTEROL SULFATE 2.5; .5 MG/3ML; MG/3ML
1 SOLUTION RESPIRATORY (INHALATION)
Status: DISCONTINUED | OUTPATIENT
Start: 2021-10-21 | End: 2021-10-25 | Stop reason: HOSPADM

## 2021-10-21 RX ORDER — LOSARTAN POTASSIUM 50 MG/1
50 TABLET ORAL DAILY
Status: DISCONTINUED | OUTPATIENT
Start: 2021-10-21 | End: 2021-10-25 | Stop reason: HOSPADM

## 2021-10-21 RX ORDER — NICOTINE POLACRILEX 4 MG
15 LOZENGE BUCCAL PRN
Status: DISCONTINUED | OUTPATIENT
Start: 2021-10-21 | End: 2021-10-25 | Stop reason: HOSPADM

## 2021-10-21 RX ORDER — METHYLPREDNISOLONE SODIUM SUCCINATE 125 MG/2ML
80 INJECTION, POWDER, LYOPHILIZED, FOR SOLUTION INTRAMUSCULAR; INTRAVENOUS EVERY 8 HOURS
Status: DISCONTINUED | OUTPATIENT
Start: 2021-10-21 | End: 2021-10-22

## 2021-10-21 RX ADMIN — THEOPHYLLINE 200 MG: 400 TABLET, EXTENDED RELEASE ORAL at 21:23

## 2021-10-21 RX ADMIN — BUDESONIDE AND FORMOTEROL FUMARATE DIHYDRATE 2 PUFF: 160; 4.5 AEROSOL RESPIRATORY (INHALATION) at 20:59

## 2021-10-21 RX ADMIN — IPRATROPIUM BROMIDE AND ALBUTEROL SULFATE 1 AMPULE: .5; 2.5 SOLUTION RESPIRATORY (INHALATION) at 11:49

## 2021-10-21 RX ADMIN — NYSTATIN 500000 UNITS: 100000 SUSPENSION ORAL at 13:01

## 2021-10-21 RX ADMIN — DILTIAZEM HYDROCHLORIDE 300 MG: 300 CAPSULE, COATED, EXTENDED RELEASE ORAL at 11:16

## 2021-10-21 RX ADMIN — ATORVASTATIN CALCIUM 10 MG: 10 TABLET, FILM COATED ORAL at 11:15

## 2021-10-21 RX ADMIN — INSULIN LISPRO 1 UNITS: 100 INJECTION, SOLUTION INTRAVENOUS; SUBCUTANEOUS at 18:46

## 2021-10-21 RX ADMIN — POLYVINYL ALCOHOL 2 DROP: 14 SOLUTION/ DROPS OPHTHALMIC at 22:00

## 2021-10-21 RX ADMIN — NYSTATIN 500000 UNITS: 100000 SUSPENSION ORAL at 18:47

## 2021-10-21 RX ADMIN — OXYMETAZOLINE HCL 2 SPRAY: 0.05 SPRAY NASAL at 22:00

## 2021-10-21 RX ADMIN — THEOPHYLLINE 200 MG: 400 TABLET, EXTENDED RELEASE ORAL at 11:16

## 2021-10-21 RX ADMIN — METHYLPREDNISOLONE SODIUM SUCCINATE 80 MG: 125 INJECTION, POWDER, FOR SOLUTION INTRAMUSCULAR; INTRAVENOUS at 05:47

## 2021-10-21 RX ADMIN — LOSARTAN POTASSIUM 50 MG: 50 TABLET, FILM COATED ORAL at 11:15

## 2021-10-21 RX ADMIN — DOCUSATE SODIUM 100 MG: 100 CAPSULE ORAL at 21:27

## 2021-10-21 RX ADMIN — IPRATROPIUM BROMIDE AND ALBUTEROL SULFATE 1 AMPULE: .5; 2.5 SOLUTION RESPIRATORY (INHALATION) at 09:14

## 2021-10-21 RX ADMIN — INSULIN LISPRO 1 UNITS: 100 INJECTION, SOLUTION INTRAVENOUS; SUBCUTANEOUS at 21:27

## 2021-10-21 RX ADMIN — BUDESONIDE AND FORMOTEROL FUMARATE DIHYDRATE 2 PUFF: 160; 4.5 AEROSOL RESPIRATORY (INHALATION) at 11:50

## 2021-10-21 RX ADMIN — INSULIN LISPRO 2 UNITS: 100 INJECTION, SOLUTION INTRAVENOUS; SUBCUTANEOUS at 13:01

## 2021-10-21 RX ADMIN — ENOXAPARIN SODIUM 40 MG: 100 INJECTION SUBCUTANEOUS at 11:15

## 2021-10-21 RX ADMIN — FLUCONAZOLE 100 MG: 100 TABLET ORAL at 11:15

## 2021-10-21 RX ADMIN — IPRATROPIUM BROMIDE AND ALBUTEROL SULFATE 1 AMPULE: .5; 2.5 SOLUTION RESPIRATORY (INHALATION) at 16:21

## 2021-10-21 RX ADMIN — METHYLPREDNISOLONE SODIUM SUCCINATE 80 MG: 125 INJECTION, POWDER, FOR SOLUTION INTRAMUSCULAR; INTRAVENOUS at 21:24

## 2021-10-21 RX ADMIN — METHYLPREDNISOLONE SODIUM SUCCINATE 80 MG: 125 INJECTION, POWDER, FOR SOLUTION INTRAMUSCULAR; INTRAVENOUS at 13:01

## 2021-10-21 RX ADMIN — PANTOPRAZOLE SODIUM 40 MG: 40 TABLET, DELAYED RELEASE ORAL at 11:15

## 2021-10-21 RX ADMIN — POLYVINYL ALCOHOL 2 DROP: 14 SOLUTION/ DROPS OPHTHALMIC at 21:24

## 2021-10-21 RX ADMIN — IPRATROPIUM BROMIDE AND ALBUTEROL SULFATE 1 AMPULE: .5; 3 SOLUTION RESPIRATORY (INHALATION) at 02:00

## 2021-10-21 RX ADMIN — IPRATROPIUM BROMIDE AND ALBUTEROL SULFATE 1 AMPULE: .5; 2.5 SOLUTION RESPIRATORY (INHALATION) at 20:59

## 2021-10-21 ASSESSMENT — PAIN SCALES - GENERAL
PAINLEVEL_OUTOF10: 0
PAINLEVEL_OUTOF10: 0

## 2021-10-21 NOTE — H&P
&P dictated--IMP: Principal Problem:    Acute respiratory failure with hypoxia (HCC)--prog SOB--cxr--no infilt--severe COPD--add HHN/steroids/pulm consult   Active Problems:    ZAHEER (obstructive sleep apnea)--cont CPAP    Abnormal glucose--with steroids---ck BS     COPD, very severe (HCC)--as noted     COPD exacerbation (HCC)    Hypercholesterolemia--Continue current therapy      Essential hypertension--Continue current therapy    Resolved Problems:    * No resolved hospital problems.  *   Admit--iv solumedrol-HHN-pulm consult--no antibx at this time     Kerrie Mitchell MD

## 2021-10-21 NOTE — PROGRESS NOTES
4 Eyes Skin Assessment     NAME:  Rony Riddle  YOB: 1940  MEDICAL RECORD NUMBER:  [id number]    The patient is being assess for  Admission    I agree that 2 RN's have performed a thorough Head to Toe Skin Assessment on the patient. ALL assessment sites listed below have been assessed. Areas assessed by both nurses:    Head, Face, Ears, Shoulders, Back, Chest, Arms, Elbows, Hands, Sacrum. Buttock, Coccyx, Ischium and Legs. Feet and Heels        Does the Patient have a Wound?  No noted wound(s)       Romulo Prevention initiated:  No   Wound Care Orders initiated:  No    Pressure Injury (Stage 3,4, Unstageable, DTI, NWPT, and Complex wounds) if present place consult order under [de-identified] No    New and Established Ostomies if present place consult order under : No      Nurse 1 eSignature: Electronically signed by Donnell Cowden, RN on 10/21/21 at 3:14 AM EDT    **SHARE this note so that the co-signing nurse is able to place an eSignature**    Nurse 2 eSignature: Electronically signed by Levert Gitelman, RN on 10/21/21 at 4:14 AM EDT

## 2021-10-21 NOTE — ACP (ADVANCE CARE PLANNING)
Advance Care Planning     Advance Care Planning Activator (Inpatient)  Conversation Note      Date of ACP Conversation: 10/21/2021     Conversation Conducted with: Patient with Decision Making Capacity    ACP Activator: Cecily Levin RN    Health Care Decision Maker:     Current Designated Health Care Decision Maker:     Primary Decision Maker: Lauren Hanson Spouse - [phone]    Care Preferences    Ventilation: \"If you were in your present state of health and suddenly became very ill and were unable to breathe on your own, what would your preference be about the use of a ventilator (breathing machine) if it were available to you? \"      Would the patient desire the use of ventilator (breathing machine)?: yes    \"If your health worsens and it becomes clear that your chance of recovery is unlikely, what would your preference be about the use of a ventilator (breathing machine) if it were available to you? \"     Would the patient desire the use of ventilator (breathing machine)?: No      Resuscitation  \"CPR works best to restart the heart when there is a sudden event, like a heart attack, in someone who is otherwise healthy. Unfortunately, CPR does not typically restart the heart for people who have serious health conditions or who are very sick. \"    \"In the event your heart stopped as a result of an underlying serious health condition, would you want attempts to be made to restart your heart (answer \"yes\" for attempt to resuscitate) or would you prefer a natural death (answer \"no\" for do not attempt to resuscitate)? \" yes       [] Yes   [x] No   Educated Patient / Lawrnce Mater regarding differences between Advance Directives and portable DNR orders.     Length of ACP Conversation in minutes:  5 minutes    Conversation Outcomes:  [x] ACP discussion completed  [] Existing advance directive reviewed with patient; no changes to patient's previously recorded wishes  [] New Advance Directive completed  [] Portable Do Not Rescitate prepared for Provider review and signature  [] POLST/POST/MOLST/MOST prepared for Provider review and signature      Follow-up plan:    [] Schedule follow-up conversation to continue planning  [] Referred individual to Provider for additional questions/concerns   [] Advised patient/agent/surrogate to review completed ACP document and update if needed with changes in condition, patient preferences or care setting    [] This note routed to one or more involved healthcare providers  Electronically signed by Emma Ly RN Case Management [phone] on 10/21/2021 at 2:33 PM

## 2021-10-21 NOTE — CARE COORDINATION
INITIAL CASE MANAGEMENT ASSESSMENT    Reviewed chart, met with patient to assess possible discharge needs. Explained Case Management role/services. Living Situation: Confirmed address, lives with wife in a 1 level condo, 1 step to enter through garage    ADLs: Independent     DME: CPAP, nebulizer, [employer] portable oxygen; has wheeled walker but doesn't use    PT/OT Recs: Not ordered, independent in room     Active Services: None     Transportation: Active  - wife to transport home     Medications: Uses [facility] on [employer] [employer] -- no issues    PCP: Echo High MD      HD/PD: n/a    PLAN/COMMENTS: Patient will return home with wife. Denies needs. CM provided contact information for patient or family to call with any questions. CM will follow and assist as needed.   Electronically signed by Rebecca Joseph RN Case Management [phone] on 10/21/2021 at 2:27 PM

## 2021-10-21 NOTE — TELEPHONE ENCOUNTER
Complains of cough and SOB  Duration 2 weeks   Cough with sputum production? No   Color none   Fever? no  Any other Symptoms? Wheezing tightness in chest   Any current treatment tried? no  Using inhalers? Yes and nebulizer  do they help? Temporarily Pulse ox 89%  Pharmacy? [address] like he did while he was in the hospital. Dr. Radha Castle has given him Prednisone but it did not help       Patient calls in today very SOB and struggling to breathe. He has restarted O2 but he is still struggling. His wife has an appointment with Forrest Stewart on Monday which she is going to be giving to Chetan Nava.  Would like Dr. Donavan Vidales opinion on if this is ok to wait until Monday
Patient went to ED , and was admitted
Pt l/m detailed of the m,message
Sounds like he should be assess in the ER. I can call in antibiotics but, if this has been going on for 2 weeks, complete work up is in order.
Will FWD to Dr Haylee Pierce
5

## 2021-10-21 NOTE — PROGRESS NOTES
Patient arrived to unit via stretcher on 2 L of oxygen. Patient ambulated to the bed without difficulty. Patient denied pain at this time. Patient has slight shortness of breath. Patient was placed on telemetry and CMU confirmed patient to be in sinus tach. Patient is alert, oriented and pleasant. Patient has no skin issues and ambulates independently when home. Patient brought his home Cpap machine. Patient was oriented to the room and demonstrated proper use of the call light.

## 2021-10-21 NOTE — CONSULTS
REASON FOR CONSULTATION/CC: COPD exacerbation      Consult at request of Katt Tang MD     PCP: Anabel Garza MD  Established Pulmonologist: Gabriela Radford    Chief Complaint   Patient presents with   [facility] Shortness of Breath     increased shortness of breath for one month. pt has oxygen available at home. arrived on [facility]. pt normally not on oxygen       HISTORY OF PRESENT ILLNESS: Ameena Magana is a [de-identified]y.o. year old male with a history of severe COPD, FEV1 41% who presents with shortness of breath. Symptoms been present for greater than 1 month. They are progressive in nature. He has not found anything that makes it better or worse. Been started on empiric treatment for COPD exacerbation with inhaled bronchodilators and systemic corticosteroids. Chest imaging clear. Associated cough, but no nausea vomiting diarrhea or chest pain. During my visit patient is tolerating room air SPO2 94%. He states he has his own POC that he wears when he gets short of breath.       Past Medical History:   Diagnosis Date    Aspergillus pneumonia ([address]     Basal cell carcinoma of skin 06/16/2017    right cheek    COPD (chronic obstructive pulmonary disease) ([address]     DNR (do not resuscitate)     no intubation or chest compressions    Hyperlipidemia     Hypertension 10/2012    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/04/2018    Skin cancer 2014    both upper thighs    Squamous cell cancer of skin of nose 5/2016         Past Surgical History:   Procedure Laterality Date    BRONCHOSCOPY N/A 7/25/2019    BRONCHOSCOPY WITH BRONCHOALVEOLAR LAVAGE performed by G[facility]mez Cantrell MD at [facility]     5/2016    COLONOSCOPY  08/04/2016    dr Rene Gaitan    3 years    SKIN BIOPSY  5/2016    Nose     TONSILLECTOMY AND ADENOIDECTOMY      TOOTH EXTRACTION  5/2016    UPPER GASTROINTESTINAL ENDOSCOPY  12/2/2015    dr Natalie Cisneros       Family Hx  family history includes Coronary Art Dis in his mother; Diabetes in his mother. Social Hx   reports that he quit smoking about 19 years ago. His smoking use included cigarettes. He has a 45.00 pack-year smoking history. He has never used smokeless tobacco.    Scheduled Meds:   methylPREDNISolone  80 mg IntraVENous Q8H    ipratropium-albuterol  1 ampule Inhalation Q4H WA    atorvastatin  10 mg Oral Daily    dilTIAZem  300 mg Oral Daily    fluconazole  100 mg Oral Daily    losartan  50 mg Oral Daily    pantoprazole  40 mg Oral QAM AC    theophylline  200 mg Oral BID    insulin lispro  0-6 Units SubCUTAneous TID WC    insulin lispro  0-3 Units SubCUTAneous Nightly    enoxaparin  40 mg SubCUTAneous Daily       Continuous Infusions:   dextrose         PRN Meds:  polyvinyl alcohol, glucose, dextrose, glucagon (rDNA), dextrose    ALLERGIES:  Patient is allergic to aquaphor [albolene]. REVIEW OF SYSTEMS:  Constitutional: Negative for fever  HENT: Negative for sore throat  Eyes: Negative for redness   Respiratory: + Wheezing, + dyspnea, cough  Cardiovascular: Negative for chest pain  Gastrointestinal: Negative for vomiting, diarrhea   Genitourinary: Negative for hematuria   Musculoskeletal: Negative for arthralgias   Skin: Negative for rash  Neurological: Negative for syncope  Hematological: Negative for adenopathy  Psychiatric/Behavorial: Negative for anxiety    Objective:   PHYSICAL EXAM:  Blood pressure (!) 172/63, pulse 90, temperature 97.7 °F (36.5 °C), temperature source Oral, resp. rate 18, height 5' 7\" (1.702 m), weight 188 lb 4.4 oz (85.4 kg), SpO2 99 %.'  Gen:  No acute distress. Eyes: PERRL. Anicteric sclera. No conjunctival injection. ENT: No discharge. Posterior oropharynx clear. External appearance of ears and nose normal.  Neck: Trachea midline. No mass   Resp:  No crackles. + Bilateral wheezes. No rhonchi. No dullness on percussion. CV: Regular rate. Regular rhythm. No murmur or rub. No edema. GI: Soft, Non-tender. Non-distended. +BS  Skin: Warm, dry, w/o erythema. Lymph: No cervical or supraclavicular LAD. M/S: No cyanosis. No clubbing. Neuro: no focal neurologic deficit. Moves all extremities  Psych: Awake and alert, Oriented x 3. Judgement and insight appropriate. Mood stable. Data Reviewed:   LABS:  CBC:   Recent Labs     10/21/21  0349   WBC 12.4*   HGB 15.0   HCT 46.7   MCV 87.1        BMP:   Recent Labs     10/20/21  2348   *   K 4.3   CL 98*   CO2 25   BUN 11   CREATININE 0.9     LIVER PROFILE: No results for input(s): AST, ALT, LIPASE, BILIDIR, BILITOT, ALKPHOS in the last 72 hours. Invalid input(s): AMYLASE,  ALB  PT/INR: No results for input(s): PROTIME, INR in the last 72 hours. APTT: No results for input(s): APTT in the last 72 hours. UA:No results for input(s): NITRITE, COLORU, PHUR, LABCAST, WBCUA, RBCUA, MUCUS, TRICHOMONAS, YEAST, BACTERIA, CLARITYU, SPECGRAV, LEUKOCYTESUR, UROBILINOGEN, BILIRUBINUR, BLOODU, GLUCOSEU, AMORPHOUS in the last 72 hours. Invalid input(s): KETONESU  No results for input(s): PHART, HGX5QTE, PO2ART in the last 72 hours. Vent Information  Skin Assessment: Clean, dry, & intact  SpO2: 99 %    Radiology Review:  Pertinent images / reports were reviewed as a part of this visit. Chest x-ray images reviewed personally by me, interpretation as follows:  -Bilateral emphysema, no acute focal consolidation or masses. Pulmonary function testing  Severe COPD, FEV1 41%      Assessment/Plan:       Acute hypoxemic respiratory failure with SPO2 less than 90% on room air  -Resolved    Acute exacerbation of severe COPD  -FEV1 41%  -DuoNebs, Symbicort  -Systemic corticosteroids  -Check VBG  -Check respiratory viral panel  Home theophylline    ZAHEER  -Home BiPAP 14/8    This note was transcribed using [id number] [employer]. Please disregard any translational errors.     Thank you for the consult    [address] Pulmonary, Sleep and Critical Care Medicine

## 2021-10-21 NOTE — ED PROVIDER NOTES
[facility]  [facility]      Pt Name: Glenna Cline  MRN: [id number]  [place] 1940  Date ofevaluation: 10/20/2021  Provider: Johann Khan MD    CHIEF COMPLAINT       Chief Complaint   Patient presents with    Shortness of Breath     increased shortness of breath for one month. pt has oxygen available at home. arrived on [facility]. pt normally not on oxygen         HISTORY OF PRESENT ILLNESS   (Location/Symptom, Timing/Onset,Context/Setting, Quality, Duration, Modifying Factors, Severity)  Note limiting factors. Glenna Cline is a [de-identified] y.o. male  who  has a past medical history of Aspergillus pneumonia [address] [address] Basal cell carcinoma of skin, COPD (chronic obstructive pulmonary disease) ([facility] [address] [facility] DNR (do not resuscitate), Hyperlipidemia, Hypertension, MRSA colonization, Skin cancer, and Squamous cell cancer of skin of nose. 61-year-old male with history of severe COPD, aspergillus pneumonia, prior MRSA pneumonia hypertension, hyperlipidemia who presents with increased work of breathing which came on gradually for last 2 weeks has been constant in nature now worsening. Associated with mild cough. Patient states he usually does not wear oxygen has a small oxygen concentrator at home but no other oxygen tanks. Patient usually is stable on room air. Patient feels like he is really having to work to breathe. Recently finished a steroid course that was given to him by his pulmonary doctor but it helped him slightly and then his symptoms have again gotten worse since he completed them. Has not been on antibiotics recently. Denies any other associated symptoms. Denies any fever, nausea, vomiting, diarrhea, chest pain, dysuria, hematuria, back pain.         Shortness of Breath  Severity:  Moderate  Onset quality:  Gradual  Duration:  2 weeks  Timing:  Constant  Progression:  Worsening  Chronicity:  Chronic  Relieved by:  Nothing  Worsened by:  Movement and exertion  Ineffective treatments:  Inhaler and oxygen (Steroids)  Associated symptoms: cough and wheezing    Associated symptoms: no abdominal pain, no chest pain, no diaphoresis, no fever, no headaches, no hemoptysis, no rash, no sore throat, no sputum production, no syncope and no vomiting    Risk factors comment:  COPD      NursingNotes were reviewed. REVIEW OF SYSTEMS    (2-9 systems for level 4, 10 or more for level 5)     Review of Systems   Constitutional: Negative. Negative for diaphoresis, fatigue and fever. HENT: Negative for congestion, rhinorrhea and sore throat. Eyes: Negative. Respiratory: Positive for cough, shortness of breath and wheezing. Negative for hemoptysis, sputum production and chest tightness. Cardiovascular: Negative for chest pain and syncope. Gastrointestinal: Negative for abdominal pain, diarrhea, nausea and vomiting. Endocrine: Negative. Genitourinary: Negative. Musculoskeletal: Negative. Skin: Negative for color change and rash. Allergic/Immunologic: Negative for environmental allergies and immunocompromised state. Neurological: Negative for dizziness, light-headedness and headaches. Hematological: Negative. All other systems reviewed and are negative. Except as noted above the remainder of the review of systems was reviewed and negative.        PAST MEDICAL HISTORY     Past Medical History:   Diagnosis Date    Aspergillus pneumonia ([address]     Basal cell carcinoma of skin 06/16/2017    right cheek    COPD (chronic obstructive pulmonary disease) ([address]     DNR (do not resuscitate)     no intubation or chest compressions    Hyperlipidemia     Hypertension 10/2012    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/04/2018    Skin cancer 2014    both upper thighs    Squamous cell cancer of skin of nose 5/2016         SURGICALHISTORY       Past Surgical History:   Procedure Laterality Date    BRONCHOSCOPY N/A 7/25/2019    BRONCHOSCOPY WITH BRONCHOALVEOLAR LAVAGE performed by Mamadou Ware MD at [facility] Bilateral     5/2016    COLONOSCOPY  08/04/2016    dr Bridgett Daugherty    3 years    SKIN BIOPSY  5/2016    Nose     TONSILLECTOMY AND ADENOIDECTOMY      TOOTH EXTRACTION  5/2016    UPPER GASTROINTESTINAL ENDOSCOPY  12/2/2015    dr Jaleesa Christy       Previous Medications    ALBUTEROL (PROVENTIL) (2.5 MG/3ML) 0.083% NEBULIZER SOLUTION    USE 1 VIAL VIA NEBULIZER EVERY 6 HOURS AS NEEDED FOR WHEEZING OR SHORTNESS OF BREATH    ALBUTEROL SULFATE HFA (VENTOLIN HFA) 108 (90 BASE) MCG/ACT INHALER    Inhale 2 puffs into the lungs every 4 hours as needed for Wheezing    ATORVASTATIN (LIPITOR) 10 MG TABLET    TAKE 1 TABLET BY MOUTH DAILY    CYCLOSPORINE (RESTASIS) 0.05 % OPHTHALMIC EMULSION    Place 1 drop into both eyes 2 times daily     DILTIAZEM (CARDIZEM CD) 300 MG EXTENDED RELEASE CAPSULE    TAKE 1 CAPSULE BY MOUTH DAILY    FLUCONAZOLE (DIFLUCAN) 100 MG TABLET    Take 1 tablet by mouth daily for 28 days    FLUTICASONE-UMECLIDIN-VILANT (TRELEGY ELLIPTA) 100-62.5-25 MCG/INH AEPB    Inhale 1 puff into the lungs daily    GUAIFENESIN (MUCINEX) 600 MG EXTENDED RELEASE TABLET    Take 1,200 mg by mouth 2 times daily    IPRATROPIUM (ATROVENT) 0.03 % NASAL SPRAY    2 sprays by Nasal route 4 times daily    LOSARTAN (COZAAR) 50 MG TABLET    TAKE 1 TABLET BY MOUTH DAILY    NEBULIZERS (COMPRESSOR/NEBULIZER) MISC    Use every 6 hours as needed, DX COPD J44.9    NEBULIZERS (BOB LC PLUS NEBULIZER) MISC    1 Device by Does not apply route daily    OMEPRAZOLE (PRILOSEC) 40 MG CAPSULE    Take 40 mg by mouth daily    PREDNISONE (DELTASONE) 10 MG TABLET    Take 40 mg by mouth for 3 days 30 mg for 3 days 20 mg for 3 days 10 mg for 3 days.     PROBIOTIC PRODUCT (PROBIOTIC ADVANCED PO)    Take by mouth    PSYLLIUM (FIBER) 28.3 % POWD    Take by mouth daily [employer]    SPACER/AERO-HOLDING CHAMBERS KIT Please dispense 1 spacer    THEOPHYLLINE (UNIPHYL) 400 MG EXTENDED RELEASE TABLET    TAKE 1/2 TABLET BY MOUTH TWICE DAILY            Patient has no known allergies. FAMILY HISTORY       Family History   Problem Relation Age of Onset    Diabetes Mother         DM    Coronary Art Dis Mother           SOCIAL HISTORY       Social History     Socioeconomic History    Marital status:      Spouse name: Not on file    Number of children: Not on file    Years of education: Not on file    Highest education level: Not on file   Occupational History    Not on file   Tobacco Use    Smoking status: Former Smoker     Packs/day: 1.00     Years: 45.00     Pack years: 45.00     Types: Cigarettes     Quit date: 2002     Years since quittin.8    Smokeless tobacco: Never Used   Vaping Use    Vaping Use: Never used   Substance and Sexual Activity    Alcohol use: Yes     Comment: rarely     Drug use: No    Sexual activity: Yes     Partners: Female   Other Topics Concern    Not on file   Social History Narrative    ** Merged History Encounter **          Social Determinants of Health     Financial Resource Strain: Unknown    Difficulty of Paying Living Expenses: Patient refused   Food Insecurity: Unknown    Worried About Running Out of Food in the Last Year: Patient refused    [address] in the Last Year: Patient refused   Transportation Needs:     Lack of Transportation (Medical):      Lack of Transportation (Non-Medical):    Physical Activity:     Days of Exercise per Week:     Minutes of Exercise per Session:    Stress:     Feeling of Stress :    Social Connections:     Frequency of Communication with Friends and Family:     Frequency of Social Gatherings with Friends and Family:     Attends Synagogue Services:     Active Member of Clubs or Organizations:     Attends Club or Organization Meetings:     Marital Status:    Intimate Partner Violence:     Fear of Current or Ex-Partner:     Emotionally Abused:     Physically Abused:     Sexually Abused:        SCREENINGS             PHYSICAL EXAM    (up to 7 for level 4, 8 or more for level 5)     ED Triage Vitals [10/20/21 2319]   BP Temp Temp Source Pulse Resp SpO2 Height Weight   (!) 168/82 97.9 °F (36.6 °C) Tympanic 108 18 95 % -- --       Physical Exam  Vitals and nursing note reviewed. Constitutional:       General: He is not in acute distress. Appearance: Normal appearance. He is well-developed and normal weight. He is not ill-appearing, toxic-appearing or diaphoretic. HENT:      Head: Normocephalic and atraumatic. Mouth/Throat:      Mouth: Mucous membranes are moist.      Pharynx: Oropharynx is clear. Eyes:      Extraocular Movements: Extraocular movements intact. Cardiovascular:      Rate and Rhythm: Normal rate and regular rhythm. Pulses: Normal pulses. Heart sounds: Normal heart sounds. No murmur heard. No gallop. Pulmonary:      Effort: Tachypnea, accessory muscle usage and respiratory distress present. Breath sounds: Examination of the right-upper field reveals wheezing. Examination of the left-upper field reveals wheezing. Examination of the right-middle field reveals wheezing. Examination of the left-middle field reveals wheezing. Examination of the right-lower field reveals wheezing. Examination of the left-lower field reveals wheezing. Wheezing present. No decreased breath sounds, rhonchi or rales. Chest:      Chest wall: No tenderness. Abdominal:      General: Bowel sounds are normal.      Palpations: Abdomen is soft. Tenderness: There is no abdominal tenderness. Musculoskeletal:         General: Normal range of motion. Cervical back: Normal range of motion and neck supple. Right lower leg: No edema. Left lower leg: No edema. Skin:     General: Skin is warm and dry. Capillary Refill: Capillary refill takes less than 2 seconds. Findings: No rash. Neurological:      General: No focal deficit present. Mental Status: He is alert. Psychiatric:         Mood and Affect: Mood normal.         Behavior: Behavior normal.         RESULTS     EKG: All EKG's are interpreted by the Emergency Department Physician who either signs or Co-signsthis chart in the absence of a cardiologist.    EKG Interpretation    Interpreted by emergency department physician    Rhythm: sinus tachycardia  Rate: 100-110  Axis: right  Ectopy: none  Conduction: normal  ST Segments: no acute change  T Waves: no acute change  Q Waves: none    Clinical Impression: sinus tachycardia unchanged from July 24, 2019    Beto Mota MD      RADIOLOGY:   Non-plain filmimages such as CT, Ultrasound and MRI are read by the radiologist.     Interpretation per the Radiologist below, if available at the time ofthis note:    XR CHEST (2 VW)   Final Result   Mild chronic obstructive lung changes with mild discoid atelectasis or   scarring along the lung bases which is less prominent. No obvious infiltrate   or effusion is seen.                ED BEDSIDE ULTRASOUND:   Performed by ED Physician - none    LABS:  Labs Reviewed   BASIC METABOLIC PANEL W/ REFLEX TO MG FOR LOW K - Abnormal; Notable for the following components:       Result Value    Sodium 134 (*)     Chloride 98 (*)     Glucose 109 (*)     All other components within normal limits    Narrative:     Performed at:  [facility]  [address] [place], [place] [address]   Phone [phone]   BRAIN NATRIURETIC PEPTIDE    Narrative:     Performed at:  [facility]  [address] [place], [place] [address]   Phone [phone]   TROPONIN    Narrative:     Performed at:  [facility]  [address] [place], [place] [address]   Phone [phone]   CBC WITH AUTO DIFFERENTIAL       All other labs were within normal range or not returned as of this dictation. EMERGENCY DEPARTMENT COURSE and DIFFERENTIAL DIAGNOSIS/MDM:   Vitals:    Vitals:    10/20/21 2319   BP: (!) 168/82   Pulse: 108   Resp: 18   Temp: 97.9 °F (36.6 °C)   TempSrc: Tympanic   SpO2: 95%       Patient was given thefollowing medications:  Medications   ipratropium-albuterol (DUONEB) nebulizer solution 1 ampule (has no administration in time range)   methylPREDNISolone sodium (SOLU-MEDROL) injection 40 mg (40 mg IntraVENous Given 10/20/21 1776)       ED COURSE & MEDICAL DECISION MAKING    Pertinent Labs & Imaging studies reviewed. (See chart for details)   -  Patient seen and evaluated in the emergency department. -  Triage and nursing notes reviewed and incorporated. -  Old chart records reviewed and incorporated. -  Differential diagnosis includes: Differential Diagnosis: Acute COPD exacerbation, Hypoxemic Respiratory Distress, Pneumonia, Sepsis, Congestive Heart Failure, Myocardial Infarction, Pulmonary Embolus, ARDS, Pneumothorax, other    43-year-old male with severe COPD and multiple prior incidence of pneumonia and Aspergillus pneumonia who presents for signs and symptoms concerning for COPD exacerbation. Wheezing and increased work of breathing. Patient is using his stomach and some accessory muscles to breathe. Patient is saturating 95 to 98% on 2 L nasal cannula. Patient does not wear oxygen at home. Patient is mildly tachycardic but no chest pain. Low concern for PE at this time. EKG is similar to previous. No significant change or signs of ischemia. Will order cardiac work-up as well as give steroids and breathing treatments. Patient will likely require admission but will monitor closely. -  Work-up included:  See above  -  ED treatment included: See above  -  Results discussed with patient. REASSESSMENT     ED Course as of Oct 21 0040   Thu Oct 21, 2021   0039 Troponin negative.   BNP normal.  Admit for COPD exacerbation.    [SC]   9344 Patient still saturating well on 2 L nasal cannula. [SC]      ED Course User Index  [SC] Kolby Womack MD         CRITICAL CARE TIME   Total Critical Care time was 30 minutes, excluding separately reportable procedures. There was a high probability of clinically significant/life threatening deterioration in the patient's condition which required my urgent intervention. This includes multiple reevaluations, vital sign monitoring, pulse oximetry monitoring, telemetry monitoring, clinical response to the IV medications, reviewing the nursing notes, consultation time, dictation/documentation time, and interpretation of the labwork. (This time excludes time spent performing procedures). CONSULTS:  IP CONSULT TO PRIMARY CARE PROVIDER    PROCEDURES:  Unless otherwise noted below, none     Procedures    FINAL IMPRESSION      1. COPD exacerbation ([address]    2. Acute respiratory failure with hypoxia (HCC)    3. Dyspnea on exertion          DISPOSITION/PLAN   DISPOSITION        PATIENT REFERREDTO:  No follow-up provider specified.     DISCHARGEMEDICATIONS:  New Prescriptions    No medications on file          (Please note that portions of this note were completed with a voice recognition program.  Efforts were made to edit the dictations but occasionally words are mis-transcribed.)    Kolby Womack MD (electronically signed)  Attending Emergency Physician         Kolby Womack MD  10/21/21 0040

## 2021-10-22 ENCOUNTER — APPOINTMENT (OUTPATIENT)
Dept: GENERAL RADIOLOGY | Age: 81
DRG: 191 | End: 2021-10-22
Payer: MEDICARE

## 2021-10-22 LAB
ANION GAP SERPL CALCULATED.3IONS-SCNC: 12 MMOL/L (ref 3–16)
BASOPHILS ABSOLUTE: 0 K/UL (ref 0–0.2)
BASOPHILS RELATIVE PERCENT: 0.2 %
BUN BLDV-MCNC: 21 MG/DL (ref 7–20)
CALCIUM SERPL-MCNC: 9.8 MG/DL (ref 8.3–10.6)
CHLORIDE BLD-SCNC: 99 MMOL/L (ref 99–110)
CO2: 24 MMOL/L (ref 21–32)
CREAT SERPL-MCNC: 1 MG/DL (ref 0.8–1.3)
EOSINOPHILS ABSOLUTE: 0 K/UL (ref 0–0.6)
EOSINOPHILS RELATIVE PERCENT: 0 %
GFR AFRICAN AMERICAN: >60
GFR NON-AFRICAN AMERICAN: >60
GLUCOSE BLD-MCNC: 169 MG/DL (ref 70–99)
GLUCOSE BLD-MCNC: 194 MG/DL (ref 70–99)
GLUCOSE BLD-MCNC: 210 MG/DL (ref 70–99)
GLUCOSE BLD-MCNC: 240 MG/DL (ref 70–99)
GLUCOSE BLD-MCNC: 250 MG/DL (ref 70–99)
HCT VFR BLD CALC: 46.1 % (ref 40.5–52.5)
HEMOGLOBIN: 14.9 G/DL (ref 13.5–17.5)
LYMPHOCYTES ABSOLUTE: 0.9 K/UL (ref 1–5.1)
LYMPHOCYTES RELATIVE PERCENT: 5.1 %
MCH RBC QN AUTO: 28.1 PG (ref 26–34)
MCHC RBC AUTO-ENTMCNC: 32.3 G/DL (ref 31–36)
MCV RBC AUTO: 87.1 FL (ref 80–100)
MONOCYTES ABSOLUTE: 0.8 K/UL (ref 0–1.3)
MONOCYTES RELATIVE PERCENT: 4.5 %
NEUTROPHILS ABSOLUTE: 15.7 K/UL (ref 1.7–7.7)
NEUTROPHILS RELATIVE PERCENT: 90.2 %
PDW BLD-RTO: 14.5 % (ref 12.4–15.4)
PERFORMED ON: ABNORMAL
PLATELET # BLD: 343 K/UL (ref 135–450)
PMV BLD AUTO: 7 FL (ref 5–10.5)
POTASSIUM SERPL-SCNC: 4.9 MMOL/L (ref 3.5–5.1)
RBC # BLD: 5.29 M/UL (ref 4.2–5.9)
SODIUM BLD-SCNC: 135 MMOL/L (ref 136–145)
WBC # BLD: 17.4 K/UL (ref 4–11)

## 2021-10-22 PROCEDURE — 6370000000 HC RX 637 (ALT 250 FOR IP): Performed by: INTERNAL MEDICINE

## 2021-10-22 PROCEDURE — 2700000000 HC OXYGEN THERAPY PER DAY

## 2021-10-22 PROCEDURE — 36415 COLL VENOUS BLD VENIPUNCTURE: CPT

## 2021-10-22 PROCEDURE — 6370000000 HC RX 637 (ALT 250 FOR IP): Performed by: NURSE PRACTITIONER

## 2021-10-22 PROCEDURE — 71045 X-RAY EXAM CHEST 1 VIEW: CPT

## 2021-10-22 PROCEDURE — 85025 COMPLETE CBC W/AUTO DIFF WBC: CPT

## 2021-10-22 PROCEDURE — 99233 SBSQ HOSP IP/OBS HIGH 50: CPT | Performed by: INTERNAL MEDICINE

## 2021-10-22 PROCEDURE — 6360000002 HC RX W HCPCS: Performed by: INTERNAL MEDICINE

## 2021-10-22 PROCEDURE — 2060000000 HC ICU INTERMEDIATE R&B

## 2021-10-22 PROCEDURE — 94660 CPAP INITIATION&MGMT: CPT

## 2021-10-22 PROCEDURE — 99232 SBSQ HOSP IP/OBS MODERATE 35: CPT | Performed by: INTERNAL MEDICINE

## 2021-10-22 PROCEDURE — 94761 N-INVAS EAR/PLS OXIMETRY MLT: CPT

## 2021-10-22 PROCEDURE — 80048 BASIC METABOLIC PNL TOTAL CA: CPT

## 2021-10-22 PROCEDURE — 94640 AIRWAY INHALATION TREATMENT: CPT

## 2021-10-22 RX ORDER — METHYLPREDNISOLONE SODIUM SUCCINATE 40 MG/ML
40 INJECTION, POWDER, LYOPHILIZED, FOR SOLUTION INTRAMUSCULAR; INTRAVENOUS DAILY
Status: DISCONTINUED | OUTPATIENT
Start: 2021-10-22 | End: 2021-10-24

## 2021-10-22 RX ORDER — METHYLPREDNISOLONE SODIUM SUCCINATE 40 MG/ML
40 INJECTION, POWDER, LYOPHILIZED, FOR SOLUTION INTRAMUSCULAR; INTRAVENOUS EVERY 8 HOURS
Status: DISCONTINUED | OUTPATIENT
Start: 2021-10-22 | End: 2021-10-22

## 2021-10-22 RX ORDER — POLYETHYLENE GLYCOL 3350 17 G/17G
17 POWDER, FOR SOLUTION ORAL DAILY
Status: DISCONTINUED | OUTPATIENT
Start: 2021-10-22 | End: 2021-10-25 | Stop reason: HOSPADM

## 2021-10-22 RX ADMIN — NYSTATIN 500000 UNITS: 100000 SUSPENSION ORAL at 08:39

## 2021-10-22 RX ADMIN — BUDESONIDE AND FORMOTEROL FUMARATE DIHYDRATE 2 PUFF: 160; 4.5 AEROSOL RESPIRATORY (INHALATION) at 19:58

## 2021-10-22 RX ADMIN — DILTIAZEM HYDROCHLORIDE 300 MG: 300 CAPSULE, COATED, EXTENDED RELEASE ORAL at 08:39

## 2021-10-22 RX ADMIN — ENOXAPARIN SODIUM 40 MG: 100 INJECTION SUBCUTANEOUS at 08:39

## 2021-10-22 RX ADMIN — BUDESONIDE AND FORMOTEROL FUMARATE DIHYDRATE 2 PUFF: 160; 4.5 AEROSOL RESPIRATORY (INHALATION) at 09:01

## 2021-10-22 RX ADMIN — PANTOPRAZOLE SODIUM 40 MG: 40 TABLET, DELAYED RELEASE ORAL at 06:01

## 2021-10-22 RX ADMIN — POLYETHYLENE GLYCOL 3350 17 G: 17 POWDER, FOR SOLUTION ORAL at 08:36

## 2021-10-22 RX ADMIN — POLYVINYL ALCOHOL 2 DROP: 14 SOLUTION/ DROPS OPHTHALMIC at 22:55

## 2021-10-22 RX ADMIN — METHYLPREDNISOLONE SODIUM SUCCINATE 80 MG: 125 INJECTION, POWDER, FOR SOLUTION INTRAMUSCULAR; INTRAVENOUS at 06:01

## 2021-10-22 RX ADMIN — IPRATROPIUM BROMIDE AND ALBUTEROL SULFATE 1 AMPULE: .5; 2.5 SOLUTION RESPIRATORY (INHALATION) at 09:01

## 2021-10-22 RX ADMIN — THEOPHYLLINE 200 MG: 400 TABLET, EXTENDED RELEASE ORAL at 21:14

## 2021-10-22 RX ADMIN — NYSTATIN 500000 UNITS: 100000 SUSPENSION ORAL at 21:14

## 2021-10-22 RX ADMIN — INSULIN LISPRO 3 UNITS: 100 INJECTION, SOLUTION INTRAVENOUS; SUBCUTANEOUS at 18:20

## 2021-10-22 RX ADMIN — ATORVASTATIN CALCIUM 10 MG: 10 TABLET, FILM COATED ORAL at 08:39

## 2021-10-22 RX ADMIN — IPRATROPIUM BROMIDE AND ALBUTEROL SULFATE 1 AMPULE: .5; 2.5 SOLUTION RESPIRATORY (INHALATION) at 15:57

## 2021-10-22 RX ADMIN — INSULIN LISPRO 2 UNITS: 100 INJECTION, SOLUTION INTRAVENOUS; SUBCUTANEOUS at 21:15

## 2021-10-22 RX ADMIN — INSULIN LISPRO 2 UNITS: 100 INJECTION, SOLUTION INTRAVENOUS; SUBCUTANEOUS at 12:10

## 2021-10-22 RX ADMIN — THEOPHYLLINE 200 MG: 400 TABLET, EXTENDED RELEASE ORAL at 08:39

## 2021-10-22 RX ADMIN — NYSTATIN 500000 UNITS: 100000 SUSPENSION ORAL at 18:20

## 2021-10-22 RX ADMIN — OXYMETAZOLINE HCL 2 SPRAY: 0.05 SPRAY NASAL at 22:55

## 2021-10-22 RX ADMIN — DOCUSATE SODIUM 100 MG: 100 CAPSULE ORAL at 08:39

## 2021-10-22 RX ADMIN — METHYLPREDNISOLONE SODIUM SUCCINATE 40 MG: 40 INJECTION, POWDER, FOR SOLUTION INTRAMUSCULAR; INTRAVENOUS at 12:08

## 2021-10-22 RX ADMIN — INSULIN LISPRO 1 UNITS: 100 INJECTION, SOLUTION INTRAVENOUS; SUBCUTANEOUS at 08:39

## 2021-10-22 RX ADMIN — DOCUSATE SODIUM 100 MG: 100 CAPSULE ORAL at 21:14

## 2021-10-22 RX ADMIN — LOSARTAN POTASSIUM 50 MG: 50 TABLET, FILM COATED ORAL at 08:39

## 2021-10-22 RX ADMIN — IPRATROPIUM BROMIDE AND ALBUTEROL SULFATE 1 AMPULE: .5; 2.5 SOLUTION RESPIRATORY (INHALATION) at 12:52

## 2021-10-22 RX ADMIN — NYSTATIN 500000 UNITS: 100000 SUSPENSION ORAL at 12:08

## 2021-10-22 RX ADMIN — IPRATROPIUM BROMIDE AND ALBUTEROL SULFATE 1 AMPULE: .5; 2.5 SOLUTION RESPIRATORY (INHALATION) at 19:58

## 2021-10-22 ASSESSMENT — PAIN SCALES - GENERAL: PAINLEVEL_OUTOF10: 0

## 2021-10-22 NOTE — PLAN OF CARE
Problem: Falls - Risk of:  Goal: Will remain free from falls  Description: Will remain free from falls  Outcome: Ongoing     Problem:  Activity Intolerance:  Goal: Ability to tolerate increased activity will improve  Description: Ability to tolerate increased activity will improve  Outcome: Ongoing     Problem: Airway Clearance - Ineffective:  Goal: Ability to maintain a clear airway will improve  Description: Ability to maintain a clear airway will improve  Outcome: Ongoing     Problem: Breathing Pattern - Ineffective:  Goal: Ability to achieve and maintain a regular respiratory rate will improve  Description: Ability to achieve and maintain a regular respiratory rate will improve  Outcome: Ongoing

## 2021-10-22 NOTE — PROGRESS NOTES
RN made MD aware of increase of WBC and new Pleural effusions found on chest xray. MD stated in a nursing order [facility] incr sec to steroids--no evidence on infection-- no antibx at this time\". RN updated pt, no new orders at this time. Will continue to monitor.        Electronically signed by Haylee Haines RN on 10/22/2021 at 8:30 AM

## 2021-10-22 NOTE — PROGRESS NOTES
Physician Progress Note      PATIENT:               Araceli Feldman  CSN #:                  [id number]  :                       1940  ADMIT DATE:       10/20/2021 11:22 PM  [address] [place] DATE:  Channing Elias  PROVIDER #:        Norma Roberson MD          QUERY TEXT:    Patient admitted with exacerbation of COPD. Congestive heart failure with   hypoxemia documented in H&P with no further mention of CHF. If possible,   please document in the progress notes and discharge summary if CHF was: The medical record reflects the following:  Risk Factors: HTN, COPD  Clinical Indicators: CHF not included in [address],  BNP 37, CXR shows no acute   process  Treatment: monitoring O2 sat, low Na diet    Thank you,  Rosaline Haines, RN, BSN, CCDS  [email or web address]  Options provided:  -- CHF present on admission  -- CHF ruled out after study  -- Other - I will add my own diagnosis  -- Disagree - Not applicable / Not valid  -- Disagree - Clinically unable to determine / Unknown  -- Refer to Clinical Documentation Reviewer    PROVIDER RESPONSE TEXT:    CHF ruled out after study. Query created by: Elodia Tinoco on 10/22/2021 9:29 AM      QUERY TEXT:    Patient admitted with exacerbation of severe COPD. Per ED provider notes O2   sat 95-98% on room air with wheezing throughout and tachypnea, accessory   muscle usage and respiratory distress. Per pulmonary consult note on 10/21,   on room air with sat 94%. Noted documentation of acute respiratory failure   with hypoxia in progress note on 10/22. In order to support the diagnosis of   acute respiratory failure, please include additional clinical indicators in   your documentation. Or please document if the diagnosis of acute respiratory   failure has been ruled out after further study.     The medical record reflects the following:  Risk Factors: severe COPD exacerbation  Clinical Indicators: O2 sat 95-98% on room air in ED, documented respiratory   distress in ED  Treatment: Solumedrol, HHN, pulmonary consult    Acute Respiratory Failure Clinical Indicators per  MS-DRG Training Guide and   Quick Reference Guide:  pO2 < 60 mmHg or SpO2 (pulse oximetry) < 91% breathing room air  pCO2 > 50 and pH < 7.35  P/F ratio (pO2 / FIO2) < 300  pO2 decrease or pCO2 increase by 10 mmHg from baseline (if known)  Supplemental oxygen of 40% or more  Presence of respiratory distress, tachypnea, dyspnea, shortness of breath,   wheezing  Unable to speak in complete sentences  Use of accessory muscles to breathe  Extreme anxiety and feeling of impending doom  Tripod position  Confusion/altered mental status/obtunded    Thank you,  Ashly Small RN, BSN, CCDS  [email or web address]  Options provided:  -- Acute Respiratory Failure as evidenced by, Please document evidence. -- Acute respiratory distress only with Acute Respiratory Failure ruled out   after study  -- Other - I will add my own diagnosis  -- Disagree - Not applicable / Not valid  -- Disagree - Clinically unable to determine / Unknown  -- Refer to Clinical Documentation Reviewer    PROVIDER RESPONSE TEXT:    Acute respiratory distress only with Acute Respiratory Failure ruled out after   study.     Query created by: Myrna Beaulieu on 10/22/2021 9:29 AM      Electronically signed by:  eDnnis Zuñiga MD 10/22/2021 2:37 PM

## 2021-10-22 NOTE — PROGRESS NOTES
Pulmonary Progress Note    Date of Admission: 10/20/2021   LOS: 1 day     Chief Complaint   Patient presents with    Shortness of Breath     increased shortness of breath for one month. pt has oxygen available at home. arrived on [facility]. pt normally not on oxygen       Assessment/Plan:     Acute hypoxemic respiratory failure with SPO2 less than 90% on room air  -Is back on1 L of oxygen but SPO2 also 97%. Goal saturation 88 to 92%  -BNP negative    Acute exacerbation of severe COPD  -FEV1 41%  -Scheduled DuoNebs, Symbicort  -Wean steroids to daily  -Blood gas compensated  -Respiratory viral panel not obtained  Home theophylline     ZAHEER  -Home BiPAP 14/8    Oral thrush  -On nystatin    Anticipate discharge 24 to 48-hour. 24 Hour Events/Subjective  Breathing much improved. Thrush better as well. Patient has no complaints today. ROS:   No nausea  No Vomiting  No chest pain      Intake/Output Summary (Last 24 hours) at 10/22/2021 0931  Last data filed at 10/22/2021 0650  Gross per 24 hour   Intake 720 ml   Output 200 ml   Net 520 ml         PHYSICAL EXAM:   Blood pressure 108/74, pulse 82, temperature 97.7 °F (36.5 °C), temperature source Oral, resp. rate 18, height 5' 7\" (1.702 m), weight 184 lb 1.4 oz (83.5 kg), SpO2 97 %.'  Gen:  No acute distress. Eyes: PERRL. Anicteric sclera. No conjunctival injection. ENT: No discharge. Posterior oropharynx clear. External appearance of ears and nose normal.  Neck: Trachea midline. No mass   Resp:  No crackles. No wheezes. No rhonchi. No dullness on percussion. CV: Regular rate. Regular rhythm. No murmur or rub. No edema. GI: Soft, Non-tender. Non-distended. +BS  Skin: Warm, dry, w/o erythema. Lymph: No cervical or supraclavicular LAD. M/S: No cyanosis. No clubbing. Neuro:  no focal neurologic deficit. Moves all extremities  Psych: Awake and alert, Oriented x 3. Judgement and insight appropriate. Mood stable.       Medications:    Scheduled Meds:   methylPREDNISolone  40 mg IntraVENous Q8H    polyethylene glycol  17 g Oral Daily    ipratropium-albuterol  1 ampule Inhalation Q4H WA    atorvastatin  10 mg Oral Daily    dilTIAZem  300 mg Oral Daily    losartan  50 mg Oral Daily    pantoprazole  40 mg Oral QAM AC    theophylline  200 mg Oral BID    insulin lispro  0-6 Units SubCUTAneous TID WC    insulin lispro  0-3 Units SubCUTAneous Nightly    enoxaparin  40 mg SubCUTAneous Daily    budesonide-formoterol  2 puff Inhalation BID    nystatin  5 mL Oral 4x Daily    docusate sodium  100 mg Oral BID       Continuous Infusions:   dextrose         PRN Meds:  polyvinyl alcohol, glucose, dextrose, glucagon (rDNA), dextrose, oxymetazoline    Labs reviewed:  CBC:   Recent Labs     10/21/21  0349 10/22/21  0705   WBC 12.4* 17.4*   HGB 15.0 14.9   HCT 46.7 46.1   MCV 87.1 87.1    343     BMP:   Recent Labs     10/20/21  2348 10/22/21  0705   * 135*   K 4.3 4.9   CL 98* 99   CO2 25 24   BUN 11 21*   CREATININE 0.9 1.0     LIVER PROFILE: No results for input(s): AST, ALT, LIPASE, BILIDIR, BILITOT, ALKPHOS in the last 72 hours. Invalid input(s): AMYLASE,  ALB  PT/INR: No results for input(s): PROTIME, INR in the last 72 hours. APTT: No results for input(s): APTT in the last 72 hours. UA:No results for input(s): NITRITE, COLORU, PHUR, LABCAST, WBCUA, RBCUA, MUCUS, TRICHOMONAS, YEAST, BACTERIA, CLARITYU, SPECGRAV, LEUKOCYTESUR, UROBILINOGEN, BILIRUBINUR, BLOODU, GLUCOSEU, AMORPHOUS in the last 72 hours. Invalid input(s): Dimple Gavin  No results for input(s): PH, PCO2, PO2 in the last 72 hours. Films:  Radiology Review:  Pertinent images / reports were reviewed as a part of this visit.     XR CHEST PORTABLE  Narrative: EXAMINATION:  ONE XRAY VIEW OF THE CHEST    10/22/2021 4:54 am    COMPARISON:  10/20/2021    HISTORY:  ORDERING SYSTEM PROVIDED HISTORY: exacerb COPD  TECHNOLOGIST PROVIDED HISTORY:  Reason for exam:->exacerb COPD  Reason for Exam: exacerb COPD  Acuity: Acute  Type of Exam: Initial    FINDINGS:  Cardiac leads project over the chest.  Oxygen support apparatus obscures the  right apex. Eventration of the right hemidiaphragm. Blunting of the  costophrenic angles again noted. No confluent airspace disease. No gross  pneumothorax. Cardiac and mediastinal silhouettes are similar to prior. Impression: No acute airspace disease. Trace pleural effusions or pleural thickening. This note was transcribed using [id number] [employer]. Please disregard any translational errors.     Thank you for this consult,    Basilia Patrick MD  [facility] Pulmonary, Critical Care, and Sleep Medicine

## 2021-10-22 NOTE — PROGRESS NOTES
Lani Rogers Alexander Progress Note  10/22/2021 7:17 AM  Subjective:   Admit Date: 10/20/2021      Chief Complaint: I feel a little better     Interval History: Is  less SOB--moving air better   NO chest pain  Cough persists   Pulm has seen--recc Continue current therapy    WBC incr sec to steroids --doubt infection   Diet: ADULT DIET; Regular; Low Fat/Low Chol/High Fiber/2 gm Na  Medications:   Scheduled Meds:   methylPREDNISolone  80 mg IntraVENous Q8H    ipratropium-albuterol  1 ampule Inhalation Q4H WA    atorvastatin  10 mg Oral Daily    dilTIAZem  300 mg Oral Daily    losartan  50 mg Oral Daily    pantoprazole  40 mg Oral QAM AC    theophylline  200 mg Oral BID    insulin lispro  0-6 Units SubCUTAneous TID WC    insulin lispro  0-3 Units SubCUTAneous Nightly    enoxaparin  40 mg SubCUTAneous Daily    budesonide-formoterol  2 puff Inhalation BID    nystatin  5 mL Oral 4x Daily    docusate sodium  100 mg Oral BID     Continuous Infusions:   dextrose         Review of Systems -   General ROS: afebrile  Respiratory ROS: positive for - cough and shortness of breath  Cardiovascular ROS: no chest pain  Musculoskeletal ROS:positive for - :joint pain  Neurological ROS: no TIA or stroke symptoms    Objective:   Vitals: BP (!) 141/62   Pulse 85   Temp 97.4 °F (36.3 °C) (Oral)   Resp 18   Ht 5' 7\" (1.702 m)   Wt 184 lb 1.4 oz (83.5 kg)   SpO2 94%   BMI 28.83 kg/m²   General appearance: alert and cooperative with exam  HEENT: Head: Normocephalic, no lesions, without obvious abnormality.   Neck: no adenopathy, no carotid bruit, no JVD, supple, symmetrical, trachea midline and thyroid not enlarged, symmetric, no tenderness/mass/nodules  Lungs: wheezes bilaterally  Heart: regular rate and rhythm, S1, S2 normal, no murmur, click, rub or gallop  Abdomen: soft, non-tender; bowel sounds normal; no masses,  no organomegaly  Extremities: extremities normal, atraumatic, no cyanosis or edema  Neurologic: Mental status: Alert, oriented, thought content appropriate    Admission on 10/20/2021   Component Date Value Ref Range Status    Ventricular Rate 10/20/2021 103  BPM Final    Atrial Rate 10/20/2021 103  BPM Final    P-R Interval 10/20/2021 134  ms Final    QRS Duration 10/20/2021 88  ms Final    Q-T Interval 10/20/2021 322  ms Final    QTc Calculation (Bazett) 10/20/2021 421  ms Final    P Axis 10/20/2021 82  degrees Final    R Axis 10/20/2021 93  degrees Final    T Axis 10/20/2021 31  degrees Final    Diagnosis 10/20/2021 Sinus tachycardiaRightward axisConfirmed by DINH CUI, Janes Sebastian ([phone]) on 10/21/2021 1:43:50 PM   Final    WBC 10/21/2021 12.4* 4.0 - 11.0 K/uL Final    RBC 10/21/2021 5.37  4.20 - 5.90 M/uL Final    Hemoglobin 10/21/2021 15.0  13.5 - 17.5 g/dL Final    Hematocrit 10/21/2021 46.7  40.5 - 52.5 % Final    MCV 10/21/2021 87.1  80.0 - 100.0 fL Final    MCH 10/21/2021 27.9  26.0 - 34.0 pg Final    MCHC 10/21/2021 32.1  31.0 - 36.0 g/dL Final    RDW 10/21/2021 14.7  12.4 - 15.4 % Final    Platelets 52/42/0618 331  135 - 450 K/uL Final    MPV 10/21/2021 6.7  5.0 - 10.5 fL Final    Neutrophils % 10/21/2021 92.7  % Final    Lymphocytes % 10/21/2021 4.2  % Final    Monocytes % 10/21/2021 2.2  % Final    Eosinophils % 10/21/2021 0.6  % Final    Basophils % 10/21/2021 0.3  % Final    Neutrophils Absolute 10/21/2021 11.5* 1.7 - 7.7 K/uL Final    Lymphocytes Absolute 10/21/2021 0.5* 1.0 - 5.1 K/uL Final    Monocytes Absolute 10/21/2021 0.3  0.0 - 1.3 K/uL Final    Eosinophils Absolute 10/21/2021 0.1  0.0 - 0.6 K/uL Final    Basophils Absolute 10/21/2021 0.0  0.0 - 0.2 K/uL Final    Sodium 10/20/2021 134* 136 - 145 mmol/L Final    Potassium reflex Magnesium 10/20/2021 4.3  3.5 - 5.1 mmol/L Final    Chloride 10/20/2021 98* 99 - 110 mmol/L Final    CO2 10/20/2021 25  21 - 32 mmol/L Final    Anion Gap 10/20/2021 11  3 - 16 Final    Glucose 10/20/2021 109* 70 - 99 mg/dL Final    BUN 10/20/2021 11  7 - 20 mg/dL Final    CREATININE 10/20/2021 0.9  0.8 - 1.3 mg/dL Final    GFR Non- 10/20/2021 >60  >60 Final    Comment: >60 mL/min/1.73m2 EGFR, calc. for ages 25 and older using the  MDRD formula (not corrected for weight), is valid for stable  renal function.  GFR  10/20/2021 >60  >60 Final    Comment: Chronic Kidney Disease: less than 60 ml/min/1.73 sq.m. Kidney Failure: less than 15 ml/min/1.73 sq.m. Results valid for patients 18 years and older.  Calcium 10/20/2021 10.0  8.3 - 10.6 mg/dL Final    Pro-BNP 10/20/2021 37  0 - 449 pg/mL Final    Comment: Methodology by NT-proBNP    An age-independent cutoff point of 300 pg/ml has a 98%  negative predictive value excluding acute heart failure. Values exceeding the age-related cutoff values (450 pg/mL if  age<50, 900 if 50-75 and 1800 if >75) has 90% sensitivity and  84% specificity for diagnosing acute HF. In patients with  renal compromise (eGFR<60) values greater than 1200pg/ml have  a diagnostic sensitivity and specificity of 89% and 72% for  acute HF.       Troponin 10/20/2021 <0.01  <0.01 ng/mL Final    Methodology by Troponin T    Hemoglobin A1C 10/20/2021 6.1  See comment % Final    Comment: Comment:  Diagnosis of Diabetes: > or = 6.5%  Increased risk of diabetes (Prediabetes): 5.7-6.4%  Glycemic Control: Nonpregnant Adults: <7.0%                    Pregnant: <6.0%        eAG 10/20/2021 128.4  mg/dL Final    pH, Seb 10/21/2021 7.365  7.350 - 7.450 Final    pCO2, Seb 10/21/2021 47.1  40.0 - 50.0 mmHg Final    pO2, Seb 10/21/2021 37  Not Established mmHg Final    HCO3, Venous 10/21/2021 27  23 - 29 mmol/L Final    Base Excess, Seb 10/21/2021 0.7  Not Established mmol/L Final    O2 Sat, Seb 10/21/2021 72  Not Established % Final    Carboxyhemoglobin 10/21/2021 1.2  % Final    Comment:      0.0-1.5  (Smokers 1.5-5.0)      MetHgb, Seb 10/21/2021 0.7  <1.5 % Final    TC02 (Calc), Seb 10/21/2021

## 2021-10-22 NOTE — RT PROTOCOL NOTE
RT Nebulizer Bronchodilator Protocol Note    There is a bronchodilator order in the chart from a provider indicating to follow the RT Bronchodilator Protocol and there is an Initiate RT Bronchodilator Protocol order as well (see protocol at bottom of note). CXR Findings:  XR CHEST PORTABLE    Result Date: 10/22/2021  No acute airspace disease. Trace pleural effusions or pleural thickening. The findings from the last RT Protocol Assessment were as follows:  Smoking: Chronic pulmonary disease  Respiratory Pattern: Dyspnea on exertion or RR 21-25 bpm  Breath Sounds: Slightly diminished and/or crackles  Cough: Strong, spontaneous, non-productive  Indication for Bronchodilator Therapy: Decreased or absent breath sounds  Bronchodilator Assessment Score: 6    Aerosolized bronchodilator medication orders have been revised according to the RT Nebulizer Bronchodilator Protocol below. Respiratory Therapist to perform RT Therapy Protocol Assessment initially then follow the protocol. Repeat RT Therapy Protocol Assessment PRN for score 0-3 or on second treatment, BID, and PRN for scores above 3. No Indications - adjust the frequency to every 6 hours PRN wheezing or bronchospasm, if no treatments needed after 48 hours then discontinue using Per Protocol order mode. If indication present, adjust the RT bronchodilator orders based on the Bronchodilator Assessment Score as indicated below. If a patient is on this medication at home then do not decrease Frequency below that used at home. 0-3 - enter or revise RT bronchodilator order(s) to equivalent RT Bronchodilator order with Frequency of every 4 hours PRN for wheezing or increased work of breathing using Per Protocol order mode.        4-6 - enter or revise RT Bronchodilator order(s) to two equivalent RT bronchodilator orders with one order with BID Frequency and one order with Frequency of every 4 hours PRN wheezing or increased work of breathing using

## 2021-10-23 LAB
GLUCOSE BLD-MCNC: 158 MG/DL (ref 70–99)
GLUCOSE BLD-MCNC: 181 MG/DL (ref 70–99)
GLUCOSE BLD-MCNC: 186 MG/DL (ref 70–99)
GLUCOSE BLD-MCNC: 195 MG/DL (ref 70–99)
PERFORMED ON: ABNORMAL

## 2021-10-23 PROCEDURE — 94761 N-INVAS EAR/PLS OXIMETRY MLT: CPT

## 2021-10-23 PROCEDURE — 94640 AIRWAY INHALATION TREATMENT: CPT

## 2021-10-23 PROCEDURE — 6370000000 HC RX 637 (ALT 250 FOR IP): Performed by: INTERNAL MEDICINE

## 2021-10-23 PROCEDURE — 99233 SBSQ HOSP IP/OBS HIGH 50: CPT | Performed by: INTERNAL MEDICINE

## 2021-10-23 PROCEDURE — 6360000002 HC RX W HCPCS: Performed by: INTERNAL MEDICINE

## 2021-10-23 PROCEDURE — 6370000000 HC RX 637 (ALT 250 FOR IP): Performed by: NURSE PRACTITIONER

## 2021-10-23 PROCEDURE — 2060000000 HC ICU INTERMEDIATE R&B

## 2021-10-23 PROCEDURE — 2700000000 HC OXYGEN THERAPY PER DAY

## 2021-10-23 RX ADMIN — IPRATROPIUM BROMIDE AND ALBUTEROL SULFATE 1 AMPULE: .5; 2.5 SOLUTION RESPIRATORY (INHALATION) at 08:31

## 2021-10-23 RX ADMIN — BUDESONIDE AND FORMOTEROL FUMARATE DIHYDRATE 2 PUFF: 160; 4.5 AEROSOL RESPIRATORY (INHALATION) at 19:54

## 2021-10-23 RX ADMIN — THEOPHYLLINE 200 MG: 400 TABLET, EXTENDED RELEASE ORAL at 20:47

## 2021-10-23 RX ADMIN — METHYLPREDNISOLONE SODIUM SUCCINATE 40 MG: 40 INJECTION, POWDER, FOR SOLUTION INTRAMUSCULAR; INTRAVENOUS at 08:34

## 2021-10-23 RX ADMIN — ATORVASTATIN CALCIUM 10 MG: 10 TABLET, FILM COATED ORAL at 08:34

## 2021-10-23 RX ADMIN — POLYVINYL ALCOHOL 2 DROP: 14 SOLUTION/ DROPS OPHTHALMIC at 08:39

## 2021-10-23 RX ADMIN — OXYMETAZOLINE HCL 2 SPRAY: 0.05 SPRAY NASAL at 08:39

## 2021-10-23 RX ADMIN — LOSARTAN POTASSIUM 50 MG: 50 TABLET, FILM COATED ORAL at 08:34

## 2021-10-23 RX ADMIN — NYSTATIN 500000 UNITS: 100000 SUSPENSION ORAL at 08:34

## 2021-10-23 RX ADMIN — DOCUSATE SODIUM 100 MG: 100 CAPSULE ORAL at 20:47

## 2021-10-23 RX ADMIN — BUDESONIDE AND FORMOTEROL FUMARATE DIHYDRATE 2 PUFF: 160; 4.5 AEROSOL RESPIRATORY (INHALATION) at 08:32

## 2021-10-23 RX ADMIN — POLYETHYLENE GLYCOL 3350 17 G: 17 POWDER, FOR SOLUTION ORAL at 08:28

## 2021-10-23 RX ADMIN — IPRATROPIUM BROMIDE AND ALBUTEROL SULFATE 1 AMPULE: .5; 2.5 SOLUTION RESPIRATORY (INHALATION) at 19:54

## 2021-10-23 RX ADMIN — INSULIN LISPRO 2 UNITS: 100 INJECTION, SOLUTION INTRAVENOUS; SUBCUTANEOUS at 12:45

## 2021-10-23 RX ADMIN — INSULIN LISPRO 1 UNITS: 100 INJECTION, SOLUTION INTRAVENOUS; SUBCUTANEOUS at 20:48

## 2021-10-23 RX ADMIN — NYSTATIN 500000 UNITS: 100000 SUSPENSION ORAL at 12:45

## 2021-10-23 RX ADMIN — PANTOPRAZOLE SODIUM 40 MG: 40 TABLET, DELAYED RELEASE ORAL at 05:26

## 2021-10-23 RX ADMIN — POLYVINYL ALCOHOL 2 DROP: 14 SOLUTION/ DROPS OPHTHALMIC at 20:47

## 2021-10-23 RX ADMIN — IPRATROPIUM BROMIDE AND ALBUTEROL SULFATE 1 AMPULE: .5; 2.5 SOLUTION RESPIRATORY (INHALATION) at 15:43

## 2021-10-23 RX ADMIN — DOCUSATE SODIUM 100 MG: 100 CAPSULE ORAL at 08:33

## 2021-10-23 RX ADMIN — INSULIN LISPRO 2 UNITS: 100 INJECTION, SOLUTION INTRAVENOUS; SUBCUTANEOUS at 08:39

## 2021-10-23 RX ADMIN — ENOXAPARIN SODIUM 40 MG: 100 INJECTION SUBCUTANEOUS at 08:34

## 2021-10-23 RX ADMIN — THEOPHYLLINE 200 MG: 400 TABLET, EXTENDED RELEASE ORAL at 08:28

## 2021-10-23 RX ADMIN — DILTIAZEM HYDROCHLORIDE 300 MG: 300 CAPSULE, COATED, EXTENDED RELEASE ORAL at 08:34

## 2021-10-23 RX ADMIN — NYSTATIN 500000 UNITS: 100000 SUSPENSION ORAL at 17:52

## 2021-10-23 RX ADMIN — IPRATROPIUM BROMIDE AND ALBUTEROL SULFATE 1 AMPULE: .5; 2.5 SOLUTION RESPIRATORY (INHALATION) at 12:21

## 2021-10-23 RX ADMIN — INSULIN LISPRO 2 UNITS: 100 INJECTION, SOLUTION INTRAVENOUS; SUBCUTANEOUS at 17:51

## 2021-10-23 RX ADMIN — NYSTATIN 500000 UNITS: 100000 SUSPENSION ORAL at 20:47

## 2021-10-23 RX ADMIN — OXYMETAZOLINE HCL 2 SPRAY: 0.05 SPRAY NASAL at 20:47

## 2021-10-23 ASSESSMENT — PAIN SCALES - GENERAL
PAINLEVEL_OUTOF10: 0

## 2021-10-23 NOTE — PROGRESS NOTES
[facility]    Pediatric Cardiovascular Surgery  Palomo Lopez MD, Chief  Carin Ahumada, MD Obi De La Garza, MD PhD  You Esteves, MD Amarilis Loo, APN  Angie Walker, APN  Keri Trotter, APN    Pediatric Cardiology  Justus Oates MD, Chief  Tha Iniguez, MD Della Almeida, MD Reynaldo Araiza, MD Rosales Montemayor, MD Damaso Hoffman, MD Regino Foote, MD Justus Franco, MD Mac Alvarado, MD Jennie Bell, MD Mónica Luna, MD Ever Lion, MD Mac Chairez, MD Leigh Casanova, MD    Pediatric Cardiac Intensive Care  Gustavo Jones MD, Director  Yasmin Matthew, DO Linh Perez, MD Malik Bhatia, MD Liana Jaime, MD Janki Garcia, MD Hailey Cuellar, MD Bk Wallace, MD Abbe Almonte, MD    Electrophysiology  Panfilo Jane MD, Director  Mac Ta, MD Gema Flowers, MD Regine Knowles, N    Interventional Cardiac Catheterization  Rosales Montemayor MD, Director  Bk Wallace, MD     Cardiology  Tha Iniguez, MD Della Almeida, MD Damaso Hoffman, MD Mac Alvarado, MD Jennie Bell, MD Mónica Luna, MD Ever Lion, MD Leigh Casanova, MD Lara Richards, RN    Adult Congenital Heart Disease  Justus Oates MD, Director  Gema Flowers, MD Panfilo Jane, MD Madeline Ugalde, [facility]  _______________________  Mailing Address:   [address] [place] [address]    Phone: [phone]  Fax: [phone]      2020    Margot Aguila MD  [facility] Dilip Ochoa  [address] [address]  Via Fax: [phone]      Patient:           Troy Lozano  YOB: 2001  Date of Visit:   2020      Dear Dr. Aguila    I saw your patient, Troy Lozano, for an evaluation. Below are my notes for this visit with Troy.     If you have questions, please do not hesitate to call me.     Sincerely,       Justus Franco MD      CC: No Recipients         ______________________________________________________________    Troy is a 18  Pulmonary Progress Note    Date of Admission: 10/20/2021   LOS: 2 days       CC:  Copd exacerbation    Subjective:  Feeling better   Believes he will be abl eto d/c tomorrow     ROS:   No nausea  No Vomiting  No chest pain      Assessment:         Plan: This note may have been transcribed using [id number] [facility]. Please disregard any translational errors. Hospital Day: 2     AE copd  * Duoneb's    * Symbicort   * daily steroids   * theophylline   * currently on room air     ZAHEER  * bipap      thrush  * nystatin    Ok to d/c . Pt believes will be ready to d/c tomorrow. Future Appointments   Date Time Provider Alka Fregoso   1/7/2022 11:00 AM Leotha Cockayne, MD [address] [place] [address] PULM [facility]   3/28/2022  1:00 PM Lee Galindo MD [facility] - [facility]           Data:        PHYSICAL EXAM:   Blood pressure 134/71, pulse 97, temperature 97.6 °F (36.4 °C), temperature source Oral, resp. rate 20, height 5' 7\" (1.702 m), weight 184 lb 11.2 oz (83.8 kg), SpO2 97 %.'  Body mass index is 28.93 kg/m². Gen: No distress. ENT:   Resp: No accessory muscle use. No crackles. No wheezes. No rhonchi. CV: Regular rate. Regular rhythm. No murmur or rub. No edema. Skin: Warm, dry, normal texture and turgor. No nodule on exposed extremities. M/S: No cyanosis. No clubbing. No joint deformity. Psych: Oriented x 3. No anxiety. Awake. Alert. Intact judgement and insight. Good Mood / Affect.   Memory appears in tact       Medications:    Scheduled Meds:   insulin lispro  0-12 Units SubCUTAneous TID WC    insulin lispro  0-6 Units SubCUTAneous Nightly    polyethylene glycol  17 g Oral Daily    methylPREDNISolone  40 mg IntraVENous Daily    ipratropium-albuterol  1 ampule Inhalation Q4H WA    atorvastatin  10 mg Oral Daily    dilTIAZem  300 mg Oral Daily    losartan  50 mg Oral Daily    pantoprazole  40 mg Oral QAM AC    theophylline  200 mg Oral BID    enoxaparin  40 mg SubCUTAneous Daily    budesonide-formoterol  2 puff Inhalation BID    nystatin  5 mL Oral 4x Daily    docusate sodium  100 mg Oral BID       Continuous Infusions:   dextrose         PRN Meds:  polyvinyl alcohol, glucose, dextrose, glucagon (rDNA), dextrose, oxymetazoline    Labs reviewed:  CBC:   Recent Labs     10/21/21  0349 10/22/21  0705   WBC 12.4* 17.4*   HGB 15.0 14.9   HCT 46.7 46.1   MCV 87.1 87.1    343     BMP:   Recent Labs     10/20/21  2348 10/22/21  0705   * 135*   K 4.3 4.9   CL 98* 99   CO2 25 24   BUN 11 21*   CREATININE 0.9 1.0     LIVER PROFILE: No results for input(s): AST, ALT, LIPASE, BILIDIR, BILITOT, ALKPHOS in the last 72 hours. Invalid input(s): AMYLASE,  ALB  PT/INR: No results for input(s): PROTIME, INR in the last 72 hours. APTT: No results for input(s): APTT in the last 72 hours. UA:No results for input(s): NITRITE, COLORU, PHUR, LABCAST, WBCUA, RBCUA, MUCUS, TRICHOMONAS, YEAST, BACTERIA, CLARITYU, SPECGRAV, LEUKOCYTESUR, UROBILINOGEN, BILIRUBINUR, BLOODU, GLUCOSEU, AMORPHOUS in the last 72 hours. Invalid input(s): [facility]  No results for input(s): PH, PCO2, PO2 in the last 72 hours. Cx:      Films: This note was transcribed using [id number] [email or web address]. Please disregard any translational errors.       Triny Olvera Pulmonary, Sleep and [facility]ra [facility] [phone] year old male who presents for cardiac consultation.  He is accompanied by his mother.   Interpretation service was not necessary.    HISTORY OF PRESENT ILLNESS  He has a finding of an abnormal EKG.  The EKG was obtained by his PCP when he was noticed to have a low heart rate at his physical exam.  He had one episode of syncope several months ago.    He was working outside on a very hot day and felt lightheaded.  He sat down for a while and then stood up again and had a brief syncopal episode.  He has never had syncope with exercise.  He reports no changes in his exercise endurance.  There are no respiratory complaints.  He vapes regularly.    Review of systems was negative except as noted in HPI and problem list      PROBLEM LIST  Patient Active Problem List   Diagnosis   • Sinus bradycardia   • Abnormal electrocardiogram       PAST MEDICAL, SURGICAL, FAMILY, AND SOCIAL HISTORY    Family History     Relation Problem Comments    Maternal Grandfather Pacemaker        Maternal Grandmother Heart arrhythmia       Paternal Grandmother Myocardial Infarction (Age:60)    Pacemaker            History reviewed. No pertinent past medical history.  No past surgical history on file.  No current outpatient medications on file.     No current facility-administered medications for this visit.      ALLERGIES:  Nuts   (food)       PHYSICAL EXAM  Visit Vitals  /61 (BP Location: RUE - Right upper extremity, Patient Position: Sitting)   Pulse (!) 54   Resp 18   Ht 5' 9.29\" (1.76 m)   Wt 79.7 kg (175 lb 11.2 oz)   SpO2 97%   BMI 25.73 kg/m²     Normative values  BP:   Blood pressure reading is in the elevated blood pressure range (BP >= 120/80) based on the 2017 AAP Clinical Practice Guideline.   Height:   49 %ile (Z= -0.02) based on CDC (Boys, 2-20 Years) Stature-for-age data based on Stature recorded on 8/17/2020.   Weight:   83 %ile (Z= 0.97) based on CDC (Boys, 2-20 Years) weight-for-age data using vitals from 8/17/2020.   BMI:    86 %ile (Z= 1.06) based on CDC (Boys, 2-20 Years) BMI-for-age based on BMI available as of 8/17/2020.     Constitutional:       General: Active and alert. Not in acute distress.     Appearance: Well-developed and well-nourished.   Eyes:      General: No scleral icterus.  HENT:    Mouth/Throat:      Mouth: Mucous membranes are moist.   Pulmonary:      Effort: No respiratory distress or retractions.      Breath sounds: No wheezing.   Chest:      Chest wall: Deformity (mild pectus excavatum) present.   Cardiovascular:      Normal rate. Regular rhythm. Normal S1. Normal S2.      Murmurs: There is no murmur.      No gallop. No click. No rub.   Pulses:     RUE pulses are 2. LUE pulses are 2. RLE pulses are 2. LLE pulses are 2.   Abdominal:      Palpations: Abdomen is soft. There is no hepatomegaly.   Musculoskeletal:         General: No edema.   Skin:     General: There is no cyanosis.      Capillary Refill: Capillary refill takes less than 3 seconds.   Neurological:      Motor: Normal muscle tone.       DIAGNOSTIC TESTING REVIEWED    EKG   Sinus bradycardia (50); normal axis; RBBB (KCUz=[id number]); no hypertrophy     Echocardiogram   Normal intracardiac connections  Normal biventricular size and function  No valve abnormalities noted  Limited views of the arch by two-dimensional imaging with normal Doppler velocity across the arch  No intracardiac shunting noted   Normal origins of the coronary arteries     ASSESSMENT AND PLAN    Sinus bradycardia  He has sinus bradycardia.  This is most likely physiologically appropriate and should not be cause for concern.    Abnormal electrocardiogram  His EKG shows a right bundle branch block.  His echocardiogram is normal and I believe the EKG finding may be related to his mild pectus excavatum.  This should be considered a normal variant and should not be cause for concern.      Orders Placed This Encounter   • Electrocardiogram 12-Lead   • Congenital transthoracic echo complete  (PEDS)       Additional Considerations  ACTIVITY: May participate in the entire physical education program without restriction including all varsity competitive sports. Must be allowed to rest when tired, lightheaded, or whenever patient deems necessary.   BACTERIAL PROPHYLAXIS:  According to the American Heart Association guidelines, SBE prophylaxis is not needed.  SURGERY/ANESTHESIA:   Based on the available history and data, the patient is at no greater cardiac risk than the general population for surgery, anesthesia, or sedation.  IMMUNIZATIONS:  Routine immunizations should be provided, including influenza vaccination for patients of appropriate age.    FOLLOW UP  Return for any additional concerns or symptoms.   Justus Franco MD

## 2021-10-23 NOTE — PROGRESS NOTES
Progress Note  10/23/2021 6:56 AM  Subjective:   Admit Date: 10/20/2021  PCP: Norma Roberson MD  Code:Prior    Interval History: No acute events overnight. Pt was seen ambulating around his room on RA. SOB has improved but not quite back to baseline. No cough. Tolerating PO intake. BG running high due to steroids. No CP, palpitations, abdominal pain, nausea, vomiting, diarrhea. He has not had a BM in a few days. Diet: ADULT DIET; Regular; Low Fat/Low Chol/High Fiber/2 gm Na    Data:   Scheduled Meds:   polyethylene glycol  17 g Oral Daily    methylPREDNISolone  40 mg IntraVENous Daily    ipratropium-albuterol  1 ampule Inhalation Q4H WA    atorvastatin  10 mg Oral Daily    dilTIAZem  300 mg Oral Daily    losartan  50 mg Oral Daily    pantoprazole  40 mg Oral QAM AC    theophylline  200 mg Oral BID    insulin lispro  0-6 Units SubCUTAneous TID WC    insulin lispro  0-3 Units SubCUTAneous Nightly    enoxaparin  40 mg SubCUTAneous Daily    budesonide-formoterol  2 puff Inhalation BID    nystatin  5 mL Oral 4x Daily    docusate sodium  100 mg Oral BID       Continuous Infusions:   dextrose         PRN Meds:polyvinyl alcohol, glucose, dextrose, glucagon (rDNA), dextrose, oxymetazoline  I/O last 3 completed shifts: In: 1440 [P.O.:1440]  Out: 200 [Urine:200]  No intake/output data recorded. Intake/Output Summary (Last 24 hours) at 10/23/2021 0656  Last data filed at 10/22/2021 1833  Gross per 24 hour   Intake 1440 ml   Output --   Net 1440 ml       CBC:   Recent Labs     10/21/21  0349 10/22/21  0705   WBC 12.4* 17.4*   HGB 15.0 14.9    343     BMP:    Recent Labs     10/20/21  2348 10/22/21  0705   * 135*   K 4.3 4.9   CL 98* 99   CO2 25 24   BUN 11 21*   CREATININE 0.9 1.0   GLUCOSE 109* 194*     Hepatic: No results for input(s): AST, ALT, ALB, BILITOT, ALKPHOS in the last 72 hours.   Troponin:   Recent Labs     10/20/21  2348   TROPONINI <0.01     BNP: No results for input(s): BNP in the last 72 hours. Lipids: No results for input(s): CHOL, HDL in the last 72 hours. Invalid input(s): LDLCALCU  ABGs:   Lab Results   Component Value Date    PHART 7.411 02/03/2018    PO2ART 67.5 02/03/2018    SNF0GBJ 38.6 02/03/2018     INR: No results for input(s): INR in the last 72 hours. XR CHEST PORTABLE    Result Date: 10/22/2021  EXAMINATION: ONE XRAY VIEW OF THE CHEST 10/22/2021 4:54 am COMPARISON: 10/20/2021 HISTORY: ORDERING SYSTEM PROVIDED HISTORY: exacerb COPD TECHNOLOGIST PROVIDED HISTORY: Reason for exam:->exacerb COPD Reason for Exam: exacerb COPD Acuity: Acute Type of Exam: Initial FINDINGS: Cardiac leads project over the chest.  Oxygen support apparatus obscures the right apex. Eventration of the right hemidiaphragm. Blunting of the costophrenic angles again noted. No confluent airspace disease. No gross pneumothorax. Cardiac and mediastinal silhouettes are similar to prior. No acute airspace disease. Trace pleural effusions or pleural thickening. Objective:   Vitals: /62   Pulse 79   Temp 97.5 °F (36.4 °C) (Oral)   Resp 20   Ht 5' 7\" (1.702 m)   Wt 184 lb 11.2 oz (83.8 kg)   SpO2 97%   BMI 28.93 kg/m²     General appearance: alert, appears stated age and cooperative  Lungs: Diffuse expiratory wheezing. Currently on RA.  No conversational dyspnea  Heart: regular rate and rhythm, S1, S2 normal, no murmur, click, rub or gallop  Abdomen: soft, non-tender; bowel sounds normal; no masses,  no organomegaly  Extremities: extremities normal, atraumatic, no cyanosis or edema  Neurologic: Mental status: Alert, oriented, thought content appropriate    Assessment/Plan:   Principal Problem:  Acute respiratory failure with hypoxia  - Secondary to COPD exacerbation  -Clinically improving and now on RA  -Continue Solumedrol, Duonebs, Symbicort, theophylline.  -Will likely discharge home tomorrow   Active Problems:  Essential hypertension- BP well controlled on current regimen  Hypercholesterolemia- continue statin  ZAHEER (obstructive sleep apnea)- continue nighttime BiPAP  Abnormal glucose- secondary to steroids. Increase to moderate SSI  COPD, very severe ([address] - continue treatment as above  Thrush- continue Nystatin  Resolved Problems:    * No resolved hospital problems. *    PPx: PPI, Lovenox  Dispo: Anticipate discharge home tomorrow    Nonah Blight, DO  6:56 AM    Documentation was done using voice recognition dragon software. Every effort was made to ensure accuracy; however, inadvertent unintentional computerized transcription errors may be present.

## 2021-10-24 LAB
ANION GAP SERPL CALCULATED.3IONS-SCNC: 10 MMOL/L (ref 3–16)
BASOPHILS ABSOLUTE: 0 K/UL (ref 0–0.2)
BASOPHILS RELATIVE PERCENT: 0 %
BUN BLDV-MCNC: 19 MG/DL (ref 7–20)
CALCIUM SERPL-MCNC: 9.2 MG/DL (ref 8.3–10.6)
CHLORIDE BLD-SCNC: 95 MMOL/L (ref 99–110)
CO2: 27 MMOL/L (ref 21–32)
CREAT SERPL-MCNC: 0.9 MG/DL (ref 0.8–1.3)
EOSINOPHILS ABSOLUTE: 0 K/UL (ref 0–0.6)
EOSINOPHILS RELATIVE PERCENT: 0 %
GFR AFRICAN AMERICAN: >60
GFR NON-AFRICAN AMERICAN: >60
GLUCOSE BLD-MCNC: 115 MG/DL (ref 70–99)
GLUCOSE BLD-MCNC: 138 MG/DL (ref 70–99)
GLUCOSE BLD-MCNC: 179 MG/DL (ref 70–99)
GLUCOSE BLD-MCNC: 186 MG/DL (ref 70–99)
GLUCOSE BLD-MCNC: 191 MG/DL (ref 70–99)
HCT VFR BLD CALC: 41.8 % (ref 40.5–52.5)
HEMOGLOBIN: 13.9 G/DL (ref 13.5–17.5)
LYMPHOCYTES ABSOLUTE: 1.1 K/UL (ref 1–5.1)
LYMPHOCYTES RELATIVE PERCENT: 6.5 %
MCH RBC QN AUTO: 28.8 PG (ref 26–34)
MCHC RBC AUTO-ENTMCNC: 33.2 G/DL (ref 31–36)
MCV RBC AUTO: 86.7 FL (ref 80–100)
MONOCYTES ABSOLUTE: 1.3 K/UL (ref 0–1.3)
MONOCYTES RELATIVE PERCENT: 7.5 %
NEUTROPHILS ABSOLUTE: 14.7 K/UL (ref 1.7–7.7)
NEUTROPHILS RELATIVE PERCENT: 86 %
PDW BLD-RTO: 14.6 % (ref 12.4–15.4)
PERFORMED ON: ABNORMAL
PLATELET # BLD: 337 K/UL (ref 135–450)
PMV BLD AUTO: 6.7 FL (ref 5–10.5)
POTASSIUM SERPL-SCNC: 4.9 MMOL/L (ref 3.5–5.1)
RBC # BLD: 4.82 M/UL (ref 4.2–5.9)
SODIUM BLD-SCNC: 132 MMOL/L (ref 136–145)
WBC # BLD: 17.1 K/UL (ref 4–11)

## 2021-10-24 PROCEDURE — 6370000000 HC RX 637 (ALT 250 FOR IP): Performed by: INTERNAL MEDICINE

## 2021-10-24 PROCEDURE — 99233 SBSQ HOSP IP/OBS HIGH 50: CPT | Performed by: INTERNAL MEDICINE

## 2021-10-24 PROCEDURE — 6370000000 HC RX 637 (ALT 250 FOR IP): Performed by: NURSE PRACTITIONER

## 2021-10-24 PROCEDURE — 94640 AIRWAY INHALATION TREATMENT: CPT

## 2021-10-24 PROCEDURE — 2060000000 HC ICU INTERMEDIATE R&B

## 2021-10-24 PROCEDURE — 6360000002 HC RX W HCPCS: Performed by: INTERNAL MEDICINE

## 2021-10-24 PROCEDURE — 36415 COLL VENOUS BLD VENIPUNCTURE: CPT

## 2021-10-24 PROCEDURE — 94761 N-INVAS EAR/PLS OXIMETRY MLT: CPT

## 2021-10-24 PROCEDURE — 80048 BASIC METABOLIC PNL TOTAL CA: CPT

## 2021-10-24 PROCEDURE — 85025 COMPLETE CBC W/AUTO DIFF WBC: CPT

## 2021-10-24 RX ORDER — PREDNISONE 20 MG/1
20 TABLET ORAL DAILY
Status: DISCONTINUED | OUTPATIENT
Start: 2021-10-25 | End: 2021-10-25 | Stop reason: HOSPADM

## 2021-10-24 RX ORDER — LACTULOSE 10 G/15ML
20 SOLUTION ORAL 3 TIMES DAILY
Status: DISCONTINUED | OUTPATIENT
Start: 2021-10-24 | End: 2021-10-25 | Stop reason: HOSPADM

## 2021-10-24 RX ADMIN — IPRATROPIUM BROMIDE AND ALBUTEROL SULFATE 1 AMPULE: .5; 2.5 SOLUTION RESPIRATORY (INHALATION) at 15:40

## 2021-10-24 RX ADMIN — NYSTATIN 500000 UNITS: 100000 SUSPENSION ORAL at 17:57

## 2021-10-24 RX ADMIN — ATORVASTATIN CALCIUM 10 MG: 10 TABLET, FILM COATED ORAL at 08:27

## 2021-10-24 RX ADMIN — THEOPHYLLINE 200 MG: 400 TABLET, EXTENDED RELEASE ORAL at 20:56

## 2021-10-24 RX ADMIN — POLYVINYL ALCOHOL 2 DROP: 14 SOLUTION/ DROPS OPHTHALMIC at 08:29

## 2021-10-24 RX ADMIN — DOCUSATE SODIUM 100 MG: 100 CAPSULE ORAL at 20:56

## 2021-10-24 RX ADMIN — THEOPHYLLINE 200 MG: 400 TABLET, EXTENDED RELEASE ORAL at 08:27

## 2021-10-24 RX ADMIN — IPRATROPIUM BROMIDE AND ALBUTEROL SULFATE 1 AMPULE: .5; 2.5 SOLUTION RESPIRATORY (INHALATION) at 12:15

## 2021-10-24 RX ADMIN — METHYLPREDNISOLONE SODIUM SUCCINATE 40 MG: 40 INJECTION, POWDER, FOR SOLUTION INTRAMUSCULAR; INTRAVENOUS at 08:27

## 2021-10-24 RX ADMIN — IPRATROPIUM BROMIDE AND ALBUTEROL SULFATE 1 AMPULE: .5; 2.5 SOLUTION RESPIRATORY (INHALATION) at 19:46

## 2021-10-24 RX ADMIN — NYSTATIN 500000 UNITS: 100000 SUSPENSION ORAL at 12:05

## 2021-10-24 RX ADMIN — NYSTATIN 500000 UNITS: 100000 SUSPENSION ORAL at 20:56

## 2021-10-24 RX ADMIN — INSULIN LISPRO 2 UNITS: 100 INJECTION, SOLUTION INTRAVENOUS; SUBCUTANEOUS at 12:04

## 2021-10-24 RX ADMIN — NYSTATIN 500000 UNITS: 100000 SUSPENSION ORAL at 08:28

## 2021-10-24 RX ADMIN — PANTOPRAZOLE SODIUM 40 MG: 40 TABLET, DELAYED RELEASE ORAL at 05:43

## 2021-10-24 RX ADMIN — LACTULOSE 20 G: 20 SOLUTION ORAL at 20:56

## 2021-10-24 RX ADMIN — POLYETHYLENE GLYCOL 3350 17 G: 17 POWDER, FOR SOLUTION ORAL at 08:27

## 2021-10-24 RX ADMIN — INSULIN LISPRO 2 UNITS: 100 INJECTION, SOLUTION INTRAVENOUS; SUBCUTANEOUS at 17:57

## 2021-10-24 RX ADMIN — LACTULOSE 20 G: 20 SOLUTION ORAL at 14:11

## 2021-10-24 RX ADMIN — ENOXAPARIN SODIUM 40 MG: 100 INJECTION SUBCUTANEOUS at 08:30

## 2021-10-24 RX ADMIN — DILTIAZEM HYDROCHLORIDE 300 MG: 300 CAPSULE, COATED, EXTENDED RELEASE ORAL at 08:27

## 2021-10-24 RX ADMIN — LOSARTAN POTASSIUM 50 MG: 50 TABLET, FILM COATED ORAL at 08:27

## 2021-10-24 RX ADMIN — INSULIN LISPRO 1 UNITS: 100 INJECTION, SOLUTION INTRAVENOUS; SUBCUTANEOUS at 20:56

## 2021-10-24 RX ADMIN — BUDESONIDE AND FORMOTEROL FUMARATE DIHYDRATE 2 PUFF: 160; 4.5 AEROSOL RESPIRATORY (INHALATION) at 19:46

## 2021-10-24 RX ADMIN — DOCUSATE SODIUM 100 MG: 100 CAPSULE ORAL at 08:27

## 2021-10-24 RX ADMIN — BUDESONIDE AND FORMOTEROL FUMARATE DIHYDRATE 2 PUFF: 160; 4.5 AEROSOL RESPIRATORY (INHALATION) at 08:24

## 2021-10-24 RX ADMIN — OXYMETAZOLINE HCL 2 SPRAY: 0.05 SPRAY NASAL at 08:29

## 2021-10-24 RX ADMIN — IPRATROPIUM BROMIDE AND ALBUTEROL SULFATE 1 AMPULE: .5; 2.5 SOLUTION RESPIRATORY (INHALATION) at 08:23

## 2021-10-24 ASSESSMENT — PAIN SCALES - GENERAL
PAINLEVEL_OUTOF10: 0
PAINLEVEL_OUTOF10: 0

## 2021-10-24 NOTE — PROGRESS NOTES
Pulmonary Progress Note    Date of Admission: 10/20/2021   LOS: 3 days       CC:  Copd exacerbation    Subjective:  Feeling better   Believes he will be abl eto d/c tomorrow     ROS:   No nausea  No Vomiting  No chest pain      Assessment:         Plan: This note may have been transcribed using [id number]. Please disregard any translational errors. Hospital Day: 3     AE copd  * Duoneb's    * Symbicort   * daily steroids , wean to pred 20 mg daily   * theophylline   * currently on room air   * wbc likely secondary to steroids. ZAHEER  * bipap    Constipation   * add lactulose       thrush  * nystatin    Ok to d/c . Future Appointments   Date Time Provider Alka Mcgheei   1/7/2022 11:00 AM [facility]   3/28/2022  1:00 PM Myrtle Goldmann, MD [facility]           Data:        PHYSICAL EXAM:   Blood pressure (!) 175/78, pulse 90, temperature 98.1 °F (36.7 °C), temperature source Oral, resp. rate 18, height 5' 7\" (1.702 m), weight 188 lb 7.9 oz (85.5 kg), SpO2 93 %.'  Body mass index is 29.52 kg/m². Gen: No distress. ENT:   Resp: No accessory muscle use. No crackles. Slight wheezes. No rhonchi. CV: Regular rate. Regular rhythm. No murmur or rub. No edema. Skin: Warm, dry, normal texture and turgor. No nodule on exposed extremities. M/S: No cyanosis. No clubbing. No joint deformity. Psych: Oriented x 3. No anxiety. Awake. Alert. Intact judgement and insight. Good Mood / Affect.   Memory appears in tact       Medications:    Scheduled Meds:   insulin lispro  0-12 Units SubCUTAneous TID WC    insulin lispro  0-6 Units SubCUTAneous Nightly    polyethylene glycol  17 g Oral Daily    methylPREDNISolone  40 mg IntraVENous Daily    ipratropium-albuterol  1 ampule Inhalation Q4H WA    atorvastatin  10 mg Oral Daily    dilTIAZem  300 mg Oral Daily    losartan  50 mg Oral Daily    pantoprazole  40 mg Oral QAM AC    theophylline  200 mg Oral BID    enoxaparin  40 mg SubCUTAneous Daily    budesonide-formoterol  2 puff Inhalation BID    nystatin  5 mL Oral 4x Daily    docusate sodium  100 mg Oral BID       Continuous Infusions:   dextrose         PRN Meds:  polyvinyl alcohol, glucose, dextrose, glucagon (rDNA), dextrose, oxymetazoline    Labs reviewed:  CBC:   Recent Labs     10/22/21  0705 10/24/21  0542   WBC 17.4* 17.1*   HGB 14.9 13.9   HCT 46.1 41.8   MCV 87.1 86.7    337     BMP:   Recent Labs     10/22/21  0705 10/24/21  0542   * 132*   K 4.9 4.9   CL 99 95*   CO2 24 27   BUN 21* 19   CREATININE 1.0 0.9     LIVER PROFILE: No results for input(s): AST, ALT, LIPASE, BILIDIR, BILITOT, ALKPHOS in the last 72 hours. Invalid input(s): AMYLASE,  ALB  PT/INR: No results for input(s): PROTIME, INR in the last 72 hours. APTT: No results for input(s): APTT in the last 72 hours. UA:No results for input(s): NITRITE, COLORU, PHUR, LABCAST, WBCUA, RBCUA, MUCUS, TRICHOMONAS, YEAST, BACTERIA, CLARITYU, SPECGRAV, LEUKOCYTESUR, UROBILINOGEN, BILIRUBINUR, BLOODU, GLUCOSEU, AMORPHOUS in the last 72 hours. Invalid input(s): [facility]  No results for input(s): PH, PCO2, PO2 in the last 72 hours. Cx:      Films: This note was transcribed using [id number] [address]. Please disregard any translational errors.       Triny Olvera Pulmonary, Sleep and [facility] [phone]

## 2021-10-24 NOTE — PROGRESS NOTES
hours. Lipids: No results for input(s): CHOL, HDL in the last 72 hours. Invalid input(s): LDLCALCU  ABGs:   Lab Results   Component Value Date    PHART 7.411 02/03/2018    PO2ART 67.5 02/03/2018    XWC1OSF 38.6 02/03/2018     INR: No results for input(s): INR in the last 72 hours. XR CHEST PORTABLE    Result Date: 10/22/2021  EXAMINATION: ONE XRAY VIEW OF THE CHEST 10/22/2021 4:54 am COMPARISON: 10/20/2021 HISTORY: ORDERING SYSTEM PROVIDED HISTORY: exacerb COPD TECHNOLOGIST PROVIDED HISTORY: Reason for exam:->exacerb COPD Reason for Exam: exacerb COPD Acuity: Acute Type of Exam: Initial FINDINGS: Cardiac leads project over the chest.  Oxygen support apparatus obscures the right apex. Eventration of the right hemidiaphragm. Blunting of the costophrenic angles again noted. No confluent airspace disease. No gross pneumothorax. Cardiac and mediastinal silhouettes are similar to prior. No acute airspace disease. Trace pleural effusions or pleural thickening. Objective:   Vitals: BP (!) 102/55   Pulse 82   Temp 97.3 °F (36.3 °C) (Oral)   Resp 19   Ht 5' 7\" (1.702 m)   Wt 188 lb 7.9 oz (85.5 kg)   SpO2 93%   BMI 29.52 kg/m²     General appearance: alert, appears stated age and cooperative  Lungs: Diffuse expiratory wheezing. Currently on RA. No conversational dyspnea  Heart: regular rate and rhythm, S1, S2 normal, no murmur, click, rub or gallop  Abdomen: soft, non-tender; bowel sounds normal; no masses,  no organomegaly  Extremities: extremities normal, atraumatic, no cyanosis or edema  Neurologic: Mental status: Alert, oriented, thought content appropriate    Assessment/Plan:   Principal Problem:  Acute respiratory failure with hypoxia  - Secondary to COPD exacerbation  -Clinically improving and now on RA  -Solumedrol changed to Prednisone 20 mg daily.  Continue  Duonebs, Symbicort, theophylline.  -Will likely discharge home tomorrow   Active Problems:  Essential hypertension- BP elevated today, previously well controlled. Suspect this will improve with tapering of steroids  Hypercholesterolemia- continue statin  ZAHEER (obstructive sleep apnea)- continue nighttime BiPAP  Abnormal glucose- secondary to steroids. Increase to moderate SSI  COPD, very severe ([facility]) - continue treatment as above  Thrush- continue Nystatin  Constipation- no BM despite colace 100 mg bid. Lactulose has been ordered. Consider enema if no BM by this evening. Resolved Problems:    * No resolved hospital problems. *    PPx: PPI, Lovenox  Dispo: Anticipate discharge home tomorrow    Joan Covington DO  8:25 AM    Documentation was done using voice recognition dragon software. Every effort was made to ensure accuracy; however, inadvertent unintentional computerized transcription errors may be present.

## 2021-10-24 NOTE — PROGRESS NOTES
Respiratory Therapy    SPO2 96% on RA at rest   SPO2 93% with ambulation from restroom       Electronically signed by Ira Briggs RCP on 10/24/2021 at 12:21 PM

## 2021-10-24 NOTE — PLAN OF CARE
Problem: Falls - Risk of:  Goal: Will remain free from falls  Description: Will remain free from falls  10/24/2021 0217 by Pratibha Webb RN  Outcome: Ongoing  10/23/2021 2357 by Pratibha Webb RN  Outcome: Ongoing  Goal: Absence of physical injury  Description: Absence of physical injury  10/24/2021 0217 by Pratibha Webb RN  Outcome: Ongoing  10/23/2021 2357 by Pratibha Webb RN  Outcome: Ongoing   Fall risk assessment completed every shift. All precautions in place. Pt has call light within reach at all times. Room clear of clutter. Pt aware to call for assistance when getting up.      Problem: Skin Integrity:  Goal: Will show no infection signs and symptoms  Description: Will show no infection signs and symptoms  10/24/2021 0217 by Pratibha Webb RN  Outcome: Ongoing  10/23/2021 2357 by Pratibha Webb RN  Outcome: Ongoing  Goal: Absence of new skin breakdown  Description: Absence of new skin breakdown  10/24/2021 0217 by Pratibha Webb RN  Outcome: Ongoing  10/23/2021 2357 by Pratibha Webb RN  Outcome: Ongoing

## 2021-10-25 ENCOUNTER — APPOINTMENT (OUTPATIENT)
Dept: GENERAL RADIOLOGY | Age: 81
DRG: 191 | End: 2021-10-25
Payer: MEDICARE

## 2021-10-25 VITALS
HEIGHT: 67 IN | SYSTOLIC BLOOD PRESSURE: 148 MMHG | OXYGEN SATURATION: 93 % | BODY MASS INDEX: 29.2 KG/M2 | HEART RATE: 72 BPM | DIASTOLIC BLOOD PRESSURE: 78 MMHG | TEMPERATURE: 97.4 F | RESPIRATION RATE: 19 BRPM | WEIGHT: 186.07 LBS

## 2021-10-25 LAB
ANION GAP SERPL CALCULATED.3IONS-SCNC: 8 MMOL/L (ref 3–16)
BASOPHILS ABSOLUTE: 0 K/UL (ref 0–0.2)
BASOPHILS RELATIVE PERCENT: 0.1 %
BUN BLDV-MCNC: 17 MG/DL (ref 7–20)
CALCIUM SERPL-MCNC: 9.6 MG/DL (ref 8.3–10.6)
CHLORIDE BLD-SCNC: 100 MMOL/L (ref 99–110)
CO2: 25 MMOL/L (ref 21–32)
CREAT SERPL-MCNC: 0.9 MG/DL (ref 0.8–1.3)
EOSINOPHILS ABSOLUTE: 0 K/UL (ref 0–0.6)
EOSINOPHILS RELATIVE PERCENT: 0.1 %
GFR AFRICAN AMERICAN: >60
GFR NON-AFRICAN AMERICAN: >60
GLUCOSE BLD-MCNC: 134 MG/DL (ref 70–99)
GLUCOSE BLD-MCNC: 144 MG/DL (ref 70–99)
GLUCOSE BLD-MCNC: 99 MG/DL (ref 70–99)
HCT VFR BLD CALC: 45.9 % (ref 40.5–52.5)
HEMOGLOBIN: 15 G/DL (ref 13.5–17.5)
LYMPHOCYTES ABSOLUTE: 1.6 K/UL (ref 1–5.1)
LYMPHOCYTES RELATIVE PERCENT: 11.1 %
MCH RBC QN AUTO: 28.5 PG (ref 26–34)
MCHC RBC AUTO-ENTMCNC: 32.6 G/DL (ref 31–36)
MCV RBC AUTO: 87.3 FL (ref 80–100)
MONOCYTES ABSOLUTE: 1.4 K/UL (ref 0–1.3)
MONOCYTES RELATIVE PERCENT: 9.8 %
NEUTROPHILS ABSOLUTE: 11.6 K/UL (ref 1.7–7.7)
NEUTROPHILS RELATIVE PERCENT: 78.9 %
PDW BLD-RTO: 14.5 % (ref 12.4–15.4)
PERFORMED ON: ABNORMAL
PERFORMED ON: NORMAL
PLATELET # BLD: 354 K/UL (ref 135–450)
PMV BLD AUTO: 6.6 FL (ref 5–10.5)
POTASSIUM SERPL-SCNC: 4.6 MMOL/L (ref 3.5–5.1)
RBC # BLD: 5.26 M/UL (ref 4.2–5.9)
SODIUM BLD-SCNC: 133 MMOL/L (ref 136–145)
WBC # BLD: 14.7 K/UL (ref 4–11)

## 2021-10-25 PROCEDURE — 36415 COLL VENOUS BLD VENIPUNCTURE: CPT

## 2021-10-25 PROCEDURE — 99233 SBSQ HOSP IP/OBS HIGH 50: CPT | Performed by: INTERNAL MEDICINE

## 2021-10-25 PROCEDURE — 6370000000 HC RX 637 (ALT 250 FOR IP): Performed by: INTERNAL MEDICINE

## 2021-10-25 PROCEDURE — 6360000002 HC RX W HCPCS: Performed by: INTERNAL MEDICINE

## 2021-10-25 PROCEDURE — 99232 SBSQ HOSP IP/OBS MODERATE 35: CPT | Performed by: INTERNAL MEDICINE

## 2021-10-25 PROCEDURE — 80048 BASIC METABOLIC PNL TOTAL CA: CPT

## 2021-10-25 PROCEDURE — 94761 N-INVAS EAR/PLS OXIMETRY MLT: CPT

## 2021-10-25 PROCEDURE — 74018 RADEX ABDOMEN 1 VIEW: CPT

## 2021-10-25 PROCEDURE — 85025 COMPLETE CBC W/AUTO DIFF WBC: CPT

## 2021-10-25 PROCEDURE — 6370000000 HC RX 637 (ALT 250 FOR IP): Performed by: NURSE PRACTITIONER

## 2021-10-25 PROCEDURE — 94660 CPAP INITIATION&MGMT: CPT

## 2021-10-25 PROCEDURE — 94640 AIRWAY INHALATION TREATMENT: CPT

## 2021-10-25 RX ORDER — BISACODYL 10 MG
10 SUPPOSITORY, RECTAL RECTAL DAILY
Status: COMPLETED | OUTPATIENT
Start: 2021-10-25 | End: 2021-10-25

## 2021-10-25 RX ORDER — DOCUSATE SODIUM 100 MG/1
100 CAPSULE, LIQUID FILLED ORAL 2 TIMES DAILY
Qty: 20 CAPSULE | Refills: 0 | Status: SHIPPED | OUTPATIENT
Start: 2021-10-25 | End: 2021-11-04

## 2021-10-25 RX ORDER — POLYETHYLENE GLYCOL 3350 17 G/17G
17 POWDER, FOR SOLUTION ORAL DAILY
Qty: 527 G | Refills: 1 | Status: SHIPPED | OUTPATIENT
Start: 2021-10-26 | End: 2021-11-25

## 2021-10-25 RX ORDER — PREDNISONE 20 MG/1
TABLET ORAL
Qty: 6 TABLET | Refills: 0 | Status: SHIPPED | OUTPATIENT
Start: 2021-10-25 | End: 2022-01-07

## 2021-10-25 RX ADMIN — NYSTATIN 500000 UNITS: 100000 SUSPENSION ORAL at 08:33

## 2021-10-25 RX ADMIN — IPRATROPIUM BROMIDE AND ALBUTEROL SULFATE 1 AMPULE: .5; 2.5 SOLUTION RESPIRATORY (INHALATION) at 08:18

## 2021-10-25 RX ADMIN — ENOXAPARIN SODIUM 40 MG: 100 INJECTION SUBCUTANEOUS at 08:33

## 2021-10-25 RX ADMIN — LOSARTAN POTASSIUM 50 MG: 50 TABLET, FILM COATED ORAL at 08:33

## 2021-10-25 RX ADMIN — DILTIAZEM HYDROCHLORIDE 300 MG: 300 CAPSULE, COATED, EXTENDED RELEASE ORAL at 08:33

## 2021-10-25 RX ADMIN — DOCUSATE SODIUM 100 MG: 100 CAPSULE ORAL at 08:33

## 2021-10-25 RX ADMIN — LACTULOSE 20 G: 20 SOLUTION ORAL at 08:33

## 2021-10-25 RX ADMIN — PANTOPRAZOLE SODIUM 40 MG: 40 TABLET, DELAYED RELEASE ORAL at 06:17

## 2021-10-25 RX ADMIN — NYSTATIN 500000 UNITS: 100000 SUSPENSION ORAL at 12:18

## 2021-10-25 RX ADMIN — THEOPHYLLINE 200 MG: 400 TABLET, EXTENDED RELEASE ORAL at 08:34

## 2021-10-25 RX ADMIN — PREDNISONE 20 MG: 20 TABLET ORAL at 08:33

## 2021-10-25 RX ADMIN — ATORVASTATIN CALCIUM 10 MG: 10 TABLET, FILM COATED ORAL at 08:33

## 2021-10-25 RX ADMIN — POLYETHYLENE GLYCOL 3350 17 G: 17 POWDER, FOR SOLUTION ORAL at 08:33

## 2021-10-25 RX ADMIN — IPRATROPIUM BROMIDE AND ALBUTEROL SULFATE 1 AMPULE: .5; 2.5 SOLUTION RESPIRATORY (INHALATION) at 12:05

## 2021-10-25 RX ADMIN — BUDESONIDE AND FORMOTEROL FUMARATE DIHYDRATE 2 PUFF: 160; 4.5 AEROSOL RESPIRATORY (INHALATION) at 08:18

## 2021-10-25 RX ADMIN — BISACODYL 10 MG: 10 SUPPOSITORY RECTAL at 08:33

## 2021-10-25 RX ADMIN — INSULIN LISPRO 2 UNITS: 100 INJECTION, SOLUTION INTRAVENOUS; SUBCUTANEOUS at 12:17

## 2021-10-25 ASSESSMENT — PAIN SCALES - GENERAL: PAINLEVEL_OUTOF10: 0

## 2021-10-25 NOTE — PROGRESS NOTES
input(s): TROPONINI in the last 72 hours. BNP: No results for input(s): BNP in the last 72 hours. Lipids: No results for input(s): CHOL, HDL in the last 72 hours. Invalid input(s): LDLCALCU  ABGs:   Lab Results   Component Value Date    PHART 7.411 02/03/2018    PO2ART 67.5 02/03/2018    NNO3YTB 38.6 02/03/2018     INR: No results for input(s): INR in the last 72 hours. XR CHEST PORTABLE    Result Date: 10/22/2021  EXAMINATION: ONE XRAY VIEW OF THE CHEST 10/22/2021 4:54 am COMPARISON: 10/20/2021 HISTORY: ORDERING SYSTEM PROVIDED HISTORY: exacerb COPD TECHNOLOGIST PROVIDED HISTORY: Reason for exam:->exacerb COPD Reason for Exam: exacerb COPD Acuity: Acute Type of Exam: Initial FINDINGS: Cardiac leads project over the chest.  Oxygen support apparatus obscures the right apex. Eventration of the right hemidiaphragm. Blunting of the costophrenic angles again noted. No confluent airspace disease. No gross pneumothorax. Cardiac and mediastinal silhouettes are similar to prior. No acute airspace disease. Trace pleural effusions or pleural thickening. Objective:   Vitals: BP (!) 147/67   Pulse 76   Temp 97.5 °F (36.4 °C) (Oral)   Resp 18   Ht 5' 7\" (1.702 m)   Wt 186 lb 1.1 oz (84.4 kg)   SpO2 94%   BMI 29.14 kg/m²     General appearance: alert, appears stated age and cooperative  Lungs: Diffuse expiratory wheezing. Currently on RA. No conversational dyspnea  Heart: regular rate and rhythm, S1, S2 normal, no murmur, click, rub or gallop  Abdomen: soft, non-tender; bowel sounds normal; no masses,  no organomegaly  Extremities: extremities normal, atraumatic, no cyanosis or edema  Neurologic: Mental status: Alert, oriented, thought content appropriate    Assessment/Plan:   Principal Problem:  Acute respiratory failure with hypoxia  - Secondary to COPD exacerbation  -Clinically improving and now on RA  -Solumedrol changed to Prednisone 20 mg daily.  Continue  Duonebs, Symbicort, theophylline.  -Will plan to discharge home once he has a BM  Active Problems:  Essential hypertension- BP elevated today, previously well controlled. Suspect this will improve with tapering of steroids  Hypercholesterolemia- continue statin  ZAHEER (obstructive sleep apnea)- continue nighttime BiPAP  Abnormal glucose- secondary to steroids. Continue moderate SSI  COPD, very severe ([address] - continue treatment as above  Thrush- continue Nystatin  Constipation- Check KUB. Add dulcolax suppository today   Resolved Problems:    * No resolved hospital problems. *    PPx: PPI, Lovenox  Dispo: Anticipate discharge home later today     Jim Awan,   6:27 AM    Documentation was done using voice recognition dragon software. Every effort was made to ensure accuracy; however, inadvertent unintentional computerized transcription errors may be present.

## 2021-10-25 NOTE — PROGRESS NOTES
AVS reviewed with patient and wife. All questions answered. Transport requested. IV removed and telemetry returned to [facility].

## 2021-10-25 NOTE — PROGRESS NOTES
Pulmonary Progress Note    Date of Admission: 10/20/2021   LOS: 4 days       CC:  Copd exacerbation    Subjective:  Feeling better   Believes he will be abl eto d/c tomorrow     ROS:   No nausea  No Vomiting  No chest pain      Assessment:         Plan: This note may have been transcribed using [id number] [facility]. Please disregard any translational errors. Hospital Day: 4     AE copd  * Duoneb's    * Symbicort   *   pred 20 mg daily   * theophylline   * currently on room air   *        ZAHEER  * bipap    Constipation   *  lactulose   * kub        thrush  * nystatin    Ok to d/c . Future Appointments   Date Time Provider Alka Fregoso   1/7/2022 11:00 AM Jose Daniel Vail MD [address] [place] [address] PULM [facility]   3/28/2022  1:00 PM Pura Chapman MD [facility] - [facility]           Data:        PHYSICAL EXAM:   Blood pressure (!) 148/78, pulse 72, temperature 97.4 °F (36.3 °C), temperature source Oral, resp. rate 19, height 5' 7\" (1.702 m), weight 186 lb 1.1 oz (84.4 kg), SpO2 96 %.'  Body mass index is 29.14 kg/m². Gen: No distress. ENT:   Resp: No accessory muscle use. No crackles. Slight wheezes. No rhonchi. CV: Regular rate. Regular rhythm. No murmur or rub. No edema. Skin: Warm, dry, normal texture and turgor. No nodule on exposed extremities. M/S: No cyanosis. No clubbing. No joint deformity. Psych: Oriented x 3. No anxiety. Awake. Alert. Intact judgement and insight. Good Mood / Affect.   Memory appears in tact       Medications:    Scheduled Meds:   lactulose  20 g Oral TID    predniSONE  20 mg Oral Daily    insulin lispro  0-12 Units SubCUTAneous TID WC    insulin lispro  0-6 Units SubCUTAneous Nightly    polyethylene glycol  17 g Oral Daily    ipratropium-albuterol  1 ampule Inhalation Q4H WA    atorvastatin  10 mg Oral Daily    dilTIAZem  300 mg Oral Daily    losartan  50 mg Oral Daily    pantoprazole  40 mg Oral QAM AC    theophylline  200 mg Oral BID    enoxaparin  40 mg SubCUTAneous Daily    budesonide-formoterol  2 puff Inhalation BID    nystatin  5 mL Oral 4x Daily    docusate sodium  100 mg Oral BID       Continuous Infusions:   dextrose         PRN Meds:  polyvinyl alcohol, glucose, dextrose, glucagon (rDNA), dextrose    Labs reviewed:  CBC:   Recent Labs     10/24/21  0542 10/25/21  0547   WBC 17.1* 14.7*   HGB 13.9 15.0   HCT 41.8 45.9   MCV 86.7 87.3    354     BMP:   Recent Labs     10/24/21  0542 10/25/21  0547   * 133*   K 4.9 4.6   CL 95* 100   CO2 27 25   BUN 19 17   CREATININE 0.9 0.9     LIVER PROFILE: No results for input(s): AST, ALT, LIPASE, BILIDIR, BILITOT, ALKPHOS in the last 72 hours. Invalid input(s): AMYLASE,  ALB  PT/INR: No results for input(s): PROTIME, INR in the last 72 hours. APTT: No results for input(s): APTT in the last 72 hours. UA:No results for input(s): NITRITE, COLORU, PHUR, LABCAST, WBCUA, RBCUA, MUCUS, TRICHOMONAS, YEAST, BACTERIA, CLARITYU, SPECGRAV, LEUKOCYTESUR, UROBILINOGEN, BILIRUBINUR, BLOODU, GLUCOSEU, AMORPHOUS in the last 72 hours. Invalid input(s): Willaim Furnish  No results for input(s): PH, PCO2, PO2 in the last 72 hours. Cx:      Films: This note was transcribed using [address]. Please disregard any translational errors.       Triny Olvera Pulmonary, [facility] [phone]

## 2021-10-25 NOTE — RT PROTOCOL NOTE
RT Inhaler-Nebulizer Bronchodilator Protocol Note    There is a bronchodilator order in the chart from a provider indicating to follow the RT Bronchodilator Protocol and there is an Initiate RT Inhaler-Nebulizer Bronchodilator Protocol order as well (see protocol at bottom of note). CXR Findings:  No results found. The findings from the last RT Protocol Assessment were as follows:   History Pulmonary Disease: Chronic pulmonary disease  Respiratory Pattern: Mild dyspnea at rest, irregular pattern, or RR 21-25 bpm  Breath Sounds: Slightly diminished and/or crackles  Cough: Strong, spontaneous, non-productive  Indication for Bronchodilator Therapy: On home bronchodilators  Bronchodilator Assessment Score: 8    Aerosolized bronchodilator medication orders have been revised according to the RT Inhaler-Nebulizer Bronchodilator Protocol below. Respiratory Therapist to perform RT Therapy Protocol Assessment initially then follow the protocol. Repeat RT Therapy Protocol Assessment PRN for score 0-3 or on second treatment, BID, and PRN for scores above 3. No Indications - adjust the frequency to every 6 hours PRN wheezing or bronchospasm, if no treatments needed after 48 hours then discontinue using Per Protocol order mode. If indication present, adjust the RT bronchodilator orders based on the Bronchodilator Assessment Score as indicated below. Use Inhaler orders unless patient has one or more of the following: on home nebulizer, not able to hold breath for 10 seconds, is not alert and oriented, cannot activate and use MDI correctly, or respiratory rate 25 breaths per minute or more, then use the equivalent nebulizer order(s) with same Frequency and PRN reasons based on the score. If a patient is on this medication at home then do not decrease Frequency below that used at home.     0-3 - enter or revise RT bronchodilator order(s) to equivalent RT Bronchodilator order with Frequency of every 4 hours PRN for wheezing or increased work of breathing using Per Protocol order mode. 4-6 - enter or revise RT Bronchodilator order(s) to two equivalent RT bronchodilator orders with one order with BID Frequency and one order with Frequency of every 4 hours PRN wheezing or increased work of breathing using Per Protocol order mode. 7-10 - enter or revise RT Bronchodilator order(s) to two equivalent RT bronchodilator orders with one order with TID Frequency and one order with Frequency of every 4 hours PRN wheezing or increased work of breathing using Per Protocol order mode. 11-13 - enter or revise RT Bronchodilator order(s) to one equivalent RT bronchodilator order with QID Frequency and an Albuterol order with Frequency of every 4 hours PRN wheezing or increased work of breathing using Per Protocol order mode. Greater than 13 - enter or revise RT Bronchodilator order(s) to one equivalent RT bronchodilator order with every 4 hours Frequency and an Albuterol order with Frequency of every 2 hours PRN wheezing or increased work of breathing using Per Protocol order mode. RT to enter RT Home Evaluation for COPD & MDI Assessment order using Per Protocol order mode.     Electronically signed by Autumn Wing RCP on 10/25/2021 at 8:22 AM

## 2021-10-25 NOTE — CARE COORDINATION
DISCHARGE SUMMARY     DATE OF DISCHARGE: 10/25/2021    DISCHARGE DESTINATION: Home with spouse      TRANSPORTATION: Spouse to transport pt home at d/c.      COMMENTS: No d/c needs noted at this time.   Binu Prado  [phone]  Electronically signed by Elisha Womack on 10/25/2021 at 2:46 PM

## 2021-10-26 ENCOUNTER — CARE COORDINATION (OUTPATIENT)
Dept: CASE MANAGEMENT | Age: 81
End: 2021-10-26

## 2021-10-26 NOTE — CARE COORDINATION
[facility] Transitions Initial Follow Up Call    Call within 2 business days of discharge: Yes    Patient: Roque Santiago Patient : 1940   MRN: [id number]  Reason for Admission: COPD exacerbation  Discharge Date: 10/25/21 RARS: Readmission Risk Score: 9.6      Last Discharge [facility]       Complaint Diagnosis Description Type Department Provider    10/20/21 Shortness of Breath COPD exacerbation ([facility] [address] . .. ED to Hosp-Admission (Discharged) (ADMITTED) Juan Bryant MD; Brody Montes... Spoke with: wife, Tory Perez, HIPAA verified    Facility: [facility]    Non-face-to-face services provided:  Obtained and reviewed discharge summary and/or continuity of care documents    Transitions of Care Initial Call    Was this an external facility discharge? No Discharge Facility: NA    Challenges to be reviewed by the provider   Additional needs identified to be addressed with provider: No  none             Method of communication with provider : phone      Advance Care Planning:   Does patient have an Advance Directive: reviewed and current. Advance Care Planning   Healthcare Decision Maker:    Primary Decision Maker: Jase Levine  [phone]    Was this a readmission? No  Patient stated reason for admission: worsening SOB  Patients top risk factors for readmission: medical condition-COPD exacerbation    Care Transition Nurse (CTN) contacted the family by telephone to perform post hospital discharge assessment. Verified name and  with family as identifiers. Provided introduction to self, and explanation of the CTN role. CTN reviewed discharge instructions, medical action plan and red flags with family who verbalized understanding. Family given an opportunity to ask questions and does not have any further questions or concerns at this time. Were discharge instructions available to patient? Yes.  Reviewed appropriate site of care based on symptoms and resources available to patient including: PCP and Specialist. The family agrees to contact the PCP office for questions related to their healthcare. Medication reconciliation was performed with family, who verbalizes understanding of administration of home medications. Advised obtaining a 90-day supply of all daily and as-needed medications. Covid Risk Education     Educated patient about risk for severe COVID-19 due to risk factors according to CDC guidelines. LPN CC reviewed discharge instructions, medical action plan and red flag symptoms with the family who verbalized understanding. Discussed COVID vaccination status: Yes. Education provided on COVID-19 vaccination as appropriate. Discussed exposure protocols and quarantine with CDC Guidelines. Family was given an opportunity to verbalize any questions and concerns and agrees to contact LPN CC or health care provider for questions related to their healthcare. Reviewed and educated family on any new and changed medications related to discharge diagnosis. Was patient discharged with a pulse oximeter? No Discussed and confirmed pulse oximeter discharge instructions and when to notify provider or seek emergency care. Spoke with wife, Ladarius Smith, HIPAA verified. Wife verified patient  and was pleasant and agreeable to transition calls. States patient is sleeping and that she is leaving to go perform services at the [employer] and can't talk long. Denies worsening SOB, CP, cough, fever/chills. Appetite is good. States she believes patient had a BM and denies urinary problems. Unable to perform medication reconciliation, but wife was able to confirm that patient is taking the docusate, Miralax, and prednisone. Wife denied difficulty using inhalers or taking any medications. States nebulizer is in working order. Patient scheduled for pulmonology . Wife requested to end call at this time, LPN CC obliged and gave contact information.  Denies any acute needs at present time. Agreeable to f/u calls. Educated on the use of urgent care or physicians 24 hr access line if assistance is needed after hours. LPN CC provided contact information. Plan for follow-up call in 3-5 days based on severity of symptoms and risk factors.   Amy Muro LPN [address] [place] [address]  [phone]    Care Transitions 24 Hour Call    Do you have any ongoing symptoms?: No  Do you have a copy of your discharge instructions?: Yes  Do you have all of your prescriptions and are they filled?: Yes  Have you been contacted by a [address]?: No  Have you scheduled your follow up appointment?: No  Were you discharged with any Home Care or Post Acute Services: No  Post Acute Services: Home Health (Comment: [facility] acute hospital)  Do you feel like you have everything you need to keep you well at home?: Yes  Care Transitions Interventions         Follow Up  Future Appointments   Date Time Provider Alka Fregoso   1/7/2022 11:00 AM Cathy Catalan MD [address] [place] [address]   3/28/2022  1:00 PM Yolanda Ahumada MD [address] [place], [place] [address]

## 2021-10-26 NOTE — DISCHARGE SUMMARY
[address] [facility]                               DISCHARGE SUMMARY    PATIENT NAME: Jessica Montes                :        1940  MED REC NO:   [id number]                          ROOM:       [id number]  ACCOUNT NO:   [de-identified]                           ADMIT DATE: 10/20/2021  PROVIDER:     Arron Terrazas MD                  DISCHARGE DATE:  10/25/2021    DIAGNOSIS ON ADMISSION:  Acute respiratory failure with hypoxemia. DIAGNOSES ON DISCHARGE:  1.  Acute respiratory failure with hypoxemia. 2.  COPD exacerbation. 3.  Essential hypertension. 4.  Very severe COPD.  5.  Obstructive sleep apnea. 6.  Abnormal glucose. 7.  Hypercholesterolemia. HISTORY OF PRESENT ILLNESS:  The patient is an 81-year-old white male  admitted through emergency with a history of increasing shortness of  breath who had been progressive over about one-week duration. Recently  finished a steroid course given to him by Dr. Krystian De León of the Pulmonary  Service with only slight improvement. Denies any antibiotic _____. Denies fever, nausea, vomiting, diarrhea, and mostly just notes  progressive shortness of breath and limitation of his activity. He does  not wear oxygen at home on a regular basis but has a small oxygen _____  which he used temporarily and did give him some relief. Please see the  HPI for further details. He was admitted. He was begun on IV  Solu-Medrol, handheld nebulizers and did notice some improvement. He  was seen by Dr. Krystian De León of the Pulmonary Service who concurred with the  treatment plan. There was no strong evidence of infection and there was  no infiltrate on x-ray. The steroids and handheld nebulizers were given  with gradual improvement in his symptoms. He continued to improve over  a slow basis. He was able to come off oxygen.   He was maintained on  Solu-Medrol, Symbicort, theophylline and Patti and improved. Following a several-day hospital course, he was finally discharged home  on 10/25. At the time of his discharge, he was told to resume his  maintenance medications and in addition, given Colace 100 mg b.i.d. for  10 days for constipation, MiraLax 17 gm daily. He was told to take  prednisone 20 mg one tablet daily for four days, followed by 10 mg for  four days. He was told to continue his maintenance medications at home  including albuterol inhaler, Atrovent nasal spray, losartan 50 mg daily,  diltiazem  daily, atorvastatin 10 mg daily, TRELEGY ELLIPTA one  puff daily, theophylline 200 mg daily, albuterol nebulizing solution  every six hours as needed, omeprazole 40 mg daily and Restasis eye  drops. Told to follow with Dr. Vanna Gonzalez in one to two weeks and Dr. Sanford Hodges as directed.     Kia Madrigal MD    D: 10/25/2021 16:03:43       T: 10/25/2021 17:37:47     [email or web address]  Job#: [id number]     Doc#: [id number]    CC:  MD Zayra Benjamin MD

## 2021-11-05 ENCOUNTER — CARE COORDINATION (OUTPATIENT)
Dept: CARE COORDINATION | Age: 81
End: 2021-11-05

## 2021-11-05 NOTE — CARE COORDINATION
[facility] Transitions Follow Up Call    2021    Patient: Amanda Kuo  Patient : 1940   MRN: [id number]  Reason for Admission: COPD exacerbation  Discharge Date: 10/25/21 RARS: Readmission Risk Score: 9.6         Spoke with: wife stated pt wasn't available and will call this RN back. Instructed to LM if unable to reach.      Follow Up  Future Appointments   Date Time Provider Alka Fregoso   2022 11:00 AM Kerwin Ortega  [facility]   3/28/2022  1:00 PM Heide Figueroa MD [address]       Abhishek Del Cid RN

## 2021-11-05 NOTE — CARE COORDINATION
[facility] Transitions Follow Up Call    2021    Patient: Jamar Connors  Patient : 1940   MRN: [id number]  Reason for Admission: COPD exacerbation  Discharge Date: 10/25/21 RARS: Readmission Risk Score: 9.6         Spoke with: [facility] Transitions Follow Up Call    Pt is not feeling an improvement, remains the same as discharged and had many questions yet very educated regarding COPD. Pt is finished with prednisone, using nebulizer in the mornings. Has O2 at home (concentrator) and asked if ok to use. Explained to use upon SOB with exertion and if O2 <90 and to monitor that he recovers. Pt states he rarely drops below 92 even with feels like its a struggle to breath. Has not seen pulm post hospitalization, will call for f/u appt versus awaiting until January. Encouraged pt to discuss with pulm maintenance prednisone as well as possibly changing any maintenance inhalers. Needs to be reviewed by the provider   Additional needs identified to be addressed with provider: Yes  pt needs hospital f/u appt with pulmonology             Method of communication with provider : chart routing      Care Transition Nurse (CTN) contacted the patient by telephone to follow up after admission on 10/20/2021. Verified name and  with patient as identifiers. Addressed changes since last contact: SOB and O2 levels  Discussed follow-up appointments. If no appointment was previously scheduled, appointment scheduling offered: No.   Is follow up appointment scheduled within 7 days of discharge? Yes. CTN reviewed discharge instructions, medical action plan and red flags with patient and discussed any barriers to care and/or understanding of plan of care after discharge. Discussed appropriate site of care based on symptoms and resources available to patient including: PCP, Specialist and When to call 911.  The patient agrees to contact the PCP office for questions related to their healthcare. Patients top risk factors for readmission: medical condition-COPD exacerbation  Interventions to address risk factors: reviewed with pt when and how to use his rescue inhaler and nebulizer, using O2 at rest.      Non-[facility] follow up appointment(s): gareth    CTN provided contact information for future needs. Plan for follow-up call in 3-5 days based on severity of symptoms and risk factors. Plan for next call: symptom management-how his breathing is doing, self management-using rescue medications appropriately and follow up appointment-has made appt or seen his pulmonologist            Care Transitions Subsequent and Final Call    Schedule Follow Up Appointment with PCP: Completed  Subsequent and Final Calls  Are you currently active with any services?: Home Health  Care Transitions Interventions  No Identified Needs  Other Interventions:            Follow Up  Future Appointments   Date Time Provider Alka Fregoso   1/7/2022 11:00 AM Stella Woods MD [facility] PULM [facility]   3/28/2022  1:00 PM Reinaldo Calabrese MD [address]       Duglas Akins RN

## 2021-11-08 ENCOUNTER — CARE COORDINATION (OUTPATIENT)
Dept: CARE COORDINATION | Age: 81
End: 2021-11-08

## 2021-11-08 NOTE — CARE COORDINATION
[facility] Transitions Follow Up Call    2021    Patient: Brittani Sierra  Patient : 1940   MRN: [id number]  Reason for Admission:  COPD exacerbation  Discharge Date: 10/25/21 RARS: Readmission Risk Score: 9.6         Spoke with: [facility] Transitions Follow Up Call    Pt states he is status quo. Still needs to use his O2 in the home when moving from one part of the house to the other. Pt bought a concentrator years ago. Will call pulm for appt, especially since going to [facility] beginning of the year. Pt wants to do the best he can at maintaining his COPD, has many grandchildren. Needs to be reviewed by the provider   Additional needs identified to be addressed with provider: No  none             Method of communication with provider : none      Care Transition Nurse (CTN) contacted the patient by telephone to follow up after admission on 10/20/21. Verified name and  with patient as identifiers. Addressed changes since last contact: breathing/O2 and appt with pulm  Discussed follow-up appointments. If no appointment was previously scheduled, appointment scheduling offered: No.   Is follow up appointment scheduled within 7 days of discharge? Yes. CTN reviewed discharge instructions, medical action plan and red flags with patient and discussed any barriers to care and/or understanding of plan of care after discharge. Discussed appropriate site of care based on symptoms and resources available to patient including: PCP, Specialist and When to call 911. The patient agrees to contact the PCP office for questions related to their healthcare. Patients top risk factors for readmission: medical condition- COPD   Interventions to address risk factors: Obtained and reviewed discharge summary and/or continuity of care documents      Non-[facility] follow up appointment(s): na    CTN provided contact information for future needs.  Plan for follow-up call in 5-7 days based on severity of symptoms and risk factors. Plan for next call: symptom management-able to use O2 and inhalers to keep O2 above 92 and follow up appointment-with pulm        Care Transitions Subsequent and Final Call    Schedule Follow Up Appointment with PCP: Completed  Subsequent and Final Calls  Are you currently active with any services?: Home Health  Care Transitions Interventions  Other Interventions:            Follow Up  Future Appointments   Date Time Provider Alka Fregoso   1/7/2022 11:00 AM Brenda Espinosa MD [facility] PULM [facility]   3/28/2022  1:00 PM Cara Meyer MD [address] [place] [address] Drive       Henri Adam RN

## 2021-11-09 ENCOUNTER — TELEPHONE (OUTPATIENT)
Dept: PULMONOLOGY | Age: 81
End: 2021-11-09

## 2021-11-09 NOTE — TELEPHONE ENCOUNTER
Care coordination note from 11/8 was sent the the pool to offer follow up .        Can offer Thursday 1 pm

## 2021-11-09 NOTE — TELEPHONE ENCOUNTER
Patient was released from hospital recently and is still having trouble breathing. Nurse from [facility] has been following up with him over phone and has suggested he come in to see Dr. Trevon robbins. Please advise . Kaiden Edouard

## 2021-11-11 ENCOUNTER — OFFICE VISIT (OUTPATIENT)
Dept: PULMONOLOGY | Age: 81
End: 2021-11-11
Payer: MEDICARE

## 2021-11-11 VITALS
TEMPERATURE: 97.5 F | BODY MASS INDEX: 29.19 KG/M2 | OXYGEN SATURATION: 94 % | DIASTOLIC BLOOD PRESSURE: 50 MMHG | RESPIRATION RATE: 18 BRPM | SYSTOLIC BLOOD PRESSURE: 136 MMHG | HEIGHT: 67 IN | WEIGHT: 186 LBS | HEART RATE: 100 BPM

## 2021-11-11 DIAGNOSIS — J44.9 COPD, VERY SEVERE (HCC): Primary | ICD-10-CM

## 2021-11-11 DIAGNOSIS — J30.89 OTHER ALLERGIC RHINITIS: ICD-10-CM

## 2021-11-11 DIAGNOSIS — J06.9 UPPER RESPIRATORY TRACT INFECTION, UNSPECIFIED TYPE: ICD-10-CM

## 2021-11-11 DIAGNOSIS — G47.33 OSA (OBSTRUCTIVE SLEEP APNEA): ICD-10-CM

## 2021-11-11 DIAGNOSIS — J96.12 CHRONIC HYPERCAPNIC RESPIRATORY FAILURE (HCC): ICD-10-CM

## 2021-11-11 DIAGNOSIS — J44.1 COPD EXACERBATION (HCC): ICD-10-CM

## 2021-11-11 PROCEDURE — 1111F DSCHRG MED/CURRENT MED MERGE: CPT | Performed by: INTERNAL MEDICINE

## 2021-11-11 PROCEDURE — 1036F TOBACCO NON-USER: CPT | Performed by: INTERNAL MEDICINE

## 2021-11-11 PROCEDURE — 99215 OFFICE O/P EST HI 40 MIN: CPT | Performed by: INTERNAL MEDICINE

## 2021-11-11 PROCEDURE — 3023F SPIROM DOC REV: CPT | Performed by: INTERNAL MEDICINE

## 2021-11-11 PROCEDURE — G8427 DOCREV CUR MEDS BY ELIG CLIN: HCPCS | Performed by: INTERNAL MEDICINE

## 2021-11-11 PROCEDURE — 1123F ACP DISCUSS/DSCN MKR DOCD: CPT | Performed by: INTERNAL MEDICINE

## 2021-11-11 PROCEDURE — G8417 CALC BMI ABV UP PARAM F/U: HCPCS | Performed by: INTERNAL MEDICINE

## 2021-11-11 PROCEDURE — G8484 FLU IMMUNIZE NO ADMIN: HCPCS | Performed by: INTERNAL MEDICINE

## 2021-11-11 PROCEDURE — 4040F PNEUMOC VAC/ADMIN/RCVD: CPT | Performed by: INTERNAL MEDICINE

## 2021-11-11 PROCEDURE — G8926 SPIRO NO PERF OR DOC: HCPCS | Performed by: INTERNAL MEDICINE

## 2021-11-11 RX ORDER — DOXYCYCLINE HYCLATE 100 MG
100 TABLET ORAL 2 TIMES DAILY
Qty: 14 TABLET | Refills: 0 | Status: SHIPPED | OUTPATIENT
Start: 2021-11-11 | End: 2021-11-18

## 2021-11-11 RX ORDER — FLUTICASONE FUROATE, UMECLIDINIUM BROMIDE AND VILANTEROL TRIFENATATE 200; 62.5; 25 UG/1; UG/1; UG/1
1 POWDER RESPIRATORY (INHALATION) DAILY
Qty: 3 EACH | Refills: 3 | Status: SHIPPED | OUTPATIENT
Start: 2021-11-11

## 2021-11-11 NOTE — PROGRESS NOTES
REASON FOR CONSULTATION/CC: ARTURO      CONSULTING PHYSICIAN: Agustina Henley MD   PCP: Agustina Henley MD    HISTORY OF PRESENT ILLNESS: Oneyda Caicedo is a [de-identified]y.o. year old male with a history of ARTURO who presents :     Sleep history:                 Increased shortness of breath. He was exposed to son who had \"a cold\" now with increased post nasal drip and cough with cpap. Increased sinus congestion. Secretions are clear. We discussed possible covid. Requesting antibiotics but having clear phlegm  Advised likely viral.     He has prednisone but is not using it. Has not started secondary to concern for side effects. arturo  Labored breathing with cpap      COPD  Worsening dyspnea. Transient hypoxemia improved with breathign techniques. Trelegy   theophylline         Chronic hypoxemia  Using oxygen prn.      allergic rhinitis  ipratropium                 [place] Sleepiness Scale:    Sleep Medicine 9/28/2021 11/23/2020 12/4/2019 8/8/2019 3/14/2019 9/13/2018 6/12/2018   Sitting and reading 1 1 1 1 1 1 1   Watching TV 1 1 1 1 1 1 1   Sitting, inactive in a public place (e.g. a theatre or a meeting) 1 1 1 1 1 1 1   As a passenger in a car for an hour without a break 1 1 1 1 1 1 1   Lying down to rest in the afternoon when circumstances permit 1 1 0 1 0 1 1   Sitting and talking to someone 1 1 1 1 1 1 1   Sitting quietly after a lunch without alcohol 1 1 1 1 1 1 1   In a car, while stopped for a few minutes in traffic 1 1 1 1 0 1 1   Total score 8 8 7 8 6 8 8   Neck circumference - - - - - 18.5 19         0 = no chance of dozing  1 = slight chance of dozing  2 = moderate chance of dozing  3 = high chance of dozing    Interpretation:   0-7: It is unlikely that you are abnormally sleepy. 8-9:You have an average amount of daytime sleepiness. 10-15: You may be excessively sleepy depending on the situation. You may want to consider   seeking medical attention. 16-24: You are excessively sleepy and should consider seeking medical attention     REVIEW OF SYSTEMS:    Constitutional: Negative for fever   HENT: Negative for sore throat  Respiratory: slight shortness of breath and cough  Cardiovascular: Negative for chest pain  Gastrointestinal: Negative for vomiting, diarrhea   Skin: Negative for rash  Psychiatric/Behavorial: Negative for anxiety    Objective:   PHYSICAL EXAM:  Blood pressure (!) 136/50, pulse 100, temperature 97.5 °F (36.4 °C), temperature source Infrared, resp. rate 18, height 5' 7\" (1.702 m), weight 186 lb (84.4 kg), SpO2 94 %.'  Gen: No distress. ENT:   Resp: No accessory muscle use. No crackles. Slight wheezes. No rhonchi. distant  CV: Regular rate. Regular rhythm. No murmur or rub. No edema. Skin: Warm, dry, normal texture and turgor. No nodule on exposed extremities. M/S: No cyanosis. No clubbing. No joint deformity. Psych: Oriented x 3. No anxiety. Awake. Alert. Intact judgement and insight. Good Mood / Affect. Memory appears in tact         Data Reviewed:     Assessment:     ·  COPD, very severe , FEV1 39%, 2021  · ZAHEER  · Chronic Sinusitis  · Code status: DNR CCA October 2, 2018    Plan:              Problem List Items Addressed This Visit     Other allergic rhinitis      Nasal ipratropium         Relevant Medications    Fluticasone-Umeclidin-Vilant (TRELEGY ELLIPTA) 200-62.5-25 MCG/INH AEPB    ZAHEER (obstructive sleep apnea)      Using every night. No download available. COPD, very severe ([address] - Primary      Continues have significant shortness of breath. He is on theophylline, Trelegy, albuterol as needed. He was recently exposed to some type of illness from his son and has progressed since then. He currently has a prednisone prescription with burst and taper but is not taking it. He was advised to use this. He is requesting antibiotics and was given a prescription for doxycycline.   With his clear sinus drainage, he was advised is likely viral and therefore antibiotics would not be beneficial.  Covid test ordered. Relevant Medications    Fluticasone-Umeclidin-Vilant (TRELEGY ELLIPTA) 200-62.5-25 MCG/INH AEPB    doxycycline hyclate (VIBRA-TABS) 100 MG tablet    COPD exacerbation (HCC)      Current exacerbation. Patient is from the hospital multiple times and understands when to go to the emergency room         Relevant Medications    Fluticasone-Umeclidin-Vilant (TRELEGY ELLIPTA) 200-62.5-25 MCG/INH AEPB    Chronic hypercapnic respiratory failure (HCC)     Using oxygen at night and as needed. Other Visit Diagnoses     Upper respiratory tract infection, unspecified type        Relevant Orders    COVID-19            This note was transcribed using Dragon Dictation software. Please disregard any translational errors.

## 2021-11-11 NOTE — ASSESSMENT & PLAN NOTE
Current exacerbation.   Patient is from the hospital multiple times and understands when to go to the emergency room

## 2021-11-11 NOTE — ASSESSMENT & PLAN NOTE
Continues have significant shortness of breath. He is on theophylline, Trelegy, albuterol as needed. He was recently exposed to some type of illness from his son and has progressed since then. He currently has a prednisone prescription with burst and taper but is not taking it. He was advised to use this. He is requesting antibiotics and was given a prescription for doxycycline. With his clear sinus drainage, he was advised is likely viral and therefore antibiotics would not be beneficial.  Covid test ordered.

## 2021-11-15 ENCOUNTER — CARE COORDINATION (OUTPATIENT)
Dept: CARE COORDINATION | Age: 81
End: 2021-11-15

## 2021-11-15 NOTE — CARE COORDINATION
[facility] Transitions Follow Up Call    11/15/2021    Patient: Oneyda Caicedo  Patient : 1940   MRN: [id number]  Reason for Admission: COPD exacerbation  Discharge Date: 10/25/21 RARS: Readmission Risk Score: 9.6         Spoke with: [facility] Transitions Follow Up Call    Pt is doing better, saw Pulm and is on steroid dose pack and ABx. Breathing better, using O2 prn but less. Asked about booster shot, educated to talk with Pulm and/or PCP. Pt states the pharmacist instructed to wait a couple weeks due to steroid. ACM stated she could not advise, but did educate on mask wearing and social distancing right now while on steroids as well as his COPD. Pt agreed. Needs to be reviewed by the provider   Additional needs identified to be addressed with provider: No  none             Method of communication with provider : none      Care Transition Nurse (CTN) contacted the patient by telephone to follow up after admission on 10/20/2021. Verified name and  with patient as identifiers. Addressed changes since last contact: symptom management and pulm appt. Discussed follow-up appointments. If no appointment was previously scheduled, appointment scheduling offered: No.   Is follow up appointment scheduled within 7 days of discharge? Yes. CTN reviewed discharge instructions, medical action plan and red flags with patient and discussed any barriers to care and/or understanding of plan of care after discharge. Discussed appropriate site of care based on symptoms and resources available to patient including: PCP, Specialist and When to call 911. The patient agrees to contact the PCP office for questions related to their healthcare.      Patients top risk factors for readmission: medical condition-COPD exacerbation  Interventions to address risk factors: Obtained and reviewed discharge summary and/or continuity of care documents      Non-[facility] follow up appointment(s): gareth    CTN provided contact information for future needs. Plan for follow-up call in 7-10 days based on severity of symptoms and risk factors. Plan for next call: symptom management-maintaining at baseline, follow up [address] [facility] specialists and made a decision on booster vaccine. Care Transitions Subsequent and Final Call    Schedule Follow Up Appointment with PCP: Completed  Subsequent and Final Calls  Are you currently active with any services?: Home Health  Care Transitions Interventions  No Identified Needs  Other Interventions:            Follow Up  Future Appointments   Date Time Provider Alka Fregoso   1/7/2022 11:00 AM Roscoe Borja MD [facility] PULM [facility]   3/28/2022  1:00 PM Vinh Logan MD [address] [facility] [address]       Dwight Rogers RN

## 2021-11-18 NOTE — TELEPHONE ENCOUNTER
----- Message from Mosie Hodgkin, RN sent at 7/9/2019 12:14 PM EDT -----  [address] Cardiopulmonary Rehabilitation                                               Qualifying Data for Home Oxygen           Resting Oxygen Saturation on Room Air:   95%     Exercise Oxygen Saturation on Room Air:  87%        Resting Oxygen Saturation on Oxygen:  97% (1L)        Exercise Oxygen Saturation on Oxygen: 92% (1L)    39 y/o male with PMHx of DM Type 2, and HTN on Enalapril and non compliant on Metformin presents to ED c/o hyperglycemia. Patient reports 3 weeks of high sugars, highest was over 600. Has been off his Metformin, ran out 5 months ago. Endorsing 3 weeks of polyuria, polydipsia, intermittent shortness of breath, and N/V 2-3 episodes daily.     Denies allergies, fever. 37 y/o male with PMHx of DM Type 2, asthma non compliant on Metformin presents to ED c/o hyperglycemia. Patient reports 3 weeks of high sugars, highest was over 600. Has been off his Metformin, ran out 5 months ago. Endorsing 3 weeks of polyuria, polydipsia, intermittent shortness of breath, and N/V 2-3 episodes daily.     Denies allergies, fever.

## 2021-11-23 ENCOUNTER — CARE COORDINATION (OUTPATIENT)
Dept: CASE MANAGEMENT | Age: 81
End: 2021-11-23

## 2021-12-02 ENCOUNTER — CARE COORDINATION (OUTPATIENT)
Dept: CASE MANAGEMENT | Age: 81
End: 2021-12-02

## 2021-12-02 DIAGNOSIS — R06.02 SOB (SHORTNESS OF BREATH): Primary | ICD-10-CM

## 2021-12-02 NOTE — CARE COORDINATION
[facility] Transitions Follow Up Call    2021    Patient: aCsa Cole  Patient : 1940   MRN: <[id number]>  Reason for Admission: COPD exacerbation  Discharge Date: 10/25/21 RARS: Readmission Risk Score: 9.6    Spoke with: Casa Cole who reports that he is not doing great. Patient states that when he get up moving around he struggle to breath. Patient reports that he is taking medications as ordered and using inhalers as directed. Patient reports that he has had follow up with PCP and Pulmonology. He states even with the increase dosage of the Trelegy is not being effective. Patient reports that he has another follow up with Pulmonology 22 and will just address it then. He states in the mean time he will just take more rest periods. Patient states that O2 sat is around 92% at rest on RA. He denies using any supplement O2. Patient denies cp, cough, dizziness, headache, n/v, diarrhea, abdominal pains, fever, or chills. Patient report that appetite and fluid intake is good and denies any problems with bowel or bladder. Denies any needs at this time. Patient instructed to continue to monitor s/s, reporting any that may present to MD immediately for early intervention. Reminded of COVID 19 precautions. Notified that this will be our last outreach and patient was agreeable. Episode closed.      Care Transitions Subsequent and Final Call    Subsequent and Final Calls  Do you have any ongoing symptoms?: Yes  Onset of Patient-reported symptoms: Other  Patient-reported symptoms: Shortness of Breath  Interventions for patient-reported symptoms: Other  Have your medications changed?: No  Do you have any questions related to your medications?: No  Do you currently have any active services?: No  Are you currently active with any services?: Home Health  Do you have any needs or concerns that I can assist you with?: No  Identified Barriers: None  Care Transitions Interventions  No Identified Needs  Other Interventions:            Follow Up  Future Appointments   Date Time Provider Alka Fregoso   1/7/2022 11:00 AM Ariel Nageotte, MD [facility] PULM [facility]   3/28/2022  1:00 PM Nitin Jones MD [address] [place] [address] Drive       Alissa Naranjo LPN

## 2021-12-07 ENCOUNTER — TELEPHONE (OUTPATIENT)
Dept: PULMONOLOGY | Age: 81
End: 2021-12-07

## 2021-12-07 DIAGNOSIS — J44.9 COPD, VERY SEVERE (HCC): Primary | ICD-10-CM

## 2021-12-07 RX ORDER — LEVOFLOXACIN 750 MG/1
750 TABLET ORAL DAILY
Qty: 7 TABLET | Refills: 0 | Status: SHIPPED | OUTPATIENT
Start: 2021-12-07 | End: 2021-12-14

## 2021-12-07 RX ORDER — PREDNISONE 10 MG/1
TABLET ORAL
Qty: 30 TABLET | Refills: 0 | Status: SHIPPED | OUTPATIENT
Start: 2021-12-07 | End: 2021-12-17

## 2021-12-07 NOTE — TELEPHONE ENCOUNTER
Complains of cough and SOB  Duration 3 days   Cough with sputum production? yes  Color green and thick   Fever? Yes , did decrease with meds  Any other Symptoms? Wheezing tightness   Any current treatment tried? Advil   Using inhalers? yes do they help? Somewhat / temp   Pharmacy?  [address]

## 2021-12-20 ENCOUNTER — HOSPITAL ENCOUNTER (OUTPATIENT)
Dept: NON INVASIVE DIAGNOSTICS | Age: 81
Discharge: HOME | End: 2021-12-20
Payer: MEDICARE

## 2021-12-20 DIAGNOSIS — R06.02 SOB (SHORTNESS OF BREATH): ICD-10-CM

## 2021-12-20 LAB
LV EF: 58 %
LVEF MODALITY: NORMAL

## 2021-12-20 PROCEDURE — C8929 TTE W OR WO FOL WCON,DOPPLER: HCPCS

## 2021-12-20 PROCEDURE — 6360000004 HC RX CONTRAST MEDICATION: Performed by: INTERNAL MEDICINE

## 2021-12-20 RX ADMIN — PERFLUTREN 1.65 MG: 6.52 INJECTION, SUSPENSION INTRAVENOUS at 15:19

## 2022-01-01 ENCOUNTER — APPOINTMENT (OUTPATIENT)
Dept: CT IMAGING | Age: 82
End: 2022-01-01
Payer: MEDICARE

## 2022-01-01 ENCOUNTER — APPOINTMENT (OUTPATIENT)
Dept: GENERAL RADIOLOGY | Age: 82
End: 2022-01-01
Payer: MEDICARE

## 2022-01-01 ENCOUNTER — HOSPITAL ENCOUNTER (INPATIENT)
Age: 82
LOS: 15 days | End: 2022-12-16
Attending: EMERGENCY MEDICINE | Admitting: INTERNAL MEDICINE
Payer: MEDICARE

## 2022-01-01 VITALS
HEIGHT: 67 IN | OXYGEN SATURATION: 91 % | BODY MASS INDEX: 28.86 KG/M2 | DIASTOLIC BLOOD PRESSURE: 24 MMHG | WEIGHT: 183.86 LBS | SYSTOLIC BLOOD PRESSURE: 40 MMHG | TEMPERATURE: 97.7 F

## 2022-01-01 DIAGNOSIS — J10.1 INFLUENZA A: Primary | ICD-10-CM

## 2022-01-01 DIAGNOSIS — J44.1 COPD EXACERBATION (HCC): ICD-10-CM

## 2022-01-01 DIAGNOSIS — J44.9 COPD, VERY SEVERE (HCC): ICD-10-CM

## 2022-01-01 LAB
(1,3)-BETA-D-GLUCAN (FUNGITELL) INTERPRETATION: POSITIVE
(1,3)-BETA-D-GLUCAN (FUNGITELL): 160 PG/ML
A/G RATIO: 1.1 (ref 1.1–2.2)
A/G RATIO: 1.2 (ref 1.1–2.2)
A/G RATIO: 1.2 (ref 1.1–2.2)
ALBUMIN SERPL-MCNC: 2.9 G/DL (ref 3.4–5)
ALBUMIN SERPL-MCNC: 3.2 G/DL (ref 3.4–5)
ALBUMIN SERPL-MCNC: 3.6 G/DL (ref 3.4–5)
ALP BLD-CCNC: 69 U/L (ref 40–129)
ALP BLD-CCNC: 75 U/L (ref 40–129)
ALP BLD-CCNC: 82 U/L (ref 40–129)
ALT SERPL-CCNC: 35 U/L (ref 10–40)
ALT SERPL-CCNC: 37 U/L (ref 10–40)
ALT SERPL-CCNC: 43 U/L (ref 10–40)
ANION GAP SERPL CALCULATED.3IONS-SCNC: 10 MMOL/L (ref 3–16)
ANION GAP SERPL CALCULATED.3IONS-SCNC: 10 MMOL/L (ref 3–16)
ANION GAP SERPL CALCULATED.3IONS-SCNC: 12 MMOL/L (ref 3–16)
ANION GAP SERPL CALCULATED.3IONS-SCNC: 12 MMOL/L (ref 3–16)
ANION GAP SERPL CALCULATED.3IONS-SCNC: 14 MMOL/L (ref 3–16)
ANION GAP SERPL CALCULATED.3IONS-SCNC: 14 MMOL/L (ref 3–16)
ANION GAP SERPL CALCULATED.3IONS-SCNC: 16 MMOL/L (ref 3–16)
ASPERGILLUS GALACTO AG: POSITIVE
ASPERGILLUS GALACTO INDEX: 0.83
AST SERPL-CCNC: 18 U/L (ref 15–37)
AST SERPL-CCNC: 18 U/L (ref 15–37)
AST SERPL-CCNC: 26 U/L (ref 15–37)
BASE EXCESS ARTERIAL: 5.3 MMOL/L (ref -3–3)
BASE EXCESS VENOUS: 2.9 MMOL/L
BASE EXCESS VENOUS: 4 MMOL/L
BASOPHILS ABSOLUTE: 0 K/UL (ref 0–0.2)
BASOPHILS ABSOLUTE: 0.1 K/UL (ref 0–0.2)
BASOPHILS RELATIVE PERCENT: 0.1 %
BASOPHILS RELATIVE PERCENT: 0.2 %
BASOPHILS RELATIVE PERCENT: 0.4 %
BILIRUB SERPL-MCNC: 0.4 MG/DL (ref 0–1)
BILIRUB SERPL-MCNC: 0.5 MG/DL (ref 0–1)
BILIRUB SERPL-MCNC: 0.5 MG/DL (ref 0–1)
BUN BLDV-MCNC: 15 MG/DL (ref 7–20)
BUN BLDV-MCNC: 15 MG/DL (ref 7–20)
BUN BLDV-MCNC: 16 MG/DL (ref 7–20)
BUN BLDV-MCNC: 17 MG/DL (ref 7–20)
BUN BLDV-MCNC: 17 MG/DL (ref 7–20)
BUN BLDV-MCNC: 18 MG/DL (ref 7–20)
BUN BLDV-MCNC: 25 MG/DL (ref 7–20)
CALCIUM SERPL-MCNC: 8.8 MG/DL (ref 8.3–10.6)
CALCIUM SERPL-MCNC: 9.1 MG/DL (ref 8.3–10.6)
CALCIUM SERPL-MCNC: 9.2 MG/DL (ref 8.3–10.6)
CALCIUM SERPL-MCNC: 9.2 MG/DL (ref 8.3–10.6)
CALCIUM SERPL-MCNC: 9.5 MG/DL (ref 8.3–10.6)
CALCIUM SERPL-MCNC: 9.5 MG/DL (ref 8.3–10.6)
CALCIUM SERPL-MCNC: 9.7 MG/DL (ref 8.3–10.6)
CARBOXYHEMOGLOBIN ARTERIAL: 0.7 % (ref 0–1.5)
CARBOXYHEMOGLOBIN: 0.3 %
CARBOXYHEMOGLOBIN: 1.3 %
CHLORIDE BLD-SCNC: 100 MMOL/L (ref 99–110)
CHLORIDE BLD-SCNC: 94 MMOL/L (ref 99–110)
CHLORIDE BLD-SCNC: 95 MMOL/L (ref 99–110)
CHLORIDE BLD-SCNC: 96 MMOL/L (ref 99–110)
CHLORIDE BLD-SCNC: 97 MMOL/L (ref 99–110)
CHLORIDE BLD-SCNC: 99 MMOL/L (ref 99–110)
CHLORIDE BLD-SCNC: 99 MMOL/L (ref 99–110)
CO2: 25 MMOL/L (ref 21–32)
CO2: 25 MMOL/L (ref 21–32)
CO2: 26 MMOL/L (ref 21–32)
CO2: 26 MMOL/L (ref 21–32)
CO2: 27 MMOL/L (ref 21–32)
CO2: 27 MMOL/L (ref 21–32)
CO2: 30 MMOL/L (ref 21–32)
CREAT SERPL-MCNC: 0.6 MG/DL (ref 0.8–1.3)
CREAT SERPL-MCNC: 0.7 MG/DL (ref 0.8–1.3)
CREAT SERPL-MCNC: 0.9 MG/DL (ref 0.8–1.3)
CREAT SERPL-MCNC: 0.9 MG/DL (ref 0.8–1.3)
CULTURE, RESPIRATORY: ABNORMAL
CULTURE, RESPIRATORY: ABNORMAL
CULTURE, RESPIRATORY: NORMAL
EKG ATRIAL RATE: 107 BPM
EKG DIAGNOSIS: NORMAL
EKG P AXIS: 76 DEGREES
EKG P-R INTERVAL: 124 MS
EKG Q-T INTERVAL: 312 MS
EKG QRS DURATION: 84 MS
EKG QTC CALCULATION (BAZETT): 416 MS
EKG R AXIS: 98 DEGREES
EKG T AXIS: 41 DEGREES
EKG VENTRICULAR RATE: 107 BPM
EOSINOPHILS ABSOLUTE: 0 K/UL (ref 0–0.6)
EOSINOPHILS RELATIVE PERCENT: 0 %
ESTIMATED AVERAGE GLUCOSE: 165.7 MG/DL
ESTIMATED AVERAGE GLUCOSE: 171.4 MG/DL
ESTIMATED AVERAGE GLUCOSE: 174.3 MG/DL
GFR SERPL CREATININE-BSD FRML MDRD: >60 ML/MIN/{1.73_M2}
GLUCOSE BLD-MCNC: 126 MG/DL (ref 70–99)
GLUCOSE BLD-MCNC: 128 MG/DL (ref 70–99)
GLUCOSE BLD-MCNC: 167 MG/DL (ref 70–99)
GLUCOSE BLD-MCNC: 209 MG/DL (ref 70–99)
GLUCOSE BLD-MCNC: 230 MG/DL (ref 70–99)
GLUCOSE BLD-MCNC: 265 MG/DL (ref 70–99)
GLUCOSE BLD-MCNC: 271 MG/DL (ref 70–99)
GLUCOSE BLD-MCNC: 281 MG/DL (ref 70–99)
GLUCOSE BLD-MCNC: 291 MG/DL (ref 70–99)
GLUCOSE BLD-MCNC: 299 MG/DL (ref 70–99)
GLUCOSE BLD-MCNC: 304 MG/DL (ref 70–99)
GLUCOSE BLD-MCNC: 317 MG/DL (ref 70–99)
GLUCOSE BLD-MCNC: 326 MG/DL (ref 70–99)
GLUCOSE BLD-MCNC: 343 MG/DL (ref 70–99)
GLUCOSE BLD-MCNC: 365 MG/DL (ref 70–99)
GLUCOSE BLD-MCNC: 379 MG/DL (ref 70–99)
GRAM STAIN RESULT: ABNORMAL
GRAM STAIN RESULT: NORMAL
HBA1C MFR BLD: 7.4 %
HBA1C MFR BLD: 7.6 %
HBA1C MFR BLD: 7.7 %
HCO3 ARTERIAL: 30 MMOL/L (ref 21–29)
HCO3 VENOUS: 27 MMOL/L (ref 23–29)
HCO3 VENOUS: 30 MMOL/L (ref 23–29)
HCT VFR BLD CALC: 46.8 % (ref 40.5–52.5)
HCT VFR BLD CALC: 48.1 % (ref 40.5–52.5)
HCT VFR BLD CALC: 50.5 % (ref 40.5–52.5)
HCT VFR BLD CALC: 52 % (ref 40.5–52.5)
HCT VFR BLD CALC: 52.7 % (ref 40.5–52.5)
HCT VFR BLD CALC: 52.9 % (ref 40.5–52.5)
HCT VFR BLD CALC: 53.7 % (ref 40.5–52.5)
HEMOGLOBIN, ART, EXTENDED: 18 G/DL (ref 13.5–17.5)
HEMOGLOBIN: 15.2 G/DL (ref 13.5–17.5)
HEMOGLOBIN: 15.6 G/DL (ref 13.5–17.5)
HEMOGLOBIN: 16.5 G/DL (ref 13.5–17.5)
HEMOGLOBIN: 16.9 G/DL (ref 13.5–17.5)
HEMOGLOBIN: 16.9 G/DL (ref 13.5–17.5)
HEMOGLOBIN: 17.1 G/DL (ref 13.5–17.5)
HEMOGLOBIN: 17.6 G/DL (ref 13.5–17.5)
LYMPHOCYTES ABSOLUTE: 0.3 K/UL (ref 1–5.1)
LYMPHOCYTES ABSOLUTE: 0.3 K/UL (ref 1–5.1)
LYMPHOCYTES ABSOLUTE: 0.5 K/UL (ref 1–5.1)
LYMPHOCYTES ABSOLUTE: 0.7 K/UL (ref 1–5.1)
LYMPHOCYTES ABSOLUTE: 1.4 K/UL (ref 1–5.1)
LYMPHOCYTES RELATIVE PERCENT: 1 %
LYMPHOCYTES RELATIVE PERCENT: 1.1 %
LYMPHOCYTES RELATIVE PERCENT: 10.4 %
LYMPHOCYTES RELATIVE PERCENT: 2.9 %
LYMPHOCYTES RELATIVE PERCENT: 3.2 %
LYMPHOCYTES RELATIVE PERCENT: 3.2 %
LYMPHOCYTES RELATIVE PERCENT: 4.9 %
MCH RBC QN AUTO: 28.2 PG (ref 26–34)
MCH RBC QN AUTO: 28.3 PG (ref 26–34)
MCH RBC QN AUTO: 28.7 PG (ref 26–34)
MCH RBC QN AUTO: 28.8 PG (ref 26–34)
MCH RBC QN AUTO: 28.8 PG (ref 26–34)
MCH RBC QN AUTO: 29 PG (ref 26–34)
MCH RBC QN AUTO: 29 PG (ref 26–34)
MCHC RBC AUTO-ENTMCNC: 31.8 G/DL (ref 31–36)
MCHC RBC AUTO-ENTMCNC: 32 G/DL (ref 31–36)
MCHC RBC AUTO-ENTMCNC: 32.4 G/DL (ref 31–36)
MCHC RBC AUTO-ENTMCNC: 32.4 G/DL (ref 31–36)
MCHC RBC AUTO-ENTMCNC: 32.5 G/DL (ref 31–36)
MCHC RBC AUTO-ENTMCNC: 32.7 G/DL (ref 31–36)
MCHC RBC AUTO-ENTMCNC: 33.3 G/DL (ref 31–36)
MCV RBC AUTO: 86.9 FL (ref 80–100)
MCV RBC AUTO: 87.9 FL (ref 80–100)
MCV RBC AUTO: 88.5 FL (ref 80–100)
MCV RBC AUTO: 88.7 FL (ref 80–100)
MCV RBC AUTO: 88.7 FL (ref 80–100)
MCV RBC AUTO: 88.8 FL (ref 80–100)
MCV RBC AUTO: 89.1 FL (ref 80–100)
METHEMOGLOBIN ARTERIAL: 0.4 %
METHEMOGLOBIN VENOUS: 0.1 %
METHEMOGLOBIN VENOUS: 0.3 %
MONOCYTES ABSOLUTE: 0.6 K/UL (ref 0–1.3)
MONOCYTES ABSOLUTE: 0.8 K/UL (ref 0–1.3)
MONOCYTES ABSOLUTE: 1.1 K/UL (ref 0–1.3)
MONOCYTES ABSOLUTE: 1.2 K/UL (ref 0–1.3)
MONOCYTES ABSOLUTE: 1.4 K/UL (ref 0–1.3)
MONOCYTES RELATIVE PERCENT: 3.9 %
MONOCYTES RELATIVE PERCENT: 4.4 %
MONOCYTES RELATIVE PERCENT: 4.6 %
MONOCYTES RELATIVE PERCENT: 5.4 %
MONOCYTES RELATIVE PERCENT: 6.1 %
MONOCYTES RELATIVE PERCENT: 7.9 %
MONOCYTES RELATIVE PERCENT: 8.9 %
NEUTROPHILS ABSOLUTE: 10.8 K/UL (ref 1.7–7.7)
NEUTROPHILS ABSOLUTE: 13 K/UL (ref 1.7–7.7)
NEUTROPHILS ABSOLUTE: 13.8 K/UL (ref 1.7–7.7)
NEUTROPHILS ABSOLUTE: 15.2 K/UL (ref 1.7–7.7)
NEUTROPHILS ABSOLUTE: 15.9 K/UL (ref 1.7–7.7)
NEUTROPHILS ABSOLUTE: 25 K/UL (ref 1.7–7.7)
NEUTROPHILS ABSOLUTE: 31.2 K/UL (ref 1.7–7.7)
NEUTROPHILS RELATIVE PERCENT: 80.6 %
NEUTROPHILS RELATIVE PERCENT: 88.8 %
NEUTROPHILS RELATIVE PERCENT: 89.6 %
NEUTROPHILS RELATIVE PERCENT: 90.8 %
NEUTROPHILS RELATIVE PERCENT: 92.8 %
NEUTROPHILS RELATIVE PERCENT: 94.2 %
NEUTROPHILS RELATIVE PERCENT: 94.2 %
O2 SAT, ARTERIAL: 89.1 %
O2 SAT, VEN: 71 %
O2 SAT, VEN: 96 %
O2 THERAPY: ABNORMAL
ORGANISM: ABNORMAL
PCO2 ARTERIAL: 42.3 MMHG (ref 35–45)
PCO2, VEN: 38.2 MMHG (ref 40–50)
PCO2, VEN: 49.8 MMHG (ref 40–50)
PDW BLD-RTO: 15.1 % (ref 12.4–15.4)
PDW BLD-RTO: 15.2 % (ref 12.4–15.4)
PDW BLD-RTO: 15.4 % (ref 12.4–15.4)
PDW BLD-RTO: 15.5 % (ref 12.4–15.4)
PDW BLD-RTO: 15.5 % (ref 12.4–15.4)
PDW BLD-RTO: 15.6 % (ref 12.4–15.4)
PDW BLD-RTO: 16.1 % (ref 12.4–15.4)
PERFORMED ON: ABNORMAL
PH ARTERIAL: 7.46 (ref 7.35–7.45)
PH VENOUS: 7.39 (ref 7.35–7.45)
PH VENOUS: 7.45 (ref 7.35–7.45)
PLATELET # BLD: 202 K/UL (ref 135–450)
PLATELET # BLD: 240 K/UL (ref 135–450)
PLATELET # BLD: 298 K/UL (ref 135–450)
PLATELET # BLD: 303 K/UL (ref 135–450)
PLATELET # BLD: 326 K/UL (ref 135–450)
PLATELET # BLD: 336 K/UL (ref 135–450)
PLATELET # BLD: 351 K/UL (ref 135–450)
PMV BLD AUTO: 6.7 FL (ref 5–10.5)
PMV BLD AUTO: 6.7 FL (ref 5–10.5)
PMV BLD AUTO: 6.8 FL (ref 5–10.5)
PMV BLD AUTO: 6.9 FL (ref 5–10.5)
PMV BLD AUTO: 6.9 FL (ref 5–10.5)
PMV BLD AUTO: 7 FL (ref 5–10.5)
PMV BLD AUTO: 7.6 FL (ref 5–10.5)
PO2 ARTERIAL: 51.8 MMHG (ref 75–108)
PO2, VEN: 37 MMHG
PO2, VEN: 77 MMHG
POTASSIUM REFLEX MAGNESIUM: 4.2 MMOL/L (ref 3.5–5.1)
POTASSIUM SERPL-SCNC: 4.4 MMOL/L (ref 3.5–5.1)
POTASSIUM SERPL-SCNC: 4.5 MMOL/L (ref 3.5–5.1)
POTASSIUM SERPL-SCNC: 4.8 MMOL/L (ref 3.5–5.1)
POTASSIUM SERPL-SCNC: 4.8 MMOL/L (ref 3.5–5.1)
POTASSIUM SERPL-SCNC: 4.9 MMOL/L (ref 3.5–5.1)
POTASSIUM SERPL-SCNC: 4.9 MMOL/L (ref 3.5–5.1)
PRO-BNP: 113 PG/ML (ref 0–449)
PRO-BNP: 140 PG/ML (ref 0–449)
PROCALCITONIN: 0.06 NG/ML (ref 0–0.15)
PROCALCITONIN: 0.09 NG/ML (ref 0–0.15)
PROCALCITONIN: 0.09 NG/ML (ref 0–0.15)
PROCALCITONIN: 0.2 NG/ML (ref 0–0.15)
RAPID INFLUENZA  B AGN: NEGATIVE
RAPID INFLUENZA A AGN: POSITIVE
RBC # BLD: 5.3 M/UL (ref 4.2–5.9)
RBC # BLD: 5.43 M/UL (ref 4.2–5.9)
RBC # BLD: 5.69 M/UL (ref 4.2–5.9)
RBC # BLD: 5.87 M/UL (ref 4.2–5.9)
RBC # BLD: 6.01 M/UL (ref 4.2–5.9)
RBC # BLD: 6.02 M/UL (ref 4.2–5.9)
RBC # BLD: 6.07 M/UL (ref 4.2–5.9)
SARS-COV-2, NAAT: NOT DETECTED
SODIUM BLD-SCNC: 132 MMOL/L (ref 136–145)
SODIUM BLD-SCNC: 134 MMOL/L (ref 136–145)
SODIUM BLD-SCNC: 136 MMOL/L (ref 136–145)
SODIUM BLD-SCNC: 137 MMOL/L (ref 136–145)
SODIUM BLD-SCNC: 138 MMOL/L (ref 136–145)
SODIUM BLD-SCNC: 138 MMOL/L (ref 136–145)
SODIUM BLD-SCNC: 139 MMOL/L (ref 136–145)
TCO2 ARTERIAL: 31.3 MMOL/L
TCO2 CALC VENOUS: 28 MMOL/L
TCO2 CALC VENOUS: 32 MMOL/L
THEOPHYLLINE LEVEL: 4.8 UG/ML (ref 8–20)
TOTAL PROTEIN: 5.4 G/DL (ref 6.4–8.2)
TOTAL PROTEIN: 5.9 G/DL (ref 6.4–8.2)
TOTAL PROTEIN: 6.9 G/DL (ref 6.4–8.2)
TROPONIN: <0.01 NG/ML
WBC # BLD: 13.3 K/UL (ref 4–11)
WBC # BLD: 14.5 K/UL (ref 4–11)
WBC # BLD: 15.6 K/UL (ref 4–11)
WBC # BLD: 16.3 K/UL (ref 4–11)
WBC # BLD: 17.5 K/UL (ref 4–11)
WBC # BLD: 26.5 K/UL (ref 4–11)
WBC # BLD: 33.1 K/UL (ref 4–11)

## 2022-01-01 PROCEDURE — 94660 CPAP INITIATION&MGMT: CPT

## 2022-01-01 PROCEDURE — 94640 AIRWAY INHALATION TREATMENT: CPT

## 2022-01-01 PROCEDURE — 2700000000 HC OXYGEN THERAPY PER DAY

## 2022-01-01 PROCEDURE — 80053 COMPREHEN METABOLIC PANEL: CPT

## 2022-01-01 PROCEDURE — 94667 MNPJ CHEST WALL 1ST: CPT

## 2022-01-01 PROCEDURE — 6360000002 HC RX W HCPCS: Performed by: EMERGENCY MEDICINE

## 2022-01-01 PROCEDURE — 87449 NOS EACH ORGANISM AG IA: CPT

## 2022-01-01 PROCEDURE — 2580000003 HC RX 258: Performed by: INTERNAL MEDICINE

## 2022-01-01 PROCEDURE — 6360000002 HC RX W HCPCS: Performed by: INTERNAL MEDICINE

## 2022-01-01 PROCEDURE — 85025 COMPLETE CBC W/AUTO DIFF WBC: CPT

## 2022-01-01 PROCEDURE — 6370000000 HC RX 637 (ALT 250 FOR IP): Performed by: INTERNAL MEDICINE

## 2022-01-01 PROCEDURE — 1200000000 HC SEMI PRIVATE

## 2022-01-01 PROCEDURE — 6370000000 HC RX 637 (ALT 250 FOR IP): Performed by: STUDENT IN AN ORGANIZED HEALTH CARE EDUCATION/TRAINING PROGRAM

## 2022-01-01 PROCEDURE — 94761 N-INVAS EAR/PLS OXIMETRY MLT: CPT

## 2022-01-01 PROCEDURE — 36415 COLL VENOUS BLD VENIPUNCTURE: CPT

## 2022-01-01 PROCEDURE — 99232 SBSQ HOSP IP/OBS MODERATE 35: CPT | Performed by: INTERNAL MEDICINE

## 2022-01-01 PROCEDURE — 94760 N-INVAS EAR/PLS OXIMETRY 1: CPT

## 2022-01-01 PROCEDURE — 94669 MECHANICAL CHEST WALL OSCILL: CPT

## 2022-01-01 PROCEDURE — 99233 SBSQ HOSP IP/OBS HIGH 50: CPT | Performed by: INTERNAL MEDICINE

## 2022-01-01 PROCEDURE — 82803 BLOOD GASES ANY COMBINATION: CPT

## 2022-01-01 PROCEDURE — 80048 BASIC METABOLIC PNL TOTAL CA: CPT

## 2022-01-01 PROCEDURE — 94668 MNPJ CHEST WALL SBSQ: CPT

## 2022-01-01 PROCEDURE — 2580000003 HC RX 258: Performed by: STUDENT IN AN ORGANIZED HEALTH CARE EDUCATION/TRAINING PROGRAM

## 2022-01-01 PROCEDURE — 71045 X-RAY EXAM CHEST 1 VIEW: CPT

## 2022-01-01 PROCEDURE — 6360000002 HC RX W HCPCS: Performed by: STUDENT IN AN ORGANIZED HEALTH CARE EDUCATION/TRAINING PROGRAM

## 2022-01-01 PROCEDURE — 2000000000 HC ICU R&B

## 2022-01-01 PROCEDURE — 99222 1ST HOSP IP/OBS MODERATE 55: CPT | Performed by: INTERNAL MEDICINE

## 2022-01-01 PROCEDURE — 87205 SMEAR GRAM STAIN: CPT

## 2022-01-01 PROCEDURE — 84145 PROCALCITONIN (PCT): CPT

## 2022-01-01 PROCEDURE — 87070 CULTURE OTHR SPECIMN AEROBIC: CPT

## 2022-01-01 PROCEDURE — 99285 EMERGENCY DEPT VISIT HI MDM: CPT

## 2022-01-01 PROCEDURE — C8929 TTE W OR WO FOL WCON,DOPPLER: HCPCS

## 2022-01-01 PROCEDURE — 83880 ASSAY OF NATRIURETIC PEPTIDE: CPT

## 2022-01-01 PROCEDURE — 71250 CT THORAX DX C-: CPT

## 2022-01-01 PROCEDURE — 83036 HEMOGLOBIN GLYCOSYLATED A1C: CPT

## 2022-01-01 PROCEDURE — 36600 WITHDRAWAL OF ARTERIAL BLOOD: CPT

## 2022-01-01 PROCEDURE — 94680 O2 UPTK RST&XERS DIR SIMPLE: CPT

## 2022-01-01 PROCEDURE — 96374 THER/PROPH/DIAG INJ IV PUSH: CPT

## 2022-01-01 PROCEDURE — 87635 SARS-COV-2 COVID-19 AMP PRB: CPT

## 2022-01-01 PROCEDURE — 84484 ASSAY OF TROPONIN QUANT: CPT

## 2022-01-01 PROCEDURE — 99291 CRITICAL CARE FIRST HOUR: CPT | Performed by: INTERNAL MEDICINE

## 2022-01-01 PROCEDURE — C1751 CATH, INF, PER/CENT/MIDLINE: HCPCS

## 2022-01-01 PROCEDURE — 99223 1ST HOSP IP/OBS HIGH 75: CPT | Performed by: INTERNAL MEDICINE

## 2022-01-01 PROCEDURE — 87305 ASPERGILLUS AG IA: CPT

## 2022-01-01 PROCEDURE — 93005 ELECTROCARDIOGRAM TRACING: CPT | Performed by: EMERGENCY MEDICINE

## 2022-01-01 PROCEDURE — 6370000000 HC RX 637 (ALT 250 FOR IP): Performed by: EMERGENCY MEDICINE

## 2022-01-01 PROCEDURE — 6360000004 HC RX CONTRAST MEDICATION: Performed by: INTERNAL MEDICINE

## 2022-01-01 PROCEDURE — 36569 INSJ PICC 5 YR+ W/O IMAGING: CPT

## 2022-01-01 PROCEDURE — 80198 ASSAY OF THEOPHYLLINE: CPT

## 2022-01-01 PROCEDURE — 02HV33Z INSERTION OF INFUSION DEVICE INTO SUPERIOR VENA CAVA, PERCUTANEOUS APPROACH: ICD-10-PCS | Performed by: INTERNAL MEDICINE

## 2022-01-01 PROCEDURE — 71046 X-RAY EXAM CHEST 2 VIEWS: CPT

## 2022-01-01 PROCEDURE — 87804 INFLUENZA ASSAY W/OPTIC: CPT

## 2022-01-01 PROCEDURE — 93010 ELECTROCARDIOGRAM REPORT: CPT | Performed by: INTERNAL MEDICINE

## 2022-01-01 PROCEDURE — 99232 SBSQ HOSP IP/OBS MODERATE 35: CPT | Performed by: STUDENT IN AN ORGANIZED HEALTH CARE EDUCATION/TRAINING PROGRAM

## 2022-01-01 RX ORDER — DEXTROSE MONOHYDRATE 100 MG/ML
INJECTION, SOLUTION INTRAVENOUS CONTINUOUS PRN
Status: DISCONTINUED | OUTPATIENT
Start: 2022-01-01 | End: 2022-01-01 | Stop reason: HOSPADM

## 2022-01-01 RX ORDER — BUDESONIDE 0.5 MG/2ML
1 INHALANT ORAL 2 TIMES DAILY
Status: DISCONTINUED | OUTPATIENT
Start: 2022-01-01 | End: 2022-01-01 | Stop reason: HOSPADM

## 2022-01-01 RX ORDER — METHYLPREDNISOLONE SODIUM SUCCINATE 40 MG/ML
40 INJECTION, POWDER, LYOPHILIZED, FOR SOLUTION INTRAMUSCULAR; INTRAVENOUS ONCE
Status: COMPLETED | OUTPATIENT
Start: 2022-01-01 | End: 2022-01-01

## 2022-01-01 RX ORDER — SODIUM CHLORIDE FOR INHALATION 3 %
15 VIAL, NEBULIZER (ML) INHALATION EVERY 8 HOURS SCHEDULED
Status: DISCONTINUED | OUTPATIENT
Start: 2022-01-01 | End: 2022-01-01 | Stop reason: HOSPADM

## 2022-01-01 RX ORDER — PANTOPRAZOLE SODIUM 40 MG/1
40 TABLET, DELAYED RELEASE ORAL
Status: DISCONTINUED | OUTPATIENT
Start: 2022-01-01 | End: 2022-01-01 | Stop reason: HOSPADM

## 2022-01-01 RX ORDER — LOSARTAN POTASSIUM 50 MG/1
50 TABLET ORAL DAILY
Status: DISCONTINUED | OUTPATIENT
Start: 2022-01-01 | End: 2022-01-01 | Stop reason: HOSPADM

## 2022-01-01 RX ORDER — INSULIN GLARGINE 100 [IU]/ML
20 INJECTION, SOLUTION SUBCUTANEOUS NIGHTLY
Status: DISCONTINUED | OUTPATIENT
Start: 2022-01-01 | End: 2022-01-01 | Stop reason: HOSPADM

## 2022-01-01 RX ORDER — ACETAMINOPHEN 325 MG/1
650 TABLET ORAL EVERY 6 HOURS PRN
Status: DISCONTINUED | OUTPATIENT
Start: 2022-01-01 | End: 2022-01-01 | Stop reason: HOSPADM

## 2022-01-01 RX ORDER — SODIUM CHLORIDE 9 MG/ML
25 INJECTION, SOLUTION INTRAVENOUS PRN
Status: DISCONTINUED | OUTPATIENT
Start: 2022-01-01 | End: 2022-01-01 | Stop reason: HOSPADM

## 2022-01-01 RX ORDER — AZITHROMYCIN 500 MG/1
500 TABLET, FILM COATED ORAL ONCE
Status: COMPLETED | OUTPATIENT
Start: 2022-01-01 | End: 2022-01-01

## 2022-01-01 RX ORDER — ALBUTEROL SULFATE 2.5 MG/3ML
2.5 SOLUTION RESPIRATORY (INHALATION) EVERY 4 HOURS PRN
Status: DISCONTINUED | OUTPATIENT
Start: 2022-01-01 | End: 2022-01-01 | Stop reason: HOSPADM

## 2022-01-01 RX ORDER — ENOXAPARIN SODIUM 100 MG/ML
40 INJECTION SUBCUTANEOUS NIGHTLY
Status: DISCONTINUED | OUTPATIENT
Start: 2022-01-01 | End: 2022-01-01 | Stop reason: HOSPADM

## 2022-01-01 RX ORDER — THEOPHYLLINE 400 MG/1
400 TABLET, EXTENDED RELEASE ORAL DAILY
Status: DISCONTINUED | OUTPATIENT
Start: 2022-01-01 | End: 2022-01-01

## 2022-01-01 RX ORDER — GUAIFENESIN 600 MG/1
600 TABLET, EXTENDED RELEASE ORAL 2 TIMES DAILY
Status: DISCONTINUED | OUTPATIENT
Start: 2022-01-01 | End: 2022-01-01 | Stop reason: HOSPADM

## 2022-01-01 RX ORDER — LOSARTAN POTASSIUM 50 MG/1
1 TABLET ORAL DAILY
Status: DISCONTINUED | OUTPATIENT
Start: 2022-01-01 | End: 2022-01-01

## 2022-01-01 RX ORDER — METHYLPREDNISOLONE SODIUM SUCCINATE 40 MG/ML
60 INJECTION, POWDER, LYOPHILIZED, FOR SOLUTION INTRAMUSCULAR; INTRAVENOUS EVERY 8 HOURS
Status: DISCONTINUED | OUTPATIENT
Start: 2022-01-01 | End: 2022-01-01

## 2022-01-01 RX ORDER — SODIUM CHLORIDE 9 MG/ML
INJECTION, SOLUTION INTRAVENOUS PRN
Status: DISCONTINUED | OUTPATIENT
Start: 2022-01-01 | End: 2022-01-01 | Stop reason: HOSPADM

## 2022-01-01 RX ORDER — IPRATROPIUM BROMIDE AND ALBUTEROL SULFATE 2.5; .5 MG/3ML; MG/3ML
1 SOLUTION RESPIRATORY (INHALATION) EVERY 6 HOURS
Status: DISCONTINUED | OUTPATIENT
Start: 2022-01-01 | End: 2022-01-01

## 2022-01-01 RX ORDER — POLYVINYL ALCOHOL 14 MG/ML
2 SOLUTION/ DROPS OPHTHALMIC 2 TIMES DAILY
Status: DISCONTINUED | OUTPATIENT
Start: 2022-01-01 | End: 2022-01-01 | Stop reason: HOSPADM

## 2022-01-01 RX ORDER — SODIUM CHLORIDE 0.9 % (FLUSH) 0.9 %
5-40 SYRINGE (ML) INJECTION PRN
Status: DISCONTINUED | OUTPATIENT
Start: 2022-01-01 | End: 2022-01-01 | Stop reason: HOSPADM

## 2022-01-01 RX ORDER — FUROSEMIDE 10 MG/ML
40 INJECTION INTRAMUSCULAR; INTRAVENOUS ONCE
Status: COMPLETED | OUTPATIENT
Start: 2022-01-01 | End: 2022-01-01

## 2022-01-01 RX ORDER — THEOPHYLLINE 400 MG/1
200 TABLET, EXTENDED RELEASE ORAL 2 TIMES DAILY
Status: DISCONTINUED | OUTPATIENT
Start: 2022-01-01 | End: 2022-01-01 | Stop reason: HOSPADM

## 2022-01-01 RX ORDER — IPRATROPIUM BROMIDE AND ALBUTEROL SULFATE 2.5; .5 MG/3ML; MG/3ML
1 SOLUTION RESPIRATORY (INHALATION) ONCE
Status: COMPLETED | OUTPATIENT
Start: 2022-01-01 | End: 2022-01-01

## 2022-01-01 RX ORDER — MORPHINE SULFATE 2 MG/ML
2 INJECTION, SOLUTION INTRAMUSCULAR; INTRAVENOUS
Status: DISCONTINUED | OUTPATIENT
Start: 2022-01-01 | End: 2022-01-01 | Stop reason: HOSPADM

## 2022-01-01 RX ORDER — INSULIN LISPRO 100 [IU]/ML
0-4 INJECTION, SOLUTION INTRAVENOUS; SUBCUTANEOUS NIGHTLY
Status: DISCONTINUED | OUTPATIENT
Start: 2022-01-01 | End: 2022-01-01

## 2022-01-01 RX ORDER — METHYLPREDNISOLONE SODIUM SUCCINATE 40 MG/ML
40 INJECTION, POWDER, LYOPHILIZED, FOR SOLUTION INTRAMUSCULAR; INTRAVENOUS EVERY 8 HOURS
Status: DISCONTINUED | OUTPATIENT
Start: 2022-01-01 | End: 2022-01-01

## 2022-01-01 RX ORDER — METHYLPREDNISOLONE SODIUM SUCCINATE 40 MG/ML
40 INJECTION, POWDER, LYOPHILIZED, FOR SOLUTION INTRAMUSCULAR; INTRAVENOUS EVERY 12 HOURS
Status: DISCONTINUED | OUTPATIENT
Start: 2022-01-01 | End: 2022-01-01

## 2022-01-01 RX ORDER — OSELTAMIVIR PHOSPHATE 75 MG/1
75 CAPSULE ORAL 2 TIMES DAILY
Status: DISCONTINUED | OUTPATIENT
Start: 2022-01-01 | End: 2022-01-01

## 2022-01-01 RX ORDER — LORAZEPAM 2 MG/ML
0.5 INJECTION INTRAMUSCULAR
Status: DISCONTINUED | OUTPATIENT
Start: 2022-01-01 | End: 2022-01-01 | Stop reason: HOSPADM

## 2022-01-01 RX ORDER — ACETAMINOPHEN 650 MG/1
650 SUPPOSITORY RECTAL EVERY 6 HOURS PRN
Status: DISCONTINUED | OUTPATIENT
Start: 2022-01-01 | End: 2022-01-01 | Stop reason: HOSPADM

## 2022-01-01 RX ORDER — ONDANSETRON 4 MG/1
4 TABLET, ORALLY DISINTEGRATING ORAL EVERY 8 HOURS PRN
Status: DISCONTINUED | OUTPATIENT
Start: 2022-01-01 | End: 2022-01-01 | Stop reason: HOSPADM

## 2022-01-01 RX ORDER — METHYLPREDNISOLONE SODIUM SUCCINATE 125 MG/2ML
125 INJECTION, POWDER, LYOPHILIZED, FOR SOLUTION INTRAMUSCULAR; INTRAVENOUS ONCE
Status: COMPLETED | OUTPATIENT
Start: 2022-01-01 | End: 2022-01-01

## 2022-01-01 RX ORDER — METHYLPREDNISOLONE SODIUM SUCCINATE 40 MG/ML
20 INJECTION, POWDER, LYOPHILIZED, FOR SOLUTION INTRAMUSCULAR; INTRAVENOUS EVERY 12 HOURS
Status: DISCONTINUED | OUTPATIENT
Start: 2022-01-01 | End: 2022-01-01

## 2022-01-01 RX ORDER — SCOLOPAMINE TRANSDERMAL SYSTEM 1 MG/1
1 PATCH, EXTENDED RELEASE TRANSDERMAL
Status: DISCONTINUED | OUTPATIENT
Start: 2022-01-01 | End: 2022-01-01 | Stop reason: HOSPADM

## 2022-01-01 RX ORDER — SODIUM CHLORIDE 0.9 % (FLUSH) 0.9 %
5-40 SYRINGE (ML) INJECTION EVERY 12 HOURS SCHEDULED
Status: DISCONTINUED | OUTPATIENT
Start: 2022-01-01 | End: 2022-01-01 | Stop reason: HOSPADM

## 2022-01-01 RX ORDER — IPRATROPIUM BROMIDE AND ALBUTEROL SULFATE 2.5; .5 MG/3ML; MG/3ML
1 SOLUTION RESPIRATORY (INHALATION)
Status: DISCONTINUED | OUTPATIENT
Start: 2022-01-01 | End: 2022-01-01 | Stop reason: HOSPADM

## 2022-01-01 RX ORDER — OSELTAMIVIR PHOSPHATE 75 MG/1
75 CAPSULE ORAL ONCE
Status: COMPLETED | OUTPATIENT
Start: 2022-01-01 | End: 2022-01-01

## 2022-01-01 RX ORDER — ATORVASTATIN CALCIUM 10 MG/1
1 TABLET, FILM COATED ORAL DAILY
Status: DISCONTINUED | OUTPATIENT
Start: 2022-01-01 | End: 2022-01-01

## 2022-01-01 RX ORDER — ATORVASTATIN CALCIUM 10 MG/1
10 TABLET, FILM COATED ORAL DAILY
Status: DISCONTINUED | OUTPATIENT
Start: 2022-01-01 | End: 2022-01-01 | Stop reason: HOSPADM

## 2022-01-01 RX ORDER — ALBUTEROL SULFATE 90 UG/1
4 AEROSOL, METERED RESPIRATORY (INHALATION) ONCE
Status: COMPLETED | OUTPATIENT
Start: 2022-01-01 | End: 2022-01-01

## 2022-01-01 RX ORDER — IPRATROPIUM BROMIDE AND ALBUTEROL SULFATE 2.5; .5 MG/3ML; MG/3ML
1 SOLUTION RESPIRATORY (INHALATION)
Status: DISCONTINUED | OUTPATIENT
Start: 2022-01-01 | End: 2022-01-01

## 2022-01-01 RX ORDER — FUROSEMIDE 10 MG/ML
40 INJECTION INTRAMUSCULAR; INTRAVENOUS 2 TIMES DAILY
Status: COMPLETED | OUTPATIENT
Start: 2022-01-01 | End: 2022-01-01

## 2022-01-01 RX ORDER — POLYETHYLENE GLYCOL 3350 17 G/17G
17 POWDER, FOR SOLUTION ORAL DAILY PRN
Status: DISCONTINUED | OUTPATIENT
Start: 2022-01-01 | End: 2022-01-01

## 2022-01-01 RX ORDER — ALBUTEROL SULFATE 2.5 MG/3ML
2.5 SOLUTION RESPIRATORY (INHALATION) ONCE
Status: COMPLETED | OUTPATIENT
Start: 2022-01-01 | End: 2022-01-01

## 2022-01-01 RX ORDER — PREDNISONE 20 MG/1
40 TABLET ORAL DAILY
Status: DISCONTINUED | OUTPATIENT
Start: 2022-01-01 | End: 2022-01-01

## 2022-01-01 RX ORDER — INSULIN LISPRO 100 [IU]/ML
0-4 INJECTION, SOLUTION INTRAVENOUS; SUBCUTANEOUS NIGHTLY
Status: DISCONTINUED | OUTPATIENT
Start: 2022-01-01 | End: 2022-01-01 | Stop reason: HOSPADM

## 2022-01-01 RX ORDER — LEVOFLOXACIN 500 MG/1
500 TABLET, FILM COATED ORAL DAILY
Status: COMPLETED | OUTPATIENT
Start: 2022-01-01 | End: 2022-01-01

## 2022-01-01 RX ORDER — POLYETHYLENE GLYCOL 3350 17 G/17G
17 POWDER, FOR SOLUTION ORAL DAILY
Status: DISCONTINUED | OUTPATIENT
Start: 2022-01-01 | End: 2022-01-01 | Stop reason: HOSPADM

## 2022-01-01 RX ORDER — INSULIN LISPRO 100 [IU]/ML
0-4 INJECTION, SOLUTION INTRAVENOUS; SUBCUTANEOUS
Status: DISCONTINUED | OUTPATIENT
Start: 2022-01-01 | End: 2022-01-01

## 2022-01-01 RX ORDER — INSULIN LISPRO 100 [IU]/ML
0-16 INJECTION, SOLUTION INTRAVENOUS; SUBCUTANEOUS
Status: DISCONTINUED | OUTPATIENT
Start: 2022-01-01 | End: 2022-01-01 | Stop reason: HOSPADM

## 2022-01-01 RX ORDER — METHYLPREDNISOLONE SODIUM SUCCINATE 40 MG/ML
40 INJECTION, POWDER, LYOPHILIZED, FOR SOLUTION INTRAMUSCULAR; INTRAVENOUS DAILY
Status: DISCONTINUED | OUTPATIENT
Start: 2022-01-01 | End: 2022-01-01 | Stop reason: HOSPADM

## 2022-01-01 RX ORDER — INSULIN GLARGINE 100 [IU]/ML
20 INJECTION, SOLUTION SUBCUTANEOUS ONCE
Status: COMPLETED | OUTPATIENT
Start: 2022-01-01 | End: 2022-01-01

## 2022-01-01 RX ORDER — LIDOCAINE HYDROCHLORIDE 10 MG/ML
5 INJECTION, SOLUTION EPIDURAL; INFILTRATION; INTRACAUDAL; PERINEURAL ONCE
Status: DISCONTINUED | OUTPATIENT
Start: 2022-01-01 | End: 2022-01-01 | Stop reason: HOSPADM

## 2022-01-01 RX ORDER — ONDANSETRON 2 MG/ML
4 INJECTION INTRAMUSCULAR; INTRAVENOUS EVERY 6 HOURS PRN
Status: DISCONTINUED | OUTPATIENT
Start: 2022-01-01 | End: 2022-01-01 | Stop reason: HOSPADM

## 2022-01-01 RX ADMIN — LOSARTAN POTASSIUM 50 MG: 50 TABLET, FILM COATED ORAL at 09:36

## 2022-01-01 RX ADMIN — METHYLPREDNISOLONE SODIUM SUCCINATE 40 MG: 40 INJECTION, POWDER, FOR SOLUTION INTRAMUSCULAR; INTRAVENOUS at 05:53

## 2022-01-01 RX ADMIN — SODIUM CHLORIDE SOLN NEBU 3% 15 ML: 3 NEBU SOLN at 07:38

## 2022-01-01 RX ADMIN — POLYVINYL ALCOHOL 2 DROP: 14 SOLUTION/ DROPS OPHTHALMIC at 09:21

## 2022-01-01 RX ADMIN — IPRATROPIUM BROMIDE AND ALBUTEROL SULFATE 1 AMPULE: .5; 3 SOLUTION RESPIRATORY (INHALATION) at 20:28

## 2022-01-01 RX ADMIN — DEXTROSE MONOHYDRATE 500 MG: 50 INJECTION, SOLUTION INTRAVENOUS at 06:07

## 2022-01-01 RX ADMIN — IPRATROPIUM BROMIDE AND ALBUTEROL SULFATE 1 AMPULE: .5; 3 SOLUTION RESPIRATORY (INHALATION) at 08:55

## 2022-01-01 RX ADMIN — MORPHINE SULFATE 2 MG: 2 INJECTION, SOLUTION INTRAMUSCULAR; INTRAVENOUS at 17:10

## 2022-01-01 RX ADMIN — IPRATROPIUM BROMIDE AND ALBUTEROL SULFATE 1 AMPULE: .5; 3 SOLUTION RESPIRATORY (INHALATION) at 16:59

## 2022-01-01 RX ADMIN — INSULIN LISPRO 2 UNITS: 100 INJECTION, SOLUTION INTRAVENOUS; SUBCUTANEOUS at 09:40

## 2022-01-01 RX ADMIN — INSULIN LISPRO 16 UNITS: 100 INJECTION, SOLUTION INTRAVENOUS; SUBCUTANEOUS at 17:16

## 2022-01-01 RX ADMIN — METHYLPREDNISOLONE SODIUM SUCCINATE 40 MG: 40 INJECTION, POWDER, FOR SOLUTION INTRAMUSCULAR; INTRAVENOUS at 17:16

## 2022-01-01 RX ADMIN — POLYVINYL ALCOHOL 2 DROP: 14 SOLUTION/ DROPS OPHTHALMIC at 22:14

## 2022-01-01 RX ADMIN — POLYVINYL ALCOHOL 2 DROP: 14 SOLUTION/ DROPS OPHTHALMIC at 08:17

## 2022-01-01 RX ADMIN — SODIUM CHLORIDE, PRESERVATIVE FREE 10 ML: 5 INJECTION INTRAVENOUS at 08:43

## 2022-01-01 RX ADMIN — IPRATROPIUM BROMIDE AND ALBUTEROL SULFATE 1 AMPULE: .5; 3 SOLUTION RESPIRATORY (INHALATION) at 10:18

## 2022-01-01 RX ADMIN — SODIUM CHLORIDE SOLN NEBU 3% 15 ML: 3 NEBU SOLN at 20:42

## 2022-01-01 RX ADMIN — IPRATROPIUM BROMIDE AND ALBUTEROL SULFATE 1 AMPULE: .5; 3 SOLUTION RESPIRATORY (INHALATION) at 16:40

## 2022-01-01 RX ADMIN — SODIUM CHLORIDE SOLN NEBU 3% 15 ML: 3 NEBU SOLN at 11:50

## 2022-01-01 RX ADMIN — THEOPHYLLINE 200 MG: 400 TABLET, EXTENDED RELEASE ORAL at 09:51

## 2022-01-01 RX ADMIN — BUDESONIDE 1000 MCG: 0.5 SUSPENSION RESPIRATORY (INHALATION) at 20:12

## 2022-01-01 RX ADMIN — THEOPHYLLINE 200 MG: 400 TABLET, EXTENDED RELEASE ORAL at 08:16

## 2022-01-01 RX ADMIN — BUDESONIDE 1000 MCG: 0.5 SUSPENSION RESPIRATORY (INHALATION) at 08:26

## 2022-01-01 RX ADMIN — SODIUM CHLORIDE, PRESERVATIVE FREE 10 ML: 5 INJECTION INTRAVENOUS at 22:24

## 2022-01-01 RX ADMIN — SODIUM CHLORIDE SOLN NEBU 3% 4 ML: 3 NEBU SOLN at 09:28

## 2022-01-01 RX ADMIN — SODIUM CHLORIDE, PRESERVATIVE FREE 10 ML: 5 INJECTION INTRAVENOUS at 10:59

## 2022-01-01 RX ADMIN — IPRATROPIUM BROMIDE AND ALBUTEROL SULFATE 1 AMPULE: .5; 3 SOLUTION RESPIRATORY (INHALATION) at 12:14

## 2022-01-01 RX ADMIN — DEXTROSE MONOHYDRATE 500 MG: 50 INJECTION, SOLUTION INTRAVENOUS at 18:07

## 2022-01-01 RX ADMIN — ATORVASTATIN CALCIUM 10 MG: 10 TABLET, FILM COATED ORAL at 09:05

## 2022-01-01 RX ADMIN — POLYVINYL ALCOHOL 2 DROP: 14 SOLUTION/ DROPS OPHTHALMIC at 08:52

## 2022-01-01 RX ADMIN — METHYLPREDNISOLONE SODIUM SUCCINATE 40 MG: 40 INJECTION, POWDER, FOR SOLUTION INTRAMUSCULAR; INTRAVENOUS at 08:42

## 2022-01-01 RX ADMIN — LORAZEPAM 0.5 MG: 2 INJECTION INTRAMUSCULAR; INTRAVENOUS at 16:10

## 2022-01-01 RX ADMIN — NYSTATIN 500000 UNITS: 100000 SUSPENSION ORAL at 09:49

## 2022-01-01 RX ADMIN — SODIUM CHLORIDE SOLN NEBU 3% 15 ML: 3 NEBU SOLN at 01:56

## 2022-01-01 RX ADMIN — PANTOPRAZOLE SODIUM 40 MG: 40 TABLET, DELAYED RELEASE ORAL at 06:49

## 2022-01-01 RX ADMIN — SODIUM CHLORIDE, PRESERVATIVE FREE 10 ML: 5 INJECTION INTRAVENOUS at 20:21

## 2022-01-01 RX ADMIN — LOSARTAN POTASSIUM 50 MG: 50 TABLET, FILM COATED ORAL at 07:49

## 2022-01-01 RX ADMIN — IPRATROPIUM BROMIDE AND ALBUTEROL SULFATE 1 AMPULE: .5; 3 SOLUTION RESPIRATORY (INHALATION) at 20:12

## 2022-01-01 RX ADMIN — METHYLPREDNISOLONE SODIUM SUCCINATE 40 MG: 40 INJECTION, POWDER, FOR SOLUTION INTRAMUSCULAR; INTRAVENOUS at 06:24

## 2022-01-01 RX ADMIN — SODIUM CHLORIDE SOLN NEBU 3% 4 ML: 3 NEBU SOLN at 08:36

## 2022-01-01 RX ADMIN — THEOPHYLLINE 200 MG: 400 TABLET, EXTENDED RELEASE ORAL at 22:13

## 2022-01-01 RX ADMIN — IPRATROPIUM BROMIDE AND ALBUTEROL SULFATE 1 AMPULE: .5; 3 SOLUTION RESPIRATORY (INHALATION) at 17:17

## 2022-01-01 RX ADMIN — GUAIFENESIN 600 MG: 600 TABLET, EXTENDED RELEASE ORAL at 09:36

## 2022-01-01 RX ADMIN — SODIUM CHLORIDE SOLN NEBU 3% 15 ML: 3 NEBU SOLN at 12:51

## 2022-01-01 RX ADMIN — POLYVINYL ALCOHOL 2 DROP: 14 SOLUTION/ DROPS OPHTHALMIC at 21:31

## 2022-01-01 RX ADMIN — POLYETHYLENE GLYCOL 3350 17 G: 17 POWDER, FOR SOLUTION ORAL at 09:32

## 2022-01-01 RX ADMIN — ATORVASTATIN CALCIUM 10 MG: 10 TABLET, FILM COATED ORAL at 08:05

## 2022-01-01 RX ADMIN — GUAIFENESIN 600 MG: 600 TABLET, EXTENDED RELEASE ORAL at 21:30

## 2022-01-01 RX ADMIN — DILTIAZEM HYDROCHLORIDE 300 MG: 180 CAPSULE, COATED, EXTENDED RELEASE ORAL at 12:36

## 2022-01-01 RX ADMIN — SODIUM CHLORIDE, PRESERVATIVE FREE 10 ML: 5 INJECTION INTRAVENOUS at 20:51

## 2022-01-01 RX ADMIN — LOSARTAN POTASSIUM 50 MG: 50 TABLET, FILM COATED ORAL at 08:53

## 2022-01-01 RX ADMIN — MORPHINE SULFATE 2 MG: 2 INJECTION, SOLUTION INTRAMUSCULAR; INTRAVENOUS at 18:19

## 2022-01-01 RX ADMIN — LOSARTAN POTASSIUM 50 MG: 50 TABLET, FILM COATED ORAL at 08:16

## 2022-01-01 RX ADMIN — IPRATROPIUM BROMIDE AND ALBUTEROL SULFATE 1 AMPULE: .5; 3 SOLUTION RESPIRATORY (INHALATION) at 21:00

## 2022-01-01 RX ADMIN — MORPHINE SULFATE 2 MG: 2 INJECTION, SOLUTION INTRAMUSCULAR; INTRAVENOUS at 16:10

## 2022-01-01 RX ADMIN — ATORVASTATIN CALCIUM 10 MG: 10 TABLET, FILM COATED ORAL at 08:53

## 2022-01-01 RX ADMIN — SODIUM CHLORIDE SOLN NEBU 3% 15 ML: 3 NEBU SOLN at 15:31

## 2022-01-01 RX ADMIN — LEVOFLOXACIN 500 MG: 500 TABLET, FILM COATED ORAL at 09:07

## 2022-01-01 RX ADMIN — GUAIFENESIN 600 MG: 600 TABLET, EXTENDED RELEASE ORAL at 08:50

## 2022-01-01 RX ADMIN — GUAIFENESIN 600 MG: 600 TABLET, EXTENDED RELEASE ORAL at 09:05

## 2022-01-01 RX ADMIN — MOMETASONE FUROATE AND FORMOTEROL FUMARATE DIHYDRATE 2 PUFF: 200; 5 AEROSOL RESPIRATORY (INHALATION) at 20:15

## 2022-01-01 RX ADMIN — FUROSEMIDE 40 MG: 10 INJECTION, SOLUTION INTRAMUSCULAR; INTRAVENOUS at 10:24

## 2022-01-01 RX ADMIN — MORPHINE SULFATE 2 MG: 2 INJECTION, SOLUTION INTRAMUSCULAR; INTRAVENOUS at 13:48

## 2022-01-01 RX ADMIN — IPRATROPIUM BROMIDE AND ALBUTEROL SULFATE 1 AMPULE: .5; 3 SOLUTION RESPIRATORY (INHALATION) at 09:28

## 2022-01-01 RX ADMIN — SODIUM CHLORIDE, PRESERVATIVE FREE 10 ML: 5 INJECTION INTRAVENOUS at 08:20

## 2022-01-01 RX ADMIN — POLYVINYL ALCOHOL 2 DROP: 14 SOLUTION/ DROPS OPHTHALMIC at 21:10

## 2022-01-01 RX ADMIN — ATORVASTATIN CALCIUM 10 MG: 10 TABLET, FILM COATED ORAL at 08:59

## 2022-01-01 RX ADMIN — METHYLPREDNISOLONE SODIUM SUCCINATE 40 MG: 40 INJECTION, POWDER, FOR SOLUTION INTRAMUSCULAR; INTRAVENOUS at 17:12

## 2022-01-01 RX ADMIN — IPRATROPIUM BROMIDE AND ALBUTEROL SULFATE 1 AMPULE: .5; 3 SOLUTION RESPIRATORY (INHALATION) at 13:36

## 2022-01-01 RX ADMIN — IPRATROPIUM BROMIDE AND ALBUTEROL SULFATE 1 AMPULE: .5; 3 SOLUTION RESPIRATORY (INHALATION) at 01:55

## 2022-01-01 RX ADMIN — LORAZEPAM 0.5 MG: 2 INJECTION INTRAMUSCULAR; INTRAVENOUS at 13:49

## 2022-01-01 RX ADMIN — LOSARTAN POTASSIUM 50 MG: 50 TABLET, FILM COATED ORAL at 08:41

## 2022-01-01 RX ADMIN — SODIUM CHLORIDE SOLN NEBU 3% 15 ML: 3 NEBU SOLN at 14:10

## 2022-01-01 RX ADMIN — PANTOPRAZOLE SODIUM 40 MG: 40 TABLET, DELAYED RELEASE ORAL at 06:05

## 2022-01-01 RX ADMIN — Medication 10 ML: at 21:31

## 2022-01-01 RX ADMIN — IPRATROPIUM BROMIDE AND ALBUTEROL SULFATE 1 AMPULE: .5; 3 SOLUTION RESPIRATORY (INHALATION) at 07:29

## 2022-01-01 RX ADMIN — METHYLPREDNISOLONE SODIUM SUCCINATE 40 MG: 40 INJECTION, POWDER, FOR SOLUTION INTRAMUSCULAR; INTRAVENOUS at 09:07

## 2022-01-01 RX ADMIN — IPRATROPIUM BROMIDE AND ALBUTEROL SULFATE 1 AMPULE: .5; 3 SOLUTION RESPIRATORY (INHALATION) at 20:31

## 2022-01-01 RX ADMIN — SODIUM CHLORIDE SOLN NEBU 3% 15 ML: 3 NEBU SOLN at 07:50

## 2022-01-01 RX ADMIN — SODIUM CHLORIDE SOLN NEBU 3% 15 ML: 3 NEBU SOLN at 20:37

## 2022-01-01 RX ADMIN — OSELTAMIVIR PHOSPHATE 75 MG: 75 CAPSULE ORAL at 18:19

## 2022-01-01 RX ADMIN — MORPHINE SULFATE 2 MG: 2 INJECTION, SOLUTION INTRAMUSCULAR; INTRAVENOUS at 21:59

## 2022-01-01 RX ADMIN — POLYVINYL ALCOHOL 2 DROP: 14 SOLUTION/ DROPS OPHTHALMIC at 20:13

## 2022-01-01 RX ADMIN — GUAIFENESIN 600 MG: 600 TABLET, EXTENDED RELEASE ORAL at 08:16

## 2022-01-01 RX ADMIN — IPRATROPIUM BROMIDE AND ALBUTEROL SULFATE 1 AMPULE: .5; 3 SOLUTION RESPIRATORY (INHALATION) at 01:49

## 2022-01-01 RX ADMIN — METHYLPREDNISOLONE SODIUM SUCCINATE 40 MG: 40 INJECTION, POWDER, FOR SOLUTION INTRAMUSCULAR; INTRAVENOUS at 08:53

## 2022-01-01 RX ADMIN — GUAIFENESIN 600 MG: 600 TABLET, EXTENDED RELEASE ORAL at 09:33

## 2022-01-01 RX ADMIN — METHYLPREDNISOLONE SODIUM SUCCINATE 40 MG: 40 INJECTION, POWDER, FOR SOLUTION INTRAMUSCULAR; INTRAVENOUS at 17:00

## 2022-01-01 RX ADMIN — GUAIFENESIN 600 MG: 600 TABLET, EXTENDED RELEASE ORAL at 21:09

## 2022-01-01 RX ADMIN — MOMETASONE FUROATE AND FORMOTEROL FUMARATE DIHYDRATE 2 PUFF: 200; 5 AEROSOL RESPIRATORY (INHALATION) at 21:00

## 2022-01-01 RX ADMIN — SODIUM CHLORIDE SOLN NEBU 3% 15 ML: 3 NEBU SOLN at 20:15

## 2022-01-01 RX ADMIN — PANTOPRAZOLE SODIUM 40 MG: 40 TABLET, DELAYED RELEASE ORAL at 06:24

## 2022-01-01 RX ADMIN — METHYLPREDNISOLONE SODIUM SUCCINATE 40 MG: 40 INJECTION, POWDER, FOR SOLUTION INTRAMUSCULAR; INTRAVENOUS at 09:51

## 2022-01-01 RX ADMIN — DILTIAZEM HYDROCHLORIDE 300 MG: 180 CAPSULE, COATED, EXTENDED RELEASE ORAL at 08:58

## 2022-01-01 RX ADMIN — IPRATROPIUM BROMIDE AND ALBUTEROL SULFATE 1 AMPULE: .5; 3 SOLUTION RESPIRATORY (INHALATION) at 20:41

## 2022-01-01 RX ADMIN — MOMETASONE FUROATE AND FORMOTEROL FUMARATE DIHYDRATE 2 PUFF: 200; 5 AEROSOL RESPIRATORY (INHALATION) at 20:37

## 2022-01-01 RX ADMIN — THEOPHYLLINE 200 MG: 400 TABLET, EXTENDED RELEASE ORAL at 22:22

## 2022-01-01 RX ADMIN — GUAIFENESIN 600 MG: 600 TABLET, EXTENDED RELEASE ORAL at 08:41

## 2022-01-01 RX ADMIN — LOSARTAN POTASSIUM 50 MG: 50 TABLET, FILM COATED ORAL at 08:58

## 2022-01-01 RX ADMIN — SODIUM CHLORIDE SOLN NEBU 3% 4 ML: 3 NEBU SOLN at 14:33

## 2022-01-01 RX ADMIN — POLYVINYL ALCOHOL 2 DROP: 14 SOLUTION/ DROPS OPHTHALMIC at 08:55

## 2022-01-01 RX ADMIN — MEROPENEM 1000 MG: 1 INJECTION, POWDER, FOR SOLUTION INTRAVENOUS at 02:18

## 2022-01-01 RX ADMIN — GUAIFENESIN 600 MG: 600 TABLET, EXTENDED RELEASE ORAL at 21:10

## 2022-01-01 RX ADMIN — SODIUM CHLORIDE SOLN NEBU 3% 15 ML: 3 NEBU SOLN at 20:28

## 2022-01-01 RX ADMIN — ENOXAPARIN SODIUM 40 MG: 100 INJECTION SUBCUTANEOUS at 20:34

## 2022-01-01 RX ADMIN — POLYVINYL ALCOHOL 2 DROP: 14 SOLUTION/ DROPS OPHTHALMIC at 09:00

## 2022-01-01 RX ADMIN — IPRATROPIUM BROMIDE AND ALBUTEROL SULFATE 1 AMPULE: .5; 3 SOLUTION RESPIRATORY (INHALATION) at 13:21

## 2022-01-01 RX ADMIN — GUAIFENESIN 600 MG: 600 TABLET, EXTENDED RELEASE ORAL at 21:20

## 2022-01-01 RX ADMIN — MOMETASONE FUROATE AND FORMOTEROL FUMARATE DIHYDRATE 2 PUFF: 200; 5 AEROSOL RESPIRATORY (INHALATION) at 20:32

## 2022-01-01 RX ADMIN — DEXTROSE MONOHYDRATE 340 MG: 50 INJECTION, SOLUTION INTRAVENOUS at 06:21

## 2022-01-01 RX ADMIN — LOSARTAN POTASSIUM 50 MG: 50 TABLET, FILM COATED ORAL at 09:05

## 2022-01-01 RX ADMIN — IPRATROPIUM BROMIDE AND ALBUTEROL SULFATE 1 AMPULE: .5; 3 SOLUTION RESPIRATORY (INHALATION) at 20:37

## 2022-01-01 RX ADMIN — METHYLPREDNISOLONE SODIUM SUCCINATE 40 MG: 40 INJECTION, POWDER, FOR SOLUTION INTRAMUSCULAR; INTRAVENOUS at 09:21

## 2022-01-01 RX ADMIN — DILTIAZEM HYDROCHLORIDE 300 MG: 180 CAPSULE, COATED, EXTENDED RELEASE ORAL at 09:51

## 2022-01-01 RX ADMIN — ALBUTEROL SULFATE 2.5 MG: 2.5 SOLUTION RESPIRATORY (INHALATION) at 06:37

## 2022-01-01 RX ADMIN — MOMETASONE FUROATE AND FORMOTEROL FUMARATE DIHYDRATE 2 PUFF: 200; 5 AEROSOL RESPIRATORY (INHALATION) at 12:00

## 2022-01-01 RX ADMIN — SODIUM CHLORIDE SOLN NEBU 3% 4 ML: 3 NEBU SOLN at 08:38

## 2022-01-01 RX ADMIN — IPRATROPIUM BROMIDE AND ALBUTEROL SULFATE 1 AMPULE: .5; 3 SOLUTION RESPIRATORY (INHALATION) at 14:04

## 2022-01-01 RX ADMIN — METHYLPREDNISOLONE SODIUM SUCCINATE 40 MG: 40 INJECTION, POWDER, FOR SOLUTION INTRAMUSCULAR; INTRAVENOUS at 01:31

## 2022-01-01 RX ADMIN — SODIUM CHLORIDE SOLN NEBU 3% 15 ML: 3 NEBU SOLN at 20:04

## 2022-01-01 RX ADMIN — POLYETHYLENE GLYCOL 3350 17 G: 17 POWDER, FOR SOLUTION ORAL at 09:07

## 2022-01-01 RX ADMIN — SODIUM CHLORIDE, PRESERVATIVE FREE 10 ML: 5 INJECTION INTRAVENOUS at 09:02

## 2022-01-01 RX ADMIN — ATORVASTATIN CALCIUM 10 MG: 10 TABLET, FILM COATED ORAL at 09:33

## 2022-01-01 RX ADMIN — IPRATROPIUM BROMIDE AND ALBUTEROL SULFATE 1 AMPULE: .5; 3 SOLUTION RESPIRATORY (INHALATION) at 14:33

## 2022-01-01 RX ADMIN — METHYLPREDNISOLONE SODIUM SUCCINATE 40 MG: 40 INJECTION, POWDER, FOR SOLUTION INTRAMUSCULAR; INTRAVENOUS at 00:11

## 2022-01-01 RX ADMIN — MORPHINE SULFATE 2 MG: 2 INJECTION, SOLUTION INTRAMUSCULAR; INTRAVENOUS at 19:22

## 2022-01-01 RX ADMIN — SODIUM CHLORIDE, PRESERVATIVE FREE 10 ML: 5 INJECTION INTRAVENOUS at 21:41

## 2022-01-01 RX ADMIN — PANTOPRAZOLE SODIUM 40 MG: 40 TABLET, DELAYED RELEASE ORAL at 09:53

## 2022-01-01 RX ADMIN — POLYVINYL ALCOHOL 2 DROP: 14 SOLUTION/ DROPS OPHTHALMIC at 20:24

## 2022-01-01 RX ADMIN — INSULIN LISPRO 3 UNITS: 100 INJECTION, SOLUTION INTRAVENOUS; SUBCUTANEOUS at 12:09

## 2022-01-01 RX ADMIN — ENOXAPARIN SODIUM 40 MG: 100 INJECTION SUBCUTANEOUS at 20:09

## 2022-01-01 RX ADMIN — METHYLPREDNISOLONE SODIUM SUCCINATE 40 MG: 40 INJECTION, POWDER, FOR SOLUTION INTRAMUSCULAR; INTRAVENOUS at 06:18

## 2022-01-01 RX ADMIN — PANTOPRAZOLE SODIUM 40 MG: 40 TABLET, DELAYED RELEASE ORAL at 06:35

## 2022-01-01 RX ADMIN — LEVOFLOXACIN 500 MG: 500 TABLET, FILM COATED ORAL at 09:33

## 2022-01-01 RX ADMIN — BUDESONIDE 1000 MCG: 0.5 SUSPENSION RESPIRATORY (INHALATION) at 08:49

## 2022-01-01 RX ADMIN — POLYVINYL ALCOHOL 2 DROP: 14 SOLUTION/ DROPS OPHTHALMIC at 22:19

## 2022-01-01 RX ADMIN — MORPHINE SULFATE 2 MG: 2 INJECTION, SOLUTION INTRAMUSCULAR; INTRAVENOUS at 23:19

## 2022-01-01 RX ADMIN — SODIUM CHLORIDE SOLN NEBU 3% 15 ML: 3 NEBU SOLN at 08:43

## 2022-01-01 RX ADMIN — ENOXAPARIN SODIUM 40 MG: 100 INJECTION SUBCUTANEOUS at 20:20

## 2022-01-01 RX ADMIN — ATORVASTATIN CALCIUM 10 MG: 10 TABLET, FILM COATED ORAL at 09:36

## 2022-01-01 RX ADMIN — LOSARTAN POTASSIUM 50 MG: 50 TABLET, FILM COATED ORAL at 09:21

## 2022-01-01 RX ADMIN — METHYLPREDNISOLONE SODIUM SUCCINATE 60 MG: 40 INJECTION, POWDER, FOR SOLUTION INTRAMUSCULAR; INTRAVENOUS at 01:48

## 2022-01-01 RX ADMIN — GUAIFENESIN 600 MG: 600 TABLET, EXTENDED RELEASE ORAL at 22:23

## 2022-01-01 RX ADMIN — IPRATROPIUM BROMIDE AND ALBUTEROL SULFATE 1 AMPULE: .5; 3 SOLUTION RESPIRATORY (INHALATION) at 20:03

## 2022-01-01 RX ADMIN — SODIUM CHLORIDE SOLN NEBU 3% 15 ML: 3 NEBU SOLN at 12:28

## 2022-01-01 RX ADMIN — SODIUM CHLORIDE SOLN NEBU 3% 15 ML: 3 NEBU SOLN at 08:49

## 2022-01-01 RX ADMIN — NYSTATIN 500000 UNITS: 100000 SUSPENSION ORAL at 20:21

## 2022-01-01 RX ADMIN — ENOXAPARIN SODIUM 40 MG: 100 INJECTION SUBCUTANEOUS at 21:39

## 2022-01-01 RX ADMIN — THEOPHYLLINE 200 MG: 400 TABLET, EXTENDED RELEASE ORAL at 21:09

## 2022-01-01 RX ADMIN — SODIUM CHLORIDE, PRESERVATIVE FREE 10 ML: 5 INJECTION INTRAVENOUS at 09:49

## 2022-01-01 RX ADMIN — GUAIFENESIN 600 MG: 600 TABLET, EXTENDED RELEASE ORAL at 22:19

## 2022-01-01 RX ADMIN — THEOPHYLLINE 200 MG: 400 TABLET, EXTENDED RELEASE ORAL at 21:10

## 2022-01-01 RX ADMIN — THEOPHYLLINE 200 MG: 400 TABLET, EXTENDED RELEASE ORAL at 20:34

## 2022-01-01 RX ADMIN — POLYETHYLENE GLYCOL 3350 17 G: 17 POWDER, FOR SOLUTION ORAL at 08:50

## 2022-01-01 RX ADMIN — SODIUM CHLORIDE SOLN NEBU 3% 15 ML: 3 NEBU SOLN at 20:32

## 2022-01-01 RX ADMIN — IPRATROPIUM BROMIDE AND ALBUTEROL SULFATE 1 AMPULE: .5; 3 SOLUTION RESPIRATORY (INHALATION) at 14:09

## 2022-01-01 RX ADMIN — POLYVINYL ALCOHOL 2 DROP: 14 SOLUTION/ DROPS OPHTHALMIC at 09:07

## 2022-01-01 RX ADMIN — METHYLPREDNISOLONE SODIUM SUCCINATE 40 MG: 40 INJECTION, POWDER, FOR SOLUTION INTRAMUSCULAR; INTRAVENOUS at 17:08

## 2022-01-01 RX ADMIN — GUAIFENESIN 600 MG: 600 TABLET, EXTENDED RELEASE ORAL at 20:34

## 2022-01-01 RX ADMIN — METHYLPREDNISOLONE SODIUM SUCCINATE 40 MG: 40 INJECTION, POWDER, FOR SOLUTION INTRAMUSCULAR; INTRAVENOUS at 09:33

## 2022-01-01 RX ADMIN — DEXTROSE MONOHYDRATE 340 MG: 50 INJECTION, SOLUTION INTRAVENOUS at 20:44

## 2022-01-01 RX ADMIN — THEOPHYLLINE 200 MG: 400 TABLET, EXTENDED RELEASE ORAL at 20:13

## 2022-01-01 RX ADMIN — ATORVASTATIN CALCIUM 10 MG: 10 TABLET, FILM COATED ORAL at 07:49

## 2022-01-01 RX ADMIN — METHYLPREDNISOLONE SODIUM SUCCINATE 40 MG: 40 INJECTION, POWDER, FOR SOLUTION INTRAMUSCULAR; INTRAVENOUS at 08:50

## 2022-01-01 RX ADMIN — SODIUM CHLORIDE SOLN NEBU 3% 15 ML: 3 NEBU SOLN at 14:04

## 2022-01-01 RX ADMIN — ENOXAPARIN SODIUM 40 MG: 100 INJECTION SUBCUTANEOUS at 21:08

## 2022-01-01 RX ADMIN — POLYVINYL ALCOHOL 2 DROP: 14 SOLUTION/ DROPS OPHTHALMIC at 21:38

## 2022-01-01 RX ADMIN — ATORVASTATIN CALCIUM 10 MG: 10 TABLET, FILM COATED ORAL at 10:58

## 2022-01-01 RX ADMIN — MOMETASONE FUROATE AND FORMOTEROL FUMARATE DIHYDRATE 2 PUFF: 200; 5 AEROSOL RESPIRATORY (INHALATION) at 09:28

## 2022-01-01 RX ADMIN — POLYVINYL ALCOHOL 2 DROP: 14 SOLUTION/ DROPS OPHTHALMIC at 21:30

## 2022-01-01 RX ADMIN — SODIUM CHLORIDE, PRESERVATIVE FREE 10 ML: 5 INJECTION INTRAVENOUS at 22:14

## 2022-01-01 RX ADMIN — THEOPHYLLINE 200 MG: 400 TABLET, EXTENDED RELEASE ORAL at 21:38

## 2022-01-01 RX ADMIN — POLYVINYL ALCOHOL 2 DROP: 14 SOLUTION/ DROPS OPHTHALMIC at 10:58

## 2022-01-01 RX ADMIN — POLYETHYLENE GLYCOL 3350 17 G: 17 POWDER, FOR SOLUTION ORAL at 08:06

## 2022-01-01 RX ADMIN — BUDESONIDE 1000 MCG: 0.5 SUSPENSION RESPIRATORY (INHALATION) at 13:36

## 2022-01-01 RX ADMIN — GUAIFENESIN 600 MG: 600 TABLET, EXTENDED RELEASE ORAL at 09:51

## 2022-01-01 RX ADMIN — THEOPHYLLINE 200 MG: 400 TABLET, EXTENDED RELEASE ORAL at 08:41

## 2022-01-01 RX ADMIN — PANTOPRAZOLE SODIUM 40 MG: 40 TABLET, DELAYED RELEASE ORAL at 05:53

## 2022-01-01 RX ADMIN — PREDNISONE 40 MG: 20 TABLET ORAL at 18:33

## 2022-01-01 RX ADMIN — INSULIN LISPRO 8 UNITS: 100 INJECTION, SOLUTION INTRAVENOUS; SUBCUTANEOUS at 09:53

## 2022-01-01 RX ADMIN — IPRATROPIUM BROMIDE AND ALBUTEROL SULFATE 1 AMPULE: .5; 3 SOLUTION RESPIRATORY (INHALATION) at 16:18

## 2022-01-01 RX ADMIN — ACETAMINOPHEN 650 MG: 325 TABLET, FILM COATED ORAL at 21:42

## 2022-01-01 RX ADMIN — FUROSEMIDE 40 MG: 10 INJECTION, SOLUTION INTRAMUSCULAR; INTRAVENOUS at 17:59

## 2022-01-01 RX ADMIN — IPRATROPIUM BROMIDE AND ALBUTEROL SULFATE 1 AMPULE: .5; 3 SOLUTION RESPIRATORY (INHALATION) at 12:18

## 2022-01-01 RX ADMIN — METHYLPREDNISOLONE SODIUM SUCCINATE 40 MG: 40 INJECTION, POWDER, FOR SOLUTION INTRAMUSCULAR; INTRAVENOUS at 18:15

## 2022-01-01 RX ADMIN — SODIUM CHLORIDE, PRESERVATIVE FREE 10 ML: 5 INJECTION INTRAVENOUS at 09:07

## 2022-01-01 RX ADMIN — IPRATROPIUM BROMIDE AND ALBUTEROL SULFATE 1 AMPULE: .5; 3 SOLUTION RESPIRATORY (INHALATION) at 21:02

## 2022-01-01 RX ADMIN — DEXTROSE MONOHYDRATE 340 MG: 50 INJECTION, SOLUTION INTRAVENOUS at 07:51

## 2022-01-01 RX ADMIN — Medication 10 ML: at 00:41

## 2022-01-01 RX ADMIN — PANTOPRAZOLE SODIUM 40 MG: 40 TABLET, DELAYED RELEASE ORAL at 07:04

## 2022-01-01 RX ADMIN — METHYLPREDNISOLONE SODIUM SUCCINATE 40 MG: 40 INJECTION, POWDER, FOR SOLUTION INTRAMUSCULAR; INTRAVENOUS at 16:38

## 2022-01-01 RX ADMIN — GUAIFENESIN 600 MG: 600 TABLET, EXTENDED RELEASE ORAL at 20:21

## 2022-01-01 RX ADMIN — MOMETASONE FUROATE AND FORMOTEROL FUMARATE DIHYDRATE 2 PUFF: 200; 5 AEROSOL RESPIRATORY (INHALATION) at 09:06

## 2022-01-01 RX ADMIN — GUAIFENESIN 600 MG: 600 TABLET, EXTENDED RELEASE ORAL at 09:48

## 2022-01-01 RX ADMIN — LORAZEPAM 0.5 MG: 2 INJECTION INTRAMUSCULAR; INTRAVENOUS at 00:01

## 2022-01-01 RX ADMIN — IPRATROPIUM BROMIDE AND ALBUTEROL SULFATE 1 AMPULE: .5; 3 SOLUTION RESPIRATORY (INHALATION) at 07:50

## 2022-01-01 RX ADMIN — THEOPHYLLINE 200 MG: 400 TABLET, EXTENDED RELEASE ORAL at 09:36

## 2022-01-01 RX ADMIN — IPRATROPIUM BROMIDE AND ALBUTEROL SULFATE 1 AMPULE: .5; 3 SOLUTION RESPIRATORY (INHALATION) at 11:40

## 2022-01-01 RX ADMIN — ALBUTEROL SULFATE 4 PUFF: 90 AEROSOL, METERED RESPIRATORY (INHALATION) at 16:08

## 2022-01-01 RX ADMIN — POLYVINYL ALCOHOL 2 DROP: 14 SOLUTION/ DROPS OPHTHALMIC at 20:14

## 2022-01-01 RX ADMIN — DILTIAZEM HYDROCHLORIDE 300 MG: 180 CAPSULE, COATED, EXTENDED RELEASE ORAL at 07:48

## 2022-01-01 RX ADMIN — ENOXAPARIN SODIUM 40 MG: 100 INJECTION SUBCUTANEOUS at 21:26

## 2022-01-01 RX ADMIN — NYSTATIN 500000 UNITS: 100000 SUSPENSION ORAL at 16:13

## 2022-01-01 RX ADMIN — IPRATROPIUM BROMIDE AND ALBUTEROL SULFATE 1 AMPULE: .5; 3 SOLUTION RESPIRATORY (INHALATION) at 08:43

## 2022-01-01 RX ADMIN — SODIUM CHLORIDE SOLN NEBU 3% 15 ML: 3 NEBU SOLN at 11:28

## 2022-01-01 RX ADMIN — SODIUM CHLORIDE, PRESERVATIVE FREE 10 ML: 5 INJECTION INTRAVENOUS at 11:27

## 2022-01-01 RX ADMIN — NYSTATIN 500000 UNITS: 100000 SUSPENSION ORAL at 09:48

## 2022-01-01 RX ADMIN — AZITHROMYCIN MONOHYDRATE 500 MG: 500 TABLET ORAL at 15:51

## 2022-01-01 RX ADMIN — BUDESONIDE 1000 MCG: 0.5 SUSPENSION RESPIRATORY (INHALATION) at 20:47

## 2022-01-01 RX ADMIN — LOSARTAN POTASSIUM 50 MG: 50 TABLET, FILM COATED ORAL at 09:51

## 2022-01-01 RX ADMIN — SODIUM CHLORIDE SOLN NEBU 3% 4 ML: 3 NEBU SOLN at 08:26

## 2022-01-01 RX ADMIN — NYSTATIN 500000 UNITS: 100000 SUSPENSION ORAL at 18:37

## 2022-01-01 RX ADMIN — INSULIN GLARGINE 20 UNITS: 100 INJECTION, SOLUTION SUBCUTANEOUS at 18:40

## 2022-01-01 RX ADMIN — IPRATROPIUM BROMIDE AND ALBUTEROL SULFATE 1 AMPULE: .5; 3 SOLUTION RESPIRATORY (INHALATION) at 02:59

## 2022-01-01 RX ADMIN — SODIUM CHLORIDE SOLN NEBU 3% 15 ML: 3 NEBU SOLN at 07:42

## 2022-01-01 RX ADMIN — GUAIFENESIN 600 MG: 600 TABLET, EXTENDED RELEASE ORAL at 10:58

## 2022-01-01 RX ADMIN — MOMETASONE FUROATE AND FORMOTEROL FUMARATE DIHYDRATE 2 PUFF: 200; 5 AEROSOL RESPIRATORY (INHALATION) at 21:02

## 2022-01-01 RX ADMIN — ENOXAPARIN SODIUM 40 MG: 100 INJECTION SUBCUTANEOUS at 22:24

## 2022-01-01 RX ADMIN — SODIUM CHLORIDE, PRESERVATIVE FREE 10 ML: 5 INJECTION INTRAVENOUS at 10:26

## 2022-01-01 RX ADMIN — GUAIFENESIN 600 MG: 600 TABLET, EXTENDED RELEASE ORAL at 22:12

## 2022-01-01 RX ADMIN — DEXTROSE MONOHYDRATE 340 MG: 50 INJECTION, SOLUTION INTRAVENOUS at 21:52

## 2022-01-01 RX ADMIN — SODIUM CHLORIDE SOLN NEBU 3% 15 ML: 3 NEBU SOLN at 15:30

## 2022-01-01 RX ADMIN — BUDESONIDE 1000 MCG: 0.5 SUSPENSION RESPIRATORY (INHALATION) at 20:28

## 2022-01-01 RX ADMIN — SODIUM CHLORIDE, PRESERVATIVE FREE 10 ML: 5 INJECTION INTRAVENOUS at 09:14

## 2022-01-01 RX ADMIN — LORAZEPAM 0.5 MG: 2 INJECTION INTRAMUSCULAR; INTRAVENOUS at 20:51

## 2022-01-01 RX ADMIN — THEOPHYLLINE 200 MG: 400 TABLET, EXTENDED RELEASE ORAL at 10:58

## 2022-01-01 RX ADMIN — POLYVINYL ALCOHOL 2 DROP: 14 SOLUTION/ DROPS OPHTHALMIC at 09:02

## 2022-01-01 RX ADMIN — THEOPHYLLINE 200 MG: 400 TABLET, EXTENDED RELEASE ORAL at 21:20

## 2022-01-01 RX ADMIN — SODIUM CHLORIDE, PRESERVATIVE FREE 10 ML: 5 INJECTION INTRAVENOUS at 21:59

## 2022-01-01 RX ADMIN — THEOPHYLLINE 200 MG: 400 TABLET, EXTENDED RELEASE ORAL at 09:07

## 2022-01-01 RX ADMIN — MOMETASONE FUROATE AND FORMOTEROL FUMARATE DIHYDRATE 2 PUFF: 200; 5 AEROSOL RESPIRATORY (INHALATION) at 19:55

## 2022-01-01 RX ADMIN — LOSARTAN POTASSIUM 50 MG: 50 TABLET, FILM COATED ORAL at 09:33

## 2022-01-01 RX ADMIN — SODIUM CHLORIDE SOLN NEBU 3% 15 ML: 3 NEBU SOLN at 08:55

## 2022-01-01 RX ADMIN — PANTOPRAZOLE SODIUM 40 MG: 40 TABLET, DELAYED RELEASE ORAL at 06:11

## 2022-01-01 RX ADMIN — BUDESONIDE 1000 MCG: 0.5 SUSPENSION RESPIRATORY (INHALATION) at 07:49

## 2022-01-01 RX ADMIN — POLYVINYL ALCOHOL 2 DROP: 14 SOLUTION/ DROPS OPHTHALMIC at 09:14

## 2022-01-01 RX ADMIN — IPRATROPIUM BROMIDE AND ALBUTEROL SULFATE 1 AMPULE: .5; 3 SOLUTION RESPIRATORY (INHALATION) at 16:52

## 2022-01-01 RX ADMIN — LOSARTAN POTASSIUM 50 MG: 50 TABLET, FILM COATED ORAL at 09:07

## 2022-01-01 RX ADMIN — PANTOPRAZOLE SODIUM 40 MG: 40 TABLET, DELAYED RELEASE ORAL at 06:18

## 2022-01-01 RX ADMIN — THEOPHYLLINE 200 MG: 400 TABLET, EXTENDED RELEASE ORAL at 08:59

## 2022-01-01 RX ADMIN — ATORVASTATIN CALCIUM 10 MG: 10 TABLET, FILM COATED ORAL at 08:50

## 2022-01-01 RX ADMIN — INSULIN LISPRO 8 UNITS: 100 INJECTION, SOLUTION INTRAVENOUS; SUBCUTANEOUS at 09:45

## 2022-01-01 RX ADMIN — MOMETASONE FUROATE AND FORMOTEROL FUMARATE DIHYDRATE 2 PUFF: 200; 5 AEROSOL RESPIRATORY (INHALATION) at 08:36

## 2022-01-01 RX ADMIN — IPRATROPIUM BROMIDE AND ALBUTEROL SULFATE 1 AMPULE: .5; 3 SOLUTION RESPIRATORY (INHALATION) at 07:32

## 2022-01-01 RX ADMIN — POLYETHYLENE GLYCOL 3350 17 G: 17 POWDER, FOR SOLUTION ORAL at 08:53

## 2022-01-01 RX ADMIN — DILTIAZEM HYDROCHLORIDE 300 MG: 180 CAPSULE, COATED, EXTENDED RELEASE ORAL at 09:05

## 2022-01-01 RX ADMIN — ENOXAPARIN SODIUM 40 MG: 100 INJECTION SUBCUTANEOUS at 21:10

## 2022-01-01 RX ADMIN — ATORVASTATIN CALCIUM 10 MG: 10 TABLET, FILM COATED ORAL at 08:16

## 2022-01-01 RX ADMIN — THEOPHYLLINE 200 MG: 400 TABLET, EXTENDED RELEASE ORAL at 20:21

## 2022-01-01 RX ADMIN — IPRATROPIUM BROMIDE AND ALBUTEROL SULFATE 1 AMPULE: .5; 3 SOLUTION RESPIRATORY (INHALATION) at 03:27

## 2022-01-01 RX ADMIN — IPRATROPIUM BROMIDE AND ALBUTEROL SULFATE 1 AMPULE: .5; 3 SOLUTION RESPIRATORY (INHALATION) at 20:47

## 2022-01-01 RX ADMIN — POLYVINYL ALCOHOL 2 DROP: 14 SOLUTION/ DROPS OPHTHALMIC at 09:50

## 2022-01-01 RX ADMIN — THEOPHYLLINE 200 MG: 400 TABLET, EXTENDED RELEASE ORAL at 08:58

## 2022-01-01 RX ADMIN — POLYETHYLENE GLYCOL 3350 17 G: 17 POWDER, FOR SOLUTION ORAL at 08:16

## 2022-01-01 RX ADMIN — IPRATROPIUM BROMIDE AND ALBUTEROL SULFATE 1 AMPULE: .5; 3 SOLUTION RESPIRATORY (INHALATION) at 12:52

## 2022-01-01 RX ADMIN — ALBUTEROL SULFATE 2.5 MG: 2.5 SOLUTION RESPIRATORY (INHALATION) at 17:08

## 2022-01-01 RX ADMIN — PANTOPRAZOLE SODIUM 40 MG: 40 TABLET, DELAYED RELEASE ORAL at 05:42

## 2022-01-01 RX ADMIN — GUAIFENESIN 600 MG: 600 TABLET, EXTENDED RELEASE ORAL at 20:08

## 2022-01-01 RX ADMIN — SODIUM CHLORIDE, PRESERVATIVE FREE 10 ML: 5 INJECTION INTRAVENOUS at 11:54

## 2022-01-01 RX ADMIN — IPRATROPIUM BROMIDE AND ALBUTEROL SULFATE 1 AMPULE: .5; 3 SOLUTION RESPIRATORY (INHALATION) at 20:15

## 2022-01-01 RX ADMIN — MOMETASONE FUROATE AND FORMOTEROL FUMARATE DIHYDRATE 2 PUFF: 200; 5 AEROSOL RESPIRATORY (INHALATION) at 20:04

## 2022-01-01 RX ADMIN — IPRATROPIUM BROMIDE AND ALBUTEROL SULFATE 1 AMPULE: .5; 3 SOLUTION RESPIRATORY (INHALATION) at 15:30

## 2022-01-01 RX ADMIN — METHYLPREDNISOLONE SODIUM SUCCINATE 40 MG: 40 INJECTION, POWDER, FOR SOLUTION INTRAMUSCULAR; INTRAVENOUS at 10:59

## 2022-01-01 RX ADMIN — MOMETASONE FUROATE AND FORMOTEROL FUMARATE DIHYDRATE 2 PUFF: 200; 5 AEROSOL RESPIRATORY (INHALATION) at 08:38

## 2022-01-01 RX ADMIN — METHYLPREDNISOLONE SODIUM SUCCINATE 40 MG: 40 INJECTION, POWDER, FOR SOLUTION INTRAMUSCULAR; INTRAVENOUS at 08:59

## 2022-01-01 RX ADMIN — POLYVINYL ALCOHOL 2 DROP: 14 SOLUTION/ DROPS OPHTHALMIC at 20:34

## 2022-01-01 RX ADMIN — MORPHINE SULFATE 2 MG: 2 INJECTION, SOLUTION INTRAMUSCULAR; INTRAVENOUS at 10:06

## 2022-01-01 RX ADMIN — IPRATROPIUM BROMIDE AND ALBUTEROL SULFATE 1 AMPULE: .5; 3 SOLUTION RESPIRATORY (INHALATION) at 12:31

## 2022-01-01 RX ADMIN — METHYLPREDNISOLONE SODIUM SUCCINATE 40 MG: 40 INJECTION, POWDER, FOR SOLUTION INTRAMUSCULAR; INTRAVENOUS at 17:18

## 2022-01-01 RX ADMIN — POLYETHYLENE GLYCOL 3350 17 G: 17 POWDER, FOR SOLUTION ORAL at 09:52

## 2022-01-01 RX ADMIN — LEVOFLOXACIN 500 MG: 500 TABLET, FILM COATED ORAL at 08:16

## 2022-01-01 RX ADMIN — GUAIFENESIN 600 MG: 600 TABLET, EXTENDED RELEASE ORAL at 08:59

## 2022-01-01 RX ADMIN — POLYVINYL ALCOHOL 2 DROP: 14 SOLUTION/ DROPS OPHTHALMIC at 07:52

## 2022-01-01 RX ADMIN — SODIUM CHLORIDE, PRESERVATIVE FREE 10 ML: 5 INJECTION INTRAVENOUS at 09:22

## 2022-01-01 RX ADMIN — THEOPHYLLINE 200 MG: 400 TABLET, EXTENDED RELEASE ORAL at 08:53

## 2022-01-01 RX ADMIN — SODIUM CHLORIDE, PRESERVATIVE FREE 10 ML: 5 INJECTION INTRAVENOUS at 21:32

## 2022-01-01 RX ADMIN — LOSARTAN POTASSIUM 50 MG: 50 TABLET, FILM COATED ORAL at 08:05

## 2022-01-01 RX ADMIN — POLYVINYL ALCOHOL 2 DROP: 14 SOLUTION/ DROPS OPHTHALMIC at 08:59

## 2022-01-01 RX ADMIN — POLYVINYL ALCOHOL 2 DROP: 14 SOLUTION/ DROPS OPHTHALMIC at 22:15

## 2022-01-01 RX ADMIN — ATORVASTATIN CALCIUM 10 MG: 10 TABLET, FILM COATED ORAL at 08:41

## 2022-01-01 RX ADMIN — IPRATROPIUM BROMIDE AND ALBUTEROL SULFATE 1 AMPULE: .5; 3 SOLUTION RESPIRATORY (INHALATION) at 19:55

## 2022-01-01 RX ADMIN — IPRATROPIUM BROMIDE AND ALBUTEROL SULFATE 1 AMPULE: .5; 3 SOLUTION RESPIRATORY (INHALATION) at 09:05

## 2022-01-01 RX ADMIN — SODIUM CHLORIDE SOLN NEBU 3% 15 ML: 3 NEBU SOLN at 10:19

## 2022-01-01 RX ADMIN — METHYLPREDNISOLONE SODIUM SUCCINATE 60 MG: 40 INJECTION, POWDER, FOR SOLUTION INTRAMUSCULAR; INTRAVENOUS at 17:17

## 2022-01-01 RX ADMIN — POLYVINYL ALCOHOL 2 DROP: 14 SOLUTION/ DROPS OPHTHALMIC at 21:20

## 2022-01-01 RX ADMIN — SODIUM CHLORIDE SOLN NEBU 3% 15 ML: 3 NEBU SOLN at 13:21

## 2022-01-01 RX ADMIN — GUAIFENESIN 600 MG: 600 TABLET, EXTENDED RELEASE ORAL at 08:05

## 2022-01-01 RX ADMIN — ATORVASTATIN CALCIUM 10 MG: 10 TABLET, FILM COATED ORAL at 09:21

## 2022-01-01 RX ADMIN — MOMETASONE FUROATE AND FORMOTEROL FUMARATE DIHYDRATE 2 PUFF: 200; 5 AEROSOL RESPIRATORY (INHALATION) at 07:52

## 2022-01-01 RX ADMIN — LOSARTAN POTASSIUM 50 MG: 50 TABLET, FILM COATED ORAL at 10:58

## 2022-01-01 RX ADMIN — SODIUM CHLORIDE, PRESERVATIVE FREE 10 ML: 5 INJECTION INTRAVENOUS at 20:14

## 2022-01-01 RX ADMIN — POLYETHYLENE GLYCOL 3350 17 G: 17 POWDER, FOR SOLUTION ORAL at 08:59

## 2022-01-01 RX ADMIN — IPRATROPIUM BROMIDE AND ALBUTEROL SULFATE 1 AMPULE: .5; 3 SOLUTION RESPIRATORY (INHALATION) at 10:23

## 2022-01-01 RX ADMIN — LORAZEPAM 0.5 MG: 2 INJECTION INTRAMUSCULAR; INTRAVENOUS at 10:05

## 2022-01-01 RX ADMIN — ENOXAPARIN SODIUM 40 MG: 100 INJECTION SUBCUTANEOUS at 22:12

## 2022-01-01 RX ADMIN — SODIUM CHLORIDE SOLN NEBU 3% 15 ML: 3 NEBU SOLN at 09:06

## 2022-01-01 RX ADMIN — LORAZEPAM 0.5 MG: 2 INJECTION INTRAMUSCULAR; INTRAVENOUS at 18:19

## 2022-01-01 RX ADMIN — THEOPHYLLINE 200 MG: 400 TABLET, EXTENDED RELEASE ORAL at 09:33

## 2022-01-01 RX ADMIN — SODIUM CHLORIDE, PRESERVATIVE FREE 10 ML: 5 INJECTION INTRAVENOUS at 08:51

## 2022-01-01 RX ADMIN — SODIUM CHLORIDE SOLN NEBU 3% 4 ML: 3 NEBU SOLN at 10:23

## 2022-01-01 RX ADMIN — ENOXAPARIN SODIUM 40 MG: 100 INJECTION SUBCUTANEOUS at 21:30

## 2022-01-01 RX ADMIN — PANTOPRAZOLE SODIUM 40 MG: 40 TABLET, DELAYED RELEASE ORAL at 05:37

## 2022-01-01 RX ADMIN — THEOPHYLLINE 200 MG: 400 TABLET, EXTENDED RELEASE ORAL at 09:21

## 2022-01-01 RX ADMIN — POLYVINYL ALCOHOL 2 DROP: 14 SOLUTION/ DROPS OPHTHALMIC at 00:14

## 2022-01-01 RX ADMIN — LEVOFLOXACIN 500 MG: 500 TABLET, FILM COATED ORAL at 08:41

## 2022-01-01 RX ADMIN — SODIUM CHLORIDE, PRESERVATIVE FREE 10 ML: 5 INJECTION INTRAVENOUS at 21:21

## 2022-01-01 RX ADMIN — Medication 10 ML: at 21:58

## 2022-01-01 RX ADMIN — GUAIFENESIN 600 MG: 600 TABLET, EXTENDED RELEASE ORAL at 09:07

## 2022-01-01 RX ADMIN — MEROPENEM 1000 MG: 1 INJECTION, POWDER, FOR SOLUTION INTRAVENOUS at 18:08

## 2022-01-01 RX ADMIN — PANTOPRAZOLE SODIUM 40 MG: 40 TABLET, DELAYED RELEASE ORAL at 06:40

## 2022-01-01 RX ADMIN — INSULIN LISPRO 12 UNITS: 100 INJECTION, SOLUTION INTRAVENOUS; SUBCUTANEOUS at 12:19

## 2022-01-01 RX ADMIN — BUDESONIDE 1000 MCG: 0.5 SUSPENSION RESPIRATORY (INHALATION) at 08:43

## 2022-01-01 RX ADMIN — MOMETASONE FUROATE AND FORMOTEROL FUMARATE DIHYDRATE 2 PUFF: 200; 5 AEROSOL RESPIRATORY (INHALATION) at 08:55

## 2022-01-01 RX ADMIN — METHYLPREDNISOLONE SODIUM SUCCINATE 40 MG: 40 INJECTION, POWDER, FOR SOLUTION INTRAMUSCULAR; INTRAVENOUS at 17:35

## 2022-01-01 RX ADMIN — METHYLPREDNISOLONE SODIUM SUCCINATE 40 MG: 40 INJECTION, POWDER, FOR SOLUTION INTRAMUSCULAR; INTRAVENOUS at 09:08

## 2022-01-01 RX ADMIN — SODIUM CHLORIDE, PRESERVATIVE FREE 10 ML: 5 INJECTION INTRAVENOUS at 09:34

## 2022-01-01 RX ADMIN — DILTIAZEM HYDROCHLORIDE 300 MG: 180 CAPSULE, COATED, EXTENDED RELEASE ORAL at 09:36

## 2022-01-01 RX ADMIN — DILTIAZEM HYDROCHLORIDE 300 MG: 180 CAPSULE, COATED, EXTENDED RELEASE ORAL at 08:05

## 2022-01-01 RX ADMIN — GUAIFENESIN 600 MG: 600 TABLET, EXTENDED RELEASE ORAL at 21:27

## 2022-01-01 RX ADMIN — IPRATROPIUM BROMIDE AND ALBUTEROL SULFATE 1 AMPULE: .5; 3 SOLUTION RESPIRATORY (INHALATION) at 12:12

## 2022-01-01 RX ADMIN — THEOPHYLLINE 200 MG: 400 TABLET, EXTENDED RELEASE ORAL at 09:05

## 2022-01-01 RX ADMIN — SODIUM CHLORIDE SOLN NEBU 3% 15 ML: 3 NEBU SOLN at 20:24

## 2022-01-01 RX ADMIN — POLYVINYL ALCOHOL 2 DROP: 14 SOLUTION/ DROPS OPHTHALMIC at 09:33

## 2022-01-01 RX ADMIN — IPRATROPIUM BROMIDE AND ALBUTEROL SULFATE 1 AMPULE: .5; 3 SOLUTION RESPIRATORY (INHALATION) at 20:32

## 2022-01-01 RX ADMIN — BUDESONIDE 1000 MCG: 0.5 SUSPENSION RESPIRATORY (INHALATION) at 20:42

## 2022-01-01 RX ADMIN — MORPHINE SULFATE 2 MG: 2 INJECTION, SOLUTION INTRAMUSCULAR; INTRAVENOUS at 01:22

## 2022-01-01 RX ADMIN — GUAIFENESIN 600 MG: 600 TABLET, EXTENDED RELEASE ORAL at 08:53

## 2022-01-01 RX ADMIN — POLYVINYL ALCOHOL 2 DROP: 14 SOLUTION/ DROPS OPHTHALMIC at 21:09

## 2022-01-01 RX ADMIN — INSULIN LISPRO 4 UNITS: 100 INJECTION, SOLUTION INTRAVENOUS; SUBCUTANEOUS at 21:52

## 2022-01-01 RX ADMIN — ENOXAPARIN SODIUM 40 MG: 100 INJECTION SUBCUTANEOUS at 22:19

## 2022-01-01 RX ADMIN — METHYLPREDNISOLONE SODIUM SUCCINATE 40 MG: 40 INJECTION, POWDER, FOR SOLUTION INTRAMUSCULAR; INTRAVENOUS at 00:41

## 2022-01-01 RX ADMIN — INSULIN LISPRO 3 UNITS: 100 INJECTION, SOLUTION INTRAVENOUS; SUBCUTANEOUS at 17:43

## 2022-01-01 RX ADMIN — DILTIAZEM HYDROCHLORIDE 300 MG: 180 CAPSULE, COATED, EXTENDED RELEASE ORAL at 10:58

## 2022-01-01 RX ADMIN — SODIUM CHLORIDE SOLN NEBU 3% 15 ML: 3 NEBU SOLN at 17:17

## 2022-01-01 RX ADMIN — BUDESONIDE 1000 MCG: 0.5 SUSPENSION RESPIRATORY (INHALATION) at 20:10

## 2022-01-01 RX ADMIN — GUAIFENESIN 600 MG: 600 TABLET, EXTENDED RELEASE ORAL at 09:21

## 2022-01-01 RX ADMIN — GUAIFENESIN 600 MG: 600 TABLET, EXTENDED RELEASE ORAL at 20:14

## 2022-01-01 RX ADMIN — THEOPHYLLINE 200 MG: 400 TABLET, EXTENDED RELEASE ORAL at 21:27

## 2022-01-01 RX ADMIN — IPRATROPIUM BROMIDE AND ALBUTEROL SULFATE 1 AMPULE: .5; 3 SOLUTION RESPIRATORY (INHALATION) at 08:49

## 2022-01-01 RX ADMIN — BUDESONIDE 1000 MCG: 0.5 SUSPENSION RESPIRATORY (INHALATION) at 07:37

## 2022-01-01 RX ADMIN — SODIUM CHLORIDE, PRESERVATIVE FREE 10 ML: 5 INJECTION INTRAVENOUS at 20:35

## 2022-01-01 RX ADMIN — MORPHINE SULFATE 2 MG: 2 INJECTION, SOLUTION INTRAMUSCULAR; INTRAVENOUS at 11:07

## 2022-01-01 RX ADMIN — LOSARTAN POTASSIUM 50 MG: 50 TABLET, FILM COATED ORAL at 08:50

## 2022-01-01 RX ADMIN — IPRATROPIUM BROMIDE AND ALBUTEROL SULFATE 1 AMPULE: .5; 3 SOLUTION RESPIRATORY (INHALATION) at 20:10

## 2022-01-01 RX ADMIN — IPRATROPIUM BROMIDE AND ALBUTEROL SULFATE 1 AMPULE: .5; 3 SOLUTION RESPIRATORY (INHALATION) at 15:26

## 2022-01-01 RX ADMIN — METHYLPREDNISOLONE SODIUM SUCCINATE 40 MG: 40 INJECTION, POWDER, FOR SOLUTION INTRAMUSCULAR; INTRAVENOUS at 16:53

## 2022-01-01 RX ADMIN — IPRATROPIUM BROMIDE AND ALBUTEROL SULFATE 1 AMPULE: .5; 3 SOLUTION RESPIRATORY (INHALATION) at 08:36

## 2022-01-01 RX ADMIN — ATORVASTATIN CALCIUM 10 MG: 10 TABLET, FILM COATED ORAL at 09:07

## 2022-01-01 RX ADMIN — THEOPHYLLINE 200 MG: 400 TABLET, EXTENDED RELEASE ORAL at 22:19

## 2022-01-01 RX ADMIN — ATORVASTATIN CALCIUM 10 MG: 10 TABLET, FILM COATED ORAL at 09:51

## 2022-01-01 RX ADMIN — ENOXAPARIN SODIUM 40 MG: 100 INJECTION SUBCUTANEOUS at 20:51

## 2022-01-01 RX ADMIN — IPRATROPIUM BROMIDE AND ALBUTEROL SULFATE 1 AMPULE: .5; 3 SOLUTION RESPIRATORY (INHALATION) at 11:27

## 2022-01-01 RX ADMIN — IPRATROPIUM BROMIDE AND ALBUTEROL SULFATE 1 AMPULE: .5; 3 SOLUTION RESPIRATORY (INHALATION) at 08:38

## 2022-01-01 RX ADMIN — BUDESONIDE 1000 MCG: 0.5 SUSPENSION RESPIRATORY (INHALATION) at 10:23

## 2022-01-01 RX ADMIN — LEVOFLOXACIN 500 MG: 500 TABLET, FILM COATED ORAL at 12:05

## 2022-01-01 RX ADMIN — SODIUM CHLORIDE SOLN NEBU 3% 15 ML: 3 NEBU SOLN at 20:47

## 2022-01-01 RX ADMIN — METHYLPREDNISOLONE SODIUM SUCCINATE 40 MG: 40 INJECTION, POWDER, FOR SOLUTION INTRAMUSCULAR; INTRAVENOUS at 01:18

## 2022-01-01 RX ADMIN — GUAIFENESIN 600 MG: 600 TABLET, EXTENDED RELEASE ORAL at 21:38

## 2022-01-01 RX ADMIN — SODIUM CHLORIDE SOLN NEBU 3% 15 ML: 3 NEBU SOLN at 12:10

## 2022-01-01 RX ADMIN — IPRATROPIUM BROMIDE AND ALBUTEROL SULFATE 1 AMPULE: .5; 3 SOLUTION RESPIRATORY (INHALATION) at 02:01

## 2022-01-01 RX ADMIN — SODIUM CHLORIDE SOLN NEBU 3% 4 ML: 3 NEBU SOLN at 16:52

## 2022-01-01 RX ADMIN — SODIUM CHLORIDE SOLN NEBU 3% 15 ML: 3 NEBU SOLN at 21:00

## 2022-01-01 RX ADMIN — POLYETHYLENE GLYCOL 3350 17 G: 17 POWDER, FOR SOLUTION ORAL at 09:21

## 2022-01-01 RX ADMIN — BUDESONIDE 1000 MCG: 0.5 SUSPENSION RESPIRATORY (INHALATION) at 20:31

## 2022-01-01 RX ADMIN — THEOPHYLLINE 200 MG: 400 TABLET, EXTENDED RELEASE ORAL at 09:49

## 2022-01-01 RX ADMIN — PERFLUTREN 1.5 ML: 6.52 INJECTION, SUSPENSION INTRAVENOUS at 09:49

## 2022-01-01 RX ADMIN — POLYETHYLENE GLYCOL 3350 17 G: 17 POWDER, FOR SOLUTION ORAL at 09:05

## 2022-01-01 RX ADMIN — POLYETHYLENE GLYCOL 3350 17 G: 17 POWDER, FOR SOLUTION ORAL at 08:42

## 2022-01-01 RX ADMIN — METHYLPREDNISOLONE SODIUM SUCCINATE 40 MG: 40 INJECTION, POWDER, FOR SOLUTION INTRAMUSCULAR; INTRAVENOUS at 01:34

## 2022-01-01 RX ADMIN — METHYLPREDNISOLONE SODIUM SUCCINATE 125 MG: 125 INJECTION, POWDER, FOR SOLUTION INTRAMUSCULAR; INTRAVENOUS at 15:51

## 2022-01-01 RX ADMIN — DILTIAZEM HYDROCHLORIDE 300 MG: 180 CAPSULE, COATED, EXTENDED RELEASE ORAL at 08:53

## 2022-01-01 RX ADMIN — SODIUM CHLORIDE, PRESERVATIVE FREE 10 ML: 5 INJECTION INTRAVENOUS at 21:11

## 2022-01-01 RX ADMIN — FUROSEMIDE 40 MG: 10 INJECTION, SOLUTION INTRAMUSCULAR; INTRAVENOUS at 09:40

## 2022-01-01 RX ADMIN — IPRATROPIUM BROMIDE AND ALBUTEROL SULFATE 1 AMPULE: .5; 3 SOLUTION RESPIRATORY (INHALATION) at 11:44

## 2022-01-01 RX ADMIN — ENOXAPARIN SODIUM 40 MG: 100 INJECTION SUBCUTANEOUS at 21:20

## 2022-01-01 RX ADMIN — SODIUM CHLORIDE, PRESERVATIVE FREE 10 ML: 5 INJECTION INTRAVENOUS at 22:22

## 2022-01-01 RX ADMIN — METHYLPREDNISOLONE SODIUM SUCCINATE 40 MG: 40 INJECTION, POWDER, FOR SOLUTION INTRAMUSCULAR; INTRAVENOUS at 01:21

## 2022-01-01 RX ADMIN — IPRATROPIUM BROMIDE AND ALBUTEROL SULFATE 1 AMPULE: .5; 3 SOLUTION RESPIRATORY (INHALATION) at 08:26

## 2022-01-01 RX ADMIN — ENOXAPARIN SODIUM 40 MG: 100 INJECTION SUBCUTANEOUS at 20:14

## 2022-01-01 RX ADMIN — THEOPHYLLINE 200 MG: 400 TABLET, EXTENDED RELEASE ORAL at 20:08

## 2022-01-01 RX ADMIN — SODIUM CHLORIDE SOLN NEBU 3% 4 ML: 3 NEBU SOLN at 13:36

## 2022-01-01 RX ADMIN — SODIUM CHLORIDE SOLN NEBU 3% 15 ML: 3 NEBU SOLN at 19:55

## 2022-01-01 RX ADMIN — THEOPHYLLINE 200 MG: 400 TABLET, EXTENDED RELEASE ORAL at 21:30

## 2022-01-01 ASSESSMENT — PAIN SCALES - GENERAL
PAINLEVEL_OUTOF10: 0

## 2022-01-01 ASSESSMENT — LIFESTYLE VARIABLES
HOW OFTEN DO YOU HAVE A DRINK CONTAINING ALCOHOL: MONTHLY OR LESS
HOW MANY STANDARD DRINKS CONTAINING ALCOHOL DO YOU HAVE ON A TYPICAL DAY: 1 OR 2

## 2022-01-01 ASSESSMENT — PAIN - FUNCTIONAL ASSESSMENT: PAIN_FUNCTIONAL_ASSESSMENT: NONE - DENIES PAIN

## 2022-01-07 ENCOUNTER — OFFICE VISIT (OUTPATIENT)
Dept: PULMONOLOGY | Age: 82
End: 2022-01-07
Payer: MEDICARE

## 2022-01-07 VITALS
RESPIRATION RATE: 24 BRPM | TEMPERATURE: 98.7 F | HEART RATE: 103 BPM | OXYGEN SATURATION: 98 % | BODY MASS INDEX: 29.76 KG/M2 | DIASTOLIC BLOOD PRESSURE: 70 MMHG | HEIGHT: 67 IN | SYSTOLIC BLOOD PRESSURE: 135 MMHG | WEIGHT: 189.6 LBS

## 2022-01-07 DIAGNOSIS — G47.33 OSA (OBSTRUCTIVE SLEEP APNEA): ICD-10-CM

## 2022-01-07 DIAGNOSIS — J30.89 OTHER ALLERGIC RHINITIS: ICD-10-CM

## 2022-01-07 DIAGNOSIS — J44.9 COPD, VERY SEVERE (HCC): Primary | ICD-10-CM

## 2022-01-07 PROBLEM — J44.1 COPD EXACERBATION (HCC): Status: RESOLVED | Noted: 2021-10-21 | Resolved: 2022-01-07

## 2022-01-07 PROCEDURE — 1036F TOBACCO NON-USER: CPT | Performed by: INTERNAL MEDICINE

## 2022-01-07 PROCEDURE — 99214 OFFICE O/P EST MOD 30 MIN: CPT | Performed by: INTERNAL MEDICINE

## 2022-01-07 PROCEDURE — G8427 DOCREV CUR MEDS BY ELIG CLIN: HCPCS | Performed by: INTERNAL MEDICINE

## 2022-01-07 PROCEDURE — G8484 FLU IMMUNIZE NO ADMIN: HCPCS | Performed by: INTERNAL MEDICINE

## 2022-01-07 PROCEDURE — G8417 CALC BMI ABV UP PARAM F/U: HCPCS | Performed by: INTERNAL MEDICINE

## 2022-01-07 PROCEDURE — 3023F SPIROM DOC REV: CPT | Performed by: INTERNAL MEDICINE

## 2022-01-07 PROCEDURE — 4040F PNEUMOC VAC/ADMIN/RCVD: CPT | Performed by: INTERNAL MEDICINE

## 2022-01-07 PROCEDURE — 1123F ACP DISCUSS/DSCN MKR DOCD: CPT | Performed by: INTERNAL MEDICINE

## 2022-01-07 RX ORDER — PREDNISONE 10 MG/1
TABLET ORAL
Qty: 30 TABLET | Refills: 0 | Status: SHIPPED | OUTPATIENT
Start: 2022-01-07 | End: 2022-01-17

## 2022-01-07 ASSESSMENT — COPD QUESTIONNAIRES
QUESTION2_CHESTPHLEGM: 3
QUESTION7_SLEEPQUALITY: 3
CAT_TOTALSCORE: 31
QUESTION1_COUGHFREQUENCY: 3
QUESTION8_ENERGYLEVEL: 4
QUESTION4_WALKINCLINE: 5
QUESTION5_HOMEACTIVITIES: 5
QUESTION3_CHESTTIGHTNESS: 5
QUESTION6_LEAVINGHOUSE: 3

## 2022-01-07 NOTE — ASSESSMENT & PLAN NOTE
Continues to have significant symptoms. Ipratropium nightly.   Discussed adding saline rinse to her regimen

## 2022-01-07 NOTE — ASSESSMENT & PLAN NOTE
Titration is not required during this visit. PAP download information:  Usage > 4 hours  99 %   Pressure setting  14/8 cwp    AHI with usage  0.6     See media tab for full download data. Abel Langston  is deriving benefit from PAP demonstrated by improved [place], AHI, symptoms.

## 2022-01-07 NOTE — PROGRESS NOTES
REASON FOR CONSULTATION/CC: ZAHEER      CONSULTING PHYSICIAN: Reggie Mazariegos MD   PCP: Reggie Mazariegos MD    HISTORY OF PRESENT ILLNESS: Sanjuana Pinon is a [de-identified]y.o. year old male with a history of ZAHEER who presents :     Sleep history:                    ZAHEER  Using bipap every night          Copd  On Trelegy , increased to 200 but has not change symptoms. symptoms worsening with increased shortness of breath. No fevers coughing or phlegm . Just shortness of breath  No desaturation. Has oxygen and will use prn. This does help shortness of breath. Using albuterol ~ bid. Very difficult to shower. Has lower ext edema. Sit in chair with feet up,   Consider CBD oils       Echo completed and normal.     Chronic hypoxemia  Using oxygen prn.      allergic rhinitis  Continued sinus congestion. ipratropium . Working well and using several times per day  Not using sinus rinse. [place] Sleepiness Scale:    Sleep Medicine 9/28/2021 11/23/2020 12/4/2019 8/8/2019 3/14/2019 9/13/2018 6/12/2018   Sitting and reading 1 1 1 1 1 1 1   Watching TV 1 1 1 1 1 1 1   Sitting, inactive in a public place (e.g. a theatre or a meeting) 1 1 1 1 1 1 1   As a passenger in a car for an hour without a break 1 1 1 1 1 1 1   Lying down to rest in the afternoon when circumstances permit 1 1 0 1 0 1 1   Sitting and talking to someone 1 1 1 1 1 1 1   Sitting quietly after a lunch without alcohol 1 1 1 1 1 1 1   In a car, while stopped for a few minutes in traffic 1 1 1 1 0 1 1   Total score 8 8 7 8 6 8 8   Neck circumference - - - - - 18.5 19         0 = no chance of dozing  1 = slight chance of dozing  2 = moderate chance of dozing  3 = high chance of dozing    Interpretation:   0-7: It is unlikely that you are abnormally sleepy. 8-9:You have an average amount of daytime sleepiness. 10-15: You may be excessively sleepy depending on the situation. You may want to consider   seeking medical attention. 16-24: You are excessively sleepy and should consider seeking medical attention     REVIEW OF SYSTEMS:    Constitutional: Negative for fever   HENT: Negative for sore throat  Respiratory: slight shortness of breath and cough  Cardiovascular: Negative for chest pain  Gastrointestinal: Negative for vomiting, diarrhea   Skin: Negative for rash  Psychiatric/Behavorial: Negative for anxiety    Objective:   PHYSICAL EXAM:  Blood pressure 135/70, pulse 103, temperature 98.7 °F (37.1 °C), resp. rate 24, height 5' 7\" (1.702 m), weight 189 lb 9.6 oz (86 kg), SpO2 98 %.'  Gen: No distress. ENT:   Resp: No accessory muscle use. No crackles. Slight wheezes. No rhonchi. distant  CV: Regular rate. Regular rhythm. No murmur or rub. No edema. Skin: Warm, dry, normal texture and turgor. No nodule on exposed extremities. M/S: No cyanosis. No clubbing. No joint deformity. Psych: Oriented x 3. No anxiety. Awake. Alert. Intact judgement and insight. Good Mood / Affect. Memory appears in tact         Data Reviewed:     Assessment:     ·  COPD, very severe , FEV1 39%, 2021  · ZAHEER  · Chronic Sinusitis  · Code status: DNR CCA October 2, 2018    Plan:              Problem List Items Addressed This Visit     Other allergic rhinitis      Continues to have significant symptoms. Ipratropium nightly. Discussed adding saline rinse to her regimen         ZAHEER (obstructive sleep apnea)      Titration is not required during this visit. PAP download information:  Usage > 4 hours  99 %   Pressure setting  14/8 cwp    AHI with usage  0.6     See media tab for full download data. Clayton Horta  is deriving benefit from PAP demonstrated by improved [place], AHI, symptoms. COPD, very severe ([address] - Primary      Continues to have significant shortness of breath. This despite Trelegy 200. He was given Breztri in the past but did not use this. He was given sample of to try again. We discussed transplant.   The age [de-identified], he is frankly too old for this. We discussed hypercapnia being controlled with his current BiPAP. He currently does not need oxygen during the day. Therefore he is not a candidate for life 2000. Website was accessed during visit and demonstrated this device  We discussed Nikolski valves. He made several criteria for Nikolski valve but does not have air trapping greater than 175% / 200% if homogeneous. He is considering using CBD. We discussed the lack of data in regards to this. Therefore, I did not discourage or encourage use of this. He trialed Daliresp in 2013. Not clear that this was effective then and not clear if it has a role now. Unfortunately, is not clear that his dyspnea can be improved         Relevant Medications    predniSONE (DELTASONE) 10 MG tablet    predniSONE (DELTASONE) 10 MG tablet            This note was transcribed using [id number] Whois. Please disregard any translational errors.

## 2022-03-28 DIAGNOSIS — G60.9 IDIOPATHIC PERIPHERAL NEUROPATHY: ICD-10-CM

## 2022-04-20 ENCOUNTER — OFFICE VISIT (OUTPATIENT)
Dept: PULMONOLOGY | Age: 82
End: 2022-04-20
Payer: MEDICARE

## 2022-04-20 VITALS
DIASTOLIC BLOOD PRESSURE: 80 MMHG | SYSTOLIC BLOOD PRESSURE: 125 MMHG | RESPIRATION RATE: 21 BRPM | BODY MASS INDEX: 29.13 KG/M2 | OXYGEN SATURATION: 98 % | HEIGHT: 67 IN | HEART RATE: 95 BPM | TEMPERATURE: 98.6 F | WEIGHT: 185.6 LBS

## 2022-04-20 DIAGNOSIS — J30.89 OTHER ALLERGIC RHINITIS: ICD-10-CM

## 2022-04-20 DIAGNOSIS — J44.9 COPD, VERY SEVERE (HCC): ICD-10-CM

## 2022-04-20 DIAGNOSIS — G47.33 OSA (OBSTRUCTIVE SLEEP APNEA): ICD-10-CM

## 2022-04-20 PROCEDURE — G8427 DOCREV CUR MEDS BY ELIG CLIN: HCPCS | Performed by: INTERNAL MEDICINE

## 2022-04-20 PROCEDURE — 99214 OFFICE O/P EST MOD 30 MIN: CPT | Performed by: INTERNAL MEDICINE

## 2022-04-20 PROCEDURE — 3023F SPIROM DOC REV: CPT | Performed by: INTERNAL MEDICINE

## 2022-04-20 PROCEDURE — 4040F PNEUMOC VAC/ADMIN/RCVD: CPT | Performed by: INTERNAL MEDICINE

## 2022-04-20 PROCEDURE — 1036F TOBACCO NON-USER: CPT | Performed by: INTERNAL MEDICINE

## 2022-04-20 PROCEDURE — 1123F ACP DISCUSS/DSCN MKR DOCD: CPT | Performed by: INTERNAL MEDICINE

## 2022-04-20 PROCEDURE — G8417 CALC BMI ABV UP PARAM F/U: HCPCS | Performed by: INTERNAL MEDICINE

## 2022-04-20 RX ORDER — PREDNISONE 20 MG/1
40 TABLET ORAL DAILY
Qty: 10 TABLET | Refills: 0 | Status: SHIPPED | OUTPATIENT
Start: 2022-04-20 | End: 2022-04-25

## 2022-04-20 ASSESSMENT — COPD QUESTIONNAIRES
QUESTION6_LEAVINGHOUSE: 2
QUESTION8_ENERGYLEVEL: 3
QUESTION2_CHESTPHLEGM: 2
QUESTION5_HOMEACTIVITIES: 3
QUESTION3_CHESTTIGHTNESS: 4
CAT_TOTALSCORE: 22
QUESTION4_WALKINCLINE: 4
QUESTION7_SLEEPQUALITY: 2
QUESTION1_COUGHFREQUENCY: 2

## 2022-04-20 NOTE — PROGRESS NOTES
REASON FOR CONSULTATION/CC: ZAHEER      CONSULTING PHYSICIAN: Janeth Abdul MD   PCP: Janeth Abdul MD    HISTORY OF PRESENT ILLNESS: Benja Hale is a [de-identified]y.o. year old male with a history of ZAHEER who presents :     Sleep history:          ZAHEER  Had the wrong since mask recently. Using never night. COPD  He would like all equipment and supplies to [address]  Trelegy increased to 200 and believes this makes a difference. albuterol   theophylline       Recent flair after dusting. Does not think he needs steroids or antibiotics. allergic rhinitis    ipratropium    Controlled. COPD Assessment Test (CAT)  Cough Assessment: 2  Phlegm Assessment: 2  Chest tightness: 4  Walking on an incline: 4  Home Activities: 3  Confident Leaving The Home: 2  Sleeping Soundly: 2  Have Energy: 3  Assessment Score: 22                 [place] Sleepiness Scale:    Sleep Medicine 9/28/2021 11/23/2020 12/4/2019 8/8/2019 3/14/2019 9/13/2018 6/12/2018   Sitting and reading 1 1 1 1 1 1 1   Watching TV 1 1 1 1 1 1 1   Sitting, inactive in a public place (e.g. a theatre or a meeting) 1 1 1 1 1 1 1   As a passenger in a car for an hour without a break 1 1 1 1 1 1 1   Lying down to rest in the afternoon when circumstances permit 1 1 0 1 0 1 1   Sitting and talking to someone 1 1 1 1 1 1 1   Sitting quietly after a lunch without alcohol 1 1 1 1 1 1 1   In a car, while stopped for a few minutes in traffic 1 1 1 1 0 1 1   Total score 8 8 7 8 6 8 8   Neck circumference (Inches) - - - - - 18.5 19         0 = no chance of dozing  1 = slight chance of dozing  2 = moderate chance of dozing  3 = high chance of dozing    Interpretation:   0-7: It is unlikely that you are abnormally sleepy. 8-9:You have an average amount of daytime sleepiness. 10-15: You may be excessively sleepy depending on the situation. You may want to consider   seeking medical attention. 16-24: You are excessively sleepy and should consider seeking medical attention       Objective:   PHYSICAL EXAM:  Blood pressure 125/80, pulse 95, temperature 98.6 °F (37 °C), resp. rate 21, height 5' 7\" (1.702 m), weight 185 lb 9.6 oz (84.2 kg), SpO2 98 %.'  Gen: No distress. ENT:   Resp: No accessory muscle use. No crackles. Slight wheezes. No rhonchi. distant  CV: Regular rate. Regular rhythm. No murmur or rub. No edema. Skin: Warm, dry, normal texture and turgor. No nodule on exposed extremities. M/S: No cyanosis. No clubbing. No joint deformity. Psych: Oriented x 3. No anxiety. Awake. Alert. Intact judgement and insight. Good Mood / Affect. Memory appears in tact         Data Reviewed:     Assessment:     ·  COPD, very severe , FEV1 39%, 2021  · ZAHEER  · Chronic Sinusitis  · Code status: DNR CCA October 2, 2018    Plan:              Problem List Items Addressed This Visit     Other allergic rhinitis      Controlled with ipratropium           ZAHEER (obstructive sleep apnea)      He is frustrated with current DME. He would like to change to [address] [place] [address]. Order will be sent to [address] [place] [address].          COPD, very severe ([address]      Trelegy. theophylline and left bundle branch block. mostly controlled with current regiment. Relevant Medications    predniSONE (DELTASONE) 20 MG tablet            This note was transcribed using [id number] apprupt. Please disregard any translational errors.

## 2022-04-21 NOTE — ASSESSMENT & PLAN NOTE
He is frustrated with current DME. He would like to change to [facility]. Order will be sent to [facility].

## 2022-04-29 ENCOUNTER — TELEPHONE (OUTPATIENT)
Dept: PULMONOLOGY | Age: 82
End: 2022-04-29

## 2022-04-29 DIAGNOSIS — J44.9 COPD, VERY SEVERE (HCC): Primary | ICD-10-CM

## 2022-04-29 NOTE — TELEPHONE ENCOUNTER
This does not sound correct. Please check with [facility].   I really doubt they would change the mode without order

## 2022-04-29 NOTE — TELEPHONE ENCOUNTER
Patient called today and informed us that [facility] has received his order for his BPAP supplies. He states [facility] called to inform the patient that they changed his script from a BPAP to a CPAP machine due to needing to change his pressures. He wants to let Dr. Gael Murray know this and see if he has anything to advise?

## 2022-05-02 RX ORDER — PREDNISONE 10 MG/1
TABLET ORAL
Qty: 30 TABLET | Refills: 0 | Status: SHIPPED | OUTPATIENT
Start: 2022-05-02 | End: 2022-05-12

## 2022-05-02 NOTE — TELEPHONE ENCOUNTER
[place] the order was changed from a spontaneous 10 CPAP on 4- by Dr. Princess Waggoner. This information was given to the patient and he has no further questions at this time. He is requesting another refill on his steroid medication. He is not in need of it at this time, but would like to have it in case he needs it since he travels a lot in the summer and he has had this done in the past.   Please advise.

## 2022-05-02 NOTE — TELEPHONE ENCOUNTER
SW the patients wife and LM for patient that medication is at the pharmacy, and the order for the BiPAP will be sent to [facility] today to have this switched back.

## 2022-05-02 NOTE — TELEPHONE ENCOUNTER
I don't see anything scanned in 4/20/22. This must have been in error. Please send order for bipap 14/8. This is what he was on as of Jan 2022. Another round of steroids sent.

## 2022-06-08 ENCOUNTER — TELEPHONE (OUTPATIENT)
Dept: PULMONOLOGY | Age: 82
End: 2022-06-08

## 2022-06-08 NOTE — TELEPHONE ENCOUNTER
Jared Argueta calls in. Out of town and wanted to know if he could come in Monday to have Dr. Redia Meigs listen to his \"chest/lungs, we will be back home by then. \"  Started 3-4 days ago with labored breathing. Some tightness in chest. Little wheezing. Dr. Redia Meigs  Is out of office next week. Jared Argueta has script of tapering prednisone 10 mg on hand. \" I hate to start the prednisone as it keeps me awake and jittery. I use my   Nebulizer but not often enough, Uses 2L oxygen only at night if I need it. \"  Inhalers and neb tx only last 2-3 hrs. I don't think I need an antibiotic as I have no fever. Dr. Redia Meigs patient mainly called to get an appointment next week. Do you suggest pt start tapering dose of prednisone?

## 2022-06-09 NOTE — TELEPHONE ENCOUNTER
I am out of the office next week  He can start steroids today  I am ok to do a VV today if he would wish

## 2022-06-21 ENCOUNTER — TELEMEDICINE (OUTPATIENT)
Dept: PULMONOLOGY | Age: 82
End: 2022-06-21
Payer: MEDICARE

## 2022-06-21 DIAGNOSIS — J44.9 COPD, VERY SEVERE (HCC): Primary | ICD-10-CM

## 2022-06-21 DIAGNOSIS — U07.1 COVID-19: ICD-10-CM

## 2022-06-21 PROCEDURE — G8428 CUR MEDS NOT DOCUMENT: HCPCS | Performed by: INTERNAL MEDICINE

## 2022-06-21 PROCEDURE — 99214 OFFICE O/P EST MOD 30 MIN: CPT | Performed by: INTERNAL MEDICINE

## 2022-06-21 PROCEDURE — 3023F SPIROM DOC REV: CPT | Performed by: INTERNAL MEDICINE

## 2022-06-21 PROCEDURE — 1123F ACP DISCUSS/DSCN MKR DOCD: CPT | Performed by: INTERNAL MEDICINE

## 2022-06-21 PROCEDURE — 1036F TOBACCO NON-USER: CPT | Performed by: INTERNAL MEDICINE

## 2022-06-21 PROCEDURE — G8417 CALC BMI ABV UP PARAM F/U: HCPCS | Performed by: INTERNAL MEDICINE

## 2022-06-21 RX ORDER — PREDNISONE 10 MG/1
TABLET ORAL
Qty: 30 TABLET | Refills: 0 | Status: SHIPPED | OUTPATIENT
Start: 2022-06-21 | End: 2022-07-01

## 2022-06-21 NOTE — PROGRESS NOTES
REASON FOR CONSULTATION/CC: RAFI      CONSULTING PHYSICIAN: Pawel Best MD   PCP: Pawel Best MD    HISTORY OF PRESENT ILLNESS: Patricio Alaniz is a [de-identified]y.o. year old male with a history of RAFI who presents :     Sleep history:          RAFI          COPD  Trelegy, theophylline, albuterol      allergic rhinitis  Increased during covid 19        Rafi      VV home    He has been feeling poorly for the last 1.5 week. He took his prednisone (had at home) and started to feel better but was not able to sleep. After finishing prednisione (today) feeling better but still labored breathing. Took vitals 92% on  ** L NC. Pulse 84. complains of runny nose with cough. Wife with similar symptoms. Used Claritin and improved sinus symptoms. Test positive for covid last night. COPD Assessment Test (CAT)                                            [place] Sleepiness Scale:    Sleep Medicine 9/28/2021 11/23/2020 12/4/2019 8/8/2019 3/14/2019 9/13/2018 6/12/2018   Sitting and reading 1 1 1 1 1 1 1   Watching TV 1 1 1 1 1 1 1   Sitting, inactive in a public place (e.g. a theatre or a meeting) 1 1 1 1 1 1 1   As a passenger in a car for an hour without a break 1 1 1 1 1 1 1   Lying down to rest in the afternoon when circumstances permit 1 1 0 1 0 1 1   Sitting and talking to someone 1 1 1 1 1 1 1   Sitting quietly after a lunch without alcohol 1 1 1 1 1 1 1   In a car, while stopped for a few minutes in traffic 1 1 1 1 0 1 1   Total score 8 8 7 8 6 8 8   Neck circumference (Inches) - - - - - 18.5 19         0 = no chance of dozing  1 = slight chance of dozing  2 = moderate chance of dozing  3 = high chance of dozing    Interpretation:   0-7: It is unlikely that you are abnormally sleepy. 8-9:You have an average amount of daytime sleepiness. 10-15: You may be excessively sleepy depending on the situation. You may want to consider   seeking medical attention. 16-24: You are excessively sleepy and should consider seeking medical attention       Objective:   PHYSICAL EXAM:  There were no vitals taken for this visit.'  Gen: No distress. Eyes:  No sclera icterus. No conjunctival injection. ENT: No discharge. Pharynx clear. External appearance of ears and nose normal.  Neck: Trachea midline. No obvious mass. Resp: No accessory muscle use. No clear wheezing. Speaking full sentences. CV:     GI:    Skin: Warm, dry, normal texture and turgor. No nodule on exposed extremities. Lymph:    M/S: No cyanosis. Neuro: Moves all four extremities. Psych: Oriented x 3. No anxiety. Awake. Alert. Intact judgement and insight. Data Reviewed:     Assessment:     ·  COPD, very severe , FEV1 39%, 2021  · ZAHEER  · Chronic Sinusitis  · Code status: DNR CCA October 2, 2018    Plan:              Problem List Items Addressed This Visit     COVID-19      Tested positive via home COVID-19 test.  He has had some improvement with prednisone. Sinus improved with Claritin. Advised Claritin along with Tylenol ibuprofen for symptoms. Advised to monitor for bacterial infection. Relevant Medications    predniSONE (DELTASONE) 10 MG tablet    COPD, very severe ([address] - Primary     COPD exacerbation secondary to COVID-19 infection. Treated with prednisone. Óscarmarilyn Resendizle , was evaluated through a synchronous (real-time) audio-video encounter. The patient (or guardian if applicable) is aware that this is a billable service, which includes applicable co-pays. This Virtual Visit was conducted with patient's (and/or legal guardian's) consent. The visit was conducted pursuant to the emergency declaration under the [address] [place] [address] authority and the [employer] and [employer] General Act. Patient identification was verified, and a caregiver was present when appropriate.  The patient was located in a state where the provider was licensed to provide care. Relevant Medications    predniSONE (DELTASONE) 10 MG tablet            This note was transcribed using [id number] [employer]. Please disregard any translational errors.

## 2022-06-21 NOTE — ASSESSMENT & PLAN NOTE
Tested positive via home COVID-19 test.  He has had some improvement with prednisone. Sinus improved with Claritin. Advised Claritin along with Tylenol ibuprofen for symptoms. Advised to monitor for bacterial infection.

## 2022-06-21 NOTE — ASSESSMENT & PLAN NOTE
COPD exacerbation secondary to COVID-19 infection. Treated with prednisone. Teodoro Aguillon , was evaluated through a synchronous (real-time) audio-video encounter. The patient (or guardian if applicable) is aware that this is a billable service, which includes applicable co-pays. This Virtual Visit was conducted with patient's (and/or legal guardian's) consent. The visit was conducted pursuant to the emergency declaration under the [address] [place] [address] authority and the [employer] and [employer] General Act. Patient identification was verified, and a caregiver was present when appropriate. The patient was located in a state where the provider was licensed to provide care.

## 2022-07-05 ENCOUNTER — TELEPHONE (OUTPATIENT)
Dept: PULMONOLOGY | Age: 82
End: 2022-07-05

## 2022-07-05 NOTE — TELEPHONE ENCOUNTER
Patient called requesting a new order for a nebulizer machine. The one he has is broken and he depends on this a lot, especially since he travels. He called [address] his DME and they need a new order before they will replace it. Is it ok to place a new order for a new machine? Please advise. Message routed to Dr. Dianna Posey.

## 2022-07-05 NOTE — TELEPHONE ENCOUNTER
Order will be sent on the order forms we have in the office for a new nebulizer machine. Order sent to [address] [place] [address] as of today.

## 2022-07-07 ENCOUNTER — TELEPHONE (OUTPATIENT)
Dept: PULMONOLOGY | Age: 82
End: 2022-07-07

## 2022-07-07 RX ORDER — PREDNISONE 1 MG/1
5 TABLET ORAL DAILY
Qty: 3 TABLET | Refills: 0 | Status: SHIPPED | OUTPATIENT
Start: 2022-07-07 | End: 2022-07-10

## 2022-07-07 RX ORDER — PREDNISONE 10 MG/1
10 TABLET ORAL DAILY
Qty: 3 TABLET | Refills: 0 | Status: SHIPPED | OUTPATIENT
Start: 2022-07-07 | End: 2022-07-10

## 2022-07-07 RX ORDER — PREDNISONE 10 MG/1
40 TABLET ORAL DAILY
Qty: 12 TABLET | Refills: 0 | Status: SHIPPED | OUTPATIENT
Start: 2022-07-07 | End: 2022-07-10

## 2022-07-07 RX ORDER — PREDNISONE 10 MG/1
20 TABLET ORAL DAILY
Qty: 6 TABLET | Refills: 0 | Status: SHIPPED | OUTPATIENT
Start: 2022-07-07 | End: 2022-07-10

## 2022-08-10 DIAGNOSIS — J32.1 CHRONIC FRONTAL SINUSITIS: ICD-10-CM

## 2022-08-11 RX ORDER — IPRATROPIUM BROMIDE 21 UG/1
SPRAY, METERED NASAL
Qty: 120 ML | Refills: 3 | Status: SHIPPED | OUTPATIENT
Start: 2022-08-11 | End: 2022-09-06

## 2022-08-13 ENCOUNTER — PATIENT MESSAGE (OUTPATIENT)
Dept: PULMONOLOGY | Age: 82
End: 2022-08-13

## 2022-08-13 DIAGNOSIS — J44.9 COPD, VERY SEVERE (HCC): Primary | ICD-10-CM

## 2022-08-15 RX ORDER — PREDNISONE 10 MG/1
TABLET ORAL
Qty: 30 TABLET | Refills: 0 | Status: SHIPPED | OUTPATIENT
Start: 2022-08-15 | End: 2022-08-25

## 2022-08-15 NOTE — TELEPHONE ENCOUNTER
From: Christopher Tanners  To: Dr. Gallegos Argue: 8/13/2022 12:56 PM EDT  Subject: Prednisone    My wife and I are taking a cruise to celebrate our 60th wedding anniversary on Aug. 25th. I would like to take some prednisone along in case of an emergency. If you can please send to Chucky at [facility] [address]

## 2022-09-03 DIAGNOSIS — J32.1 CHRONIC FRONTAL SINUSITIS: ICD-10-CM

## 2022-09-06 RX ORDER — IPRATROPIUM BROMIDE 21 UG/1
SPRAY, METERED NASAL
Qty: 30 ML | Refills: 5 | Status: SHIPPED | OUTPATIENT
Start: 2022-09-06

## 2022-10-03 ENCOUNTER — OFFICE VISIT (OUTPATIENT)
Dept: PULMONOLOGY | Age: 82
End: 2022-10-03
Payer: MEDICARE

## 2022-10-03 VITALS
OXYGEN SATURATION: 97 % | DIASTOLIC BLOOD PRESSURE: 60 MMHG | TEMPERATURE: 97.7 F | SYSTOLIC BLOOD PRESSURE: 125 MMHG | HEART RATE: 90 BPM | WEIGHT: 187.2 LBS | BODY MASS INDEX: 29.38 KG/M2 | RESPIRATION RATE: 21 BRPM | HEIGHT: 67 IN

## 2022-10-03 DIAGNOSIS — J44.9 COPD, VERY SEVERE (HCC): Primary | ICD-10-CM

## 2022-10-03 DIAGNOSIS — G47.33 OSA (OBSTRUCTIVE SLEEP APNEA): ICD-10-CM

## 2022-10-03 DIAGNOSIS — J30.89 OTHER ALLERGIC RHINITIS: ICD-10-CM

## 2022-10-03 PROCEDURE — G8484 FLU IMMUNIZE NO ADMIN: HCPCS | Performed by: INTERNAL MEDICINE

## 2022-10-03 PROCEDURE — 99214 OFFICE O/P EST MOD 30 MIN: CPT | Performed by: INTERNAL MEDICINE

## 2022-10-03 PROCEDURE — G8417 CALC BMI ABV UP PARAM F/U: HCPCS | Performed by: INTERNAL MEDICINE

## 2022-10-03 PROCEDURE — 1123F ACP DISCUSS/DSCN MKR DOCD: CPT | Performed by: INTERNAL MEDICINE

## 2022-10-03 PROCEDURE — 3023F SPIROM DOC REV: CPT | Performed by: INTERNAL MEDICINE

## 2022-10-03 PROCEDURE — 1036F TOBACCO NON-USER: CPT | Performed by: INTERNAL MEDICINE

## 2022-10-03 PROCEDURE — G8427 DOCREV CUR MEDS BY ELIG CLIN: HCPCS | Performed by: INTERNAL MEDICINE

## 2022-10-03 RX ORDER — PREDNISONE 10 MG/1
TABLET ORAL
Qty: 30 TABLET | Refills: 0 | Status: SHIPPED | OUTPATIENT
Start: 2022-10-03 | End: 2022-10-13

## 2022-10-03 ASSESSMENT — COPD QUESTIONNAIRES
QUESTION2_CHESTPHLEGM: 1
CAT_TOTALSCORE: 24
QUESTION6_LEAVINGHOUSE: 3
QUESTION5_HOMEACTIVITIES: 4
QUESTION4_WALKINCLINE: 4
QUESTION7_SLEEPQUALITY: 3
QUESTION3_CHESTTIGHTNESS: 4
QUESTION1_COUGHFREQUENCY: 1
QUESTION8_ENERGYLEVEL: 4

## 2022-10-03 NOTE — ASSESSMENT & PLAN NOTE
He has worsening shortness of breath. No symptoms of chronic bronchitis. We discussed emphysema and options. Continue Trelegy and theophylline . Will check level. He has tried Breztri in the past.      Last PFT did not qualify him for [place] valves but will repeat to assess  Not transplant candidate. On bipap for ZAHEER therefore NIV would not likely add benefit.

## 2022-10-03 NOTE — PROGRESS NOTES
REASON FOR CONSULTATION/CC: ZAHEER      CONSULTING PHYSICIAN: Dylan Rose MD   PCP: Dylan Rose MD    HISTORY OF PRESENT ILLNESS: Maninder Vazquez is a 81y.o. year old male with a history of ZAHEER who presents :     Sleep history:       Multiple questions answered      COPD  Worsening dyspnea with moving. Having stress incontinence   Using Trelegy and theophylline. albuterol neb and HFA   No able to exercise at home. No cough or phlegm       Average oxygen ~ 92% at home      ZAHEER  Changed to [employer] [address] - likes this company better for equipment  Using ever night. No download. Occasional poor night sleep. allergic rhinitis  ipratropium  - mostly controlled. COPD Assessment Test (CAT)  Cough Assessment: 1  Phlegm Assessment: 1  Chest tightness: 4  Walking on an incline: 4  Home Activities: 4  Confident Leaving The Home: 3  Sleeping Soundly: 3  Have Energy: 4  Assessment Score: 24                 [place] Sleepiness Scale:    Sleep Medicine 9/28/2021 11/23/2020 12/4/2019 8/8/2019 3/14/2019 9/13/2018 6/12/2018   Sitting and reading 1 1 1 1 1 1 1   Watching TV 1 1 1 1 1 1 1   Sitting, inactive in a public place (e.g. a theatre or a meeting) 1 1 1 1 1 1 1   As a passenger in a car for an hour without a break 1 1 1 1 1 1 1   Lying down to rest in the afternoon when circumstances permit 1 1 0 1 0 1 1   Sitting and talking to someone 1 1 1 1 1 1 1   Sitting quietly after a lunch without alcohol 1 1 1 1 1 1 1   In a car, while stopped for a few minutes in traffic 1 1 1 1 0 1 1   [place] Sleepiness Score 8 8 7 8 6 8 8   Neck circumference (Inches) - - - - - 18.5 19         0 = no chance of dozing  1 = slight chance of dozing  2 = moderate chance of dozing  3 = high chance of dozing    Interpretation:   0-7: It is unlikely that you are abnormally sleepy. 8-9:You have an average amount of daytime sleepiness. 10-15: You may be excessively sleepy depending on the situation.  You may want to consider seeking medical attention. 16-24: You are excessively sleepy and should consider seeking medical attention       Objective:   PHYSICAL EXAM:  Blood pressure 125/60, pulse 90, temperature 97.7 °F (36.5 °C), resp. rate 21, height 5' 7\" (1.702 m), weight 187 lb 3.2 oz (84.9 kg), SpO2 97 %.'  Gen: No distress. ENT:   Resp: No accessory muscle use. No crackles. Slight wheezes. No rhonchi. distant  CV: Regular rate. Regular rhythm. No murmur or rub. No edema. Skin: Warm, dry, normal texture and turgor. No nodule on exposed extremities. M/S: No cyanosis. No clubbing. No joint deformity. Psych: Oriented x 3. No anxiety. Awake. Alert. Intact judgement and insight. Good Mood / Affect. Memory appears in tact         Data Reviewed:     Assessment:      COPD, very severe , FEV1 39%, 2021  ZAHEER  Chronic Sinusitis  Code status: DNR CCA October 2, 2018    Plan:              Problem List Items Addressed This Visit       Other allergic rhinitis      Mostly controlled with ipratropium           ZAHEER (obstructive sleep apnea)      Did not bring device. No download. No complaints or issues. COPD, very severe ([address] - Primary      He has worsening shortness of breath. No symptoms of chronic bronchitis. We discussed emphysema and options. Continue Trelegy and theophylline . Will check level. He has tried Breztri in the past.      Last PFT did not qualify him for [place] valves but will repeat to assess  Not transplant candidate. On bipap for ZAHEER therefore NIV would not likely add benefit. Relevant Medications    predniSONE (DELTASONE) 10 MG tablet    Other Relevant Orders    Full PFT Study With Bronchodilator    Theophylline (Completed)           This note was transcribed using [id number] [employer]. Please disregard any translational errors.

## 2022-10-14 ENCOUNTER — HOSPITAL ENCOUNTER (OUTPATIENT)
Dept: PULMONOLOGY | Age: 82
Discharge: HOME | End: 2022-10-14
Payer: MEDICARE

## 2022-10-14 DIAGNOSIS — J44.9 COPD, VERY SEVERE (HCC): ICD-10-CM

## 2022-10-14 LAB
DLCO %PRED: 79 %
DLCO PRED: NORMAL
DLCO/VA %PRED: NORMAL
DLCO/VA PRED: NORMAL
DLCO/VA: NORMAL
DLCO: 21.02 ML/MIN/MMHG
EXPIRATORY TIME-POST: NORMAL
EXPIRATORY TIME: NORMAL
FEF 25-75% %CHNG: NORMAL
FEF 25-75% %PRED-POST: NORMAL
FEF 25-75% %PRED-PRE: NORMAL
FEF 25-75% PRED: NORMAL
FEF 25-75%-POST: NORMAL
FEF 25-75%-PRE: NORMAL
FEV1 %PRED-POST: 39 %
FEV1 %PRED-PRE: 35 %
FEV1 PRED: NORMAL
FEV1-POST: NORMAL
FEV1-PRE: NORMAL
FEV1/FVC %PRED-POST: NORMAL
FEV1/FVC %PRED-PRE: NORMAL
FEV1/FVC PRED: NORMAL
FEV1/FVC-POST: 36 %
FEV1/FVC-PRE: 36 %
FVC %PRED-POST: NORMAL
FVC %PRED-PRE: NORMAL
FVC PRED: NORMAL
FVC-POST: NORMAL
FVC-PRE: NORMAL
GAW %PRED: NORMAL
GAW PRED: NORMAL
GAW: NORMAL
IC %PRED: NORMAL
IC PRED: NORMAL
IC: NORMAL
MEP: NORMAL
MIP: NORMAL
MVV %PRED-PRE: NORMAL
MVV PRED: NORMAL
MVV-PRE: NORMAL
PEF %PRED-POST: NORMAL
PEF %PRED-PRE: NORMAL
PEF PRED: NORMAL
PEF%CHNG: NORMAL
PEF-POST: NORMAL
PEF-PRE: NORMAL
RAW %PRED: NORMAL
RAW PRED: NORMAL
RAW: NORMAL
RV %PRED: NORMAL
RV PRED: NORMAL
RV: NORMAL
SVC %PRED: NORMAL
SVC PRED: NORMAL
SVC: NORMAL
TLC %PRED: 98 %
TLC PRED: NORMAL
TLC: NORMAL
VA %PRED: NORMAL
VA PRED: NORMAL
VA: NORMAL
VTG %PRED: NORMAL
VTG PRED: NORMAL
VTG: NORMAL

## 2022-10-14 PROCEDURE — 94060 EVALUATION OF WHEEZING: CPT

## 2022-10-14 PROCEDURE — 94729 DIFFUSING CAPACITY: CPT

## 2022-10-14 PROCEDURE — 94726 PLETHYSMOGRAPHY LUNG VOLUMES: CPT

## 2022-10-14 PROCEDURE — 94640 AIRWAY INHALATION TREATMENT: CPT

## 2022-10-14 PROCEDURE — 6370000000 HC RX 637 (ALT 250 FOR IP): Performed by: INTERNAL MEDICINE

## 2022-10-14 PROCEDURE — 94664 DEMO&/EVAL PT USE INHALER: CPT

## 2022-10-14 RX ORDER — ALBUTEROL SULFATE 90 UG/1
4 AEROSOL, METERED RESPIRATORY (INHALATION) ONCE
Status: COMPLETED | OUTPATIENT
Start: 2022-10-14 | End: 2022-10-14

## 2022-10-14 RX ADMIN — ALBUTEROL SULFATE 4 PUFF: 90 AEROSOL, METERED RESPIRATORY (INHALATION) at 09:36

## 2022-10-14 ASSESSMENT — PULMONARY FUNCTION TESTS
FEV1_PERCENT_PREDICTED_PRE: 35
FEV1/FVC_POST: 36
FEV1/FVC_PRE: 36
FEV1_PERCENT_PREDICTED_POST: 39

## 2022-10-16 PROCEDURE — 94060 EVALUATION OF WHEEZING: CPT | Performed by: INTERNAL MEDICINE

## 2022-10-16 PROCEDURE — 94729 DIFFUSING CAPACITY: CPT | Performed by: INTERNAL MEDICINE

## 2022-10-16 PROCEDURE — 94726 PLETHYSMOGRAPHY LUNG VOLUMES: CPT | Performed by: INTERNAL MEDICINE

## 2022-10-16 NOTE — PROCEDURES
Spirometry was acceptable and reproducible by ATS standards      Spirometry/Flow volume loop:  Spirometry and flow volume loops consistent with severe airflow obstruction. FEV1 of 39%, and FVC 81%. There was not a significant bronchodilator response. Lung volumes:  Lung volumes with air trapping  Diffusing capacity:  Diffusing capacity within normal limits    Summary:  Consistent with severe COPD. FEV1 %Pred-Post   Date Value Ref Range Status   10/14/2022 39 % Final     FEV1/FVC-Post   Date Value Ref Range Status   10/14/2022 36 % Final     TLC %Pred   Date Value Ref Range Status   10/14/2022 98 % Final       PFT data will be scanned into the media tab under this encounter. Please see the scanned data for numerical values.      Rasheed Barclay MD  [facility] Pulmonary, Sleep and Critical Care Medicine

## 2022-11-21 ENCOUNTER — TELEPHONE (OUTPATIENT)
Dept: PULMONOLOGY | Age: 82
End: 2022-11-21

## 2022-11-21 ENCOUNTER — OFFICE VISIT (OUTPATIENT)
Dept: PULMONOLOGY | Age: 82
End: 2022-11-21
Payer: MEDICARE

## 2022-11-21 VITALS
SYSTOLIC BLOOD PRESSURE: 135 MMHG | DIASTOLIC BLOOD PRESSURE: 60 MMHG | OXYGEN SATURATION: 96 % | WEIGHT: 186.6 LBS | TEMPERATURE: 98.6 F | HEIGHT: 67 IN | RESPIRATION RATE: 22 BRPM | BODY MASS INDEX: 29.29 KG/M2 | HEART RATE: 101 BPM

## 2022-11-21 DIAGNOSIS — J18.9 COMMUNITY ACQUIRED PNEUMONIA, UNSPECIFIED LATERALITY: Primary | ICD-10-CM

## 2022-11-21 DIAGNOSIS — J44.9 COPD, VERY SEVERE (HCC): ICD-10-CM

## 2022-11-21 DIAGNOSIS — J96.12 CHRONIC HYPERCAPNIC RESPIRATORY FAILURE (HCC): ICD-10-CM

## 2022-11-21 DIAGNOSIS — G47.33 OSA (OBSTRUCTIVE SLEEP APNEA): ICD-10-CM

## 2022-11-21 PROCEDURE — 1123F ACP DISCUSS/DSCN MKR DOCD: CPT | Performed by: INTERNAL MEDICINE

## 2022-11-21 PROCEDURE — 3078F DIAST BP <80 MM HG: CPT | Performed by: INTERNAL MEDICINE

## 2022-11-21 PROCEDURE — G8484 FLU IMMUNIZE NO ADMIN: HCPCS | Performed by: INTERNAL MEDICINE

## 2022-11-21 PROCEDURE — G8427 DOCREV CUR MEDS BY ELIG CLIN: HCPCS | Performed by: INTERNAL MEDICINE

## 2022-11-21 PROCEDURE — 3023F SPIROM DOC REV: CPT | Performed by: INTERNAL MEDICINE

## 2022-11-21 PROCEDURE — 1036F TOBACCO NON-USER: CPT | Performed by: INTERNAL MEDICINE

## 2022-11-21 PROCEDURE — G8417 CALC BMI ABV UP PARAM F/U: HCPCS | Performed by: INTERNAL MEDICINE

## 2022-11-21 PROCEDURE — 99214 OFFICE O/P EST MOD 30 MIN: CPT | Performed by: INTERNAL MEDICINE

## 2022-11-21 PROCEDURE — 3074F SYST BP LT 130 MM HG: CPT | Performed by: INTERNAL MEDICINE

## 2022-11-21 RX ORDER — PREDNISONE 10 MG/1
TABLET ORAL
Qty: 30 TABLET | Refills: 0 | Status: ON HOLD | OUTPATIENT
Start: 2022-11-21 | End: 2022-12-01

## 2022-11-21 RX ORDER — DOXYCYCLINE HYCLATE 100 MG
100 TABLET ORAL 2 TIMES DAILY
Qty: 14 TABLET | Refills: 0 | Status: SHIPPED | OUTPATIENT
Start: 2022-11-21 | End: 2022-11-28

## 2022-11-21 ASSESSMENT — COPD QUESTIONNAIRES
QUESTION3_CHESTTIGHTNESS: 5
QUESTION1_COUGHFREQUENCY: 5
QUESTION5_HOMEACTIVITIES: 4
QUESTION4_WALKINCLINE: 4
CAT_TOTALSCORE: 34
QUESTION2_CHESTPHLEGM: 5
QUESTION7_SLEEPQUALITY: 4
QUESTION8_ENERGYLEVEL: 4
QUESTION6_LEAVINGHOUSE: 3

## 2022-11-21 NOTE — PROGRESS NOTES
REASON FOR CONSULTATION/CC: ZAHEER      CONSULTING PHYSICIAN: Anjel Durand MD   PCP: Anjel Durand MD    HISTORY OF PRESENT ILLNESS: Nori Guadarrama is a 81y.o. year old male with a history of ZAHEER who presents :     Sleep history:       Multiple questions answered          allergic rhinitis  ipratropium  - mostly controlled. COPD  Sick for ~ 3 weeks  . 3 Weeks ago he has Night sweats and then thrush. Tried diflucan with side effects then used Nystatin. Yobany Ruse as resturned. Now on 3rd line. He is now having cough with green phlegm with continued shortness of breath. No fevers at this time. Increased shortness of breath / dyspnea on exertion. Using mucinex. Using oxygen more with shortness of breath. Saturation 94%. albuterol given tremers. Trelegy 200 daily . ZAHEER  Using PAP. Some insomnia. allergic rhinitis   ipratropium  . Mostly controled.                 COPD Assessment Test (CAT)  Cough Assessment: 5  Phlegm Assessment: 5  Chest tightness: 5  Walking on an incline: 4  Home Activities: 4  Confident Leaving The Home: 3  Sleeping Soundly: 4  Have Energy: 4  Assessment Score: 34                 [place] Sleepiness Scale:    Sleep Medicine 9/28/2021 11/23/2020 12/4/2019 8/8/2019 3/14/2019 9/13/2018 6/12/2018   Sitting and reading 1 1 1 1 1 1 1   Watching TV 1 1 1 1 1 1 1   Sitting, inactive in a public place (e.g. a theatre or a meeting) 1 1 1 1 1 1 1   As a passenger in a car for an hour without a break 1 1 1 1 1 1 1   Lying down to rest in the afternoon when circumstances permit 1 1 0 1 0 1 1   Sitting and talking to someone 1 1 1 1 1 1 1   Sitting quietly after a lunch without alcohol 1 1 1 1 1 1 1   In a car, while stopped for a few minutes in traffic 1 1 1 1 0 1 1   [place] Sleepiness Score 8 8 7 8 6 8 8   Neck circumference (Inches) - - - - - 18.5 19         0 = no chance of dozing  1 = slight chance of dozing  2 = moderate chance of dozing  3 = high chance of dozing    Interpretation:   0-7: It is unlikely that you are abnormally sleepy. 8-9:You have an average amount of daytime sleepiness. 10-15: You may be excessively sleepy depending on the situation. You may want to consider   seeking medical attention. 16-24: You are excessively sleepy and should consider seeking medical attention       Objective:   PHYSICAL EXAM:  Blood pressure 135/60, pulse (!) 101, temperature 98.6 °F (37 °C), resp. rate 22, height 5' 7\" (1.702 m), weight 186 lb 9.6 oz (84.6 kg), SpO2 96 %.'  Gen: No distress. ENT:   Resp: No accessory muscle use. No crackles. Significant wheezes. No rhonchi. distant  CV: Regular rate. Regular rhythm. No murmur or rub. No edema. Skin: Warm, dry, normal texture and turgor. No nodule on exposed extremities. M/S: No cyanosis. No clubbing. No joint deformity. Psych: Oriented x 3. No anxiety. Awake. Alert. Intact judgement and insight. Good Mood / Affect. Memory appears in tact         Data Reviewed:     Assessment:      COPD, very severe , FEV1 39%, 2021  ZAHEER  Chronic Sinusitis  Code status: DNR CCA October 2, 2018    Plan:              Problem List Items Addressed This Visit       ZAHEER (obstructive sleep apnea)      Using BiPAP nightly. No issues other than insomnia with recent illness. COPD, very severe ([address]     Significant increase in shortness of breath consistent with COPD with exacerbation. Currently on prednisone. May be developing pneumonia as well with green phlegm. Round of prednisone and doxycycline prescribed. He will continue Trelegy. We will for thrush. Relevant Medications    predniSONE (DELTASONE) 10 MG tablet    Chronic hypercapnic respiratory failure (HCC)      No download today but patient endorses using BiPAP nightly.           Other Visit Diagnoses       Community acquired pneumonia, unspecified laterality    -  Primary    Relevant Medications    predniSONE (DELTASONE) 10 MG tablet    doxycycline hyclate (VIBRA-TABS) 100 MG tablet                  This note was transcribed using [id number] [employer]. Please disregard any translational errors.

## 2022-11-21 NOTE — TELEPHONE ENCOUNTER
[facility] [address] does not have his antibiotic that he was supposed to . He wants to make sure that we sent it. Thank you. Dr Frederic Hanson prescribed at his OV just now.

## 2022-11-21 NOTE — TELEPHONE ENCOUNTER
Steroid and Antibiotic both sent to his pharmacy. They may not have gotten since he literally just left the office.

## 2022-11-21 NOTE — ASSESSMENT & PLAN NOTE
Significant increase in shortness of breath consistent with COPD with exacerbation. Currently on prednisone. May be developing pneumonia as well with green phlegm. Round of prednisone and doxycycline prescribed. He will continue Trelegy. We will for thrush.

## 2022-11-28 ENCOUNTER — PATIENT MESSAGE (OUTPATIENT)
Dept: PULMONOLOGY | Age: 82
End: 2022-11-28

## 2022-11-28 DIAGNOSIS — J44.9 COPD, VERY SEVERE (HCC): Primary | ICD-10-CM

## 2022-11-28 NOTE — TELEPHONE ENCOUNTER
From: Antonio Huang  To: Dr. Judith Disla: 11/28/2022 1:37 PM EST  Subject: Congested Again    At Nov. 21st visit was congested & coughing up green phlegm. Had already begun Prednisone. You recommended a 2nd Prednisone series & Doxycycline HYC 100MG 2X a day for 7 days. After 3 days the phlegm was gone & I improved. Now 3/4 through 2nd Prednisone series & 1 antibiotic pill left. Yesterday began coughing up grey phlegm, congested & difficulty sleeping. Using liquid nebulizer, 12hr Mucinex [id number] & oxygen as needed. Still no fever. Thanks, Rolena Staff ([birth year])   Recommendations?

## 2022-11-29 RX ORDER — PREDNISONE 10 MG/1
10 TABLET ORAL DAILY
Qty: 21 TABLET | Refills: 0 | Status: ON HOLD | OUTPATIENT
Start: 2022-11-29

## 2022-12-01 PROBLEM — J10.1 INFLUENZA A: Status: ACTIVE | Noted: 2022-12-01

## 2022-12-01 NOTE — ED PROVIDER NOTES
[facility] EMERGENCY DEPARTMENT    CHIEF COMPLAINT  Shortness of Breath (Pt states he has had increased sob x 2-3 days. Pt states he is coughing up green sputum. Pt has history of copd and is on home oxygen of 2 liters.)       HISTORY OF PRESENT ILLNESS  Mitchell Rios is a 81 y.o. male with PMH COPD (uses 2L n/c as needed), ZAHEER, and as below who presents to the ED with complaint of cough and SOB. Symptoms started 3 weeks ago. Follows w/ Dr. Marylin Colon / Brayden Fernandez. He's recently had thrush and was treated first with diflucan, then Nystatin. Cough productive of green phlegm. Tried mucinex without improvement. Is on Trelogy. Has tried two courses of prednisone and doxycycline. Finished the abx about 1 week ago. Seemed to help for 2-3 days but symptoms worsened 4-5 days ago. Denies fever, chest pain, nausea, vomiting, diarrhea, abdominal pain. Former smoker, quit 20 years ago. No other complaints, modifying factors or associated symptoms.      I have reviewed the following from the nursing documentation:    Past Medical History:   Diagnosis Date    Aspergillus pneumonia ([facility] [address]     Basal cell carcinoma of skin 06/16/2017    right cheek    COPD (chronic obstructive pulmonary disease) (HCC)     DNR (do not resuscitate)     no intubation or chest compressions    Hyperlipidemia     Hypertension 10/2012    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/04/2018    Skin cancer 2014    both upper thighs    Squamous cell cancer of skin of nose 5/2016     Past Surgical History:   Procedure Laterality Date    BRONCHOSCOPY N/A 7/25/2019    BRONCHOSCOPY WITH BRONCHOALVEOLAR LAVAGE performed by Grace Bhatia MD at [facility] Bilateral     5/2016    COLONOSCOPY  08/04/2016    dr Mora Mayer    3 years    SKIN BIOPSY  5/2016    Nose     TONSILLECTOMY AND ADENOIDECTOMY      TOOTH EXTRACTION  5/2016    UPPER GASTROINTESTINAL ENDOSCOPY  12/2/2015    dr Forde December     Family History   Problem Relation Age of Onset    Diabetes Mother         DM    Coronary Art Dis Mother      Social History     Socioeconomic History    Marital status:      Spouse name: Not on file    Number of children: Not on file    Years of education: Not on file    Highest education level: Not on file   Occupational History    Not on file   Tobacco Use    Smoking status: Former     Packs/day: 1.00     Years: 45.00     Pack years: 45.00     Types: Cigarettes     Quit date: 2002     Years since quittin.9    Smokeless tobacco: Never   Vaping Use    Vaping Use: Never used   Substance and Sexual Activity    Alcohol use: Yes     Comment: rarely     Drug use: No    Sexual activity: Yes     Partners: Female   Other Topics Concern    Not on file   Social History Narrative    ** Merged History Encounter **          Social Determinants of Health     Financial Resource Strain: Low Risk     Difficulty of Paying Living Expenses: Not hard at all   Food Insecurity: No Food Insecurity    Worried About Running Out of Food in the Last Year: Never true    Ran Out of Food in the Last Year: Never true   Transportation Needs: Not on file   Physical Activity: Inactive    Days of Exercise per Week: 0 days    Minutes of Exercise per Session: 0 min   Stress: Not on file   Social Connections: Not on file   Intimate Partner Violence: Not on file   Housing Stability: Not on file     Current Facility-Administered Medications   Medication Dose Route Frequency Provider Last Rate Last Admin    oseltamivir (TAMIFLU) capsule 75 mg  75 mg Oral Once Kimberly MD Yonas         Current Outpatient Medications   Medication Sig Dispense Refill    predniSONE (DELTASONE) 10 MG tablet Take 1 tablet by mouth daily 21 tablet 0    TRELEGY ELLIPTA 200-62.5-25 MCG/INH AEPB USE 1 INHALATION ORALLY    DAILY 1 each 11    predniSONE (DELTASONE) 10 MG tablet Take 40 mg by mouth for 3 days 30 mg for 3 days 20 mg for 3 days 10 mg for 3 days.  30 tablet 0    atorvastatin (LIPITOR) 10 MG tablet TAKE 1 TABLET BY MOUTH DAILY 90 tablet 0    dilTIAZem (CARDIZEM CD) 300 MG extended release capsule TAKE 1 CAPSULE BY MOUTH DAILY 90 capsule 0    gabapentin (NEURONTIN) 100 MG capsule TAKE 1 CAPSULE BY MOUTH EVERY NIGHT 30 capsule 1    ipratropium (ATROVENT) 0.03 % nasal spray USE 2 SPRAYS IN EACH NOSTRIL FOUR TIMES DAILY 30 mL 5    losartan (COZAAR) 50 MG tablet TAKE 1 TABLET BY MOUTH DAILY 90 tablet 0    theophylline (UNIPHYL) 400 MG extended release tablet TAKE 1/2 TABLET TWICE DAILY 90 tablet 3    albuterol (PROVENTIL) (2.5 MG/3ML) 0.083% nebulizer solution USE 1 VIAL VIA NEBULIZER EVERY 6 HOURS AS NEEDED FOR WHEEZING OR SHORTNESS OF BREATH 360 mL 0    albuterol sulfate HFA (VENTOLIN HFA) 108 (90 Base) MCG/ACT inhaler Inhale 2 puffs into the lungs every 4 hours as needed for Wheezing 1 each 11    omeprazole (PRILOSEC) 40 MG capsule Take 40 mg by mouth daily      Spacer/Aero-Holding Chambers KIT Please dispense 1 spacer 1 kit 0    cycloSPORINE (RESTASIS) 0.05 % ophthalmic emulsion Place 1 drop into both eyes 2 times daily       Allergies   Allergen Reactions    Aquaphor [Albolene] Rash       REVIEW OF SYSTEMS  10 systems reviewed, pertinent positives and negatives per HPI, otherwise noted to be negative. PHYSICAL EXAM  ED Triage Vitals [12/01/22 1428]   BP Temp Temp Source Heart Rate Resp SpO2 Height Weight   (!) 156/70 98.1 °F (36.7 °C) Oral (!) 108 17 94 % 5' 6\" (1.676 m) 187 lb 9.8 oz (85.1 kg)     General appearance: Awake and alert. Cooperative. No acute distress. HENT: Normocephalic. Atraumatic. Mucous membranes are moist.  Neck: Supple. Eyes: PERRL. EOMI. Heart/Chest: RRR. No murmurs. Lungs: Pursed lip breathing. Maintaining adequate SpO2 on 2 L nasal canula. Mildly diminished. Diffuse end expiratory wheezes. No rales/rhonchi. Abdomen: Soft. Non-tender. Non-distended. No rebound or guarding. Musculoskeletal: No extremity edema. No deformity. No tenderness in the extremities.   All extremities neurovascularly intact. Skin: Warm and dry. No acute rashes. Neurological: Alert and oriented. CN II-XII intact. Psychiatric: Mood/affect: normal      LABS  I have reviewed all labs for this visit.    Results for orders placed or performed during the hospital encounter of 12/01/22   Rapid influenza A/B antigens    Specimen: Nasopharyngeal   Result Value Ref Range    Rapid Influenza A Ag POSITIVE (A) Negative    Rapid Influenza B Ag Negative Negative   COVID-19, Rapid    Specimen: Nasopharyngeal Swab   Result Value Ref Range    SARS-CoV-2, NAAT Not Detected Not Detected   CBC with Auto Differential   Result Value Ref Range    WBC 15.6 (H) 4.0 - 11.0 K/uL    RBC 6.07 (H) 4.20 - 5.90 M/uL    Hemoglobin 17.6 (H) 13.5 - 17.5 g/dL    Hematocrit 52.7 (H) 40.5 - 52.5 %    MCV 86.9 80.0 - 100.0 fL    MCH 29.0 26.0 - 34.0 pg    MCHC 33.3 31.0 - 36.0 g/dL    RDW 15.5 (H) 12.4 - 15.4 %    Platelets 894 030 - 893 K/uL    MPV 6.9 5.0 - 10.5 fL    Neutrophils % 88.8 %    Lymphocytes % 3.2 %    Monocytes % 7.9 %    Eosinophils % 0.0 %    Basophils % 0.1 %    Neutrophils Absolute 13.8 (H) 1.7 - 7.7 K/uL    Lymphocytes Absolute 0.5 (L) 1.0 - 5.1 K/uL    Monocytes Absolute 1.2 0.0 - 1.3 K/uL    Eosinophils Absolute 0.0 0.0 - 0.6 K/uL    Basophils Absolute 0.0 0.0 - 0.2 K/uL   Brain Natriuretic Peptide   Result Value Ref Range    Pro- 0 - 449 pg/mL   Troponin   Result Value Ref Range    Troponin <0.01 <0.01 ng/mL   Comprehensive Metabolic Panel w/ Reflex to MG   Result Value Ref Range    Sodium 132 (L) 136 - 145 mmol/L    Potassium reflex Magnesium 4.2 3.5 - 5.1 mmol/L    Chloride 94 (L) 99 - 110 mmol/L    CO2 26 21 - 32 mmol/L    Anion Gap 12 3 - 16    Glucose 128 (H) 70 - 99 mg/dL    BUN 15 7 - 20 mg/dL    Creatinine 0.7 (L) 0.8 - 1.3 mg/dL    Est, Glom Filt Rate >60 >60    Calcium 9.7 8.3 - 10.6 mg/dL    Total Protein 6.9 6.4 - 8.2 g/dL    Albumin 3.6 3.4 - 5.0 g/dL    Albumin/Globulin Ratio 1.1 1.1 - 2.2 Total Bilirubin 0.4 0.0 - 1.0 mg/dL    Alkaline Phosphatase 75 40 - 129 U/L    ALT 35 10 - 40 U/L    AST 26 15 - 37 U/L   Blood Gas, Venous   Result Value Ref Range    pH, Seb 7.394 7.350 - 7.450    pCO2, Seb 49.8 40.0 - 50.0 mmHg    pO2, Seb 37 Not Established mmHg    HCO3, Venous 30 (H) 23 - 29 mmol/L    Base Excess, Seb 4.0 Not Established mmol/L    O2 Sat, Seb 71 Not Established %    Carboxyhemoglobin 1.3 %    MetHgb, Seb 0.3 <1.5 %    TC02 (Calc), Seb 32 Not Established mmol/L    O2 Therapy Unknown    EKG 12 Lead   Result Value Ref Range    Ventricular Rate 107 BPM    Atrial Rate 107 BPM    P-R Interval 124 ms    QRS Duration 84 ms    Q-T Interval 312 ms    QTc Calculation (Bazett) 416 ms    P Axis 76 degrees    R Axis 98 degrees    T Axis 41 degrees    Diagnosis       Sinus tachycardiaBaseline artifactPoor R wave progression likely from lead position [id number] axisBorderline ECGWhen compared with ECG of 10/20/2021Poor data quality , butNo significant change likelyConfirmed by Darlene Jacobo MD, Rosanne Ventura ([id number]) on 12/1/2022 4:16:12 PM         RADIOLOGY  I have reviewed all radiographic studies for this visit. XR CHEST (2 VW)   Final Result   No acute abnormality              ECG  EKG interpreted by myself. Rate: 107  Rhythm: sinus tachycardia   Axis: 98  Intervals: within normal limits  ST Segments: no acute abnormality  T waves: no acute abnormality  Comparison: Compared to 10/20/21, there is no significant change  Impression: sinus tachycardia w/ R axis dev    ED COURSE/MDM  Patient seen and evaluated. Old records reviewed. Labs and imaging reviewed and results discussed with patient/family to extent possible. Patient presents with shortness of breath. Lung fields are wheezy and diminished. Patient maintaining adequate oxygenation on baseline 2 L nasal cannula. Tests positive for influenza A. Given breathing treatments, azithromycin for indication of COPD exacerbation, and IV steroids.   Administer correctly.         Easter Cogan, MD  12/01/22 2960

## 2022-12-02 PROBLEM — J96.11 CHRONIC RESPIRATORY FAILURE WITH HYPOXIA (HCC): Status: ACTIVE | Noted: 2022-01-01

## 2022-12-02 NOTE — H&P
[address]           [address] [address]                              HISTORY AND PHYSICAL    PATIENT NAME: Socorro                 :        1940  MED REC NO:   [id number]                          ROOM:       [id number]  ACCOUNT NO:   [de-identified]                           ADMIT DATE: 2022  PROVIDER:     Lilly Mirza MD    CHIEF COMPLAINT:  Text. HISTORY OF PRESENT ILLNESS:  Influenza A and respiratory failure with  hypoxemia. HISTORY OF PRESENT ILLNESS:  The patient is an 81-year-old white male  admitted through emergency. He presented there with a several-day  history of increasing shortness of breath and cough. He has had  symptoms going on about 2-3 weeks. He follows with Dr. Karan Chowdary with  Pulmonary Service. He was recently treated for thrush with first  Diflucan and nystatin and most recently she had cough productive of  greenish mucus. He tried Mucinex without improvement. He has had two  courses of prednisone and doxycycline, neither of which have helped him. The past 2-3 days, his symptoms got pneumatically worsened. He  presented the ER for further evaluation. He denies fever or chills. Denies nausea and vomiting. Does note some increase in purulent  secretions with coughing, increased wheezing and difficulty getting his  breath. He typically wears oxygen at night at home. Evaluation in ER  showed a swab positive for influenza A and then he had a resting  tachycardia and significant expiratory wheezing. Admitted for all the  above. PAST MEDICAL HISTORY:  Remarkable for prior history of Aspergillus  pneumonia, he also got COPD. He also requested to be a DNR with no  intubation or chest compressions. Also has hyperlipidemia, hypertension  and squamous cell cancer and basal cell cancer of the scan.     PAST SURGICAL HISTORY:  Includes bronchoscopy in the past by Dr. Karan Chowdary, Pulmonary Service. He has also had tonsillectomy, multiple  skin biopsies. FAMILY HISTORY:  Remarkable for diabetes in mother. Coronary artery  disease in mother. SOCIAL HISTORY:  He is . He is a former smoker, quit in 2002. He drinks alcohol socially. REVIEW OF SYSTEMS:  Positive for cough. Positive for wheezing. Positive for shortness of breath. Negative for fever. Negative for  chills. Positive for purulent pulmonary secretions. Positive for  chronic arthritic pain in his knees and lower back. 10-point review of  systems otherwise performed was negative. MEDICATIONS:  On admission included prednisone 10 mg a day, Trelegy  Ellipta 200/62.5/25 one inhalation daily, atorvastatin 10 mg nightly,  diltiazem  one daily, gabapentin 100 mg at bedtime, Losartan 50 mg  daily, theophylline 400 mg half tab daily, albuterol nebulizer every 6  hours as needed, and omeprazole 40 mg daily, Restasis eye drops. ALLERGIES:  Include Aquaphor. PHYSICAL EXAMINATION:  GENERAL:  He is a well-developed, well-nourished white male sitting in  bed with expiratory wheezing at rest and was able to converse, but was  mostly short of breath. VITAL SIGNS:  Blood pressure was 156/70, his pulse was 108, his  respirations were 17, his O2 sat was 94% on 2 liters. HEENT:  Head is normocephalic, atraumatic. Pupils equal, round, and  reactive to light and accommodation. Extraocular muscles intact. NECK:  Supple without JVD. CHEST:  Showed pressured breathing. He had diminished breath sounds,  diffuse expiratory wheezing throughout. No rales were noted. Some  rhonchi over the anterior chest.  CARDIOVASCULAR:  Showed regular rate and rhythm with resting  tachycardia. ABDOMEN:  Soft, nontender without mass, organomegaly. EXTREMITIES:  Showed trace of edema. NEUROLOGIC:  Alert, conversant, moving all extremities without focal  neurologic deficits.     LABORATORY DATA:  Included a positive rapid influenza A test. COVID  test was negative. His white counts 15,000, hemoglobin 17.6, platelet  count is 956,178. Troponin is less than 0.01. BNP is 113, sodium 132,  BUN and creatinine 15 and 0.7. Blood sugar 128. Venous blood gas  showed a pCO2 of 49 and pH of 7.39. EKG showed sinus tachycardia with a  resting pulse of about 108. Chest x-ray showed no acute abnormality. In ER, he was given nebulizer treatments one dose of IV steroid and IV  Zithromax. Also Tamiflu was started upon return of the influenza A  test.  He continued to wheeze despite these treatments and so  accordingly he is being admitted for further evaluation and treatment. IMPRESSION:  At the time of this dictation:  1. Influenza A with respiratory insufficiency and hypoxemia. He will  be admitted, given Tamiflu and respiratory support. 2.  Hypoxemia. We will increase his oxygen sats greater than 90%,  recognize he is on chronic O2 at 2 liters at home. 3.  COPD very severe. We will consult Dr. Jagruti Mendez for additional  treatment. 4.  Hypertension. PLAN:  He will be admitted. He will be given O2, Tamiflu, handheld  nebulizers, Solu-Medrol and Pulmonary consultation will be obtained.   He  will be maintained a DNR CCA at his request.        Kelsi Menard MD    D: 12/01/2022 20:28:49       T: 12/01/2022 20:36:23     [email or web address]  Job#: [id number]     Doc#: [id number]    CC:  Joshua Ackerman MD

## 2022-12-02 NOTE — PROGRESS NOTES
Physician Progress Note      PATIENT:               Viktor Barakat  CSN #:                  [id number]  :                       1940  ADMIT DATE:       2022 2:09 PM  [address] [place] DATE:  RESPONDING  PROVIDER #:        Florida Clark MD          QUERY TEXT:    Patient admitted with Influenza A. Noted documentation of acute respiratory   failure in H&P on 22. In order to support the diagnosis of acute   respiratory failure, please include additional clinical indicators in your   documentation. Or please document if the diagnosis of acute respiratory   failure has been ruled out after further study. The medical record reflects the following:  Risk Factors: PMH- COPD, Home O2 2L  Clinical Indicators: RR 17-20   P 108. Santiago Figueroa .. 94% RA  94% 2L. Santiago Figueroa Rapid Influ A-   POSITIVE. Santiago Figueroa Per Pulmonary consult note on 22- -         Chronic Hypoxic   Respiratory Failure  Treatment: Oxygen, Pulmonary consult    Acute Respiratory Failure Clinical Indicators per 3M MS-DRG Training Guide and   Quick Reference Guide:  pO2 < 60 mmHg or SpO2 (pulse oximetry) < 91% breathing room air  pCO2 > 50 and pH < 7.35  P/F ratio (pO2 / FIO2) < 300  pO2 decrease or pCO2 increase by 10 mmHg from baseline (if known)  Supplemental oxygen of 40% or more  Presence of respiratory distress, tachypnea, dyspnea, shortness of breath,   wheezing  Unable to speak in complete sentences  Use of accessory muscles to breathe  Extreme anxiety and feeling of impending doom  Tripod position  Confusion/altered mental status/obtunded    Thank Kristel Gutierrez RN BSN CDS CRCR  [email or web address]  Options provided:  -- Acute Respiratory Failure as evidenced by, Please document evidence.   -- Acute Respiratory Failure ruled out after study  -- Acute Respiratory Failure ruled out after study and Chronic Respiratory   Failure confirmed  -- Other - I will add my own diagnosis  -- Disagree - Not applicable / Not valid  -- Disagree - Clinically unable to determine / Unknown  -- Refer to Clinical Documentation Reviewer    PROVIDER RESPONSE TEXT:    Acute Respiratory Failure ruled out after study and Chronic Respiratory   Failure confirmed.     Query created by: Ira Acevedo on 12/2/2022 1:19 PM      Electronically signed by:  Larry Sanders MD 12/2/2022 1:24 PM

## 2022-12-02 NOTE — CONSULTS
PATIENT IS SEEN AT THE REQUEST OF DR. Earnest Hercules for influnza, severe COPD    HISTORY OF PRESENT ILLNESS:  This is a 81 y.o. male who presented to the ED on 12/1 with a CC of SOB for the past 2-3 days. Per ED notes his symptoms started 3 weeks ago with SOB and productive cough. Symptoms worsened and he presented. Tested positive for influenza . He started to get sick before Thanksgiving. He failed two rounds of prednisone. He was then given doxycycline which seemed to help for a few days then he got worse. Got to the point were he could not walk across the room without being SOB. Then found out about the flu. Has oxygen at home but rarely uses it. On prn inhalers. Has a CPAP machine that he cannot sleep without. Slightly better today     Established Pulmonologist:  Vince     PAST MEDICAL HISTORY:  Past Medical History:   Diagnosis Date    Aspergillus pneumonia ([facility] [address]     Basal cell carcinoma of skin 06/16/2017    right cheek    COPD (chronic obstructive pulmonary disease) (HCC)     DNR (do not resuscitate)     no intubation or chest compressions    Hyperlipidemia     Hypertension 10/2012    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/04/2018    Skin cancer 2014    both upper thighs    Squamous cell cancer of skin of nose 5/2016       PAST SURGICAL HISTORY:  Past Surgical History:   Procedure Laterality Date    BRONCHOSCOPY N/A 7/25/2019    BRONCHOSCOPY WITH BRONCHOALVEOLAR LAVAGE performed by Bess Pisano MD at [facility] Bilateral     5/2016    COLONOSCOPY  08/04/2016    dr Olegario Welch    3 years    SKIN BIOPSY  5/2016    Nose     TONSILLECTOMY AND ADENOIDECTOMY      TOOTH EXTRACTION  5/2016    UPPER GASTROINTESTINAL ENDOSCOPY  12/2/2015    dr Yancey Estimable       FAMILY HISTORY:  family history includes Coronary Art Dis in his mother; Diabetes in his mother. SOCIAL HISTORY:   reports that he quit smoking about 20 years ago. His smoking use included cigarettes.  He has a 45.00 pack-year smoking history. He has never used smokeless tobacco.    Scheduled Meds:   polyethylene glycol  17 g Oral Daily    guaiFENesin  600 mg Oral BID    methylPREDNISolone  40 mg IntraVENous Q8H    ipratropium-albuterol  1 ampule Inhalation Q4H WA    sodium chloride flush  5-40 mL IntraVENous 2 times per day    enoxaparin  40 mg SubCUTAneous Nightly    polyvinyl alcohol  2 drop Both Eyes BID    pantoprazole  40 mg Oral QAM AC    atorvastatin  10 mg Oral Daily    losartan  50 mg Oral Daily    theophylline  400 mg Oral Daily       Continuous Infusions:   sodium chloride         PRN Meds:  sodium chloride flush, sodium chloride, ondansetron **OR** ondansetron, acetaminophen **OR** acetaminophen, albuterol    ALLERGIES:  Patient is allergic to aquaphor [albolene]. REVIEW OF SYSTEMS:  Constitutional: Negative for fever or chills  HENT: Negative for sore throat  Eyes: Negative for redness   Respiratory: Worsening SOB and cough for the past 2 weeks, failed outpatient therapy  Cardiovascular: Negative for chest pain  Gastrointestinal: Negative for vomiting, diarrhea   Genitourinary: Negative for hematuria, negative for dysuria  Musculoskeletal: Negative for arthralgias   Skin: Negative for rash  Neurological: Negative for syncope  Hematological: Negative for adenopathy  Extremities:  Negative for swelling    PHYSICAL EXAM:  Blood pressure 123/64, pulse 80, temperature 97.3 °F (36.3 °C), temperature source Axillary, resp. rate 16, height 5' 7\" (1.702 m), weight 187 lb 13.3 oz (85.2 kg), SpO2 94 %.'  Gen: No distress. Eyes: PERRL. No sclera icterus. No conjunctival injection. ENT: No discharge. Pharynx clear. Neck: Trachea midline. No obvious mass. Resp: Very poor air movement bilaterally   CV: Regular rate. Regular rhythm. No murmur or rub. GI: Non-tender. Non-distended. No hernia. BS present. Skin: Warm and dry. No nodule on exposed extremities. Lymph: No cervical LAD. No supraclavicular LAD.    M/S: No cyanosis. No joint deformity. Neuro: Awake. Alert. Moves all four extremities. EXT:   no edema, no clubbing    LABS:  CBC:   Recent Labs     22  1522   WBC 15.6*   HGB 17.6*   HCT 52.7*   MCV 86.9        BMP:   Recent Labs     22  1522   *   K 4.2   CL 94*   CO2 26   BUN 15   CREATININE 0.7*     LIVER PROFILE:   Recent Labs     22  1522   AST 26   ALT 35   BILITOT 0.4   ALKPHOS 75     PT/INR: No results for input(s): PROTIME, INR in the last 72 hours. APTT: No results for input(s): APTT in the last 72 hours. UA:No results for input(s): NITRITE, COLORU, PHUR, LABCAST, WBCUA, RBCUA, MUCUS, TRICHOMONAS, YEAST, BACTERIA, CLARITYU, SPECGRAV, LEUKOCYTESUR, UROBILINOGEN, BILIRUBINUR, BLOODU, GLUCOSEU, AMORPHOUS in the last 72 hours. Invalid input(s): KETONESU  No results for input(s): PHART, PMS7NFF, PO2ART in the last 72 hours. Cultures:   None    PFTs:     Severe obstruction     ECHO:    Normal left ventricle size and systolic function with an estimated ejection   fraction of 55-60%. Aortic valve not well visualized. Calcified aortic valve with reduced leaflet excursion. The pulmonic valve is not well visualized. Trace pericardial effusion anteriorly. Definity contrast agent was used to help visualize endocardial borders. VB.39    Chest X-ray:  Chest imaging was reviewed by me and showed mild hyperinflation     I reviewed all the above labs and studies pertaining to this visit. ASSESSMENT/PLAN:  Chronic Hypoxic Respiratory Failure  Titrate oxygen for saturations greater than or equal to 90%  Has home oxygen already which he rarely uses   ZAHEER  Home machine is in the room and I encourage him to keep using it.   May need oxygen bleed in depending on saturations   COPD exacerbation   Continue with IV solumedrol  Continue with Duonebs  Add Dulera q 12 hours  Add Saline nebs  Check sputum  Check procal  Levaquin for 5 days  Influenza A  Tamiflu     DVT prophylaxis  Lovenox       Zahra Contreras, DO  [facility]

## 2022-12-02 NOTE — PLAN OF CARE
Problem: Discharge Planning  Goal: Discharge to home or other facility with appropriate resources  12/2/2022 1247 by Alex Wilson RN  Outcome: Progressing  12/2/2022 0420 by Sommer Benitez RN  Outcome: Progressing     Problem: Safety - Adult  Goal: Free from fall injury  12/2/2022 1247 by Alex Wilson RN  Outcome: Progressing  12/2/2022 0420 by Sommer Benitez RN  Outcome: Progressing     Problem: Pain  Goal: Verbalizes/displays adequate comfort level or baseline comfort level  12/2/2022 1247 by Alex Wilson RN  Outcome: Progressing  12/2/2022 0420 by Sommer Benitez RN  Outcome: Progressing

## 2022-12-02 NOTE — PROGRESS NOTES
[facility] Progress Note  12/2/2022 7:59 AM  Subjective:   Admit Date: 12/1/2022      Chief Complaint: I feel a little better     Interval History: Inlf A positive   Still coughing--wheeze persists   No chest pain     Diet: ADULT DIET; Regular  Medications:   Scheduled Meds:   sodium chloride flush  5-40 mL IntraVENous 2 times per day    enoxaparin  40 mg SubCUTAneous Nightly    polyvinyl alcohol  2 drop Both Eyes BID    pantoprazole  40 mg Oral QAM AC    atorvastatin  10 mg Oral Daily    losartan  50 mg Oral Daily    theophylline  400 mg Oral Daily     Continuous Infusions:   sodium chloride         Review of Systems -   General ROS: afebrile  Respiratory ROS: positive for - cough, shortness of breath, and wheezing  Cardiovascular ROS: no chest pain  Musculoskeletal ROS:positive for - :joint pain  Neurological ROS: no TIA or stroke symptoms    Objective:   Vitals: /64   Pulse 80   Temp 97.3 °F (36.3 °C) (Axillary)   Resp 16   Ht 5' 7\" (1.702 m)   Wt 187 lb 13.3 oz (85.2 kg)   SpO2 94%   BMI 29.42 kg/m²   General appearance: alert and cooperative with exam  HEENT: Head: Normocephalic, no lesions, without obvious abnormality.   Neck: no adenopathy, no carotid bruit, no JVD, supple, symmetrical, trachea midline, and thyroid not enlarged, symmetric, no tenderness/mass/nodules  Lungs: expir wheezeing --1= rhonchi   Heart: regular rate and rhythm, S1, S2 normal, no murmur, click, rub or gallop  Abdomen: soft, non-tender; bowel sounds normal; no masses,  no organomegaly  Extremities: extremities normal, atraumatic, no cyanosis or edema  Neurologic: Mental status: Alert, oriented, thought content appropriate    Admission on 12/01/2022   Component Date Value Ref Range Status    Ventricular Rate 12/01/2022 107  BPM Final    Atrial Rate 12/01/2022 107  BPM Final    P-R Interval 12/01/2022 124  ms Final    QRS Duration 12/01/2022 84  ms Final    Q-T Interval 12/01/2022 312  ms Final    QTc Calculation (Bazett) 12/01/2022 416  ms Final    P Axis 12/01/2022 76  degrees Final    R Axis 12/01/2022 98  degrees Final    T Axis 12/01/2022 41  degrees Final    Diagnosis 12/01/2022    Final                    Value:Sinus tachycardiaBaseline artifactPoor R wave progression likely from lead position [id number] axisBorderline ECGWhen compared with ECG of 10/20/2021Poor data quality , butNo significant change likelyConfirmed by [facility] DUDLEY CUI ([id number]) on 12/1/2022 4:16:12 PM      WBC 12/01/2022 15.6 (A)  4.0 - 11.0 K/uL Final    RBC 12/01/2022 6.07 (A)  4.20 - 5.90 M/uL Final    Hemoglobin 12/01/2022 17.6 (A)  13.5 - 17.5 g/dL Final    Hematocrit 12/01/2022 52.7 (A)  40.5 - 52.5 % Final    MCV 12/01/2022 86.9  80.0 - 100.0 fL Final    MCH 12/01/2022 29.0  26.0 - 34.0 pg Final    MCHC 12/01/2022 33.3  31.0 - 36.0 g/dL Final    RDW 12/01/2022 15.5 (A)  12.4 - 15.4 % Final    Platelets 50/72/7989 303  135 - 450 K/uL Final    MPV 12/01/2022 6.9  5.0 - 10.5 fL Final    Neutrophils % 12/01/2022 88.8  % Final    Lymphocytes % 12/01/2022 3.2  % Final    Monocytes % 12/01/2022 7.9  % Final    Eosinophils % 12/01/2022 0.0  % Final    Basophils % 12/01/2022 0.1  % Final    Neutrophils Absolute 12/01/2022 13.8 (A)  1.7 - 7.7 K/uL Final    Lymphocytes Absolute 12/01/2022 0.5 (A)  1.0 - 5.1 K/uL Final    Monocytes Absolute 12/01/2022 1.2  0.0 - 1.3 K/uL Final    Eosinophils Absolute 12/01/2022 0.0  0.0 - 0.6 K/uL Final    Basophils Absolute 12/01/2022 0.0  0.0 - 0.2 K/uL Final    Pro-BNP 12/01/2022 113  0 - 449 pg/mL Final    Comment: Methodology by NT-proBNP    An age-independent cutoff point of 300 pg/ml has a 98%  negative predictive value excluding acute heart failure. Values exceeding the age-related cutoff values (450 pg/mL if  age<50, 900 if 50-75 and 1800 if >75) has 90% sensitivity and  84% specificity for diagnosing acute HF.  In patients with  renal compromise (eGFR<60) values greater than 1200pg/ml have  a diagnostic sensitivity and specificity of 89% and 72% for  acute HF. Troponin 12/01/2022 <0.01  <0.01 ng/mL Final    Methodology by Troponin T    Sodium 12/01/2022 132 (A)  136 - 145 mmol/L Final    Potassium reflex Magnesium 12/01/2022 4.2  3.5 - 5.1 mmol/L Final    Chloride 12/01/2022 94 (A)  99 - 110 mmol/L Final    CO2 12/01/2022 26  21 - 32 mmol/L Final    Anion Gap 12/01/2022 12  3 - 16 Final    Glucose 12/01/2022 128 (A)  70 - 99 mg/dL Final    BUN 12/01/2022 15  7 - 20 mg/dL Final    Creatinine 12/01/2022 0.7 (A)  0.8 - 1.3 mg/dL Final    Est, Glom Filt Rate 12/01/2022 >60  >60 Final    Comment: Pediatric calculator link  [facility].at. org/professionals/kdoqi/gfr_calculatorped  Effective Oct 3, 2022  These results are not intended for use in patients  <25years of age. eGFR results are calculated without  a race factor using the 2021 CKD-EPI equation. Careful  clinical correlation is recommended, particularly when  comparing to results calculated using previous equations. The CKD-EPI equation is less accurate in patients with  extremes of muscle mass, extra-renal metabolism of  creatinine, excessive creatinine ingestion, or following  therapy that affects renal tubular secretion.       Calcium 12/01/2022 9.7  8.3 - 10.6 mg/dL Final    Total Protein 12/01/2022 6.9  6.4 - 8.2 g/dL Final    Albumin 12/01/2022 3.6  3.4 - 5.0 g/dL Final    Albumin/Globulin Ratio 12/01/2022 1.1  1.1 - 2.2 Final    Total Bilirubin 12/01/2022 0.4  0.0 - 1.0 mg/dL Final    Alkaline Phosphatase 12/01/2022 75  40 - 129 U/L Final    ALT 12/01/2022 35  10 - 40 U/L Final    AST 12/01/2022 26  15 - 37 U/L Final    pH, Seb 12/01/2022 7.394  7.350 - 7.450 Final    pCO2, Seb 12/01/2022 49.8  40.0 - 50.0 mmHg Final    pO2, Seb 12/01/2022 37  Not Established mmHg Final    HCO3, Venous 12/01/2022 30 (A)  23 - 29 mmol/L Final    Base Excess, Seb 12/01/2022 4.0  Not Established mmol/L Final    O2 Sat, Seb 12/01/2022 71  Not Established % Final Carboxyhemoglobin 12/01/2022 1.3  % Final    Comment:      0.0-1.5  (Smokers 1.5-5.0)      MetHgb, Seb 12/01/2022 0.3  <1.5 % Final    TC02 (Calc), Seb 12/01/2022 32  Not Established mmol/L Final    O2 Therapy 12/01/2022 Unknown   Final    Rapid Influenza A Ag 12/01/2022 POSITIVE (A)  Negative Final    Rapid Influenza B Ag 12/01/2022 Negative  Negative Final    SARS-CoV-2, NAAT 12/01/2022 Not Detected  Not Detected Final    Comment: Rapid NAAT:   Negative results should be treated as presumptive and,  if inconsistent with clinical signs and symptoms or necessary for  patient management, should be tested with an alternative molecular  assay. Negative results do not preclude SARS-CoV-2 infection and  should not be used as the sole basis for patient management decisions. This test has been authorized by the FDA under an Emergency Use  Authorization (EUA) for use by authorized laboratories. Fact sheet for Healthcare Providers:  http://www.marck.erasto/  Fact sheet for Patients: http://www.marck.erasto/    METHODOLOGY: Isothermal Nucleic Acid Amplification           Assessment & Plan:   Principal Problem:    Influenza A--cont tamilfu --add steroids--persisting wheeze and decrr BS   Active Problems:    Hypoxemia--O2     COPD, very severe ([address]    Acute respiratory failure with hypoxia ([address]    DNR (do not resuscitate)    Essential hypertension--Continue current therapy    Resolved Problems:    * No resolved hospital problems.  *  Add solumedrol--ask pulm to see --cont tamiflu and HHN   Please note that over 35 minutes was spent in evaluating the patient, review of records and results, discussion with staff/family, etc.    Elisabeth Mejia MD

## 2022-12-02 NOTE — H&P
Admit H&P dictated--IMP: Principal Problem:    Influenza A--resp insuffic and hypoxemia--tamiflu and resp support   Active Problems:    Hypoxemia--O2 to keep sat >90%--on home O2 at 2 lpm     COPD, very severe (HCC)--follows with dr Camron Stewart     Acute respiratory failure with hypoxia ([facility] [address]    Essential hypertension--Continue current therapy    BLK-PV-I--no cpr and no intubation   Resolved Problems:    * No resolved hospital problems.  *  Admit--O2/Tamiflu/HHN/solumedrol/pulm consult--sees dr Camron Norton MD

## 2022-12-03 NOTE — PLAN OF CARE
Problem: Discharge Planning  Goal: Discharge to home or other facility with appropriate resources  12/3/2022 1447 by Trixie Novak RN  Outcome: Progressing  Flowsheets (Taken 12/3/2022 0800)  Discharge to home or other facility with appropriate resources:   Identify barriers to discharge with patient and caregiver   Arrange for needed discharge resources and transportation as appropriate   Identify discharge learning needs (meds, wound care, etc)     Problem: Safety - Adult  Goal: Free from fall injury  12/3/2022 1447 by Trixie Novak RN  Outcome: Progressing  Flowsheets (Taken 12/3/2022 0800)  Free From Fall Injury: Instruct family/caregiver on patient safety     Problem: Pain  Goal: Verbalizes/displays adequate comfort level or baseline comfort level  12/3/2022 1447 by Trixie Novak RN  Outcome: Progressing  Flowsheets (Taken 12/3/2022 0745)  Verbalizes/displays adequate comfort level or baseline comfort level: Encourage patient to monitor pain and request assistance     Problem: ABCDS Injury Assessment  Goal: Absence of physical injury  12/3/2022 1447 by Trixie Novak RN  Outcome: Progressing  Flowsheets (Taken 12/3/2022 0800)  Absence of Physical Injury: Implement safety measures based on patient assessment

## 2022-12-03 NOTE — PROGRESS NOTES
PT voices concern regarding his increased work of breathing and continued use of oxygen. When asked how he normally handles his oxygen when he is sick he states \"well, I normally end up in the hospital\". Oxygenation remains in the high 90's on pt's normal 2L O2. His CPAP was connected to oxygen and is ready for use. Pt declines help at this time and states that he is still able to care for himself. This RN instructed the pt to call out for help any time of the night if he feels that he is too weak or his breathing becomes more worrisome to him. He verbalized understanding. He is currently in his chair watching t.v. Call light is within reach and all personal items are available.

## 2022-12-03 NOTE — PROGRESS NOTES
Name: Armando Salguero  /Age/Sex:  (81 y.o. male)   MRN & CSN: [id number] & [id number]  Admission Date/Time: 2022  2:09 PM   Location: [address] [place] [address] Day: Hospital Day: 3   Principal Problem: Influenza A  HPI     Patient seen and examined. No issues overnight. Patient still with some wheezing today. Does have some cough. No fever or chills. Discussed with Dr. Xochilt Acosta    All other review of systems negative unless noted above. VITALS    Vitals:    22 1150   BP: (!) 149/65   Pulse: (!) 106   Resp: 18   Temp: 97.5 °F (36.4 °C)   SpO2: 96%         PHYSICAL EXAM     GENERAL:  No acute distress, NC  HEENT:  Head is normocephalic, atraumatic. Pupils equal, round, and  reactive to light and accommodation. Extraocular muscles intact. NECK:  Supple without JVD. CHEST:  Showed pressured breathing. He had diminished breath sounds,  diffuse expiratory wheezing throughout. No rales were noted. Some  rhonchi over the anterior chest.  CARDIOVASCULAR:  Tachycardia, regular rhythm, no murmur   ABDOMEN:  Soft, nontender without mass, organomegaly. EXTREMITIES:  Showed trace of edema. NEUROLOGIC:  Alert, conversant, moving all extremities without focal  neurologic deficits. LABS   BMP  Recent Labs     22  1522 22  0658   * 136   K 4.2 4.8   CL 94* 99   CO2 26 27   BUN 15 17   CREATININE 0.7* 0.6*   CALCIUM 9.7 9.2     CBC/COAGS  Recent Labs     22  1522 22  0658   WBC 15.6* 16.3*   HCT 52.7* 48.1    298     Liver & Pancreas  Recent Labs     22  1522   AST 26   ALT 35   ALKPHOS 75          IMAGING   Impression   Asymmetric ground-glass opacification at the right lung base may represent a   small pleural effusion or developing pneumonitis.       Above studies and images were personally reviewed by myself    MEDS   Scheduled Meds:   polyethylene glycol  17 g Oral Daily    guaiFENesin  600 mg Oral BID    methylPREDNISolone  40 mg

## 2022-12-03 NOTE — PROGRESS NOTES
Pulmonary Critical Care Progress Note     Patient's name:  [address] Record Number: [id number]  Patient's account/billing number: [de-identified]  Patient's YOB: 1940  Age: 81 y.o. Date of Admission: 12/1/2022  2:09 PM  Date of Consult: 12/3/2022      Primary Care Physician: Ernesto Sawyer MD      Code Status: Full Code    Chief complaint: COPD with acute exacerbation    Assessment and Plan     COPD with acute exacerbation  Acute on Chronic respiratory with hypoxia  Influenza A infection PNA  Obstructive sleep apnea      Plan:  Tamiflu x5 days. Levaquin for 5 days. IV steroid. Bronchodilators DuoNeb and Dulera. DVT prophylaxis. Titrate oxygen to keep sats more than 90%. Encourage to use bipap intermittently during the day and continuous @ night, monitor respiratory status closely high risk for decompensation       Overnight:  Wrosening sob overnight      REVIEW OF SYSTEMS:  Review of Systems -   General ROS: Generalized fatigue  Psychological ROS: negative  Ophthalmic ROS: negative  ENT ROS: negative  Allergy and Immunology ROS: negative  Hematological and Lymphatic ROS: negative  Endocrine ROS: negative  Breast ROS: negative  Respiratory ROS: Cough, shortness of breath cardiovascular ROS: no chest pain or dyspnea on exertion  Gastrointestinal ROS:negative  Genito-Urinary ROS: negative  Musculoskeletal ROS: negative  Neurological ROS: negative  Dermatological ROS: negative        Physical Exam:    Vitals: BP (!) 154/82   Pulse 86   Temp 97.6 °F (36.4 °C) (Oral)   Resp 18   Ht 5' 7\" (1.702 m)   Wt 187 lb 9.8 oz (85.1 kg)   SpO2 94%   BMI 29.38 kg/m²     Last Body weight:   Wt Readings from Last 3 Encounters:   12/03/22 187 lb 9.8 oz (85.1 kg)   11/21/22 186 lb 9.6 oz (84.6 kg)   11/14/22 185 lb (83.9 kg)       Body Mass Index : Body mass index is 29.38 kg/m².       Intake and Output summary:   Intake/Output Summary (Last 24 hours) at 12/3/2022 1554  Last data filed at 12/3/2022 0800  Gross per 24 hour   Intake 950 ml   Output 300 ml   Net 650 ml       Physical Examination:     Gen:  moderate acute distress. Eyes: PERRL. Anicteric sclera. No conjunctival injection. ENT: No discharge. Posterior oropharynx clear. External appearance of ears and nose normal.  Neck: Trachea midline. No mass   Resp: Severely diminished bilaterally prolonged expiratory phase  CV: Regular rate. Regular rhythm. No murmur or rub. No edema. GI: Soft, Non-tender. Non-distended. +BS  Skin: Warm, dry, w/o erythema. Lymph: No cervical or supraclavicular LAD. M/S: No cyanosis. No clubbing. Neuro:  CN 2-12 tested, no focal neurologic deficit. Moves all extremities  Psych: Awake and alert, Oriented x 3. Judgement and insight appropriate. Mood stable. Laboratory findings:-    CBC:   Recent Labs     12/03/22  0658   WBC 16.3*   HGB 15.6        BMP:    Recent Labs     12/01/22  1522 12/03/22  0658   * 136   K 4.2 4.8   CL 94* 99   CO2 26 27   BUN 15 17   CREATININE 0.7* 0.6*   GLUCOSE 128* 209*     S. Calcium:  Recent Labs     12/03/22  0658   CALCIUM 9.2         Radiology Review:  Pertinent images / reports were reviewed as a part of this visit.      CXR reviewed           Мария Zhu MD, M.D.            12/3/2022, 11:47 AM

## 2022-12-04 NOTE — PLAN OF CARE
Problem: Discharge Planning  Goal: Discharge to home or other facility with appropriate resources  12/4/2022 1339 by Adan Lynch RN  Outcome: Progressing  Flowsheets (Taken 12/4/2022 0840)  Discharge to home or other facility with appropriate resources:   Identify barriers to discharge with patient and caregiver   Arrange for needed discharge resources and transportation as appropriate   Identify discharge learning needs (meds, wound care, etc)     Problem: Safety - Adult  Goal: Free from fall injury  12/4/2022 1339 by Adan Lynch RN  Outcome: Progressing  Flowsheets (Taken 12/4/2022 0840)  Free From Fall Injury: Instruct family/caregiver on patient safety     Problem: Pain  Goal: Verbalizes/displays adequate comfort level or baseline comfort level  12/4/2022 1339 by Adan Lynch RN  Outcome: Progressing       Problem: ABCDS Injury Assessment  Goal: Absence of physical injury  12/4/2022 1339 by Adan Lynch RN  Outcome: Progressing  Flowsheets (Taken 12/4/2022 0840)  Absence of Physical Injury: Implement safety measures based on patient assessment     Problem: Respiratory - Adult  Goal: Achieves optimal ventilation and oxygenation  Outcome: Progressing

## 2022-12-04 NOTE — PROGRESS NOTES
Pulmonary Critical Care Progress Note     Patient's name:  [address] Record Number: [id number]  Patient's account/billing number: [de-identified]  Patient's YOB: 1940  Age: 81 y.o. Date of Admission: 12/1/2022  2:09 PM  Date of Consult: 12/4/2022      Primary Care Physician: Leatha Abdul MD      Code Status: Full Code    Chief complaint: COPD with acute exacerbation    Assessment and Plan     COPD with acute exacerbation  Acute on Chronic respiratory with hypoxia  Influenza A infection PNA  Obstructive sleep apnea      Plan:  Tamiflu x5 days. Levaquin for 5 days. IV steroid. Bronchodilators DuoNeb and Dulera. DVT prophylaxis. Titrate oxygen to keep sats more than 90%. Encourage to use bipap intermittently during the day and continuous @ night, monitor respiratory status closely high risk for decompensation       Overnight:  No acute events overnight  Still with severe sob/wheezing       REVIEW OF SYSTEMS:  Review of Systems -   General ROS: Generalized fatigue  Psychological ROS: negative  Ophthalmic ROS: negative  ENT ROS: negative  Allergy and Immunology ROS: negative  Hematological and Lymphatic ROS: negative  Endocrine ROS: negative  Breast ROS: negative  Respiratory ROS: Cough, shortness of breath cardiovascular ROS: no chest pain or dyspnea on exertion  Gastrointestinal ROS:negative  Genito-Urinary ROS: negative  Musculoskeletal ROS: negative  Neurological ROS: negative  Dermatological ROS: negative        Physical Exam:    Vitals: BP (!) 141/70   Pulse (!) 111   Temp 97.5 °F (36.4 °C) (Oral)   Resp 18   Ht 5' 7\" (1.702 m)   Wt 189 lb 9.5 oz (86 kg)   SpO2 96%   BMI 29.69 kg/m²     Last Body weight:   Wt Readings from Last 3 Encounters:   12/04/22 189 lb 9.5 oz (86 kg)   11/21/22 186 lb 9.6 oz (84.6 kg)   11/14/22 185 lb (83.9 kg)       Body Mass Index : Body mass index is 29.69 kg/m².       Intake and Output summary:   Intake/Output Summary (Last 24 hours) at 12/4/2022 1225  Last data filed at 12/3/2022 2114  Gross per 24 hour   Intake --   Output 500 ml   Net -500 ml       Physical Examination:     Gen:  moderate acute distress. Eyes: PERRL. Anicteric sclera. No conjunctival injection. ENT: No discharge. Posterior oropharynx clear. External appearance of ears and nose normal.  Neck: Trachea midline. No mass   Resp: Severely diminished bilaterally prolonged expiratory phase  CV: Regular rate. Regular rhythm. No murmur or rub. No edema. GI: Soft, Non-tender. Non-distended. +BS  Skin: Warm, dry, w/o erythema. Lymph: No cervical or supraclavicular LAD. M/S: No cyanosis. No clubbing. Neuro:  CN 2-12 tested, no focal neurologic deficit. Moves all extremities  Psych: Awake and alert, Oriented x 3. Judgement and insight appropriate. Mood stable. Laboratory findings:-    CBC:   Recent Labs     12/03/22  0658   WBC 16.3*   HGB 15.6        BMP:    Recent Labs     12/01/22  1522 12/03/22  0658   * 136   K 4.2 4.8   CL 94* 99   CO2 26 27   BUN 15 17   CREATININE 0.7* 0.6*   GLUCOSE 128* 209*     S. Calcium:  Recent Labs     12/03/22  0658   CALCIUM 9.2         Radiology Review:  Pertinent images / reports were reviewed as a part of this visit.      CXR reviewed           Mahi Carey MD, MDARLING.            12/4/2022, 12:25 PM

## 2022-12-04 NOTE — PROGRESS NOTES
Name: Mitchell Rios  /Age/Sex: [birth year] (81 y.o. male)   MRN & CSN: [id number] & [id number]  Admission Date/Time: 2022  2:09 PM   Location: [address] [place] [address] Day: Hospital Day: 4   Principal Problem: Influenza A  HPI     Patient seen and examined. No issues overnight. Patient still with some wheezing today. Does have some cough. No fever or chills. Feels like he is improving in general        All other review of systems negative unless noted above. VITALS    Vitals:    22 0930   BP:    Pulse: (!) 105   Resp: 18   Temp:    SpO2: 92%         PHYSICAL EXAM     GENERAL:  No acute distress, NC  HEENT:  Head is normocephalic, atraumatic. Pupils equal, round, and  reactive to light and accommodation. Extraocular muscles intact. NECK:  Supple without JVD. CHEST: insp and exp wheeze no accessory muscles still on 4L  CARDIOVASCULAR:  Tachycardia, regular rhythm, no murmur   ABDOMEN:  Soft, nontender without mass, organomegaly. EXTREMITIES:  Showed trace of edema. NEUROLOGIC:  Alert, conversant, moving all extremities without focal  neurologic deficits. LABS   BMP  Recent Labs     22  1522 22  0658   * 136   K 4.2 4.8   CL 94* 99   CO2 26 27   BUN 15 17   CREATININE 0.7* 0.6*   CALCIUM 9.7 9.2       CBC/COAGS  Recent Labs     22  1522 22  0658   WBC 15.6* 16.3*   HCT 52.7* 48.1    298       Liver & Pancreas  Recent Labs     22  1522   AST 26   ALT 35   ALKPHOS 75            IMAGING   Impression   Asymmetric ground-glass opacification at the right lung base may represent a   small pleural effusion or developing pneumonitis.       Above studies and images were personally reviewed by myself    MEDS   Scheduled Meds:   polyethylene glycol  17 g Oral Daily    guaiFENesin  600 mg Oral BID    methylPREDNISolone  40 mg IntraVENous Q8H    ipratropium-albuterol  1 ampule Inhalation Q4H WA    theophylline  200 mg Oral BID    mometasone-formoterol 2 puff Inhalation BID    sodium chloride (Inhalant)  15 mL Nebulization 3 times per day    levoFLOXacin  500 mg Oral Daily    sodium chloride flush  5-40 mL IntraVENous 2 times per day    enoxaparin  40 mg SubCUTAneous Nightly    polyvinyl alcohol  2 drop Both Eyes BID    pantoprazole  40 mg Oral QAM AC    atorvastatin  10 mg Oral Daily    losartan  50 mg Oral Daily     Continuous Infusions:   sodium chloride       PRN Meds:sodium chloride flush, sodium chloride, ondansetron **OR** ondansetron, acetaminophen **OR** acetaminophen, albuterol     CURRENT HOSPITAL PROBLEMS   Principal Problem:    Influenza A  - tamiflu  Active Problems:    Essential hypertension  - Losartan    Hypoxemia    Chronic respiratory failure with hypoxia (HCC)    COPD exacerbation (HCC)  - Solumedrol 40 mg q8h  - cont home therapy  - duonebs  - levaquin for 5 days    COPD, very severe ([address]    Acute respiratory failure with hypoxia ([address]    DNR (do not resuscitate)  GERD  - Protonix  HLD  - lipitor     Prophylaxis: PPI, lovenox      FRANK MARKS MD 12/4/2022 11:00 AM

## 2022-12-05 NOTE — ACP (ADVANCE CARE PLANNING)
Advance Care Planning     Advance Care Planning Activator (Inpatient)  Conversation Note      Date of ACP Conversation: 12/5/2022     Conversation Conducted with: Patient with Decision Making Capacity    ACP Activator: Roxane Hopkins RN    Health Care Decision Maker:     Current Designated Health Care Decision Maker:     Primary Decision Maker: Inderjit Elizabeth Spouse - [phone]    Care Preferences    Ventilation: \"If you were in your present state of health and suddenly became very ill and were unable to breathe on your own, what would your preference be about the use of a ventilator (breathing machine) if it were available to you? \"      Would the patient desire the use of ventilator (breathing machine)?: yes    \"If your health worsens and it becomes clear that your chance of recovery is unlikely, what would your preference be about the use of a ventilator (breathing machine) if it were available to you? \"     Would the patient desire the use of ventilator (breathing machine)?: Yes      Resuscitation  \"CPR works best to restart the heart when there is a sudden event, like a heart attack, in someone who is otherwise healthy. Unfortunately, CPR does not typically restart the heart for people who have serious health conditions or who are very sick. \"    \"In the event your heart stopped as a result of an underlying serious health condition, would you want attempts to be made to restart your heart (answer \"yes\" for attempt to resuscitate) or would you prefer a natural death (answer \"no\" for do not attempt to resuscitate)? \" yes       [] Yes   [x] No   Educated Patient / Rosaura Alpers regarding differences between Advance Directives and portable DNR orders.     Length of ACP Conversation in minutes:  5    Conversation Outcomes:  [x] ACP discussion completed  [] Existing advance directive reviewed with patient; no changes to patient's previously recorded wishes  [] New Advance Directive completed  [] Portable Do Not Rescitate prepared for Provider review and signature  [] POLST/POST/MOLST/MOST prepared for Provider review and signature      Follow-up plan:    [] Schedule follow-up conversation to continue planning  [] Referred individual to Provider for additional questions/concerns   [] Advised patient/agent/surrogate to review completed ACP document and update if needed with changes in condition, patient preferences or care setting    [] This note routed to one or more involved healthcare providers    Demar MATA RN  Case Management  [phone]    Electronically signed by Demar Moody RN on 12/5/2022 at 5:19 PM

## 2022-12-05 NOTE — CARE COORDINATION
INITIAL CASE MANAGEMENT ASSESSMENT    Met with patient to assess possible discharge needs. Explained Case Management role/services. Living Situation: Lives with his spouse in a condominium with 1 [address]. ADLs: Independent     DME: Nebulizer machine, oxygen at 2-3l/min/NC, supplied by [employer]. Has a CPAP/BIPAP machine. PT/OT Recs: No recommendations at this time. Active Services: N/A     Transportation: Active , spouse will provide transportation at discharge. Medications: [facility]    PCP: Zechariah Womack MD      HD/PD: N/A    PLAN/COMMENTS: Discharge plan is to return to home at discharge with his spouse. No needs identified at this time. Provided contact information for patient or family to call with any questions. Will follow and assist as needed.     Theo MATA RN  Case Management  [phone]    Electronically signed by Theo Yoder RN on 12/5/2022 at 5:14 PM

## 2022-12-05 NOTE — PROGRESS NOTES
Lani GRIM[facility] Progress Note  12/5/2022 7:31 AM  Subjective:   Admit Date: 12/1/2022      Chief Complaint: I feel a little better     Interval History: Now on Levaquin ans solumedrol  Sl less wheeze--cough persists   CXR ? ? Infiltrate       Diet: ADULT DIET; Regular  Medications:   Scheduled Meds:   polyethylene glycol  17 g Oral Daily    guaiFENesin  600 mg Oral BID    methylPREDNISolone  40 mg IntraVENous Q8H    ipratropium-albuterol  1 ampule Inhalation Q4H WA    theophylline  200 mg Oral BID    mometasone-formoterol  2 puff Inhalation BID    sodium chloride (Inhalant)  15 mL Nebulization 3 times per day    levoFLOXacin  500 mg Oral Daily    sodium chloride flush  5-40 mL IntraVENous 2 times per day    enoxaparin  40 mg SubCUTAneous Nightly    polyvinyl alcohol  2 drop Both Eyes BID    pantoprazole  40 mg Oral QAM AC    atorvastatin  10 mg Oral Daily    losartan  50 mg Oral Daily     Continuous Infusions:   sodium chloride         Review of Systems -   General ROS: afebrile  Respiratory ROS: positive for - cough and shortness of breath  Cardiovascular ROS: no chest pain  Musculoskeletal ROS:positive for - low back pain   Neurological ROS: no TIA or stroke symptoms    Objective:   Vitals: /75   Pulse (!) 102   Temp 97.6 °F (36.4 °C) (Axillary)   Resp 18   Ht 5' 7\" (1.702 m)   Wt 192 lb 0.3 oz (87.1 kg)   SpO2 94%   BMI 30.07 kg/m²   General appearance: alert and cooperative with exam  HEENT: Head: Normocephalic, no lesions, without obvious abnormality.   Neck: no adenopathy, no carotid bruit, no JVD, supple, symmetrical, trachea midline, and thyroid not enlarged, symmetric, no tenderness/mass/nodules  Lungs: rhonchi bilaterally  Heart: regular rate and rhythm, S1, S2 normal, no murmur, click, rub or gallop  Abdomen: soft, non-tender; bowel sounds normal; no masses,  no organomegaly  Extremities: tr edema   Neurologic: Mental status: Alert, oriented, thought content appropriate    Admission on 12/01/2022 Component Date Value Ref Range Status    Ventricular Rate 12/01/2022 107  BPM Final    Atrial Rate 12/01/2022 107  BPM Final    P-R Interval 12/01/2022 124  ms Final    QRS Duration 12/01/2022 84  ms Final    Q-T Interval 12/01/2022 312  ms Final    QTc Calculation (Bazett) 12/01/2022 416  ms Final    P Axis 12/01/2022 76  degrees Final    R Axis 12/01/2022 98  degrees Final    T Axis 12/01/2022 41  degrees Final    Diagnosis 12/01/2022    Final                    Value:Sinus tachycardiaBaseline artifactPoor R wave progression likely from lead position [id number] axisBorderline ECGWhen compared with ECG of 10/20/2021Poor data quality , butNo significant change likelyConfirmed by [facility] Fabiana CUI ([id number]) on 12/1/2022 4:16:12 PM      WBC 12/01/2022 15.6 (A)  4.0 - 11.0 K/uL Final    RBC 12/01/2022 6.07 (A)  4.20 - 5.90 M/uL Final    Hemoglobin 12/01/2022 17.6 (A)  13.5 - 17.5 g/dL Final    Hematocrit 12/01/2022 52.7 (A)  40.5 - 52.5 % Final    MCV 12/01/2022 86.9  80.0 - 100.0 fL Final    MCH 12/01/2022 29.0  26.0 - 34.0 pg Final    MCHC 12/01/2022 33.3  31.0 - 36.0 g/dL Final    RDW 12/01/2022 15.5 (A)  12.4 - 15.4 % Final    Platelets 50/79/7655 303  135 - 450 K/uL Final    MPV 12/01/2022 6.9  5.0 - 10.5 fL Final    Neutrophils % 12/01/2022 88.8  % Final    Lymphocytes % 12/01/2022 3.2  % Final    Monocytes % 12/01/2022 7.9  % Final    Eosinophils % 12/01/2022 0.0  % Final    Basophils % 12/01/2022 0.1  % Final    Neutrophils Absolute 12/01/2022 13.8 (A)  1.7 - 7.7 K/uL Final    Lymphocytes Absolute 12/01/2022 0.5 (A)  1.0 - 5.1 K/uL Final    Monocytes Absolute 12/01/2022 1.2  0.0 - 1.3 K/uL Final    Eosinophils Absolute 12/01/2022 0.0  0.0 - 0.6 K/uL Final    Basophils Absolute 12/01/2022 0.0  0.0 - 0.2 K/uL Final    Pro-BNP 12/01/2022 113  0 - 449 pg/mL Final    Comment: Methodology by NT-proBNP    An age-independent cutoff point of 300 pg/ml has a 98%  negative predictive value excluding acute heart failure. Values exceeding the age-related cutoff values (450 pg/mL if  age<50, 900 if 50-75 and 1800 if >75) has 90% sensitivity and  84% specificity for diagnosing acute HF. In patients with  renal compromise (eGFR<60) values greater than 1200pg/ml have  a diagnostic sensitivity and specificity of 89% and 72% for  acute HF. Troponin 12/01/2022 <0.01  <0.01 ng/mL Final    Methodology by Troponin T    Sodium 12/01/2022 132 (A)  136 - 145 mmol/L Final    Potassium reflex Magnesium 12/01/2022 4.2  3.5 - 5.1 mmol/L Final    Chloride 12/01/2022 94 (A)  99 - 110 mmol/L Final    CO2 12/01/2022 26  21 - 32 mmol/L Final    Anion Gap 12/01/2022 12  3 - 16 Final    Glucose 12/01/2022 128 (A)  70 - 99 mg/dL Final    BUN 12/01/2022 15  7 - 20 mg/dL Final    Creatinine 12/01/2022 0.7 (A)  0.8 - 1.3 mg/dL Final    Est, Glom Filt Rate 12/01/2022 >60  >60 Final    Comment: Pediatric calculator link  [facility].at. org/professionals/kdoqi/gfr_calculatorped  Effective Oct 3, 2022  These results are not intended for use in patients  <25years of age. eGFR results are calculated without  a race factor using the 2021 CKD-EPI equation. Careful  clinical correlation is recommended, particularly when  comparing to results calculated using previous equations. The CKD-EPI equation is less accurate in patients with  extremes of muscle mass, extra-renal metabolism of  creatinine, excessive creatinine ingestion, or following  therapy that affects renal tubular secretion.       Calcium 12/01/2022 9.7  8.3 - 10.6 mg/dL Final    Total Protein 12/01/2022 6.9  6.4 - 8.2 g/dL Final    Albumin 12/01/2022 3.6  3.4 - 5.0 g/dL Final    Albumin/Globulin Ratio 12/01/2022 1.1  1.1 - 2.2 Final    Total Bilirubin 12/01/2022 0.4  0.0 - 1.0 mg/dL Final    Alkaline Phosphatase 12/01/2022 75  40 - 129 U/L Final    ALT 12/01/2022 35  10 - 40 U/L Final    AST 12/01/2022 26  15 - 37 U/L Final    pH, Seb 12/01/2022 7.394  7.350 - 7.450 Final    pCO2, Seb 12/01/2022 49.8  40.0 - 50.0 mmHg Final    pO2, Seb 12/01/2022 37  Not Established mmHg Final    HCO3, Venous 12/01/2022 30 (A)  23 - 29 mmol/L Final    Base Excess, Seb 12/01/2022 4.0  Not Established mmol/L Final    O2 Sat, Seb 12/01/2022 71  Not Established % Final    Carboxyhemoglobin 12/01/2022 1.3  % Final    Comment:      0.0-1.5  (Smokers 1.5-5.0)      MetHgb, Seb 12/01/2022 0.3  <1.5 % Final    TC02 (Calc), Seb 12/01/2022 32  Not Established mmol/L Final    O2 Therapy 12/01/2022 Unknown   Final    Rapid Influenza A Ag 12/01/2022 POSITIVE (A)  Negative Final    Rapid Influenza B Ag 12/01/2022 Negative  Negative Final    SARS-CoV-2, NAAT 12/01/2022 Not Detected  Not Detected Final    Comment: Rapid NAAT:   Negative results should be treated as presumptive and,  if inconsistent with clinical signs and symptoms or necessary for  patient management, should be tested with an alternative molecular  assay. Negative results do not preclude SARS-CoV-2 infection and  should not be used as the sole basis for patient management decisions. This test has been authorized by the FDA under an Emergency Use  Authorization (EUA) for use by authorized laboratories. Fact sheet for Healthcare Providers:  http://www.marck.erasto/  Fact sheet for Patients: http://www.beti-holly.erasto/    METHODOLOGY: Isothermal Nucleic Acid Amplification      Procalcitonin 12/02/2022 0.09  0.00 - 0.15 ng/mL Final    Comment: Suspected Sepsis:  Low likelihood of sepsis  <.50 ng/mL    Increased likelihood of sepsis 0.50-2.00 ng/mL  Antibiotics encouraged    High risk of sepsis/shock   >2.00 ng/mL  Antibiotics strongly encouraged    Suspected Lower Respiratory Tract Infections:  Low likelihood of bacterial infection  <0.24 ng/mL    Increased likelihood of bacterial infection >0.24 ng/mL  Antibiotics encouraged    With successful antibiotic therapy, PCT levels should decrease  rapidly.  (Half-life of 24 to 36 hours.)    Procalcitonin values from samples collected within the first  6 hours of systemic infection may still be low. Retesting may be indicated. Values from day 1 and day 4 can be entered into the Change in  Procalcitonin Calculator to determine the patient's  Mortality Risk Prognosis  ([email or web address]-pct-[email or web address]. com)    In healthy neonates, plasma Procalcitonin (PCT) concentrations  increase gradually after birth, reaching peak values at about  24 hours of age then decrease to normal values below 0.5                            ng/mL  by 48-72 hours of age.       CULTURE, RESPIRATORY 12/02/2022 Normal respiratory rowan   Final    Gram Stain Result 12/02/2022    Final                    Value:2+ Epithelial Cells  4+ WBC's (Polymorphonuclear)  3+ Gram positive cocci  2+ Yeast      WBC 12/03/2022 16.3 (A)  4.0 - 11.0 K/uL Final    RBC 12/03/2022 5.43  4.20 - 5.90 M/uL Final    Hemoglobin 12/03/2022 15.6  13.5 - 17.5 g/dL Final    Hematocrit 12/03/2022 48.1  40.5 - 52.5 % Final    MCV 12/03/2022 88.7  80.0 - 100.0 fL Final    MCH 12/03/2022 28.8  26.0 - 34.0 pg Final    MCHC 12/03/2022 32.4  31.0 - 36.0 g/dL Final    RDW 12/03/2022 15.1  12.4 - 15.4 % Final    Platelets 17/82/5596 298  135 - 450 K/uL Final    MPV 12/03/2022 6.7  5.0 - 10.5 fL Final    Neutrophils % 12/03/2022 92.8  % Final    Lymphocytes % 12/03/2022 3.2  % Final    Monocytes % 12/03/2022 3.9  % Final    Eosinophils % 12/03/2022 0.0  % Final    Basophils % 12/03/2022 0.1  % Final    Neutrophils Absolute 12/03/2022 15.2 (A)  1.7 - 7.7 K/uL Final    Lymphocytes Absolute 12/03/2022 0.5 (A)  1.0 - 5.1 K/uL Final    Monocytes Absolute 12/03/2022 0.6  0.0 - 1.3 K/uL Final    Eosinophils Absolute 12/03/2022 0.0  0.0 - 0.6 K/uL Final    Basophils Absolute 12/03/2022 0.0  0.0 - 0.2 K/uL Final    Sodium 12/03/2022 136  136 - 145 mmol/L Final    Potassium 12/03/2022 4.8  3.5 - 5.1 mmol/L Final    Chloride 12/03/2022 99  99 - 110 mmol/L Final    CO2 12/03/2022 27  21 - 32 mmol/L Final    Anion Gap 12/03/2022 10  3 - 16 Final    Glucose 12/03/2022 209 (A)  70 - 99 mg/dL Final    BUN 12/03/2022 17  7 - 20 mg/dL Final    Creatinine 12/03/2022 0.6 (A)  0.8 - 1.3 mg/dL Final    Est, Glom Filt Rate 12/03/2022 >60  >60 Final    Comment: Pediatric calculator link  [employer].at. org/professionals/kdoqi/gfr_calculatorped  Effective Oct 3, 2022  These results are not intended for use in patients  <25years of age. eGFR results are calculated without  a race factor using the 2021 CKD-EPI equation. Careful  clinical correlation is recommended, particularly when  comparing to results calculated using previous equations. The CKD-EPI equation is less accurate in patients with  extremes of muscle mass, extra-renal metabolism of  creatinine, excessive creatinine ingestion, or following  therapy that affects renal tubular secretion. Calcium 12/03/2022 9.2  8.3 - 10.6 mg/dL Final    Sodium 12/04/2022 137  136 - 145 mmol/L Final    Potassium 12/04/2022 4.8  3.5 - 5.1 mmol/L Final    Chloride 12/04/2022 97 (A)  99 - 110 mmol/L Final    CO2 12/04/2022 30  21 - 32 mmol/L Final    Anion Gap 12/04/2022 10  3 - 16 Final    Glucose 12/04/2022 167 (A)  70 - 99 mg/dL Final    BUN 12/04/2022 15  7 - 20 mg/dL Final    Creatinine 12/04/2022 0.9  0.8 - 1.3 mg/dL Final    Est, Glom Filt Rate 12/04/2022 >60  >60 Final    Comment: Pediatric calculator link  make[employer].at. org/professionals/kdoqi/gfr_calculatorped  Effective Oct 3, 2022  These results are not intended for use in patients  <25years of age. eGFR results are calculated without  a race factor using the 2021 CKD-EPI equation. Careful  clinical correlation is recommended, particularly when  comparing to results calculated using previous equations.   The CKD-EPI equation is less accurate in patients with  extremes of muscle mass, extra-renal metabolism of  creatinine, excessive creatinine ingestion, or following  therapy that affects renal tubular secretion. Calcium 12/04/2022 9.5  8.3 - 10.6 mg/dL Final    WBC 12/04/2022 17.5 (A)  4.0 - 11.0 K/uL Final    RBC 12/04/2022 5.87  4.20 - 5.90 M/uL Final    Hemoglobin 12/04/2022 16.9  13.5 - 17.5 g/dL Final    Hematocrit 12/04/2022 52.0  40.5 - 52.5 % Final    MCV 12/04/2022 88.7  80.0 - 100.0 fL Final    MCH 12/04/2022 28.8  26.0 - 34.0 pg Final    MCHC 12/04/2022 32.5  31.0 - 36.0 g/dL Final    RDW 12/04/2022 15.6 (A)  12.4 - 15.4 % Final    Platelets 52/42/9032 351  135 - 450 K/uL Final    MPV 12/04/2022 6.7  5.0 - 10.5 fL Final    Neutrophils % 12/04/2022 90.8  % Final    Lymphocytes % 12/04/2022 2.9  % Final    Monocytes % 12/04/2022 6.1  % Final    Eosinophils % 12/04/2022 0.0  % Final    Basophils % 12/04/2022 0.2  % Final    Neutrophils Absolute 12/04/2022 15.9 (A)  1.7 - 7.7 K/uL Final    Lymphocytes Absolute 12/04/2022 0.5 (A)  1.0 - 5.1 K/uL Final    Monocytes Absolute 12/04/2022 1.1  0.0 - 1.3 K/uL Final    Eosinophils Absolute 12/04/2022 0.0  0.0 - 0.6 K/uL Final    Basophils Absolute 12/04/2022 0.0  0.0 - 0.2 K/uL Final         Assessment & Plan:   Principal Problem:    Influenza A--cont Tamiflu--levaquin added   Active Problems:    Hypoxemia--will still reqq O2 at 4 lpm     COPD exacerbation (HCC)    COPD, very severe (HCC)    Acute respiratory failure with hypoxia ([address]    DNR (do not resuscitate)    Essential hypertension--Continue current therapy      Chronic respiratory failure with hypoxia (HCC)  Resolved Problems:    * No resolved hospital problems.  *  Overall-slowly better-cont O2 at 4 lpm--none PTA   Please note that over 35 minutes was spent in evaluating the patient, review of records and results, discussion with staff/family, etc.    Dennise Hurtado MD

## 2022-12-05 NOTE — PROGRESS NOTES
Pulmonary Progress Note    Date of Admission: 12/1/2022   LOS: 4 days       CC:  Chief Complaint   Patient presents with    Shortness of Breath     Pt states he has had increased sob x 2-3 days. Pt states he is coughing up green sputum. Pt has history of copd and is on home oxygen of 2 liters. Subjective:  Feeling better. ROS:   No nausea  No Vomiting  No chest pain      Assessment:         Plan: This note may have been transcribed using [address] [employer]. Please disregard any translational errors. Late note. Seen approximately 3 hours ago    Hospital Day: 4     COPD exacerbation secondary to influenza A pneumonia  Solumedrol, decreased 40 to bid. Dulera   theophylline   Tamiflu  Duoneb's  q4. Likely wean to q8 tomorrow. Sleep apnea   Home cpap in room. Data:        PHYSICAL EXAM:   Blood pressure (!) 157/94, pulse (!) 101, temperature 97.8 °F (36.6 °C), temperature source Oral, resp. rate 16, height 5' 7\" (1.702 m), weight 192 lb 0.3 oz (87.1 kg), SpO2 100 %.'  Body mass index is 30.07 kg/m². Gen: No distress. ENT:   Resp: No accessory muscle use. No crackles. Few  wheezes. No rhonchi. CV: Regular rate. Regular rhythm. No murmur or rub. No edema. Skin: Warm, dry, normal texture and turgor. No nodule on exposed extremities. M/S: No cyanosis. No clubbing. No joint deformity. Psych: Oriented x 3. No anxiety. Awake. Alert. Intact judgement and insight. Good Mood / Affect.   Memory appears in tact       Medications:    Scheduled Meds:   polyethylene glycol  17 g Oral Daily    guaiFENesin  600 mg Oral BID    methylPREDNISolone  40 mg IntraVENous Q8H    ipratropium-albuterol  1 ampule Inhalation Q4H WA    theophylline  200 mg Oral BID    mometasone-formoterol  2 puff Inhalation BID    sodium chloride (Inhalant)  15 mL Nebulization 3 times per day    levoFLOXacin  500 mg Oral Daily    sodium chloride flush  5-40 mL IntraVENous 2 times per day    enoxaparin 40 mg SubCUTAneous Nightly    polyvinyl alcohol  2 drop Both Eyes BID    pantoprazole  40 mg Oral QAM AC    atorvastatin  10 mg Oral Daily    losartan  50 mg Oral Daily       Continuous Infusions:   sodium chloride         PRN Meds:  sodium chloride flush, sodium chloride, ondansetron **OR** ondansetron, acetaminophen **OR** acetaminophen, albuterol    Labs reviewed:  CBC:   Recent Labs     12/03/22  0658 12/04/22  1607 12/05/22  1006   WBC 16.3* 17.5* 14.5*   HGB 15.6 16.9 16.5   HCT 48.1 52.0 50.5   MCV 88.7 88.7 88.8    351 336     BMP:   Recent Labs     12/03/22  0658 12/04/22  1607 12/05/22  1006    137 134*   K 4.8 4.8 4.9   CL 99 97* 95*   CO2 27 30 25   BUN 17 15 17   CREATININE 0.6* 0.9 0.9     LIVER PROFILE: No results for input(s): AST, ALT, LIPASE, BILIDIR, BILITOT, ALKPHOS in the last 72 hours. Invalid input(s): AMYLASE,  ALB  PT/INR: No results for input(s): PROTIME, INR in the last 72 hours. APTT: No results for input(s): APTT in the last 72 hours. UA:No results for input(s): NITRITE, COLORU, PHUR, LABCAST, WBCUA, RBCUA, MUCUS, TRICHOMONAS, YEAST, BACTERIA, CLARITYU, SPECGRAV, LEUKOCYTESUR, UROBILINOGEN, BILIRUBINUR, BLOODU, GLUCOSEU, AMORPHOUS in the last 72 hours. Invalid input(s): [place]  No results for input(s): PH, PCO2, PO2 in the last 72 hours. Cx:      Films: This note was transcribed using [id number] [facility]. Please disregard any translational errors.       Triny Olvera Pulmonary, Sleep and [phone]

## 2022-12-05 NOTE — PLAN OF CARE
Problem: Discharge Planning  Goal: Discharge to home or other facility with appropriate resources  Outcome: Progressing     Problem: Safety - Adult  Goal: Free from fall injury  Outcome: Progressing     Problem: Pain  Goal: Verbalizes/displays adequate comfort level or baseline comfort level  Outcome: Progressing     Problem: ABCDS Injury Assessment  Goal: Absence of physical injury  Outcome: Progressing     Problem: Respiratory - Adult  Goal: Achieves optimal ventilation and oxygenation  Outcome: Progressing     Problem: Infection - Adult  Goal: Absence of infection at discharge  Outcome: Progressing  Goal: Absence of infection during hospitalization  Outcome: Progressing  Goal: Absence of fever/infection during anticipated neutropenic period  Outcome: Progressing

## 2022-12-05 NOTE — PLAN OF CARE
Problem: Discharge Planning  Goal: Discharge to home or other facility with appropriate resources  12/5/2022 1556 by Anish Alvarado RN  Outcome: Progressing     Problem: Safety - Adult  Goal: Free from fall injury  12/5/2022 1556 by Anish Alvarado RN  Outcome: Progressing     Problem: Pain  Goal: Verbalizes/displays adequate comfort level or baseline comfort level  12/5/2022 1556 by Anish Alvarado RN  Outcome: Progressing     Problem: ABCDS Injury Assessment  Goal: Absence of physical injury  12/5/2022 1556 by Anish Alvarado RN  Outcome: Progressing     Problem: Respiratory - Adult  Goal: Achieves optimal ventilation and oxygenation  12/5/2022 1556 by Anish Alvarado RN  Outcome: Progressing     Problem: Infection - Adult  Goal: Absence of infection at discharge  12/5/2022 1556 by Anish Alvarado RN  Outcome: Progressing     Problem: Infection - Adult  Goal: Absence of infection during hospitalization  12/5/2022 1556 by Anish Alvarado RN  Outcome: Progressing

## 2022-12-06 NOTE — PROGRESS NOTES
12/06/22 1841   Encounter Summary   Encounter Overview/Reason  Rituals, Rites and Sacraments;Grief, Loss, and Adjustments   Service Provided For: Patient and family together   Referral/Consult From: Nurse   Support System Spouse; Children   Last Encounter  12/06/22  (Support - [facility] being in hospital, [facility] - wants daily SM)   Complexity of Encounter Moderate   Begin Time 1640   End Time  1710   Total Time Calculated 30 min   Spiritual/Emotional needs   Type Spiritual Support   Rituals, Rites and Sacraments   Type Confucianist Communion  (wants daily if possible)   Assessment/Intervention/Outcome   Assessment Coping   Intervention Discussed illness injury and its impact; Discussed belief system/Hoahaoism practices/lupe;Life review/Legacy   Outcome Expressed feelings, needs, and concerns

## 2022-12-06 NOTE — PROGRESS NOTES
[facility]    Respiratory Therapy   Home Oxygen Evaluation        Name: Bernice  [address] Record Number: [id number]  Age: 81 y.o. Gender:  male   : 1940  Today's date: 2022  Room: [place] [address]/5791-30      Assessment        BP (!) 145/78   Pulse (!) 114   Temp 97.8 °F (36.6 °C) (Oral)   Resp 16   Ht 5' 7\" (1.702 m)   Wt 186 lb 1.1 oz (84.4 kg)   SpO2 94%   BMI 29.14 kg/m²     Patient Active Problem List   Diagnosis    Gastroenteritis    Chronic diarrhea    GERD (gastroesophageal reflux disease)    Thrush    Benign prostatic hyperplasia    Allergic state    Hypercholesterolemia    Neck pain    Essential hypertension    Anxiety    Abdominal pain    Hypoxemia    COPD exacerbation (HCC)    PSVT (paroxysmal supraventricular tachycardia) (HCC)    Dyspnea    ZAHEER (obstructive sleep apnea)    Abnormal glucose    Hypoxia    Bronchitis    Abnormal CT of the chest    COPD, very severe (HCC)    Pulmonary nodules    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    Elevated lactic acid level    Hypokalemia    Acute respiratory failure with hypoxia (HCC)    PLMD (periodic limb movement disorder)    DNR (do not resuscitate)    Chronic hypercapnic respiratory failure (HCC)    Otalgia    Asthma    Other allergic rhinitis    COVID-19    Influenza A    Chronic respiratory failure with hypoxia ([facility] [address]       Social History:  Social History     Tobacco Use    Smoking status: Former     Packs/day: 1.00     Years: 45.00     Pack years: 45.00     Types: Cigarettes     Quit date: 2002     Years since quittin.9    Smokeless tobacco: Never   Vaping Use    Vaping Use: Never used   Substance Use Topics    Alcohol use: Yes     Comment: rarely     Drug use: No       Patient Room Air saturation at rest 94  %  Patient Room Air saturation upon ambulation 91 %  Patient ambulated 5 minutes.   (Minimum of 3 minutes unless Sp02 < 88% prior to 3 minute amanda)    Oxygen saturations of 88% or less on RA qualifies patient for Home Oxygen      In your clinical assessment does the Patient Require Portable Oxygen Tanks? No     Pt stated he is not comfortable going home without oxygen. Requested to be placed back on 3L after home O2 evaluation was completed.       Time Spent with Home O2 Set Up:  15  minutes     Malena Silva RCP on 12/6/2022 at 5:07 PM

## 2022-12-06 NOTE — PROGRESS NOTES
Physician Progress Note      PATIENT:               Fred Wallace  CSN #:                  [id number]  :                       1940  ADMIT DATE:       2022 2:09 PM  [facility] DATE:  RESPONDING  PROVIDER #:        Yaneth Weiner MD          QUERY TEXT:    Pt admitted with Influenza A. Pt noted to have leukocytosis and tachycardia. If possible, please document in the progress notes and discharge summary if   you are evaluating and /or treating any of the following: The medical record reflects the following:  Risk Factors: PMH-COPD, Home O2 2L. Curtis Potters Curtis Potters Current admission for Influenza A  Clinical Indicators: P 108. ...WBC 15.6, Neut 13.8. Curtis Potters Curtis Potters Curtis Potters Rapid Influ A- POSITIVE  Treatment: O2/Tamiflu/HHN/solumedrol/pulm consult, Resp culture    Thank You,  John Posey RN BSN CDS OBI [email or web address]  Options provided:  -- Sepsis related to Influenza A, present on admission  -- Sepsis related to Influenza A, , present on admission, now resolved  -- Influenza A without Sepsis  -- Other - I will add my own diagnosis  -- Disagree - Not applicable / Not valid  -- Disagree - Clinically unable to determine / Unknown  -- Refer to Clinical Documentation Reviewer    PROVIDER RESPONSE TEXT:    This patient has sepsis related to Influenza A which was present on admission. Query created by: Lazaro George on 2022 6:35 AM      QUERY TEXT:    Pt admitted with Influenza A. Pt noted to have  pneumonia. If possible,   please document in the progress notes and discharge summary if you are   evaluating and/or treating any of the following:      Note: CAP and HCAP indicate where the pneumonia was acquired, not a specific   type. The medical record reflects the following:  Risk Factors: PMH-COPD, Home O2 2L. Curtis Potters Curtis Potters Current admission for Influenza A  Clinical Indicators: P 108. ...WBC 15.6, Neut 13.8. Curtis Potters Curtis Potters Curtis Potters Rapid Influ A-   POSITIVE. Curtis Potters Curtis Potters CXR -Asymmetric ground-glass opacification at the right lung   base may represent a small pleural effusion or developing pneumonitis. Treatment: O2/Tamiflu/HHN/solumedrol/pulm consult, Resp culture, Zithro po,   Levaquin IV    Thank Hugo Murphy RN BSN CDS CRCR  [email or web address]  Options provided:  -- Treating for gram negative pneumonia  -- Other - I will add my own diagnosis  -- Disagree - Not applicable / Not valid  -- Disagree - Clinically unable to determine / Unknown  -- Refer to Clinical Documentation Reviewer    PROVIDER RESPONSE TEXT:    This patient is being treated for gram negative pneumonia.     Query created by: Lalo Belcher on 12/6/2022 6:51 AM      Electronically signed by:  José Miguel Sidhu MD 12/6/2022 11:21 AM

## 2022-12-06 NOTE — PROGRESS NOTES
Pulmonary Progress Note    Date of Admission: 12/1/2022   LOS: 5 days       CC:  Chief Complaint   Patient presents with    Shortness of Breath     Pt states he has had increased sob x 2-3 days. Pt states he is coughing up green sputum. Pt has history of copd and is on home oxygen of 2 liters. Subjective:  Feeling better. Cough with phlegm   Believes will be ready to d/c tomorrow. ROS:   No nausea  No Vomiting  No chest pain      Assessment:         Plan: This note may have been transcribed using [id number] [address] [employer]. Please disregard any translational errors. Late note. Seen approximately 3 hours ago    Hospital Day: 5     COPD exacerbation secondary to influenza A pneumonia  Solumedrol,   40   bid. Dulera   theophylline   Tamiflu  Duoneb's  q4. Wean to every 6 hours. Vital documented as room air but on 2 L NC  Wean O2 to sat >90%     Sleep apnea   Home cpap in room. Data:        PHYSICAL EXAM:   Blood pressure 138/71, pulse (!) 112, temperature 97.5 °F (36.4 °C), temperature source Oral, resp. rate 16, height 5' 7\" (1.702 m), weight 186 lb 1.1 oz (84.4 kg), SpO2 92 %.'  Body mass index is 29.14 kg/m². Gen: No distress. ENT:   Resp: No accessory muscle use. No crackles. Few  wheezes. No rhonchi. CV: Regular rate. Regular rhythm. No murmur or rub. No edema. Skin: Warm, dry, normal texture and turgor. No nodule on exposed extremities. M/S: No cyanosis. No clubbing. No joint deformity. Psych: Oriented x 3. No anxiety. Awake. Alert. Intact judgement and insight. Good Mood / Affect.   Memory appears in tact       Medications:    Scheduled Meds:   methylPREDNISolone  40 mg IntraVENous Q12H    polyethylene glycol  17 g Oral Daily    guaiFENesin  600 mg Oral BID    ipratropium-albuterol  1 ampule Inhalation Q4H WA    theophylline  200 mg Oral BID    mometasone-formoterol  2 puff Inhalation BID    sodium chloride (Inhalant)  15 mL Nebulization 3 times per day sodium chloride flush  5-40 mL IntraVENous 2 times per day    enoxaparin  40 mg SubCUTAneous Nightly    polyvinyl alcohol  2 drop Both Eyes BID    pantoprazole  40 mg Oral QAM AC    atorvastatin  10 mg Oral Daily    losartan  50 mg Oral Daily       Continuous Infusions:   sodium chloride         PRN Meds:  sodium chloride flush, sodium chloride, ondansetron **OR** ondansetron, acetaminophen **OR** acetaminophen, albuterol    Labs reviewed:  CBC:   Recent Labs     12/04/22  1607 12/05/22  1006 12/06/22 0622   WBC 17.5* 14.5* 13.3*   HGB 16.9 16.5 15.2   HCT 52.0 50.5 46.8   MCV 88.7 88.8 88.5    336 326       BMP:   Recent Labs     12/04/22  1607 12/05/22  1006 12/06/22 0622    134* 138   K 4.8 4.9 4.4   CL 97* 95* 99   CO2 30 25 27   BUN 15 17 16   CREATININE 0.9 0.9 0.7*       LIVER PROFILE: No results for input(s): AST, ALT, LIPASE, BILIDIR, BILITOT, ALKPHOS in the last 72 hours. Invalid input(s): AMYLASE,  ALB  PT/INR: No results for input(s): PROTIME, INR in the last 72 hours. APTT: No results for input(s): APTT in the last 72 hours. UA:No results for input(s): NITRITE, COLORU, PHUR, LABCAST, WBCUA, RBCUA, MUCUS, TRICHOMONAS, YEAST, BACTERIA, CLARITYU, SPECGRAV, LEUKOCYTESUR, UROBILINOGEN, BILIRUBINUR, BLOODU, GLUCOSEU, AMORPHOUS in the last 72 hours. Invalid input(s): Kimberli Madura  No results for input(s): PH, PCO2, PO2 in the last 72 hours. Cx:      Films: This note was transcribed using [id number] [facility]. Please disregard any translational errors.       Triny Olvera [facility] [phone]

## 2022-12-06 NOTE — PROGRESS NOTES
12/06/22 0651   Rituals, Rites and Sacraments   Type Sacrament of Sick  (by Fr. Carlos Vázquez on 12/5)

## 2022-12-06 NOTE — PROGRESS NOTES
Patient is alert and oriented x4, UAL, call light within reach. AM meds complete, patient tolerated well. VSS and WDL. No s/s of distress, no further needs noted at this time.    Electronically signed by Ayaz Wilcox RN on 12/6/2022 at 12:28 PM

## 2022-12-06 NOTE — PROGRESS NOTES
[facility] Progress Note  12/6/2022 9:07 AM  Subjective:   Admit Date: 12/1/2022      Chief Complaint: I feel a little better     Interval History: Sl less cough--still wheezing   As per pulmonary==gradually better    Diet: ADULT DIET; Regular  Medications:   Scheduled Meds:   ipratropium-albuterol  1 ampule Inhalation Q6H    methylPREDNISolone  40 mg IntraVENous Q12H    polyethylene glycol  17 g Oral Daily    guaiFENesin  600 mg Oral BID    theophylline  200 mg Oral BID    mometasone-formoterol  2 puff Inhalation BID    sodium chloride (Inhalant)  15 mL Nebulization 3 times per day    sodium chloride flush  5-40 mL IntraVENous 2 times per day    enoxaparin  40 mg SubCUTAneous Nightly    polyvinyl alcohol  2 drop Both Eyes BID    pantoprazole  40 mg Oral QAM AC    atorvastatin  10 mg Oral Daily    losartan  50 mg Oral Daily     Continuous Infusions:   sodium chloride         Review of Systems -   General ROS: afebrile  Respiratory ROS: positive for - cough and shortness of breath  Cardiovascular ROS: no chest pain  Musculoskeletal ROS:positive for - :joint pain  Neurological ROS: no TIA or stroke symptoms    Objective:   Vitals: /71   Pulse (!) 112   Temp 97.5 °F (36.4 °C) (Oral)   Resp 16   Ht 5' 7\" (1.702 m)   Wt 186 lb 1.1 oz (84.4 kg)   SpO2 92%   BMI 29.14 kg/m²   General appearance: alert and cooperative with exam  HEENT: Head: Normocephalic, no lesions, without obvious abnormality.   Neck: no adenopathy, no carotid bruit, no JVD, supple, symmetrical, trachea midline, and thyroid not enlarged, symmetric, no tenderness/mass/nodules  Lungs: wheezes bilaterally  Heart: regular rate and rhythm, S1, S2 normal, no murmur, click, rub or gallop  Abdomen: soft, non-tender; bowel sounds normal; no masses,  no organomegaly  Extremities: extremities normal, atraumatic, no cyanosis or edema  Neurologic: Mental status: Alert, oriented, thought content appropriate    Admission on 12/01/2022   Component Date Value Ref Range Status    Ventricular Rate 12/01/2022 107  BPM Final    Atrial Rate 12/01/2022 107  BPM Final    P-R Interval 12/01/2022 124  ms Final    QRS Duration 12/01/2022 84  ms Final    Q-T Interval 12/01/2022 312  ms Final    QTc Calculation (Bazett) 12/01/2022 416  ms Final    P Axis 12/01/2022 76  degrees Final    R Axis 12/01/2022 98  degrees Final    T Axis 12/01/2022 41  degrees Final    Diagnosis 12/01/2022    Final                    Value:Sinus tachycardiaBaseline artifactPoor R wave progression likely from lead position [id number] axisBorderline ECGWhen compared with ECG of 10/20/2021Poor data quality , butNo significant change likelyConfirmed by [facility] S[facility] CUI ([id number]) on 12/1/2022 4:16:12 PM      WBC 12/01/2022 15.6 (A)  4.0 - 11.0 K/uL Final    RBC 12/01/2022 6.07 (A)  4.20 - 5.90 M/uL Final    Hemoglobin 12/01/2022 17.6 (A)  13.5 - 17.5 g/dL Final    Hematocrit 12/01/2022 52.7 (A)  40.5 - 52.5 % Final    MCV 12/01/2022 86.9  80.0 - 100.0 fL Final    MCH 12/01/2022 29.0  26.0 - 34.0 pg Final    MCHC 12/01/2022 33.3  31.0 - 36.0 g/dL Final    RDW 12/01/2022 15.5 (A)  12.4 - 15.4 % Final    Platelets 26/79/6559 303  135 - 450 K/uL Final    MPV 12/01/2022 6.9  5.0 - 10.5 fL Final    Neutrophils % 12/01/2022 88.8  % Final    Lymphocytes % 12/01/2022 3.2  % Final    Monocytes % 12/01/2022 7.9  % Final    Eosinophils % 12/01/2022 0.0  % Final    Basophils % 12/01/2022 0.1  % Final    Neutrophils Absolute 12/01/2022 13.8 (A)  1.7 - 7.7 K/uL Final    Lymphocytes Absolute 12/01/2022 0.5 (A)  1.0 - 5.1 K/uL Final    Monocytes Absolute 12/01/2022 1.2  0.0 - 1.3 K/uL Final    Eosinophils Absolute 12/01/2022 0.0  0.0 - 0.6 K/uL Final    Basophils Absolute 12/01/2022 0.0  0.0 - 0.2 K/uL Final    Pro-BNP 12/01/2022 113  0 - 449 pg/mL Final    Comment: Methodology by NT-proBNP    An age-independent cutoff point of 300 pg/ml has a 98%  negative predictive value excluding acute heart failure.   Values exceeding the age-related cutoff values (450 pg/mL if  age<50, 900 if 50-75 and 1800 if >75) has 90% sensitivity and  84% specificity for diagnosing acute HF. In patients with  renal compromise (eGFR<60) values greater than 1200pg/ml have  a diagnostic sensitivity and specificity of 89% and 72% for  acute HF. Troponin 12/01/2022 <0.01  <0.01 ng/mL Final    Methodology by Troponin T    Sodium 12/01/2022 132 (A)  136 - 145 mmol/L Final    Potassium reflex Magnesium 12/01/2022 4.2  3.5 - 5.1 mmol/L Final    Chloride 12/01/2022 94 (A)  99 - 110 mmol/L Final    CO2 12/01/2022 26  21 - 32 mmol/L Final    Anion Gap 12/01/2022 12  3 - 16 Final    Glucose 12/01/2022 128 (A)  70 - 99 mg/dL Final    BUN 12/01/2022 15  7 - 20 mg/dL Final    Creatinine 12/01/2022 0.7 (A)  0.8 - 1.3 mg/dL Final    Est, Glom Filt Rate 12/01/2022 >60  >60 Final    Comment: Pediatric calculator link  [email or web address]. [email or web address]/gfr_calculatorped  Effective Oct 3, 2022  These results are not intended for use in patients  <25years of age. eGFR results are calculated without  a race factor using the 2021 CKD-EPI equation. Careful  clinical correlation is recommended, particularly when  comparing to results calculated using previous equations. The CKD-EPI equation is less accurate in patients with  extremes of muscle mass, extra-renal metabolism of  creatinine, excessive creatinine ingestion, or following  therapy that affects renal tubular secretion.       Calcium 12/01/2022 9.7  8.3 - 10.6 mg/dL Final    Total Protein 12/01/2022 6.9  6.4 - 8.2 g/dL Final    Albumin 12/01/2022 3.6  3.4 - 5.0 g/dL Final    Albumin/Globulin Ratio 12/01/2022 1.1  1.1 - 2.2 Final    Total Bilirubin 12/01/2022 0.4  0.0 - 1.0 mg/dL Final    Alkaline Phosphatase 12/01/2022 75  40 - 129 U/L Final    ALT 12/01/2022 35  10 - 40 U/L Final    AST 12/01/2022 26  15 - 37 U/L Final    pH, Seb 12/01/2022 7.394  7.350 - 7.450 Final    pCO2, [facility] 12/01/2022 49.8  40.0 - 50.0 mmHg Final    pO2, Seb 12/01/2022 37  Not Established mmHg Final    HCO3, Venous 12/01/2022 30 (A)  23 - 29 mmol/L Final    Base Excess, Seb 12/01/2022 4.0  Not Established mmol/L Final    O2 Sat, Seb 12/01/2022 71  Not Established % Final    Carboxyhemoglobin 12/01/2022 1.3  % Final    Comment:      0.0-1.5  (Smokers 1.5-5.0)      MetHgb, Seb 12/01/2022 0.3  <1.5 % Final    TC02 (Calc), Seb 12/01/2022 32  Not Established mmol/L Final    O2 Therapy 12/01/2022 Unknown   Final    Rapid Influenza A Ag 12/01/2022 POSITIVE (A)  Negative Final    Rapid Influenza B Ag 12/01/2022 Negative  Negative Final    SARS-CoV-2, NAAT 12/01/2022 Not Detected  Not Detected Final    Comment: Rapid NAAT:   Negative results should be treated as presumptive and,  if inconsistent with clinical signs and symptoms or necessary for  patient management, should be tested with an alternative molecular  assay. Negative results do not preclude SARS-CoV-2 infection and  should not be used as the sole basis for patient management decisions. This test has been authorized by the FDA under an Emergency Use  Authorization (EUA) for use by authorized laboratories. Fact sheet for Healthcare Providers:  ResellerReport.com.cy  Fact sheet for Patients: ScaleIO.Radisys.cy    METHODOLOGY: Isothermal Nucleic Acid Amplification      Procalcitonin 12/02/2022 0.09  0.00 - 0.15 ng/mL Final    Comment: Suspected Sepsis:  Low likelihood of sepsis  <.50 ng/mL    Increased likelihood of sepsis 0.50-2.00 ng/mL  Antibiotics encouraged    High risk of sepsis/shock   >2.00 ng/mL  Antibiotics strongly encouraged    Suspected Lower Respiratory Tract Infections:  Low likelihood of bacterial infection  <0.24 ng/mL    Increased likelihood of bacterial infection >0.24 ng/mL  Antibiotics encouraged    With successful antibiotic therapy, PCT levels should decrease  rapidly.  (Half-life of 24 to 36 hours.)    Procalcitonin values from samples collected within the first  6 hours of systemic infection may still be low. Retesting may be indicated. Values from day 1 and day 4 can be entered into the Change in  Procalcitonin Calculator to determine the patient's  Mortality Risk Prognosis  ([email or web address]-pct-[email or web address]. com)    In healthy neonates, plasma Procalcitonin (PCT) concentrations  increase gradually after birth, reaching peak values at about  24 hours of age then decrease to normal values below 0.5                            ng/mL  by 48-72 hours of age.       CULTURE, RESPIRATORY 12/02/2022 Normal respiratory rowan   Final    Gram Stain Result 12/02/2022    Final                    Value:2+ Epithelial Cells  4+ WBC's (Polymorphonuclear)  3+ Gram positive cocci  2+ Yeast      WBC 12/03/2022 16.3 (A)  4.0 - 11.0 K/uL Final    RBC 12/03/2022 5.43  4.20 - 5.90 M/uL Final    Hemoglobin 12/03/2022 15.6  13.5 - 17.5 g/dL Final    Hematocrit 12/03/2022 48.1  40.5 - 52.5 % Final    MCV 12/03/2022 88.7  80.0 - 100.0 fL Final    MCH 12/03/2022 28.8  26.0 - 34.0 pg Final    MCHC 12/03/2022 32.4  31.0 - 36.0 g/dL Final    RDW 12/03/2022 15.1  12.4 - 15.4 % Final    Platelets 82/75/5293 298  135 - 450 K/uL Final    MPV 12/03/2022 6.7  5.0 - 10.5 fL Final    Neutrophils % 12/03/2022 92.8  % Final    Lymphocytes % 12/03/2022 3.2  % Final    Monocytes % 12/03/2022 3.9  % Final    Eosinophils % 12/03/2022 0.0  % Final    Basophils % 12/03/2022 0.1  % Final    Neutrophils Absolute 12/03/2022 15.2 (A)  1.7 - 7.7 K/uL Final    Lymphocytes Absolute 12/03/2022 0.5 (A)  1.0 - 5.1 K/uL Final    Monocytes Absolute 12/03/2022 0.6  0.0 - 1.3 K/uL Final    Eosinophils Absolute 12/03/2022 0.0  0.0 - 0.6 K/uL Final    Basophils Absolute 12/03/2022 0.0  0.0 - 0.2 K/uL Final    Sodium 12/03/2022 136  136 - 145 mmol/L Final    Potassium 12/03/2022 4.8  3.5 - 5.1 mmol/L Final    Chloride 12/03/2022 99  99 - 110 mmol/L Final    CO2 12/03/2022 27  21 - 32 mmol/L Final    Anion Gap 12/03/2022 10  3 - 16 Final    Glucose 12/03/2022 209 (A)  70 - 99 mg/dL Final    BUN 12/03/2022 17  7 - 20 mg/dL Final    Creatinine 12/03/2022 0.6 (A)  0.8 - 1.3 mg/dL Final    Est, Glom Filt Rate 12/03/2022 >60  >60 Final    Comment: Pediatric calculator link  [email or web address]. [email or web address]/kdoqi/gfr_calculatorped  Effective Oct 3, 2022  These results are not intended for use in patients  <25years of age. eGFR results are calculated without  a race factor using the 2021 CKD-EPI equation. Careful  clinical correlation is recommended, particularly when  comparing to results calculated using previous equations. The CKD-EPI equation is less accurate in patients with  extremes of muscle mass, extra-renal metabolism of  creatinine, excessive creatinine ingestion, or following  therapy that affects renal tubular secretion. Calcium 12/03/2022 9.2  8.3 - 10.6 mg/dL Final    Sodium 12/04/2022 137  136 - 145 mmol/L Final    Potassium 12/04/2022 4.8  3.5 - 5.1 mmol/L Final    Chloride 12/04/2022 97 (A)  99 - 110 mmol/L Final    CO2 12/04/2022 30  21 - 32 mmol/L Final    Anion Gap 12/04/2022 10  3 - 16 Final    Glucose 12/04/2022 167 (A)  70 - 99 mg/dL Final    BUN 12/04/2022 15  7 - 20 mg/dL Final    Creatinine 12/04/2022 0.9  0.8 - 1.3 mg/dL Final    Est, Glom Filt Rate 12/04/2022 >60  >60 Final    Comment: Pediatric calculator link  [email or web address]. [email or web address]/kdoqi/gfr_calculatorped  Effective Oct 3, 2022  These results are not intended for use in patients  <25years of age. eGFR results are calculated without  a race factor using the 2021 CKD-EPI equation. Careful  clinical correlation is recommended, particularly when  comparing to results calculated using previous equations. The CKD-EPI equation is less accurate in patients with  extremes of muscle mass, extra-renal metabolism of  creatinine, excessive creatinine ingestion, or following  therapy that affects renal tubular secretion. Calcium 12/04/2022 9.5  8.3 - 10.6 mg/dL Final    WBC 12/04/2022 17.5 (A)  4.0 - 11.0 K/uL Final    RBC 12/04/2022 5.87  4.20 - 5.90 M/uL Final    Hemoglobin 12/04/2022 16.9  13.5 - 17.5 g/dL Final    Hematocrit 12/04/2022 52.0  40.5 - 52.5 % Final    MCV 12/04/2022 88.7  80.0 - 100.0 fL Final    MCH 12/04/2022 28.8  26.0 - 34.0 pg Final    MCHC 12/04/2022 32.5  31.0 - 36.0 g/dL Final    RDW 12/04/2022 15.6 (A)  12.4 - 15.4 % Final    Platelets 71/37/1357 351  135 - 450 K/uL Final    MPV 12/04/2022 6.7  5.0 - 10.5 fL Final    Neutrophils % 12/04/2022 90.8  % Final    Lymphocytes % 12/04/2022 2.9  % Final    Monocytes % 12/04/2022 6.1  % Final    Eosinophils % 12/04/2022 0.0  % Final    Basophils % 12/04/2022 0.2  % Final    Neutrophils Absolute 12/04/2022 15.9 (A)  1.7 - 7.7 K/uL Final    Lymphocytes Absolute 12/04/2022 0.5 (A)  1.0 - 5.1 K/uL Final    Monocytes Absolute 12/04/2022 1.1  0.0 - 1.3 K/uL Final    Eosinophils Absolute 12/04/2022 0.0  0.0 - 0.6 K/uL Final    Basophils Absolute 12/04/2022 0.0  0.0 - 0.2 K/uL Final    Sodium 12/05/2022 134 (A)  136 - 145 mmol/L Final    Potassium 12/05/2022 4.9  3.5 - 5.1 mmol/L Final    Chloride 12/05/2022 95 (A)  99 - 110 mmol/L Final    CO2 12/05/2022 25  21 - 32 mmol/L Final    Anion Gap 12/05/2022 14  3 - 16 Final    Glucose 12/05/2022 230 (A)  70 - 99 mg/dL Final    BUN 12/05/2022 17  7 - 20 mg/dL Final    Creatinine 12/05/2022 0.9  0.8 - 1.3 mg/dL Final    Est, Glom Filt Rate 12/05/2022 >60  >60 Final    Comment: Pediatric calculator link  [facility].[email or web address] [email or web address]/kdoqi/gfr_calculatorped  Effective Oct 3, 2022  These results are not intended for use in patients  <25years of age. eGFR results are calculated without  a race factor using the 2021 CKD-EPI equation. Careful  clinical correlation is recommended, particularly when  comparing to results calculated using previous equations.   The CKD-EPI equation is less accurate in patients with  extremes of muscle mass, extra-renal metabolism of  creatinine, excessive creatinine ingestion, or following  therapy that affects renal tubular secretion. Calcium 12/05/2022 9.5  8.3 - 10.6 mg/dL Final    WBC 12/05/2022 14.5 (A)  4.0 - 11.0 K/uL Final    RBC 12/05/2022 5.69  4.20 - 5.90 M/uL Final    Hemoglobin 12/05/2022 16.5  13.5 - 17.5 g/dL Final    Hematocrit 12/05/2022 50.5  40.5 - 52.5 % Final    MCV 12/05/2022 88.8  80.0 - 100.0 fL Final    MCH 12/05/2022 29.0  26.0 - 34.0 pg Final    MCHC 12/05/2022 32.7  31.0 - 36.0 g/dL Final    RDW 12/05/2022 15.2  12.4 - 15.4 % Final    Platelets 45/58/1908 336  135 - 450 K/uL Final    MPV 12/05/2022 6.9  5.0 - 10.5 fL Final    Neutrophils % 12/05/2022 89.6  % Final    Lymphocytes % 12/05/2022 4.9  % Final    Monocytes % 12/05/2022 5.4  % Final    Eosinophils % 12/05/2022 0.0  % Final    Basophils % 12/05/2022 0.1  % Final    Neutrophils Absolute 12/05/2022 13.0 (A)  1.7 - 7.7 K/uL Final    Lymphocytes Absolute 12/05/2022 0.7 (A)  1.0 - 5.1 K/uL Final    Monocytes Absolute 12/05/2022 0.8  0.0 - 1.3 K/uL Final    Eosinophils Absolute 12/05/2022 0.0  0.0 - 0.6 K/uL Final    Basophils Absolute 12/05/2022 0.0  0.0 - 0.2 K/uL Final    Sodium 12/06/2022 138  136 - 145 mmol/L Final    Potassium 12/06/2022 4.4  3.5 - 5.1 mmol/L Final    Chloride 12/06/2022 99  99 - 110 mmol/L Final    CO2 12/06/2022 27  21 - 32 mmol/L Final    Anion Gap 12/06/2022 12  3 - 16 Final    Glucose 12/06/2022 126 (A)  70 - 99 mg/dL Final    BUN 12/06/2022 16  7 - 20 mg/dL Final    Creatinine 12/06/2022 0.7 (A)  0.8 - 1.3 mg/dL Final    Est, Gloj[place]ob Filt Rate 12/06/2022 >60  >60 Final    Comment: Pediatric calculator link  [employer].at. org/professionals/kdoqi/gfr_calculatorped  Effective Oct 3, 2022  These results are not intended for use in patients  <25years of age. eGFR results are calculated without  a race factor using the 2021 CKD-EPI equation.   Careful  clinical correlation is recommended, particularly when  comparing to results calculated using previous equations. The CKD-EPI equation is less accurate in patients with  extremes of muscle mass, extra-renal metabolism of  creatinine, excessive creatinine ingestion, or following  therapy that affects renal tubular secretion. Calcium 12/06/2022 8.8  8.3 - 10.6 mg/dL Final    WBC 12/06/2022 13.3 (A)  4.0 - 11.0 K/uL Final    RBC 12/06/2022 5.30  4.20 - 5.90 M/uL Final    Hemoglobin 12/06/2022 15.2  13.5 - 17.5 g/dL Final    Hematocrit 12/06/2022 46.8  40.5 - 52.5 % Final    MCV 12/06/2022 88.5  80.0 - 100.0 fL Final    MCH 12/06/2022 28.7  26.0 - 34.0 pg Final    MCHC 12/06/2022 32.4  31.0 - 36.0 g/dL Final    RDW 12/06/2022 15.4  12.4 - 15.4 % Final    Platelets 14/70/9235 326  135 - 450 K/uL Final    MPV 12/06/2022 6.8  5.0 - 10.5 fL Final    Neutrophils % 12/06/2022 80.6  % Final    Lymphocytes % 12/06/2022 10.4  % Final    Monocytes % 12/06/2022 8.9  % Final    Eosinophils % 12/06/2022 0.0  % Final    Basophils % 12/06/2022 0.1  % Final    Neutrophils Absolute 12/06/2022 10.8 (A)  1.7 - 7.7 K/uL Final    Lymphocytes Absolute 12/06/2022 1.4  1.0 - 5.1 K/uL Final    Monocytes Absolute 12/06/2022 1.2  0.0 - 1.3 K/uL Final    Eosinophils Absolute 12/06/2022 0.0  0.0 - 0.6 K/uL Final    Basophils Absolute 12/06/2022 0.0  0.0 - 0.2 K/uL Final         Assessment & Plan:   Principal Problem:    Influenza A--to finish tamiflu and levaquin today --needs O2--was not on it--PTA --possible DC tomorrow     Active Problems:    Hypoxemia--improving     COPD exacerbation (HCC)    COPD, very severe (HCC)    Acute respiratory failure with hypoxia (HCC)    DNR (do not resuscitate)    Essential hypertension--Continue current therapy      Chronic respiratory failure with hypoxia (HCC)  Resolved Problems:    * No resolved hospital problems.  *  Complete tamiflu and levaquin today--possible dc tomorrow--I will see him in AM --chnge solumedrol to oral prednisone   Please note that over 35 minutes was spent in evaluating the patient, review of records and results, discussion with staff/family, etc.    Sarika Valencia MD

## 2022-12-06 NOTE — PLAN OF CARE
Problem: Safety - Adult  Goal: Free from fall injury  12/6/2022 0050 by Jose Rahman RN  Outcome: Progressing  12/5/2022 1556 by Rubio Almonte RN  Outcome: Progressing

## 2022-12-06 NOTE — CARE COORDINATION
Discharge Planning:  SW met with pt to discuss d/c planning. Pt stated he remains on oxygen and only has an inogen machine that he uses at home or in the car when needed. Pt stated he has had oxygen in the past but no longer has the concentrator at home. Pt feels he will need O2 at home upon d/c.    [facility] contacted [address] [place] [address] and was informed this pt is NOT active with [facility] at this time. Pt is able to chose any O2 company of his choice if oxygen is needed at d/c. Message spoke to Dr. Durand Person to inform him that pt will need a home O2 eval within 24 hrs of d/c if he feels pt needs home O2. If pt qualifies for home O2, pt will need a O2 DME order. PLAN: Pt plans to return home with his spouse upon d/c.  Pt does not have O2 at home and will need a home O2 eval and oxygen orders if O2 is needed. Spouse will transport pt home at d/c.   Electronically signed by Abby Orozco on 12/6/2022 at 1:29 PM

## 2022-12-06 NOTE — PLAN OF CARE
Problem: Discharge Planning  Goal: Discharge to home or other facility with appropriate resources  Outcome: Progressing  Flowsheets (Taken 12/6/2022 0816)  Discharge to home or other facility with appropriate resources: Identify barriers to discharge with patient and caregiver     Problem: Safety - Adult  Goal: Free from fall injury  12/6/2022 1226 by Dina Venegas RN  Outcome: Progressing  Flowsheets (Taken 12/6/2022 0816)  Free From Fall Injury: Instruct family/caregiver on patient safety     Problem: Pain  Goal: Verbalizes/displays adequate comfort level or baseline comfort level  Outcome: Progressing  Flowsheets (Taken 12/6/2022 0816)  Verbalizes/displays adequate comfort level or baseline comfort level: Encourage patient to monitor pain and request assistance   Pain /discomfort being managed with PRN analgesics per MD orders. Patient able to express presence and absence of pain and rate pain appropriately using numerical scale.      Problem: ABCDS Injury Assessment  Goal: Absence of physical injury  Outcome: Progressing  Flowsheets (Taken 12/6/2022 0816)  Absence of Physical Injury: Implement safety measures based on patient assessment     Problem: Respiratory - Adult  Goal: Achieves optimal ventilation and oxygenation  Outcome: Progressing     Problem: Infection - Adult  Goal: Absence of infection at discharge  Outcome: Progressing  Flowsheets (Taken 12/6/2022 0816)  Absence of infection at discharge: Assess and monitor for signs and symptoms of infection

## 2022-12-07 NOTE — PROGRESS NOTES
Patient sitting in chair eating breakfast. /77 , Resp 24, SpO2 93% on 3L. No c/o of pain A+Ox4. MD notified. Call light in reach.  Electronically signed by Heena Davidson RN on 12/7/2022 at 10:05 AM

## 2022-12-07 NOTE — PROGRESS NOTES
Pulmonary Progress Note    Date of Admission: 12/1/2022   LOS: 6 days       CC:  Chief Complaint   Patient presents with    Shortness of Breath     Pt states he has had increased sob x 2-3 days. Pt states he is coughing up green sputum. Pt has history of copd and is on home oxygen of 2 liters. Subjective:  Continued wheezing and phlegm   He states he is coughing up \"green\" phlegm but this is clearly not true. Then he stated it was \"yellow\", also not true. ROS:   No nausea  No Vomiting  No chest pain      Assessment:         Plan: This note may have been transcribed using [id number] [employer]. Please disregard any translational errors. Hospital Day: 6     COPD exacerbation secondary to influenza A pneumonia  Solumedrol,   40   bid. Dulera   theophylline   Tamiflu  Duoneb's  every 6 hours. Continues to cough up white phlegm. Send for culture   Add vest.   Plan to add vest therapy and [facility] as out patient     Sleep apnea   Home cpap in room. With frequent illness and daily struggles, hospice may be needed but he is not ready for this. Data:        PHYSICAL EXAM:   Blood pressure (!) 148/79, pulse (!) 102, temperature 97.6 °F (36.4 °C), temperature source Oral, resp. rate 18, height 5' 7\" (1.702 m), weight 185 lb 10 oz (84.2 kg), SpO2 94 %.'  Body mass index is 29.07 kg/m². Gen: No distress. ENT:   Resp: No accessory muscle use. No crackles. Few  wheezes. +  rhonchi. CV: Regular rate. Regular rhythm. No murmur or rub. No edema. Skin: Warm, dry, normal texture and turgor. No nodule on exposed extremities. M/S: No cyanosis. No clubbing. No joint deformity. Psych: Oriented x 3. No anxiety. Awake. Alert. Intact judgement and insight. Good Mood / Affect.   Memory appears in tact       Medications:    Scheduled Meds:   methylPREDNISolone sodium  40 mg IntraVENous Q8H    ipratropium-albuterol  1 ampule Inhalation Q6H    polyethylene glycol  17 g Oral Daily    guaiFENesin  600 mg Oral BID    theophylline  200 mg Oral BID    mometasone-formoterol  2 puff Inhalation BID    sodium chloride (Inhalant)  15 mL Nebulization 3 times per day    sodium chloride flush  5-40 mL IntraVENous 2 times per day    enoxaparin  40 mg SubCUTAneous Nightly    polyvinyl alcohol  2 drop Both Eyes BID    pantoprazole  40 mg Oral QAM AC    atorvastatin  10 mg Oral Daily    losartan  50 mg Oral Daily       Continuous Infusions:   sodium chloride         PRN Meds:  sodium chloride flush, sodium chloride, ondansetron **OR** ondansetron, acetaminophen **OR** acetaminophen, albuterol    Labs reviewed:  CBC:   Recent Labs     12/04/22  1607 12/05/22  1006 12/06/22  0622   WBC 17.5* 14.5* 13.3*   HGB 16.9 16.5 15.2   HCT 52.0 50.5 46.8   MCV 88.7 88.8 88.5    336 326       BMP:   Recent Labs     12/04/22  1607 12/05/22  1006 12/06/22 0622    134* 138   K 4.8 4.9 4.4   CL 97* 95* 99   CO2 30 25 27   BUN 15 17 16   CREATININE 0.9 0.9 0.7*       LIVER PROFILE: No results for input(s): AST, ALT, LIPASE, BILIDIR, BILITOT, ALKPHOS in the last 72 hours. Invalid input(s): AMYLASE,  ALB  PT/INR: No results for input(s): PROTIME, INR in the last 72 hours. APTT: No results for input(s): APTT in the last 72 hours. UA:No results for input(s): NITRITE, COLORU, PHUR, LABCAST, WBCUA, RBCUA, MUCUS, TRICHOMONAS, YEAST, BACTERIA, CLARITYU, SPECGRAV, LEUKOCYTESUR, UROBILINOGEN, BILIRUBINUR, BLOODU, GLUCOSEU, AMORPHOUS in the last 72 hours. Invalid input(s): Duana Spurling  No results for input(s): PH, PCO2, PO2 in the last 72 hours. Cx:      Films: This note was transcribed using [id number] [employer]. Please disregard any translational errors.       Triny Ol[facility]a [facility] [phone]

## 2022-12-07 NOTE — PLAN OF CARE
Problem: Discharge Planning  Goal: Discharge to home or other facility with appropriate resources  Outcome: Progressing  Flowsheets (Taken 12/7/2022 0915)  Discharge to home or other facility with appropriate resources:   Identify barriers to discharge with patient and caregiver   Arrange for needed discharge resources and transportation as appropriate   Identify discharge learning needs (meds, wound care, etc)   Arrange for interpreters to assist at discharge as needed   Refer to discharge planning if patient needs post-hospital services based on physician order or complex needs related to functional status, cognitive ability or social support system     Problem: Safety - Adult  Goal: Free from fall injury  Outcome: Progressing     Problem: Pain  Goal: Verbalizes/displays adequate comfort level or baseline comfort level  Outcome: Progressing  Flowsheets (Taken 12/7/2022 0915)  Verbalizes/displays adequate comfort level or baseline comfort level:   Encourage patient to monitor pain and request assistance   Assess pain using appropriate pain scale   Administer analgesics based on type and severity of pain and evaluate response   Implement non-pharmacological measures as appropriate and evaluate response   Consider cultural and social influences on pain and pain management   Notify Licensed Independent Practitioner if interventions unsuccessful or patient reports new pain     Problem: ABCDS Injury Assessment  Goal: Absence of physical injury  Outcome: Progressing     Problem: Respiratory - Adult  Goal: Achieves optimal ventilation and oxygenation  Outcome: Progressing  Flowsheets (Taken 12/7/2022 0915)  Achieves optimal ventilation and oxygenation:   Assess for changes in respiratory status   Assess for changes in mentation and behavior   Position to facilitate oxygenation and minimize respiratory effort   Oxygen supplementation based on oxygen saturation or arterial blood gases   Assess the need for suctioning and aspirate as needed   Assess and instruct to report shortness of breath or any respiratory difficulty   Respiratory therapy support as indicated     Problem: Infection - Adult  Goal: Absence of infection at discharge  Outcome: Progressing  Flowsheets (Taken 12/7/2022 0915)  Absence of infection at discharge:   Assess and monitor for signs and symptoms of infection   Monitor lab/diagnostic results   Monitor all insertion sites i.e., indwelling lines, tubes and drains   Administer medications as ordered   Instruct and encourage patient and family to use good hand hygiene technique   Identify and instruct in appropriate isolation precautions for identified infection/condition  Goal: Absence of infection during hospitalization  Outcome: Progressing  Flowsheets (Taken 12/7/2022 0915)  Absence of infection during hospitalization:   Assess and monitor for signs and symptoms of infection   Monitor lab/diagnostic results   Monitor all insertion sites i.e., indwelling lines, tubes and drains   Instruct and encourage patient and family to use good hand hygiene technique   Identify and instruct in appropriate isolation precautions for identified infection/condition   Administer medications as ordered  Goal: Absence of fever/infection during anticipated neutropenic period  Outcome: Progressing  Flowsheets (Taken 12/7/2022 0915)  Absence of fever/infection during anticipated neutropenic period:   Monitor white blood cell count   Administer growth factors as ordered   Implement neutropenic guidelines

## 2022-12-07 NOTE — PROGRESS NOTES
[facility] Progress Note  12/7/2022 7:35 AM  Subjective:   Admit Date: 12/1/2022      Chief Complaint: I am congested     Interval History: BRII [place] Part status still tenuous  Incr heart rate--incr wheeze   Still congested    Diet: ADULT DIET; Regular  Medications:   Scheduled Meds:   ipratropium-albuterol  1 ampule Inhalation Q6H    predniSONE  40 mg Oral Daily    polyethylene glycol  17 g Oral Daily    guaiFENesin  600 mg Oral BID    theophylline  200 mg Oral BID    mometasone-formoterol  2 puff Inhalation BID    sodium chloride (Inhalant)  15 mL Nebulization 3 times per day    sodium chloride flush  5-40 mL IntraVENous 2 times per day    enoxaparin  40 mg SubCUTAneous Nightly    polyvinyl alcohol  2 drop Both Eyes BID    pantoprazole  40 mg Oral QAM AC    atorvastatin  10 mg Oral Daily    losartan  50 mg Oral Daily     Continuous Infusions:   sodium chloride         Review of Systems -   General ROS: afebrile  Respiratory ROS: positive for - cough, shortness of breath, and wheezing  Cardiovascular ROS: no chest pain  Musculoskeletal ROS:positive for - low back pain  Neurological ROS: no TIA or stroke symptoms    Objective:   Vitals: BP (!) 148/79   Pulse (!) 102   Temp 97.6 °F (36.4 °C) (Oral)   Resp 18   Ht 5' 7\" (1.702 m)   Wt 185 lb 10 oz (84.2 kg)   SpO2 94%   BMI 29.07 kg/m²   General appearance: alert and cooperative with exam  HEENT: Head: Normocephalic, no lesions, without obvious abnormality.   Neck: no adenopathy, no carotid bruit, no JVD, supple, symmetrical, trachea midline, and thyroid not enlarged, symmetric, no tenderness/mass/nodules  Lungs: wheezes bilaterally  Heart: regular rate and rhythm, S1, S2 normal, no murmur, click, rub or gallop  Abdomen: soft, non-tender; bowel sounds normal; no masses,  no organomegaly  Extremities: extremities normal, atraumatic, no cyanosis or edema  Neurologic: Mental status: Alert, oriented, thought content appropriate    Admission on 12/01/2022 Component Date Value Ref Range Status    Ventricular Rate 12/01/2022 107  BPM Final    Atrial Rate 12/01/2022 107  BPM Final    P-R Interval 12/01/2022 124  ms Final    QRS Duration 12/01/2022 84  ms Final    Q-T Interval 12/01/2022 312  ms Final    QTc Calculation (Bazett) 12/01/2022 416  ms Final    P Axis 12/01/2022 76  degrees Final    R Axis 12/01/2022 98  degrees Final    T Axis 12/01/2022 41  degrees Final    Diagnosis 12/01/2022    Final                    Value:Sinus tachycardiaBaseline artifactPoor R wave progression likely from lead position [id number] axisBorderline ECGWhen compared with ECG of 10/20/2021Poor data quality , butNo significant change likelyConfirmed by [facility] C[facility] ([id number]) on 12/1/2022 4:16:12 PM      WBC 12/01/2022 15.6 (A)  4.0 - 11.0 K/uL Final    RBC 12/01/2022 6.07 (A)  4.20 - 5.90 M/uL Final    Hemoglobin 12/01/2022 17.6 (A)  13.5 - 17.5 g/dL Final    Hematocrit 12/01/2022 52.7 (A)  40.5 - 52.5 % Final    MCV 12/01/2022 86.9  80.0 - 100.0 fL Final    MCH 12/01/2022 29.0  26.0 - 34.0 pg Final    MCHC 12/01/2022 33.3  31.0 - 36.0 g/dL Final    RDW 12/01/2022 15.5 (A)  12.4 - 15.4 % Final    Platelets 61/96/1195 303  135 - 450 K/uL Final    MPV 12/01/2022 6.9  5.0 - 10.5 fL Final    Neutrophils % 12/01/2022 88.8  % Final    Lymphocytes % 12/01/2022 3.2  % Final    Monocytes % 12/01/2022 7.9  % Final    Eosinophils % 12/01/2022 0.0  % Final    Basophils % 12/01/2022 0.1  % Final    Neutrophils Absolute 12/01/2022 13.8 (A)  1.7 - 7.7 K/uL Final    Lymphocytes Absolute 12/01/2022 0.5 (A)  1.0 - 5.1 K/uL Final    Monocytes Absolute 12/01/2022 1.2  0.0 - 1.3 K/uL Final    Eosinophils Absolute 12/01/2022 0.0  0.0 - 0.6 K/uL Final    Basophils Absolute 12/01/2022 0.0  0.0 - 0.2 K/uL Final    Pro-BNP 12/01/2022 113  0 - 449 pg/mL Final    Comment: Methodology by NT-proBNP    An age-independent cutoff point of 300 pg/ml has a 98%  negative predictive value excluding acute heart failure. Values exceeding the age-related cutoff values (450 pg/mL if  age<50, 900 if 50-75 and 1800 if >75) has 90% sensitivity and  84% specificity for diagnosing acute HF. In patients with  renal compromise (eGFR<60) values greater than 1200pg/ml have  a diagnostic sensitivity and specificity of 89% and 72% for  acute HF. Troponin 12/01/2022 <0.01  <0.01 ng/mL Final    Methodology by Troponin T    Sodium 12/01/2022 132 (A)  136 - 145 mmol/L Final    Potassium reflex Magnesium 12/01/2022 4.2  3.5 - 5.1 mmol/L Final    Chloride 12/01/2022 94 (A)  99 - 110 mmol/L Final    CO2 12/01/2022 26  21 - 32 mmol/L Final    Anion Gap 12/01/2022 12  3 - 16 Final    Glucose 12/01/2022 128 (A)  70 - 99 mg/dL Final    BUN 12/01/2022 15  7 - 20 mg/dL Final    Creatinine 12/01/2022 0.7 (A)  0.8 - 1.3 mg/dL Final    Est, Glom Filt Rate 12/01/2022 >60  >60 Final    Comment: Pediatric calculator link  [facility].at. [email or web address]  Effective Oct 3, 2022  These results are not intended for use in patients  <25years of age. eGFR results are calculated without  a race factor using the 2021 CKD-EPI equation. Careful  clinical correlation is recommended, particularly when  comparing to results calculated using previous equations. The CKD-EPI equation is less accurate in patients with  extremes of muscle mass, extra-renal metabolism of  creatinine, excessive creatinine ingestion, or following  therapy that affects renal tubular secretion.       Calcium 12/01/2022 9.7  8.3 - 10.6 mg/dL Final    Total Protein 12/01/2022 6.9  6.4 - 8.2 g/dL Final    Albumin 12/01/2022 3.6  3.4 - 5.0 g/dL Final    Albumin/Globulin Ratio 12/01/2022 1.1  1.1 - 2.2 Final    Total Bilirubin 12/01/2022 0.4  0.0 - 1.0 mg/dL Final    Alkaline Phosphatase 12/01/2022 75  40 - 129 U/L Final    ALT 12/01/2022 35  10 - 40 U/L Final    AST 12/01/2022 26  15 - 37 U/L Final    pH, Seb 12/01/2022 7.394  7.350 - 7.450 Final    pCO2, Seb 12/01/2022 49.8  40.0 - 50.0 mmHg Final    pO2, Seb 12/01/2022 37  Not Established mmHg Final    HCO3, Venous 12/01/2022 30 (A)  23 - 29 mmol/L Final    Base Excess, Seb 12/01/2022 4.0  Not Established mmol/L Final    O2 Sat, Seb 12/01/2022 71  Not Established % Final    Carboxyhemoglobin 12/01/2022 1.3  % Final    Comment:      0.0-1.5  (Smokers 1.5-5.0)      MetHgb, Seb 12/01/2022 0.3  <1.5 % Final    TC02 (Calc), Seb 12/01/2022 32  Not Established mmol/L Final    O2 Therapy 12/01/2022 Unknown   Final    Rapid Influenza A Ag 12/01/2022 POSITIVE (A)  Negative Final    Rapid Influenza B Ag 12/01/2022 Negative  Negative Final    SARS-CoV-2, NAAT 12/01/2022 Not Detected  Not Detected Final    Comment: Rapid NAAT:   Negative results should be treated as presumptive and,  if inconsistent with clinical signs and symptoms or necessary for  patient management, should be tested with an alternative molecular  assay. Negative results do not preclude SARS-CoV-2 infection and  should not be used as the sole basis for patient management decisions. This test has been authorized by the FDA under an Emergency Use  Authorization (EUA) for use by authorized laboratories. Fact sheet for Healthcare Providers:  http://www.marck.erasto/  Fact sheet for Patients: http://www.beti-holly.erasto/    METHODOLOGY: Isothermal Nucleic Acid Amplification      Procalcitonin 12/02/2022 0.09  0.00 - 0.15 ng/mL Final    Comment: Suspected Sepsis:  Low likelihood of sepsis  <.50 ng/mL    Increased likelihood of sepsis 0.50-2.00 ng/mL  Antibiotics encouraged    High risk of sepsis/shock   >2.00 ng/mL  Antibiotics strongly encouraged    Suspected Lower Respiratory Tract Infections:  Low likelihood of bacterial infection  <0.24 ng/mL    Increased likelihood of bacterial infection >0.24 ng/mL  Antibiotics encouraged    With successful antibiotic therapy, PCT levels should decrease  rapidly.  (Half-life of 24 to 36 hours.)    Procalcitonin values from samples collected within the first  6 hours of systemic infection may still be low. Retesting may be indicated. Values from day 1 and day 4 can be entered into the Change in  Procalcitonin Calculator to determine the patient's  Mortality Risk Prognosis  ([email or web address] com)    In healthy neonates, plasma Procalcitonin (PCT) concentrations  increase gradually after birth, reaching peak values at about  24 hours of age then decrease to normal values below 0.5                            ng/mL  by 48-72 hours of age.       CULTURE, RESPIRATORY 12/02/2022 Normal respiratory rowan   Final    Gram Stain Result 12/02/2022    Final                    Value:2+ Epithelial Cells  4+ WBC's (Polymorphonuclear)  3+ Gram positive cocci  2+ Yeast      WBC 12/03/2022 16.3 (A)  4.0 - 11.0 K/uL Final    RBC 12/03/2022 5.43  4.20 - 5.90 M/uL Final    Hemoglobin 12/03/2022 15.6  13.5 - 17.5 g/dL Final    Hematocrit 12/03/2022 48.1  40.5 - 52.5 % Final    MCV 12/03/2022 88.7  80.0 - 100.0 fL Final    MCH 12/03/2022 28.8  26.0 - 34.0 pg Final    MCHC 12/03/2022 32.4  31.0 - 36.0 g/dL Final    RDW 12/03/2022 15.1  12.4 - 15.4 % Final    Platelets 06/63/5533 298  135 - 450 K/uL Final    MPV 12/03/2022 6.7  5.0 - 10.5 fL Final    Neutrophils % 12/03/2022 92.8  % Final    Lymphocytes % 12/03/2022 3.2  % Final    Monocytes % 12/03/2022 3.9  % Final    Eosinophils % 12/03/2022 0.0  % Final    Basophils % 12/03/2022 0.1  % Final    Neutrophils Absolute 12/03/2022 15.2 (A)  1.7 - 7.7 K/uL Final    Lymphocytes Absolute 12/03/2022 0.5 (A)  1.0 - 5.1 K/uL Final    Monocytes Absolute 12/03/2022 0.6  0.0 - 1.3 K/uL Final    Eosinophils Absolute 12/03/2022 0.0  0.0 - 0.6 K/uL Final    Basophils Absolute 12/03/2022 0.0  0.0 - 0.2 K/uL Final    Sodium 12/03/2022 136  136 - 145 mmol/L Final    Potassium 12/03/2022 4.8  3.5 - 5.1 mmol/L Final    Chloride 12/03/2022 99  99 - 110 mmol/L Final    CO2 12/03/2022 27  21 - 32 mmol/L Final    Anion Gap 12/03/2022 10  3 - 16 Final    Glucose 12/03/2022 209 (A)  70 - 99 mg/dL Final    BUN 12/03/2022 17  7 - 20 mg/dL Final    Creatinine 12/03/2022 0.6 (A)  0.8 - 1.3 mg/dL Final    Est, Glom Filt Rate 12/03/2022 >60  >60 Final    Comment: Pediatric calculator link  [email or web address]_calculatorped  Effective Oct 3, 2022  These results are not intended for use in patients  <25years of age. eGFR results are calculated without  a race factor using the 2021 CKD-EPI equation. Careful  clinical correlation is recommended, particularly when  comparing to results calculated using previous equations. The CKD-EPI equation is less accurate in patients with  extremes of muscle mass, extra-renal metabolism of  creatinine, excessive creatinine ingestion, or following  therapy that affects renal tubular secretion. Calcium 12/03/2022 9.2  8.3 - 10.6 mg/dL Final    Sodium 12/04/2022 137  136 - 145 mmol/L Final    Potassium 12/04/2022 4.8  3.5 - 5.1 mmol/L Final    Chloride 12/04/2022 97 (A)  99 - 110 mmol/L Final    CO2 12/04/2022 30  21 - 32 mmol/L Final    Anion Gap 12/04/2022 10  3 - 16 Final    Glucose 12/04/2022 167 (A)  70 - 99 mg/dL Final    BUN 12/04/2022 15  7 - 20 mg/dL Final    Creatinine 12/04/2022 0.9  0.8 - 1.3 mg/dL Final    Est, Glom Filt Rate 12/04/2022 >60  >60 Final    Comment: Pediatric calculator link  [email or web address]_calculatorped  Effective Oct 3, 2022  These results are not intended for use in patients  <25years of age. eGFR results are calculated without  a race factor using the 2021 CKD-EPI equation. Careful  clinical correlation is recommended, particularly when  comparing to results calculated using previous equations.   The CKD-EPI equation is less accurate in patients with  extremes of muscle mass, extra-renal metabolism of  creatinine, excessive creatinine ingestion, or following  therapy that affects renal tubular secretion. Calcium 12/04/2022 9.5  8.3 - 10.6 mg/dL Final    WBC 12/04/2022 17.5 (A)  4.0 - 11.0 K/uL Final    RBC 12/04/2022 5.87  4.20 - 5.90 M/uL Final    Hemoglobin 12/04/2022 16.9  13.5 - 17.5 g/dL Final    Hematocrit 12/04/2022 52.0  40.5 - 52.5 % Final    MCV 12/04/2022 88.7  80.0 - 100.0 fL Final    MCH 12/04/2022 28.8  26.0 - 34.0 pg Final    MCHC 12/04/2022 32.5  31.0 - 36.0 g/dL Final    RDW 12/04/2022 15.6 (A)  12.4 - 15.4 % Final    Platelets 91/99/6859 351  135 - 450 K/uL Final    MPV 12/04/2022 6.7  5.0 - 10.5 fL Final    Neutrophils % 12/04/2022 90.8  % Final    Lymphocytes % 12/04/2022 2.9  % Final    Monocytes % 12/04/2022 6.1  % Final    Eosinophils % 12/04/2022 0.0  % Final    Basophils % 12/04/2022 0.2  % Final    Neutrophils Absolute 12/04/2022 15.9 (A)  1.7 - 7.7 K/uL Final    Lymphocytes Absolute 12/04/2022 0.5 (A)  1.0 - 5.1 K/uL Final    Monocytes Absolute 12/04/2022 1.1  0.0 - 1.3 K/uL Final    Eosinophils Absolute 12/04/2022 0.0  0.0 - 0.6 K/uL Final    Basophils Absolute 12/04/2022 0.0  0.0 - 0.2 K/uL Final    Sodium 12/05/2022 134 (A)  136 - 145 mmol/L Final    Potassium 12/05/2022 4.9  3.5 - 5.1 mmol/L Final    Chloride 12/05/2022 95 (A)  99 - 110 mmol/L Final    CO2 12/05/2022 25  21 - 32 mmol/L Final    Anion Gap 12/05/2022 14  3 - 16 Final    Glucose 12/05/2022 230 (A)  70 - 99 mg/dL Final    BUN 12/05/2022 17  7 - 20 mg/dL Final    Creatinine 12/05/2022 0.9  0.8 - 1.3 mg/dL Final    Est, Glom Filt Rate 12/05/2022 >60  >60 Final    Comment: Pediatric calculator link  [facility].[email or web address]/gfr_calculatorped  Effective Oct 3, 2022  These results are not intended for use in patients  <25years of age. eGFR results are calculated without  a race factor using the 2021 CKD-EPI equation. Careful  clinical correlation is recommended, particularly when  comparing to results calculated using previous equations.   The CKD-EPI equation is less accurate in patients with  extremes of muscle mass, extra-renal metabolism of  creatinine, excessive creatinine ingestion, or following  therapy that affects renal tubular secretion. Calcium 12/05/2022 9.5  8.3 - 10.6 mg/dL Final    WBC 12/05/2022 14.5 (A)  4.0 - 11.0 K/uL Final    RBC 12/05/2022 5.69  4.20 - 5.90 M/uL Final    Hemoglobin 12/05/2022 16.5  13.5 - 17.5 g/dL Final    Hematocrit 12/05/2022 50.5  40.5 - 52.5 % Final    MCV 12/05/2022 88.8  80.0 - 100.0 fL Final    MCH 12/05/2022 29.0  26.0 - 34.0 pg Final    MCHC 12/05/2022 32.7  31.0 - 36.0 g/dL Final    RDW 12/05/2022 15.2  12.4 - 15.4 % Final    Platelets 28/68/6289 336  135 - 450 K/uL Final    MPV 12/05/2022 6.9  5.0 - 10.5 fL Final    Neutrophils % 12/05/2022 89.6  % Final    Lymphocytes % 12/05/2022 4.9  % Final    Monocytes % 12/05/2022 5.4  % Final    Eosinophils % 12/05/2022 0.0  % Final    Basophils % 12/05/2022 0.1  % Final    Neutrophils Absolute 12/05/2022 13.0 (A)  1.7 - 7.7 K/uL Final    Lymphocytes Absolute 12/05/2022 0.7 (A)  1.0 - 5.1 K/uL Final    Monocytes Absolute 12/05/2022 0.8  0.0 - 1.3 K/uL Final    Eosinophils Absolute 12/05/2022 0.0  0.0 - 0.6 K/uL Final    Basophils Absolute 12/05/2022 0.0  0.0 - 0.2 K/uL Final    Sodium 12/06/2022 138  136 - 145 mmol/L Final    Potassium 12/06/2022 4.4  3.5 - 5.1 mmol/L Final    Chloride 12/06/2022 99  99 - 110 mmol/L Final    CO2 12/06/2022 27  21 - 32 mmol/L Final    Anion Gap 12/06/2022 12  3 - 16 Final    Glucose 12/06/2022 126 (A)  70 - 99 mg/dL Final    BUN 12/06/2022 16  7 - 20 mg/dL Final    Creatinine 12/06/2022 0.7 (A)  0.8 - 1.3 mg/dL Final    Est, Glom Filt Rate 12/06/2022 >60  >60 Final    Comment: Pediatric calculator link  [facility].[email or web address]. [email or web address]/gfr_calculatorped  Effective Oct 3, 2022  These results are not intended for use in patients  <25years of age. eGFR results are calculated without  a race factor using the 2021 CKD-EPI equation. Careful  clinical correlation is recommended, particularly when  comparing to results calculated using previous equations. The CKD-EPI equation is less accurate in patients with  extremes of muscle mass, extra-renal metabolism of  creatinine, excessive creatinine ingestion, or following  therapy that affects renal tubular secretion.       Calcium 12/06/2022 8.8  8.3 - 10.6 mg/dL Final    WBC 12/06/2022 13.3 (A)  4.0 - 11.0 K/uL Final    RBC 12/06/2022 5.30  4.20 - 5.90 M/uL Final    Hemoglobin 12/06/2022 15.2  13.5 - 17.5 g/dL Final    Hematocrit 12/06/2022 46.8  40.5 - 52.5 % Final    MCV 12/06/2022 88.5  80.0 - 100.0 fL Final    MCH 12/06/2022 28.7  26.0 - 34.0 pg Final    MCHC 12/06/2022 32.4  31.0 - 36.0 g/dL Final    RDW 12/06/2022 15.4  12.4 - 15.4 % Final    Platelets 55/42/7001 326  135 - 450 K/uL Final    MPV 12/06/2022 6.8  5.0 - 10.5 fL Final    Neutrophils % 12/06/2022 80.6  % Final    Lymphocytes % 12/06/2022 10.4  % Final    Monocytes % 12/06/2022 8.9  % Final    Eosinophils % 12/06/2022 0.0  % Final    Basophils % 12/06/2022 0.1  % Final    Neutrophils Absolute 12/06/2022 10.8 (A)  1.7 - 7.7 K/uL Final    Lymphocytes Absolute 12/06/2022 1.4  1.0 - 5.1 K/uL Final    Monocytes Absolute 12/06/2022 1.2  0.0 - 1.3 K/uL Final    Eosinophils Absolute 12/06/2022 0.0  0.0 - 0.6 K/uL Final    Basophils Absolute 12/06/2022 0.0  0.0 - 0.2 K/uL Final         Assessment & Plan:   Principal Problem:    Influenza A--completed Tamiflu and Levaquin--remains with wheeze and cough --still req O2--he was NOT on O2 PTA  Active Problems:    Hypoxemia--O2     COPD exacerbation (HCC)    COPD, very severe (HCC)    Acute respiratory failure with hypoxia (HCC)    DNR (do not resuscitate)--he has changed his mind--he now req full code status --agree he is not ready for hospice eval     Essential hypertension--Continue current therapy      Chronic respiratory failure with hypoxia ([facility] [address]  Resolved Problems:    * No resolved hospital problems.  *  Will ret to iv solumedrol--daliresp to be added by pulm   Dw wife also   Please note that over 35 minutes was spent in evaluating the patient, review of records and results, discussion with staff/family, etc.    Elisabeth Mejia MD

## 2022-12-07 NOTE — PROGRESS NOTES
Patient sitting up in chair. No c/o pain. Wife sitting in room. VSS, A+Ox4. Call light in reach, all needs met at this time.  Electronically signed by Leisa Lopes RN on 12/7/2022 at 4:49 PM

## 2022-12-08 NOTE — PROGRESS NOTES
Lani GRIMES [facility] Progress Note  12/8/2022 9:11 AM  Subjective:   Admit Date: 12/1/2022      Chief Complaint: I am worn out     Interval History: Now requiring 3 liters of o2   CXR w/o infiltrate   Limited activity   Cardizem re-ordered for htn and tachycardia   Diet: ADULT DIET; Regular  Medications:   Scheduled Meds:   methylPREDNISolone sodium  40 mg IntraVENous Q8H    dilTIAZem  300 mg Oral Daily    ipratropium-albuterol  1 ampule Inhalation Q6H    polyethylene glycol  17 g Oral Daily    guaiFENesin  600 mg Oral BID    theophylline  200 mg Oral BID    mometasone-formoterol  2 puff Inhalation BID    sodium chloride (Inhalant)  15 mL Nebulization 3 times per day    sodium chloride flush  5-40 mL IntraVENous 2 times per day    enoxaparin  40 mg SubCUTAneous Nightly    polyvinyl alcohol  2 drop Both Eyes BID    pantoprazole  40 mg Oral QAM AC    atorvastatin  10 mg Oral Daily    losartan  50 mg Oral Daily     Continuous Infusions:   sodium chloride         Review of Systems -   General ROS: afebrile  Respiratory ROS: positive for - cough, shortness of breath, and wheezing  Cardiovascular ROS: no chest pain  Musculoskeletal ROS:positive for - :joint pain  Neurological ROS: no TIA or stroke symptoms    Objective:   Vitals: BP (!) 146/73   Pulse 92   Temp 97.5 °F (36.4 °C) (Axillary)   Resp 18   Ht 5' 7\" (1.702 m)   Wt 186 lb 1.1 oz (84.4 kg)   SpO2 93%   BMI 29.14 kg/m²   General appearance: alert and cooperative with exam  HEENT: Head: Normocephalic, no lesions, without obvious abnormality.   Neck: no adenopathy, no carotid bruit, no JVD, supple, symmetrical, trachea midline, and thyroid not enlarged, symmetric, no tenderness/mass/nodules  Lungs: wheezes bilaterally  Heart: regular rate and rhythm, S1, S2 normal, no murmur, click, rub or gallop  Abdomen: soft, non-tender; bowel sounds normal; no masses,  no organomegaly  Extremities: extremities normal, atraumatic, no cyanosis or edema  Neurologic: Mental status: Alert, oriented, thought content appropriate    Admission on 12/01/2022   Component Date Value Ref Range Status    Ventricular Rate 12/01/2022 107  BPM Final    Atrial Rate 12/01/2022 107  BPM Final    P-R Interval 12/01/2022 124  ms Final    QRS Duration 12/01/2022 84  ms Final    Q-T Interval 12/01/2022 312  ms Final    QTc Calculation (Bazett) 12/01/2022 416  ms Final    P Axis 12/01/2022 76  degrees Final    R Axis 12/01/2022 98  degrees Final    T Axis 12/01/2022 41  degrees Final    Diagnosis 12/01/2022    Final                    Value:Sinus tachycardiaBaseline artifactPoor R wave progression likely from lead position [id number] axisBorderline ECGWhen compared with ECG of 10/20/2021Poor data quality , butNo significant change likelyConfirmed by [facility] Jenifer CUI ([id number]) on 12/1/2022 4:16:12 PM      WBC 12/01/2022 15.6 (A)  4.0 - 11.0 K/uL Final    RBC 12/01/2022 6.07 (A)  4.20 - 5.90 M/uL Final    Hemoglobin 12/01/2022 17.6 (A)  13.5 - 17.5 g/dL Final    Hematocrit 12/01/2022 52.7 (A)  40.5 - 52.5 % Final    MCV 12/01/2022 86.9  80.0 - 100.0 fL Final    MCH 12/01/2022 29.0  26.0 - 34.0 pg Final    MCHC 12/01/2022 33.3  31.0 - 36.0 g/dL Final    RDW 12/01/2022 15.5 (A)  12.4 - 15.4 % Final    Platelets 79/60/9554 303  135 - 450 K/uL Final    MPV 12/01/2022 6.9  5.0 - 10.5 fL Final    Neutrophils % 12/01/2022 88.8  % Final    Lymphocytes % 12/01/2022 3.2  % Final    Monocytes % 12/01/2022 7.9  % Final    Eosinophils % 12/01/2022 0.0  % Final    Basophils % 12/01/2022 0.1  % Final    Neutrophils Absolute 12/01/2022 13.8 (A)  1.7 - 7.7 K/uL Final    Lymphocytes Absolute 12/01/2022 0.5 (A)  1.0 - 5.1 K/uL Final    Monocytes Absolute 12/01/2022 1.2  0.0 - 1.3 K/uL Final    Eosinophils Absolute 12/01/2022 0.0  0.0 - 0.6 K/uL Final    Basophils Absolute 12/01/2022 0.0  0.0 - 0.2 K/uL Final    Pro-BNP 12/01/2022 113  0 - 449 pg/mL Final    Comment: Methodology by NT-proBNP    An age-independent cutoff point of 300 pg/ml has a 98%  negative predictive value excluding acute heart failure. Values exceeding the age-related cutoff values (450 pg/mL if  age<50, 900 if 50-75 and 1800 if >75) has 90% sensitivity and  84% specificity for diagnosing acute HF. In patients with  renal compromise (eGFR<60) values greater than 1200pg/ml have  a diagnostic sensitivity and specificity of 89% and 72% for  acute HF. Troponin 12/01/2022 <0.01  <0.01 ng/mL Final    Methodology by Troponin T    Sodium 12/01/2022 132 (A)  136 - 145 mmol/L Final    Potassium reflex Magnesium 12/01/2022 4.2  3.5 - 5.1 mmol/L Final    Chloride 12/01/2022 94 (A)  99 - 110 mmol/L Final    CO2 12/01/2022 26  21 - 32 mmol/L Final    Anion Gap 12/01/2022 12  3 - 16 Final    Glucose 12/01/2022 128 (A)  70 - 99 mg/dL Final    BUN 12/01/2022 15  7 - 20 mg/dL Final    Creatinine 12/01/2022 0.7 (A)  0.8 - 1.3 mg/dL Final    Est, Glom Filt Rate 12/01/2022 >60  >60 Final    Comment: Pediatric calculator link  [facility].at. [email or web address]_[email or web address]  Effective Oct 3, 2022  These results are not intended for use in patients  <25years of age. eGFR results are calculated without  a race factor using the 2021 CKD-EPI equation. Careful  clinical correlation is recommended, particularly when  comparing to results calculated using previous equations. The CKD-EPI equation is less accurate in patients with  extremes of muscle mass, extra-renal metabolism of  creatinine, excessive creatinine ingestion, or following  therapy that affects renal tubular secretion.       Calcium 12/01/2022 9.7  8.3 - 10.6 mg/dL Final    Total Protein 12/01/2022 6.9  6.4 - 8.2 g/dL Final    Albumin 12/01/2022 3.6  3.4 - 5.0 g/dL Final    Albumin/Globulin Ratio 12/01/2022 1.1  1.1 - 2.2 Final    Total Bilirubin 12/01/2022 0.4  0.0 - 1.0 mg/dL Final    Alkaline Phosphatase 12/01/2022 75  40 - 129 U/L Final    ALT 12/01/2022 35  10 - 40 U/L Final    AST 12/01/2022 26  15 - 37 U/L Final    pH, Seb 12/01/2022 7.394  7.350 - 7.450 Final    pCO2, Seb 12/01/2022 49.8  40.0 - 50.0 mmHg Final    pO2, Seb 12/01/2022 37  Not Established mmHg Final    HCO3, Venous 12/01/2022 30 (A)  23 - 29 mmol/L Final    Base Excess, Seb 12/01/2022 4.0  Not Established mmol/L Final    O2 Sat, Seb 12/01/2022 71  Not Established % Final    Carboxyhemoglobin 12/01/2022 1.3  % Final    Comment:      0.0-1.5  (Smokers 1.5-5.0)      MetHgb, Seb 12/01/2022 0.3  <1.5 % Final    TC02 (Calc), Seb 12/01/2022 32  Not Established mmol/L Final    O2 Therapy 12/01/2022 Unknown   Final    Rapid Influenza A Ag 12/01/2022 POSITIVE (A)  Negative Final    Rapid Influenza B Ag 12/01/2022 Negative  Negative Final    SARS-CoV-2, NAAT 12/01/2022 Not Detected  Not Detected Final    Comment: Rapid NAAT:   Negative results should be treated as presumptive and,  if inconsistent with clinical signs and symptoms or necessary for  patient management, should be tested with an alternative molecular  assay. Negative results do not preclude SARS-CoV-2 infection and  should not be used as the sole basis for patient management decisions. This test has been authorized by the FDA under an Emergency Use  Authorization (EUA) for use by authorized laboratories.     Fact sheet for Healthcare Providers:  http://www.marck.erasto/  Fact sheet for Patients: http://www.beti-holly.erasto/    METHODOLOGY: Isothermal Nucleic Acid Amplification      Procalcitonin 12/02/2022 0.09  0.00 - 0.15 ng/mL Final    Comment: Suspected Sepsis:  Low likelihood of sepsis  <.50 ng/mL    Increased likelihood of sepsis 0.50-2.00 ng/mL  Antibiotics encouraged    High risk of sepsis/shock   >2.00 ng/mL  Antibiotics strongly encouraged    Suspected Lower Respiratory Tract Infections:  Low likelihood of bacterial infection  <0.24 ng/mL    Increased likelihood of bacterial infection >0.24 ng/mL  Antibiotics encouraged    With successful antibiotic therapy, PCT levels should decrease  rapidly. (Half-life of 24 to 36 hours.)    Procalcitonin values from samples collected within the first  6 hours of systemic infection may still be low. Retesting may be indicated. Values from day 1 and day 4 can be entered into the Change in  Procalcitonin Calculator to determine the patient's  Mortality Risk Prognosis  ([email or web address] com)    In healthy neonates, plasma Procalcitonin (PCT) concentrations  increase gradually after birth, reaching peak values at about  24 hours of age then decrease to normal values below 0.5                            ng/mL  by 48-72 hours of age.       CULTURE, RESPIRATORY 12/02/2022 Normal respiratory rowan   Final    Gram Stain Result 12/02/2022    Final                    Value:2+ Epithelial Cells  4+ WBC's (Polymorphonuclear)  3+ Gram positive cocci  2+ Yeast      WBC 12/03/2022 16.3 (A)  4.0 - 11.0 K/uL Final    RBC 12/03/2022 5.43  4.20 - 5.90 M/uL Final    Hemoglobin 12/03/2022 15.6  13.5 - 17.5 g/dL Final    Hematocrit 12/03/2022 48.1  40.5 - 52.5 % Final    MCV 12/03/2022 88.7  80.0 - 100.0 fL Final    MCH 12/03/2022 28.8  26.0 - 34.0 pg Final    MCHC 12/03/2022 32.4  31.0 - 36.0 g/dL Final    RDW 12/03/2022 15.1  12.4 - 15.4 % Final    Platelets 38/00/3840 298  135 - 450 K/uL Final    MPV 12/03/2022 6.7  5.0 - 10.5 fL Final    Neutrophils % 12/03/2022 92.8  % Final    Lymphocytes % 12/03/2022 3.2  % Final    Monocytes % 12/03/2022 3.9  % Final    Eosinophils % 12/03/2022 0.0  % Final    Basophils % 12/03/2022 0.1  % Final    Neutrophils Absolute 12/03/2022 15.2 (A)  1.7 - 7.7 K/uL Final    Lymphocytes Absolute 12/03/2022 0.5 (A)  1.0 - 5.1 K/uL Final    Monocytes Absolute 12/03/2022 0.6  0.0 - 1.3 K/uL Final    Eosinophils Absolute 12/03/2022 0.0  0.0 - 0.6 K/uL Final    Basophils Absolute 12/03/2022 0.0  0.0 - 0.2 K/uL Final    Sodium 12/03/2022 136  136 - 145 mmol/L Final    Potassium 12/03/2022 4.8  3.5 - 5.1 mmol/L Final Chloride 12/03/2022 99  99 - 110 mmol/L Final    CO2 12/03/2022 27  21 - 32 mmol/L Final    Anion Gap 12/03/2022 10  3 - 16 Final    Glucose 12/03/2022 209 (A)  70 - 99 mg/dL Final    BUN 12/03/2022 17  7 - 20 mg/dL Final    Creatinine 12/03/2022 0.6 (A)  0.8 - 1.3 mg/dL Final    Est, Glom Filt Rate 12/03/2022 >60  >60 Final    Comment: Pediatric calculator link  [employer].at. [email or web address]/gfr_calculatorped  Effective Oct 3, 2022  These results are not intended for use in patients  <25years of age. eGFR results are calculated without  a race factor using the 2021 CKD-EPI equation. Careful  clinical correlation is recommended, particularly when  comparing to results calculated using previous equations. The CKD-EPI equation is less accurate in patients with  extremes of muscle mass, extra-renal metabolism of  creatinine, excessive creatinine ingestion, or following  therapy that affects renal tubular secretion. Calcium 12/03/2022 9.2  8.3 - 10.6 mg/dL Final    Sodium 12/04/2022 137  136 - 145 mmol/L Final    Potassium 12/04/2022 4.8  3.5 - 5.1 mmol/L Final    Chloride 12/04/2022 97 (A)  99 - 110 mmol/L Final    CO2 12/04/2022 30  21 - 32 mmol/L Final    Anion Gap 12/04/2022 10  3 - 16 Final    Glucose 12/04/2022 167 (A)  70 - 99 mg/dL Final    BUN 12/04/2022 15  7 - 20 mg/dL Final    Creatinine 12/04/2022 0.9  0.8 - 1.3 mg/dL Final    Est, Glom Filt Rate 12/04/2022 >60  >60 Final    Comment: Pediatric calculator link  [employer].at. [email or web address]/gfr_calculatorped  Effective Oct 3, 2022  These results are not intended for use in patients  <25years of age. eGFR results are calculated without  a race factor using the 2021 CKD-EPI equation. Careful  clinical correlation is recommended, particularly when  comparing to results calculated using previous equations.   The CKD-EPI equation is less accurate in patients with  extremes of muscle mass, extra-renal metabolism of  creatinine, excessive creatinine ingestion, or following  therapy that affects renal tubular secretion. Calcium 12/04/2022 9.5  8.3 - 10.6 mg/dL Final    WBC 12/04/2022 17.5 (A)  4.0 - 11.0 K/uL Final    RBC 12/04/2022 5.87  4.20 - 5.90 M/uL Final    Hemoglobin 12/04/2022 16.9  13.5 - 17.5 g/dL Final    Hematocrit 12/04/2022 52.0  40.5 - 52.5 % Final    MCV 12/04/2022 88.7  80.0 - 100.0 fL Final    MCH 12/04/2022 28.8  26.0 - 34.0 pg Final    MCHC 12/04/2022 32.5  31.0 - 36.0 g/dL Final    RDW 12/04/2022 15.6 (A)  12.4 - 15.4 % Final    Platelets 87/97/7118 351  135 - 450 K/uL Final    MPV 12/04/2022 6.7  5.0 - 10.5 fL Final    Neutrophils % 12/04/2022 90.8  % Final    Lymphocytes % 12/04/2022 2.9  % Final    Monocytes % 12/04/2022 6.1  % Final    Eosinophils % 12/04/2022 0.0  % Final    Basophils % 12/04/2022 0.2  % Final    Neutrophils Absolute 12/04/2022 15.9 (A)  1.7 - 7.7 K/uL Final    Lymphocytes Absolute 12/04/2022 0.5 (A)  1.0 - 5.1 K/uL Final    Monocytes Absolute 12/04/2022 1.1  0.0 - 1.3 K/uL Final    Eosinophils Absolute 12/04/2022 0.0  0.0 - 0.6 K/uL Final    Basophils Absolute 12/04/2022 0.0  0.0 - 0.2 K/uL Final    Sodium 12/05/2022 134 (A)  136 - 145 mmol/L Final    Potassium 12/05/2022 4.9  3.5 - 5.1 mmol/L Final    Chloride 12/05/2022 95 (A)  99 - 110 mmol/L Final    CO2 12/05/2022 25  21 - 32 mmol/L Final    Anion Gap 12/05/2022 14  3 - 16 Final    Glucose 12/05/2022 230 (A)  70 - 99 mg/dL Final    BUN 12/05/2022 17  7 - 20 mg/dL Final    Creatinine 12/05/2022 0.9  0.8 - 1.3 mg/dL Final    Est, Glom Filt Rate 12/05/2022 >60  >60 Final    Comment: Pediatric calculator link  [employer].[email or web address]. [email or web address]/kdoqi/gfr_calculatorped  Effective Oct 3, 2022  These results are not intended for use in patients  <25years of age. eGFR results are calculated without  a race factor using the 2021 CKD-EPI equation.   Careful  clinical correlation is recommended, particularly when  comparing to results calculated using previous equations. The CKD-EPI equation is less accurate in patients with  extremes of muscle mass, extra-renal metabolism of  creatinine, excessive creatinine ingestion, or following  therapy that affects renal tubular secretion. Calcium 12/05/2022 9.5  8.3 - 10.6 mg/dL Final    WBC 12/05/2022 14.5 (A)  4.0 - 11.0 K/uL Final    RBC 12/05/2022 5.69  4.20 - 5.90 M/uL Final    Hemoglobin 12/05/2022 16.5  13.5 - 17.5 g/dL Final    Hematocrit 12/05/2022 50.5  40.5 - 52.5 % Final    MCV 12/05/2022 88.8  80.0 - 100.0 fL Final    MCH 12/05/2022 29.0  26.0 - 34.0 pg Final    MCHC 12/05/2022 32.7  31.0 - 36.0 g/dL Final    RDW 12/05/2022 15.2  12.4 - 15.4 % Final    Platelets 48/33/0289 336  135 - 450 K/uL Final    MPV 12/05/2022 6.9  5.0 - 10.5 fL Final    Neutrophils % 12/05/2022 89.6  % Final    Lymphocytes % 12/05/2022 4.9  % Final    Monocytes % 12/05/2022 5.4  % Final    Eosinophils % 12/05/2022 0.0  % Final    Basophils % 12/05/2022 0.1  % Final    Neutrophils Absolute 12/05/2022 13.0 (A)  1.7 - 7.7 K/uL Final    Lymphocytes Absolute 12/05/2022 0.7 (A)  1.0 - 5.1 K/uL Final    Monocytes Absolute 12/05/2022 0.8  0.0 - 1.3 K/uL Final    Eosinophils Absolute 12/05/2022 0.0  0.0 - 0.6 K/uL Final    Basophils Absolute 12/05/2022 0.0  0.0 - 0.2 K/uL Final    Sodium 12/06/2022 138  136 - 145 mmol/L Final    Potassium 12/06/2022 4.4  3.5 - 5.1 mmol/L Final    Chloride 12/06/2022 99  99 - 110 mmol/L Final    CO2 12/06/2022 27  21 - 32 mmol/L Final    Anion Gap 12/06/2022 12  3 - 16 Final    Glucose 12/06/2022 126 (A)  70 - 99 mg/dL Final    BUN 12/06/2022 16  7 - 20 mg/dL Final    Creatinine 12/06/2022 0.7 (A)  0.8 - 1.3 mg/dL Final    Est, Glom Filt Rate 12/06/2022 >60  >60 Final    Comment: Pediatric calculator link  [employer].at. [email or web address]/kdoqi/gfr_calculatorped  Effective Oct 3, 2022  These results are not intended for use in patients  <25years of age.   eGFR results are calculated without  a race factor using the 2021 CKD-EPI equation. Careful  clinical correlation is recommended, particularly when  comparing to results calculated using previous equations. The CKD-EPI equation is less accurate in patients with  extremes of muscle mass, extra-renal metabolism of  creatinine, excessive creatinine ingestion, or following  therapy that affects renal tubular secretion. Calcium 12/06/2022 8.8  8.3 - 10.6 mg/dL Final    WBC 12/06/2022 13.3 (A)  4.0 - 11.0 K/uL Final    RBC 12/06/2022 5.30  4.20 - 5.90 M/uL Final    Hemoglobin 12/06/2022 15.2  13.5 - 17.5 g/dL Final    Hematocrit 12/06/2022 46.8  40.5 - 52.5 % Final    MCV 12/06/2022 88.5  80.0 - 100.0 fL Final    MCH 12/06/2022 28.7  26.0 - 34.0 pg Final    MCHC 12/06/2022 32.4  31.0 - 36.0 g/dL Final    RDW 12/06/2022 15.4  12.4 - 15.4 % Final    Platelets 90/59/1907 326  135 - 450 K/uL Final    MPV 12/06/2022 6.8  5.0 - 10.5 fL Final    Neutrophils % 12/06/2022 80.6  % Final    Lymphocytes % 12/06/2022 10.4  % Final    Monocytes % 12/06/2022 8.9  % Final    Eosinophils % 12/06/2022 0.0  % Final    Basophils % 12/06/2022 0.1  % Final    Neutrophils Absolute 12/06/2022 10.8 (A)  1.7 - 7.7 K/uL Final    Lymphocytes Absolute 12/06/2022 1.4  1.0 - 5.1 K/uL Final    Monocytes Absolute 12/06/2022 1.2  0.0 - 1.3 K/uL Final    Eosinophils Absolute 12/06/2022 0.0  0.0 - 0.6 K/uL Final    Basophils Absolute 12/06/2022 0.0  0.0 - 0.2 K/uL Final    Theophylline Lvl 12/07/2022 4.8 (A)  8.0 - 20.0 ug/mL Final         Assessment & Plan:   Principal Problem:    Influenza A--completed Tamiflu--remains SOB   Active Problems:    Hypoxemia--Cont O2     COPD exacerbation (HCC)    COPD, very severe (HCC)    Acute respiratory failure with hypoxia (HCC)    DNR (do not resuscitate)--he now desires to be full code     Essential hypertension--Continue current therapy      Chronic respiratory failure with hypoxia (HCC)  Resolved Problems:    * No resolved hospital problems. *  Cont vest abd daliresp--O2 and IV steroids   Please note that over 35 minutes was spent in evaluating the patient, review of records and results, discussion with staff/family, etc.    Cristino Ceballos MD

## 2022-12-08 NOTE — CARE COORDINATION
Per chart review, patient remains on O2-3l/min per NC, refused respiratory treatments this morning because he was eating breakfast. Code status changed from DNR to full code. Has oxygen at 2L/min/NC at baseline. Plan is to return to home. Kelsie MATA RN  Case Management  [phone]    Electronically signed by Kelsie Nicholson RN on 12/8/2022 at 9:49 AM    Addendum:     Per review of pulmonary note, vest therapy to be added as outpatient/discharge.      Kelsie MATA RN  Case Management  [phone]    Electronically signed by Kelsie Nicholson RN on 12/8/2022 at 4:10 PM

## 2022-12-08 NOTE — PROGRESS NOTES
Pulmonary Progress Note    Date of Admission: 12/1/2022   LOS: 7 days       CC:  Chief Complaint   Patient presents with    Shortness of Breath     Pt states he has had increased sob x 2-3 days. Pt states he is coughing up green sputum. Pt has history of copd and is on home oxygen of 2 liters. Subjective: With vest therapy, productive phlegm has decreased. Overall continues to be severely short of breath with wheezing. ROS:   No nausea  No Vomiting  No chest pain      Assessment:         Plan: This note may have been transcribed using [id number] [address] [employer]. Please disregard any translational errors. Hospital Day: 7     COPD exacerbation secondary to influenza A pneumonia  Solumedrol,   40   bid. Dulera   theophylline   Tamiflu  Duoneb's  every 6 hours. Sputum for culture. vest.  Patient is not clear if this is improving but phlegm has decreased  Plan to add vest therapy and Daliresp as out patient     Sleep apnea   Home cpap in room. Patient confirmed full code but also stated he would not want to live on the ventilator. Data:        PHYSICAL EXAM:   Blood pressure (!) 146/73, pulse 92, temperature 97.5 °F (36.4 °C), temperature source Axillary, resp. rate 18, height 5' 7\" (1.702 m), weight 186 lb 1.1 oz (84.4 kg), SpO2 93 %.'  Body mass index is 29.14 kg/m². Gen: No distress. ENT:   Resp: No accessory muscle use. No crackles. Few  wheezes. +  rhonchi. CV: Regular rate. Regular rhythm. No murmur or rub. No edema. Skin: Warm, dry, normal texture and turgor. No nodule on exposed extremities. M/S: No cyanosis. No clubbing. No joint deformity. Psych: Oriented x 3. No anxiety. Awake. Alert. Intact judgement and insight. Good Mood / Affect.   Memory appears in tact       Medications:    Scheduled Meds:   methylPREDNISolone sodium  40 mg IntraVENous Q8H    dilTIAZem  300 mg Oral Daily    ipratropium-albuterol  1 ampule Inhalation Q6H    polyethylene glycol 17 g Oral Daily    guaiFENesin  600 mg Oral BID    theophylline  200 mg Oral BID    mometasone-formoterol  2 puff Inhalation BID    sodium chloride (Inhalant)  15 mL Nebulization 3 times per day    sodium chloride flush  5-40 mL IntraVENous 2 times per day    enoxaparin  40 mg SubCUTAneous Nightly    polyvinyl alcohol  2 drop Both Eyes BID    pantoprazole  40 mg Oral QAM AC    atorvastatin  10 mg Oral Daily    losartan  50 mg Oral Daily       Continuous Infusions:   sodium chloride         PRN Meds:  sodium chloride flush, sodium chloride, ondansetron **OR** ondansetron, acetaminophen **OR** acetaminophen, albuterol    Labs reviewed:  CBC:   Recent Labs     12/06/22 0622   WBC 13.3*   HGB 15.2   HCT 46.8   MCV 88.5          BMP:   Recent Labs     12/06/22 0622      K 4.4   CL 99   CO2 27   BUN 16   CREATININE 0.7*       LIVER PROFILE: No results for input(s): AST, ALT, LIPASE, BILIDIR, BILITOT, ALKPHOS in the last 72 hours. Invalid input(s): AMYLASE,  ALB  PT/INR: No results for input(s): PROTIME, INR in the last 72 hours. APTT: No results for input(s): APTT in the last 72 hours. UA:No results for input(s): NITRITE, COLORU, PHUR, LABCAST, WBCUA, RBCUA, MUCUS, TRICHOMONAS, YEAST, BACTERIA, CLARITYU, SPECGRAV, LEUKOCYTESUR, UROBILINOGEN, BILIRUBINUR, BLOODU, GLUCOSEU, AMORPHOUS in the last 72 hours. Invalid input(s): Esaw Speck  No results for input(s): PH, PCO2, PO2 in the last 72 hours. Cx:      Films: This note was transcribed using [id number] [employer]. Please disregard any translational errors.       Triny Olvera Pulmonary, Sleep and [facility] [phone]

## 2022-12-08 NOTE — PROGRESS NOTES
Patient sitting in chair eating dinner. Wife in room. SOB noted, frequent pauses during meal to take deep breaths. /71 . No c/o any pain. Call light in reach.  Electronically signed by Mariama Reyes RN on 12/8/2022 at 5:04 PM

## 2022-12-08 NOTE — PLAN OF CARE
Problem: Discharge Planning  Goal: Discharge to home or other facility with appropriate resources  Outcome: Progressing  Flowsheets (Taken 12/8/2022 0905)  Discharge to home or other facility with appropriate resources:   Identify barriers to discharge with patient and caregiver   Identify discharge learning needs (meds, wound care, etc)     Problem: Safety - Adult  Goal: Free from fall injury  Outcome: Progressing     Problem: Pain  Goal: Verbalizes/displays adequate comfort level or baseline comfort level  Outcome: Progressing     Problem: ABCDS Injury Assessment  Goal: Absence of physical injury  Outcome: Progressing     Problem: Respiratory - Adult  Goal: Achieves optimal ventilation and oxygenation  Outcome: Progressing     Problem: Infection - Adult  Goal: Absence of infection at discharge  Outcome: Progressing  Flowsheets (Taken 12/8/2022 0905)  Absence of infection at discharge:   Assess and monitor for signs and symptoms of infection   Monitor lab/diagnostic results   Monitor all insertion sites i.e., indwelling lines, tubes and drains   Administer medications as ordered   Instruct and encourage patient and family to use good hand hygiene technique   Identify and instruct in appropriate isolation precautions for identified infection/condition  Goal: Absence of infection during hospitalization  Outcome: Progressing  Flowsheets (Taken 12/8/2022 0905)  Absence of infection during hospitalization:   Assess and monitor for signs and symptoms of infection   Monitor lab/diagnostic results   Monitor all insertion sites i.e., indwelling lines, tubes and drains   Administer medications as ordered   Instruct and encourage patient and family to use good hand hygiene technique   Identify and instruct in appropriate isolation precautions for identified infection/condition  Goal: Absence of fever/infection during anticipated neutropenic period  Outcome: Progressing  Flowsheets (Taken 12/8/2022 0905)  Absence of fever/infection during anticipated neutropenic period: Monitor white blood cell count

## 2022-12-08 NOTE — PROGRESS NOTES
Patient refusing to take respiratory treatments. Patient stated, I can't do them right now\". RT asked why patient couldn't use treatments and he stated, \"because I can't breath\". Rt explained to patient that his respiratory therapy is to help him breath better and he still refused. Patient stated he would do treatments at a later time. RT explained to patient that we cannot guarantee a return time. Patient then stated that he didn't care and it was fine.

## 2022-12-09 NOTE — PROGRESS NOTES
Pt awake with complaint of SOB after standing to void. Pt assisted with transfer from home C-Pap to 4L via NC. RT notified for breathing TX. Pt states he feels his breathing is getting worse. Pt noted with increased dyspnea with ambulation from bed to chair. .

## 2022-12-09 NOTE — PROGRESS NOTES
Lani GRIMES [facility] Progress Note  12/9/2022 7:20 AM  Subjective:   Admit Date: 12/1/2022      Chief Complaint: I feel a worse--cant move much air     Interval History: Remains on O2  Vest and daliresp may help to mobilize secretions       Diet: ADULT DIET; Regular  Medications:   Scheduled Meds:   methylPREDNISolone sodium  40 mg IntraVENous Q8H    dilTIAZem  300 mg Oral Daily    ipratropium-albuterol  1 ampule Inhalation Q6H    polyethylene glycol  17 g Oral Daily    guaiFENesin  600 mg Oral BID    theophylline  200 mg Oral BID    mometasone-formoterol  2 puff Inhalation BID    sodium chloride (Inhalant)  15 mL Nebulization 3 times per day    sodium chloride flush  5-40 mL IntraVENous 2 times per day    enoxaparin  40 mg SubCUTAneous Nightly    polyvinyl alcohol  2 drop Both Eyes BID    pantoprazole  40 mg Oral QAM AC    atorvastatin  10 mg Oral Daily    losartan  50 mg Oral Daily     Continuous Infusions:   sodium chloride         Review of Systems -   General ROS: afebrile  Respiratory ROS: positive for - cough and shortness of breath  Cardiovascular ROS: no chest pain  Musculoskeletal ROS:positive for - :joint pain  Neurological ROS: no TIA or stroke symptoms    Objective:   Vitals: /61   Pulse (!) 107   Temp 97.7 °F (36.5 °C) (Oral)   Resp 18   Ht 5' 7\" (1.702 m)   Wt 186 lb 8.2 oz (84.6 kg)   SpO2 95%   BMI 29.21 kg/m²   General appearance: alert and cooperative with exam  HEENT: Head: Normocephalic, no lesions, without obvious abnormality.   Neck: no adenopathy, no carotid bruit, no JVD, supple, symmetrical, trachea midline, and thyroid not enlarged, symmetric, no tenderness/mass/nodules  Lungs: rhonchi bilaterally  Heart: regular rate and rhythm, S1, S2 normal, no murmur, click, rub or gallop  Abdomen: soft, non-tender; bowel sounds normal; no masses,  no organomegaly  Extremities: extremities normal, atraumatic, no cyanosis or edema  Neurologic: Mental status: Alert, oriented, thought content appropriate    Admission on 12/01/2022   Component Date Value Ref Range Status    Ventricular Rate 12/01/2022 107  BPM Final    Atrial Rate 12/01/2022 107  BPM Final    P-R Interval 12/01/2022 124  ms Final    QRS Duration 12/01/2022 84  ms Final    Q-T Interval 12/01/2022 312  ms Final    QTc Calculation (Bazett) 12/01/2022 416  ms Final    P Axis 12/01/2022 76  degrees Final    R Axis 12/01/2022 98  degrees Final    T Axis 12/01/2022 41  degrees Final    Diagnosis 12/01/2022    Final                    Value:Sinus tachycardiaBaseline artifactPoor R wave progression likely from lead position Q0Cwbervlgm axisBorderline ECGWhen compared with ECG of 10/20/2021Poor data quality , butNo significant change likelyConfirmed by [facility] Kerrie CUI ([id number]) on 12/1/2022 4:16:12 PM      WBC 12/01/2022 15.6 (A)  4.0 - 11.0 K/uL Final    RBC 12/01/2022 6.07 (A)  4.20 - 5.90 M/uL Final    Hemoglobin 12/01/2022 17.6 (A)  13.5 - 17.5 g/dL Final    Hematocrit 12/01/2022 52.7 (A)  40.5 - 52.5 % Final    MCV 12/01/2022 86.9  80.0 - 100.0 fL Final    MCH 12/01/2022 29.0  26.0 - 34.0 pg Final    MCHC 12/01/2022 33.3  31.0 - 36.0 g/dL Final    RDW 12/01/2022 15.5 (A)  12.4 - 15.4 % Final    Platelets 22/41/3609 303  135 - 450 K/uL Final    MPV 12/01/2022 6.9  5.0 - 10.5 fL Final    Neutrophils % 12/01/2022 88.8  % Final    Lymphocytes % 12/01/2022 3.2  % Final    Monocytes % 12/01/2022 7.9  % Final    Eosinophils % 12/01/2022 0.0  % Final    Basophils % 12/01/2022 0.1  % Final    Neutrophils Absolute 12/01/2022 13.8 (A)  1.7 - 7.7 K/uL Final    Lymphocytes Absolute 12/01/2022 0.5 (A)  1.0 - 5.1 K/uL Final    Monocytes Absolute 12/01/2022 1.2  0.0 - 1.3 K/uL Final    Eosinophils Absolute 12/01/2022 0.0  0.0 - 0.6 K/uL Final    Basophils Absolute 12/01/2022 0.0  0.0 - 0.2 K/uL Final    Pro-BNP 12/01/2022 113  0 - 449 pg/mL Final    Comment: Methodology by NT-proBNP    An age-independent cutoff point of 300 pg/ml has a 98%  negative predictive value excluding acute heart failure. Values exceeding the age-related cutoff values (450 pg/mL if  age<50, 900 if 50-75 and 1800 if >75) has 90% sensitivity and  84% specificity for diagnosing acute HF. In patients with  renal compromise (eGFR<60) values greater than 1200pg/ml have  a diagnostic sensitivity and specificity of 89% and 72% for  acute HF. Troponin 12/01/2022 <0.01  <0.01 ng/mL Final    Methodology by Troponin T    Sodium 12/01/2022 132 (A)  136 - 145 mmol/L Final    Potassium reflex Magnesium 12/01/2022 4.2  3.5 - 5.1 mmol/L Final    Chloride 12/01/2022 94 (A)  99 - 110 mmol/L Final    CO2 12/01/2022 26  21 - 32 mmol/L Final    Anion Gap 12/01/2022 12  3 - 16 Final    Glucose 12/01/2022 128 (A)  70 - 99 mg/dL Final    BUN 12/01/2022 15  7 - 20 mg/dL Final    Creatinine 12/01/2022 0.7 (A)  0.8 - 1.3 mg/dL Final    Est, Glom Filt Rate 12/01/2022 >60  >60 Final    Comment: Pediatric calculator link  [email or web address].at. [email or web address]/gfr_calculatorped  Effective Oct 3, 2022  These results are not intended for use in patients  <25years of age. eGFR results are calculated without  a race factor using the 2021 CKD-EPI equation. Careful  clinical correlation is recommended, particularly when  comparing to results calculated using previous equations. The CKD-EPI equation is less accurate in patients with  extremes of muscle mass, extra-renal metabolism of  creatinine, excessive creatinine ingestion, or following  therapy that affects renal tubular secretion.       Calcium 12/01/2022 9.7  8.3 - 10.6 mg/dL Final    Total Protein 12/01/2022 6.9  6.4 - 8.2 g/dL Final    Albumin 12/01/2022 3.6  3.4 - 5.0 g/dL Final    Albumin/Globulin Ratio 12/01/2022 1.1  1.1 - 2.2 Final    Total Bilirubin 12/01/2022 0.4  0.0 - 1.0 mg/dL Final    Alkaline Phosphatase 12/01/2022 75  40 - 129 U/L Final    ALT 12/01/2022 35  10 - 40 U/L Final    AST 12/01/2022 26  15 - 37 U/L Final    pH, Seb 12/01/2022 7.394  7.350 - 7.450 Final    pCO2, Seb 12/01/2022 49.8  40.0 - 50.0 mmHg Final    pO2, Seb 12/01/2022 37  Not Established mmHg Final    HCO3, Venous 12/01/2022 30 (A)  23 - 29 mmol/L Final    Base Excess, Seb 12/01/2022 4.0  Not Established mmol/L Final    O2 Sat, Seb 12/01/2022 71  Not Established % Final    Carboxyhemoglobin 12/01/2022 1.3  % Final    Comment:      0.0-1.5  (Smokers 1.5-5.0)      MetHgb, Seb 12/01/2022 0.3  <1.5 % Final    TC02 (Calc), Seb 12/01/2022 32  Not Established mmol/L Final    O2 Therapy 12/01/2022 Unknown   Final    Rapid Influenza A Ag 12/01/2022 POSITIVE (A)  Negative Final    Rapid Influenza B Ag 12/01/2022 Negative  Negative Final    SARS-CoV-2, NAAT 12/01/2022 Not Detected  Not Detected Final    Comment: Rapid NAAT:   Negative results should be treated as presumptive and,  if inconsistent with clinical signs and symptoms or necessary for  patient management, should be tested with an alternative molecular  assay. Negative results do not preclude SARS-CoV-2 infection and  should not be used as the sole basis for patient management decisions. This test has been authorized by the FDA under an Emergency Use  Authorization (EUA) for use by authorized laboratories.     Fact sheet for Healthcare Providers:  http://www.marck.erasto/  Fact sheet for Patients: http://www.beti-holly.erasto/    METHODOLOGY: Isothermal Nucleic Acid Amplification      Procalcitonin 12/02/2022 0.09  0.00 - 0.15 ng/mL Final    Comment: Suspected Sepsis:  Low likelihood of sepsis  <.50 ng/mL    Increased likelihood of sepsis 0.50-2.00 ng/mL  Antibiotics encouraged    High risk of sepsis/shock   >2.00 ng/mL  Antibiotics strongly encouraged    Suspected Lower Respiratory Tract Infections:  Low likelihood of bacterial infection  <0.24 ng/mL    Increased likelihood of bacterial infection >0.24 ng/mL  Antibiotics encouraged    With successful antibiotic therapy, PCT levels should decrease  rapidly. (Half-life of 24 to 36 hours.)    Procalcitonin values from samples collected within the first  6 hours of systemic infection may still be low. Retesting may be indicated. Values from day 1 and day 4 can be entered into the Change in  Procalcitonin Calculator to determine the patient's  Mortality Risk Prognosis  ([email or web address]-[email or web address] com)    In healthy neonates, plasma Procalcitonin (PCT) concentrations  increase gradually after birth, reaching peak values at about  24 hours of age then decrease to normal values below 0.5                            ng/mL  by 48-72 hours of age.       CULTURE, RESPIRATORY 12/02/2022 Normal respiratory rowan   Final    Gram Stain Result 12/02/2022    Final                    Value:2+ Epithelial Cells  4+ WBC's (Polymorphonuclear)  3+ Gram positive cocci  2+ Yeast      WBC 12/03/2022 16.3 (A)  4.0 - 11.0 K/uL Final    RBC 12/03/2022 5.43  4.20 - 5.90 M/uL Final    Hemoglobin 12/03/2022 15.6  13.5 - 17.5 g/dL Final    Hematocrit 12/03/2022 48.1  40.5 - 52.5 % Final    MCV 12/03/2022 88.7  80.0 - 100.0 fL Final    MCH 12/03/2022 28.8  26.0 - 34.0 pg Final    MCHC 12/03/2022 32.4  31.0 - 36.0 g/dL Final    RDW 12/03/2022 15.1  12.4 - 15.4 % Final    Platelets 05/94/5825 298  135 - 450 K/uL Final    MPV 12/03/2022 6.7  5.0 - 10.5 fL Final    Neutrophils % 12/03/2022 92.8  % Final    Lymphocytes % 12/03/2022 3.2  % Final    Monocytes % 12/03/2022 3.9  % Final    Eosinophils % 12/03/2022 0.0  % Final    Basophils % 12/03/2022 0.1  % Final    Neutrophils Absolute 12/03/2022 15.2 (A)  1.7 - 7.7 K/uL Final    Lymphocytes Absolute 12/03/2022 0.5 (A)  1.0 - 5.1 K/uL Final    Monocytes Absolute 12/03/2022 0.6  0.0 - 1.3 K/uL Final    Eosinophils Absolute 12/03/2022 0.0  0.0 - 0.6 K/uL Final    Basophils Absolute 12/03/2022 0.0  0.0 - 0.2 K/uL Final    Sodium 12/03/2022 136  136 - 145 mmol/L Final    Potassium 12/03/2022 4.8  3.5 - 5.1 mmol/L Final    Chloride 12/03/2022 99 99 - 110 mmol/L Final    CO2 12/03/2022 27  21 - 32 mmol/L Final    Anion Gap 12/03/2022 10  3 - 16 Final    Glucose 12/03/2022 209 (A)  70 - 99 mg/dL Final    BUN 12/03/2022 17  7 - 20 mg/dL Final    Creatinine 12/03/2022 0.6 (A)  0.8 - 1.3 mg/dL Final    Est, Glom Filt Rate 12/03/2022 >60  >60 Final    Comment: Pediatric calculator link  "[employer]".at. org/professionals/kdoqi/gfr_calculatorped  Effective Oct 3, 2022  These results are not intended for use in patients  <25years of age. eGFR results are calculated without  a race factor using the 2021 CKD-EPI equation. Careful  clinical correlation is recommended, particularly when  comparing to results calculated using previous equations. The CKD-EPI equation is less accurate in patients with  extremes of muscle mass, extra-renal metabolism of  creatinine, excessive creatinine ingestion, or following  therapy that affects renal tubular secretion. Calcium 12/03/2022 9.2  8.3 - 10.6 mg/dL Final    Sodium 12/04/2022 137  136 - 145 mmol/L Final    Potassium 12/04/2022 4.8  3.5 - 5.1 mmol/L Final    Chloride 12/04/2022 97 (A)  99 - 110 mmol/L Final    CO2 12/04/2022 30  21 - 32 mmol/L Final    Anion Gap 12/04/2022 10  3 - 16 Final    Glucose 12/04/2022 167 (A)  70 - 99 mg/dL Final    BUN 12/04/2022 15  7 - 20 mg/dL Final    Creatinine 12/04/2022 0.9  0.8 - 1.3 mg/dL Final    Est, Glom Filt Rate 12/04/2022 >60  >60 Final    Comment: Pediatric calculator link  "[employer]".at. org/professionals/kdoqi/gfr_calculatorped  Effective Oct 3, 2022  These results are not intended for use in patients  <25years of age. eGFR results are calculated without  a race factor using the 2021 CKD-EPI equation. Careful  clinical correlation is recommended, particularly when  comparing to results calculated using previous equations.   The CKD-EPI equation is less accurate in patients with  extremes of muscle mass, extra-renal metabolism of  creatinine, excessive creatinine ingestion, or following  therapy that affects renal tubular secretion. Calcium 12/04/2022 9.5  8.3 - 10.6 mg/dL Final    WBC 12/04/2022 17.5 (A)  4.0 - 11.0 K/uL Final    RBC 12/04/2022 5.87  4.20 - 5.90 M/uL Final    Hemoglobin 12/04/2022 16.9  13.5 - 17.5 g/dL Final    Hematocrit 12/04/2022 52.0  40.5 - 52.5 % Final    MCV 12/04/2022 88.7  80.0 - 100.0 fL Final    MCH 12/04/2022 28.8  26.0 - 34.0 pg Final    MCHC 12/04/2022 32.5  31.0 - 36.0 g/dL Final    RDW 12/04/2022 15.6 (A)  12.4 - 15.4 % Final    Platelets 26/68/4079 351  135 - 450 K/uL Final    MPV 12/04/2022 6.7  5.0 - 10.5 fL Final    Neutrophils % 12/04/2022 90.8  % Final    Lymphocytes % 12/04/2022 2.9  % Final    Monocytes % 12/04/2022 6.1  % Final    Eosinophils % 12/04/2022 0.0  % Final    Basophils % 12/04/2022 0.2  % Final    Neutrophils Absolute 12/04/2022 15.9 (A)  1.7 - 7.7 K/uL Final    Lymphocytes Absolute 12/04/2022 0.5 (A)  1.0 - 5.1 K/uL Final    Monocytes Absolute 12/04/2022 1.1  0.0 - 1.3 K/uL Final    Eosinophils Absolute 12/04/2022 0.0  0.0 - 0.6 K/uL Final    Basophils Absolute 12/04/2022 0.0  0.0 - 0.2 K/uL Final    Sodium 12/05/2022 134 (A)  136 - 145 mmol/L Final    Potassium 12/05/2022 4.9  3.5 - 5.1 mmol/L Final    Chloride 12/05/2022 95 (A)  99 - 110 mmol/L Final    CO2 12/05/2022 25  21 - 32 mmol/L Final    Anion Gap 12/05/2022 14  3 - 16 Final    Glucose 12/05/2022 230 (A)  70 - 99 mg/dL Final    BUN 12/05/2022 17  7 - 20 mg/dL Final    Creatinine 12/05/2022 0.9  0.8 - 1.3 mg/dL Final    Est, Glom Filt Rate 12/05/2022 >60  >60 Final    Comment: Pediatric calculator link  [employer].at. org/professionals/kdoqi/gfr_calculatorped  Effective Oct 3, 2022  These results are not intended for use in patients  <25years of age. eGFR results are calculated without  a race factor using the 2021 CKD-EPI equation.   Careful  clinical correlation is recommended, particularly when  comparing to results calculated using previous equations. The CKD-EPI equation is less accurate in patients with  extremes of muscle mass, extra-renal metabolism of  creatinine, excessive creatinine ingestion, or following  therapy that affects renal tubular secretion. Calcium 12/05/2022 9.5  8.3 - 10.6 mg/dL Final    WBC 12/05/2022 14.5 (A)  4.0 - 11.0 K/uL Final    RBC 12/05/2022 5.69  4.20 - 5.90 M/uL Final    Hemoglobin 12/05/2022 16.5  13.5 - 17.5 g/dL Final    Hematocrit 12/05/2022 50.5  40.5 - 52.5 % Final    MCV 12/05/2022 88.8  80.0 - 100.0 fL Final    MCH 12/05/2022 29.0  26.0 - 34.0 pg Final    MCHC 12/05/2022 32.7  31.0 - 36.0 g/dL Final    RDW 12/05/2022 15.2  12.4 - 15.4 % Final    Platelets 20/87/0008 336  135 - 450 K/uL Final    MPV 12/05/2022 6.9  5.0 - 10.5 fL Final    Neutrophils % 12/05/2022 89.6  % Final    Lymphocytes % 12/05/2022 4.9  % Final    Monocytes % 12/05/2022 5.4  % Final    Eosinophils % 12/05/2022 0.0  % Final    Basophils % 12/05/2022 0.1  % Final    Neutrophils Absolute 12/05/2022 13.0 (A)  1.7 - 7.7 K/uL Final    Lymphocytes Absolute 12/05/2022 0.7 (A)  1.0 - 5.1 K/uL Final    Monocytes Absolute 12/05/2022 0.8  0.0 - 1.3 K/uL Final    Eosinophils Absolute 12/05/2022 0.0  0.0 - 0.6 K/uL Final    Basophils Absolute 12/05/2022 0.0  0.0 - 0.2 K/uL Final    Sodium 12/06/2022 138  136 - 145 mmol/L Final    Potassium 12/06/2022 4.4  3.5 - 5.1 mmol/L Final    Chloride 12/06/2022 99  99 - 110 mmol/L Final    CO2 12/06/2022 27  21 - 32 mmol/L Final    Anion Gap 12/06/2022 12  3 - 16 Final    Glucose 12/06/2022 126 (A)  70 - 99 mg/dL Final    BUN 12/06/2022 16  7 - 20 mg/dL Final    Creatinine 12/06/2022 0.7 (A)  0.8 - 1.3 mg/dL Final    Est, Glom Filt Rate 12/06/2022 >60  >60 Final    Comment: Pediatric calculator link  [employer].at. org/professionals/kdoqi/gfr_calculatorped  Effective Oct 3, 2022  These results are not intended for use in patients  <25years of age.   eGFR results are calculated without  a race factor using the 2021 CKD-EPI equation. Careful  clinical correlation is recommended, particularly when  comparing to results calculated using previous equations. The CKD-EPI equation is less accurate in patients with  extremes of muscle mass, extra-renal metabolism of  creatinine, excessive creatinine ingestion, or following  therapy that affects renal tubular secretion.       Calcium 12/06/2022 8.8  8.3 - 10.6 mg/dL Final    WBC 12/06/2022 13.3 (A)  4.0 - 11.0 K/uL Final    RBC 12/06/2022 5.30  4.20 - 5.90 M/uL Final    Hemoglobin 12/06/2022 15.2  13.5 - 17.5 g/dL Final    Hematocrit 12/06/2022 46.8  40.5 - 52.5 % Final    MCV 12/06/2022 88.5  80.0 - 100.0 fL Final    MCH 12/06/2022 28.7  26.0 - 34.0 pg Final    MCHC 12/06/2022 32.4  31.0 - 36.0 g/dL Final    RDW 12/06/2022 15.4  12.4 - 15.4 % Final    Platelets 53/76/4424 326  135 - 450 K/uL Final    MPV 12/06/2022 6.8  5.0 - 10.5 fL Final    Neutrophils % 12/06/2022 80.6  % Final    Lymphocytes % 12/06/2022 10.4  % Final    Monocytes % 12/06/2022 8.9  % Final    Eosinophils % 12/06/2022 0.0  % Final    Basophils % 12/06/2022 0.1  % Final    Neutrophils Absolute 12/06/2022 10.8 (A)  1.7 - 7.7 K/uL Final    Lymphocytes Absolute 12/06/2022 1.4  1.0 - 5.1 K/uL Final    Monocytes Absolute 12/06/2022 1.2  0.0 - 1.3 K/uL Final    Eosinophils Absolute 12/06/2022 0.0  0.0 - 0.6 K/uL Final    Basophils Absolute 12/06/2022 0.0  0.0 - 0.2 K/uL Final    Theophylline Lvl 12/07/2022 4.8 (A)  8.0 - 20.0 ug/mL Final    CULTURE, RESPIRATORY 12/07/2022 Further report to follow   Preliminary    Gram Stain Result 12/07/2022    Final                    Value:1+ Epithelial Cells  2+ WBC's (Polymorphonuclear)  1+ Yeast  3+ Gram positive cocci  1+ Gram negative rods  1+ Gram positive rods           Assessment & Plan:   Principal Problem:    Influenza A--completed Tamiflu and levaquin --still with resp decompensation-  Active Problems:    Hypoxemia--Cont O2 at 3 lpm     COPD exacerbation (HCC) COPD, very severe ([address]    Acute respiratory failure with hypoxia ([address]    DNR (do not resuscitate)--changed to full code     Essential hypertension--Continue current therapy      Chronic respiratory failure with hypoxia ([address]  Resolved Problems:    * No resolved hospital problems.  *  Tachycardia persists--BP is better---he is struggling --ck bnp --await pulm thoughts   Please note that over 35 minutes was spent in evaluating the patient, review of records and results, discussion with staff/family, etc.    Tin Aragon MD

## 2022-12-09 NOTE — PROGRESS NOTES
Comprehensive Nutrition Assessment    Type and Reason for Visit:  Initial, RD Nutrition Re-Screen/LOS    Nutrition Recommendations/Plan:   Continue regular diet     Malnutrition Assessment:  Malnutrition Status: At risk for malnutrition (Comment) (12/09/22 1015)    Context:  Acute Illness     Findings of the 6 clinical characteristics of malnutrition:  Energy Intake:  Mild decrease in energy intake (Comment)  Weight Loss:  No significant weight loss     Body Fat Loss:  Unable to assess     Muscle Mass Loss:  Unable to assess    Fluid Accumulation:  Mild Extremities   Strength:  Not Performed    Nutrition Assessment:    Pt screened for LOS. PMH includes severe COPD, HLD. Pt adm with sob and found to have Flu A. Diet adv to regular with good po intake at %. Pt with no nutritional concerns at this time. Will continue to follow. Nutrition Related Findings:    Labs reviewed. Noted BM on 12/7. Noted +2 edema to BLE. Wound Type: None       Current Nutrition Intake & Therapies:    Average Meal Intake: %     ADULT DIET; Regular    Anthropometric Measures:  Height: 5' 7\" (170.2 cm)  Ideal Body Weight (IBW): 148 lbs (67 kg)    Admission Body Weight: 188 lb (85.3 kg)  Current Body Weight: 187 lb (84.8 kg),   IBW. Weight Source: Bed Scale  Current BMI (kg/m2): 29.3                          BMI Categories: Overweight (BMI 25.0-29. 9)        Nutrition Diagnosis:   No nutrition diagnosis at this time     Nutrition Interventions:   Food and/or Nutrient Delivery: Continue Current Diet  Nutrition Education/Counseling: No recommendation at this time  Coordination of Nutrition Care: Continue to monitor while inpatient       Goals:     Goals: PO intake 50% or greater       Nutrition Monitoring and Evaluation:   Behavioral-Environmental Outcomes: None Identified  Food/Nutrient Intake Outcomes: Food and Nutrient Intake  Physical Signs/Symptoms Outcomes: Biochemical Data, Constipation, Diarrhea, Fluid Status or Edema, Skin, Weight    Discharge Planning:    No discharge needs at this time     Laura Tijerina, [address] [place] [address],   Contact: [phone]

## 2022-12-09 NOTE — PLAN OF CARE
Problem: Discharge Planning  Goal: Discharge to home or other facility with appropriate resources  12/9/2022 1148 by Nasreen Cannon RN  Outcome: Progressing  12/9/2022 0211 by Randy Whitten RN  Outcome: Progressing     Problem: Safety - Adult  Goal: Free from fall injury  12/9/2022 1148 by Nasreen Cannon RN  Outcome: Progressing  12/9/2022 0211 by Randy Whitten RN  Outcome: Progressing     Problem: Pain  Goal: Verbalizes/displays adequate comfort level or baseline comfort level  12/9/2022 1148 by Nasreen Cannon RN  Outcome: Progressing  12/9/2022 0211 by Randy Whitten RN  Outcome: Progressing     Problem: ABCDS Injury Assessment  Goal: Absence of physical injury  12/9/2022 1148 by Nasreen Cannon RN  Outcome: Progressing  12/9/2022 0211 by Randy Whitten RN  Outcome: Progressing     Problem: Respiratory - Adult  Goal: Achieves optimal ventilation and oxygenation  12/9/2022 1148 by Nasreen Cannon RN  Outcome: Progressing  12/9/2022 0211 by Randy Whitten RN  Outcome: Progressing     Problem: Infection - Adult  Goal: Absence of infection at discharge  12/9/2022 1148 by Nasreen Cannon RN  Outcome: Progressing  12/9/2022 0211 by Randy Whitten RN  Outcome: Progressing  Goal: Absence of infection during hospitalization  12/9/2022 1148 by Nasreen Cannon RN  Outcome: Progressing  12/9/2022 0211 by Randy Whitten RN  Outcome: Progressing  Goal: Absence of fever/infection during anticipated neutropenic period  12/9/2022 1148 by Nasreen Cannon RN  Outcome: Progressing  12/9/2022 0211 by Randy Whitten RN  Outcome: Progressing

## 2022-12-09 NOTE — PROGRESS NOTES
Pulmonary Progress Note    Date of Admission: 12/1/2022   LOS: 8 days       CC:  Chief Complaint   Patient presents with    Shortness of Breath     Pt states he has had increased sob x 2-3 days. Pt states he is coughing up green sputum. Pt has history of copd and is on home oxygen of 2 liters. Subjective:  Continues have shortness of breath. No improvement. ROS:   No nausea  No Vomiting  No chest pain      Assessment:         Plan: This note may have been transcribed using [id number] [employer]. Please disregard any translational errors. Hospital Day: 8     COPD exacerbation secondary to influenza A pneumonia  Solumedrol,   40   bid. theophylline   Tamiflu  Duoneb's  every 6 hours. Sputum for culture. vest.  Patient is not clear if this is improving but phlegm has decreased  Plan to add vest therapy and [facility] as out patient   Improving shortness of breath. Its not clear that he can be improved at this point. Change Dulera to budesonide. We discussed the lack of improvement and consideration for end-stage COPD. He is not ready for hospice at this time. Sleep apnea   Home cpap in room. Data:        PHYSICAL EXAM:   Blood pressure 117/71, pulse (!) 120, temperature 98.4 °F (36.9 °C), temperature source Oral, resp. rate 18, height 5' 7\" (1.702 m), weight 186 lb 8.2 oz (84.6 kg), SpO2 93 %.'  Body mass index is 29.21 kg/m². Gen: No distress. ENT:   Resp: No accessory muscle use. No crackles. Few  wheezes. no rhonchi. CV: Regular rate. Regular rhythm. No murmur or rub. No edema. Skin: Warm, dry, normal texture and turgor. No nodule on exposed extremities. M/S: No cyanosis. No clubbing. No joint deformity. Psych: Oriented x 3. No anxiety. Awake. Alert. Intact judgement and insight. Good Mood / Affect.   Memory appears in tact       Medications:    Scheduled Meds:   budesonide  1 mg Nebulization BID    methylPREDNISolone sodium  40 mg IntraVENous Q8H dilTIAZem  300 mg Oral Daily    ipratropium-albuterol  1 ampule Inhalation Q6H    polyethylene glycol  17 g Oral Daily    guaiFENesin  600 mg Oral BID    theophylline  200 mg Oral BID    sodium chloride (Inhalant)  15 mL Nebulization 3 times per day    sodium chloride flush  5-40 mL IntraVENous 2 times per day    enoxaparin  40 mg SubCUTAneous Nightly    polyvinyl alcohol  2 drop Both Eyes BID    pantoprazole  40 mg Oral QAM AC    atorvastatin  10 mg Oral Daily    losartan  50 mg Oral Daily       Continuous Infusions:   sodium chloride         PRN Meds:  sodium chloride flush, sodium chloride, ondansetron **OR** ondansetron, acetaminophen **OR** acetaminophen, albuterol    Labs reviewed:  CBC:   No results for input(s): WBC, HGB, HCT, MCV, PLT in the last 72 hours. BMP:   No results for input(s): NA, K, CL, CO2, PHOS, BUN, CREATININE, CA in the last 72 hours. LIVER PROFILE: No results for input(s): AST, ALT, LIPASE, BILIDIR, BILITOT, ALKPHOS in the last 72 hours. Invalid input(s): AMYLASE,  ALB  PT/INR: No results for input(s): PROTIME, INR in the last 72 hours. APTT: No results for input(s): APTT in the last 72 hours. UA:No results for input(s): NITRITE, COLORU, PHUR, LABCAST, WBCUA, RBCUA, MUCUS, TRICHOMONAS, YEAST, BACTERIA, CLARITYU, SPECGRAV, LEUKOCYTESUR, UROBILINOGEN, BILIRUBINUR, BLOODU, GLUCOSEU, AMORPHOUS in the last 72 hours. Invalid input(s): Beryle Sandman  No results for input(s): PH, PCO2, PO2 in the last 72 hours. Cx:      Films: This note was transcribed using [id number]. Please disregard any translational errors.       Tr[facility] [phone]

## 2022-12-09 NOTE — PLAN OF CARE
Problem: Discharge Planning  Goal: Discharge to home or other facility with appropriate resources  Outcome: Progressing     Problem: Safety - Adult Bed, low, wheels locked and call light within reach. ( Alarm declined.    Goal: Free from fall injury  Outcome: Progressing     Problem: Pain Medication per Mar.  Goal: Verbalizes/displays adequate comfort level or baseline comfort level  Outcome: Progressing     Problem: ABCDS Injury Assessment  Goal: Absence of physical injury  Outcome: Progressing     Problem: Respiratory - Adult  Goal: Achieves optimal ventilation and oxygenation  Outcome: Progressing     Problem: Infection - Adult  Goal: Absence of infection at discharge  Outcome: Progressing  Goal: Absence of infection during hospitalization  Outcome: Progressing  Goal: Absence of fever/infection during anticipated neutropenic period  Outcome: Progressing

## 2022-12-10 NOTE — PROGRESS NOTES
Pulmonary Progress Note    Date of Admission: 12/1/2022   LOS: 9 days     Chief Complaint   Patient presents with    Shortness of Breath     Pt states he has had increased sob x 2-3 days. Pt states he is coughing up green sputum. Pt has history of copd and is on home oxygen of 2 liters. Assessment/Plan:       Acute hypoxemic respiratory failure   Acute exacerbation of COPD, due to influenza and a  -Tamiflu  -Scheduled duo nebs, budesonide  -Theophylline  -Sputum culture pending  -Wean oxygen goal saturation 90%    ZAHEER  -Home biPAP at night and with naps      24 Hour Events/Subjective  No acute events overnight. Down to 2 L of oxygen. Remains tachycardic still wheezing    ROS:   No nausea  No Vomiting  No chest pain      Intake/Output Summary (Last 24 hours) at 12/10/2022 0849  Last data filed at 12/10/2022 0339  Gross per 24 hour   Intake --   Output 645 ml   Net -645 ml         PHYSICAL EXAM:   Blood pressure (!) 146/68, pulse (!) 115, temperature 98 °F (36.7 °C), temperature source Axillary, resp. rate 20, height 5' 7\" (1.702 m), weight 186 lb 15.2 oz (84.8 kg), SpO2 94 %.'  Gen:  No acute distress. Eyes: PERRL. Anicteric sclera. No conjunctival injection. ENT: No discharge. Posterior oropharynx clear. External appearance of ears and nose normal.  Neck: Trachea midline. No mass   Resp:  No crackles. + Bilateral wheezes. No rhonchi. No dullness on percussion. CV: Tachycardic rate. Regular rhythm. No murmur or rub. No edema. GI: Soft, Non-tender. Non-distended. +BS  Skin: Warm, dry, w/o erythema. Lymph: No cervical or supraclavicular LAD. M/S: No cyanosis. No clubbing. Neuro:  no focal neurologic deficit. Moves all extremities  Psych: Awake and alert, Oriented x 3. Judgement and insight appropriate. Mood stable.       Medications:    Scheduled Meds:   budesonide  1 mg Nebulization BID    methylPREDNISolone sodium  40 mg IntraVENous Q8H    dilTIAZem  300 mg Oral Daily ipratropium-albuterol  1 ampule Inhalation Q6H    polyethylene glycol  17 g Oral Daily    guaiFENesin  600 mg Oral BID    theophylline  200 mg Oral BID    sodium chloride (Inhalant)  15 mL Nebulization 3 times per day    sodium chloride flush  5-40 mL IntraVENous 2 times per day    enoxaparin  40 mg SubCUTAneous Nightly    polyvinyl alcohol  2 drop Both Eyes BID    pantoprazole  40 mg Oral QAM AC    atorvastatin  10 mg Oral Daily    losartan  50 mg Oral Daily       Continuous Infusions:   sodium chloride         PRN Meds:  sodium chloride flush, sodium chloride, ondansetron **OR** ondansetron, acetaminophen **OR** acetaminophen, albuterol    Labs reviewed:  CBC: No results for input(s): WBC, HGB, HCT, MCV, PLT in the last 72 hours. BMP: No results for input(s): NA, K, CL, CO2, PHOS, BUN, CREATININE, CA in the last 72 hours. LIVER PROFILE: No results for input(s): AST, ALT, LIPASE, BILIDIR, BILITOT, ALKPHOS in the last 72 hours. Invalid input(s): AMYLASE,  ALB  PT/INR: No results for input(s): PROTIME, INR in the last 72 hours. Films:  Radiology Review:  Pertinent images / reports were reviewed as a part of this visit. This note was transcribed using [id number] [employer]. Please disregard any translational errors.     Thank you for this consult,    Rasheed Barclay MD  [facility] Pulmonary, Critical Care, and Sleep Medicine

## 2022-12-10 NOTE — PROGRESS NOTES
[facility] Progress Note  12/10/2022 8:16 AM  Subjective:   Admit Date: 12/1/2022      Chief Complaint: I am still SOB --but better than yesterday     Interval History: resp insuffic persists --but less purse lipped breqthing--is able to eat and converse   Resting tachycardai (102-104) sec to resp load   Sputum cx---nl rowan  CXR--no infiltrate     Diet: ADULT DIET; Regular  Medications:   Scheduled Meds:   budesonide  1 mg Nebulization BID    methylPREDNISolone sodium  40 mg IntraVENous Q8H    dilTIAZem  300 mg Oral Daily    ipratropium-albuterol  1 ampule Inhalation Q6H    polyethylene glycol  17 g Oral Daily    guaiFENesin  600 mg Oral BID    theophylline  200 mg Oral BID    sodium chloride (Inhalant)  15 mL Nebulization 3 times per day    sodium chloride flush  5-40 mL IntraVENous 2 times per day    enoxaparin  40 mg SubCUTAneous Nightly    polyvinyl alcohol  2 drop Both Eyes BID    pantoprazole  40 mg Oral QAM AC    atorvastatin  10 mg Oral Daily    losartan  50 mg Oral Daily     Continuous Infusions:   sodium chloride         Review of Systems -   General ROS: afebrile  Respiratory ROS: positive for - cough and shortness of breath  Cardiovascular ROS: no chest pain  Musculoskeletal ROS:positive for - low back pain  Neurological ROS: no TIA or stroke symptoms    Objective:   Vitals: BP (!) 146/68   Pulse (!) 102   Temp 98 °F (36.7 °C) (Axillary)   Resp 16   Ht 5' 7\" (1.702 m)   Wt 186 lb 15.2 oz (84.8 kg)   SpO2 95%   BMI 29.28 kg/m²   General appearance: alert and cooperative with exam  HEENT: Head: Normocephalic, no lesions, without obvious abnormality.   Neck: no adenopathy, no carotid bruit, no JVD, supple, symmetrical, trachea midline, and thyroid not enlarged, symmetric, no tenderness/mass/nodules  Lungs: rhonchi bilaterally  Heart: resting tachycardia   Abdomen: soft, non-tender; bowel sounds normal; no masses,  no organomegaly  Extremities: tr edema   Neurologic: Mental status: Alert, oriented, thought content appropriate    Admission on 12/01/2022   Component Date Value Ref Range Status    Ventricular Rate 12/01/2022 107  BPM Final    Atrial Rate 12/01/2022 107  BPM Final    P-R Interval 12/01/2022 124  ms Final    QRS Duration 12/01/2022 84  ms Final    Q-T Interval 12/01/2022 312  ms Final    QTc Calculation (Bazett) 12/01/2022 416  ms Final    P Axis 12/01/2022 76  degrees Final    R Axis 12/01/2022 98  degrees Final    T Axis 12/01/2022 41  degrees Final    Diagnosis 12/01/2022    Final                    Value:Sinus tachycardiaBaseline artifactPoor R wave progression likely from lead position [id number] axisBorderline ECGWhen compared with ECG of 10/20/2021Poor data quality , butNo significant change likelyConfirmed by [facility] CUI ([id number]) on 12/1/2022 4:16:12 PM      WBC 12/01/2022 15.6 (A)  4.0 - 11.0 K/uL Final    RBC 12/01/2022 6.07 (A)  4.20 - 5.90 M/uL Final    Hemoglobin 12/01/2022 17.6 (A)  13.5 - 17.5 g/dL Final    Hematocrit 12/01/2022 52.7 (A)  40.5 - 52.5 % Final    MCV 12/01/2022 86.9  80.0 - 100.0 fL Final    MCH 12/01/2022 29.0  26.0 - 34.0 pg Final    MCHC 12/01/2022 33.3  31.0 - 36.0 g/dL Final    RDW 12/01/2022 15.5 (A)  12.4 - 15.4 % Final    Platelets 34/19/5081 303  135 - 450 K/uL Final    MPV 12/01/2022 6.9  5.0 - 10.5 fL Final    Neutrophils % 12/01/2022 88.8  % Final    Lymphocytes % 12/01/2022 3.2  % Final    Monocytes % 12/01/2022 7.9  % Final    Eosinophils % 12/01/2022 0.0  % Final    Basophils % 12/01/2022 0.1  % Final    Neutrophils Absolute 12/01/2022 13.8 (A)  1.7 - 7.7 K/uL Final    Lymphocytes Absolute 12/01/2022 0.5 (A)  1.0 - 5.1 K/uL Final    Monocytes Absolute 12/01/2022 1.2  0.0 - 1.3 K/uL Final    Eosinophils Absolute 12/01/2022 0.0  0.0 - 0.6 K/uL Final    Basophils Absolute 12/01/2022 0.0  0.0 - 0.2 K/uL Final    Pro-BNP 12/01/2022 113  0 - 449 pg/mL Final    Comment: Methodology by NT-proBNP    An age-independent cutoff point of 300 pg/ml has a 98%  negative predictive value excluding acute heart failure. Values exceeding the age-related cutoff values (450 pg/mL if  age<50, 900 if 50-75 and 1800 if >75) has 90% sensitivity and  84% specificity for diagnosing acute HF. In patients with  renal compromise (eGFR<60) values greater than 1200pg/ml have  a diagnostic sensitivity and specificity of 89% and 72% for  acute HF. Troponin 12/01/2022 <0.01  <0.01 ng/mL Final    Methodology by Troponin T    Sodium 12/01/2022 132 (A)  136 - 145 mmol/L Final    Potassium reflex Magnesium 12/01/2022 4.2  3.5 - 5.1 mmol/L Final    Chloride 12/01/2022 94 (A)  99 - 110 mmol/L Final    CO2 12/01/2022 26  21 - 32 mmol/L Final    Anion Gap 12/01/2022 12  3 - 16 Final    Glucose 12/01/2022 128 (A)  70 - 99 mg/dL Final    BUN 12/01/2022 15  7 - 20 mg/dL Final    Creatinine 12/01/2022 0.7 (A)  0.8 - 1.3 mg/dL Final    Est, Glom Filt Rate 12/01/2022 >60  >60 Final    Comment: Pediatric calculator link  [email or web address]. [email or web address]  Effective Oct 3, 2022  These results are not intended for use in patients  <25years of age. eGFR results are calculated without  a race factor using the 2021 CKD-EPI equation. Careful  clinical correlation is recommended, particularly when  comparing to results calculated using previous equations. The CKD-EPI equation is less accurate in patients with  extremes of muscle mass, extra-renal metabolism of  creatinine, excessive creatinine ingestion, or following  therapy that affects renal tubular secretion.       Calcium 12/01/2022 9.7  8.3 - 10.6 mg/dL Final    Total Protein 12/01/2022 6.9  6.4 - 8.2 g/dL Final    Albumin 12/01/2022 3.6  3.4 - 5.0 g/dL Final    Albumin/Globulin Ratio 12/01/2022 1.1  1.1 - 2.2 Final    Total Bilirubin 12/01/2022 0.4  0.0 - 1.0 mg/dL Final    Alkaline Phosphatase 12/01/2022 75  40 - 129 U/L Final    ALT 12/01/2022 35  10 - 40 U/L Final    AST 12/01/2022 26  15 - 37 U/L Final    pH, Seb 12/01/2022 7.394  7.350 - 7.450 Final    pCO2, Seb 12/01/2022 49.8  40.0 - 50.0 mmHg Final    pO2, Seb 12/01/2022 37  Not Established mmHg Final    HCO3, Venous 12/01/2022 30 (A)  23 - 29 mmol/L Final    Base Excess, Seb 12/01/2022 4.0  Not Established mmol/L Final    O2 Sat, Seb 12/01/2022 71  Not Established % Final    Carboxyhemoglobin 12/01/2022 1.3  % Final    Comment:      0.0-1.5  (Smokers 1.5-5.0)      MetHgb, Seb 12/01/2022 0.3  <1.5 % Final    TC02 (Calc), Seb 12/01/2022 32  Not Established mmol/L Final    O2 Therapy 12/01/2022 Unknown   Final    Rapid Influenza A Ag 12/01/2022 POSITIVE (A)  Negative Final    Rapid Influenza B Ag 12/01/2022 Negative  Negative Final    SARS-CoV-2, NAAT 12/01/2022 Not Detected  Not Detected Final    Comment: Rapid NAAT:   Negative results should be treated as presumptive and,  if inconsistent with clinical signs and symptoms or necessary for  patient management, should be tested with an alternative molecular  assay. Negative results do not preclude SARS-CoV-2 infection and  should not be used as the sole basis for patient management decisions. This test has been authorized by the FDA under an Emergency Use  Authorization (EUA) for use by authorized laboratories.     Fact sheet for Healthcare Providers:  http://www.marck.erasto/  Fact sheet for Patients: http://www.beti-holly.erasto/    METHODOLOGY: Isothermal Nucleic Acid Amplification      Procalcitonin 12/02/2022 0.09  0.00 - 0.15 ng/mL Final    Comment: Suspected Sepsis:  Low likelihood of sepsis  <.50 ng/mL    Increased likelihood of sepsis 0.50-2.00 ng/mL  Antibiotics encouraged    High risk of sepsis/shock   >2.00 ng/mL  Antibiotics strongly encouraged    Suspected Lower Respiratory Tract Infections:  Low likelihood of bacterial infection  <0.24 ng/mL    Increased likelihood of bacterial infection >0.24 ng/mL  Antibiotics encouraged    With successful antibiotic therapy, PCT levels should decrease  rapidly. (Half-life of 24 to 36 hours.)    Procalcitonin values from samples collected within the first  6 hours of systemic infection may still be low. Retesting may be indicated. Values from day 1 and day 4 can be entered into the Change in  Procalcitonin Calculator to determine the patient's  Mortality Risk Prognosis  ([email or web address]-pct-[email or web address] com)    In healthy neonates, plasma Procalcitonin (PCT) concentrations  increase gradually after birth, reaching peak values at about  24 hours of age then decrease to normal values below 0.5                            ng/mL  by 48-72 hours of age.       CULTURE, RESPIRATORY 12/02/2022 Normal respiratory rowan   Final    Gram Stain Result 12/02/2022    Final                    Value:2+ Epithelial Cells  4+ WBC's (Polymorphonuclear)  3+ Gram positive cocci  2+ Yeast      WBC 12/03/2022 16.3 (A)  4.0 - 11.0 K/uL Final    RBC 12/03/2022 5.43  4.20 - 5.90 M/uL Final    Hemoglobin 12/03/2022 15.6  13.5 - 17.5 g/dL Final    Hematocrit 12/03/2022 48.1  40.5 - 52.5 % Final    MCV 12/03/2022 88.7  80.0 - 100.0 fL Final    MCH 12/03/2022 28.8  26.0 - 34.0 pg Final    MCHC 12/03/2022 32.4  31.0 - 36.0 g/dL Final    RDW 12/03/2022 15.1  12.4 - 15.4 % Final    Platelets 94/62/4870 298  135 - 450 K/uL Final    MPV 12/03/2022 6.7  5.0 - 10.5 fL Final    Neutrophils % 12/03/2022 92.8  % Final    Lymphocytes % 12/03/2022 3.2  % Final    Monocytes % 12/03/2022 3.9  % Final    Eosinophils % 12/03/2022 0.0  % Final    Basophils % 12/03/2022 0.1  % Final    Neutrophils Absolute 12/03/2022 15.2 (A)  1.7 - 7.7 K/uL Final    Lymphocytes Absolute 12/03/2022 0.5 (A)  1.0 - 5.1 K/uL Final    Monocytes Absolute 12/03/2022 0.6  0.0 - 1.3 K/uL Final    Eosinophils Absolute 12/03/2022 0.0  0.0 - 0.6 K/uL Final    Basophils Absolute 12/03/2022 0.0  0.0 - 0.2 K/uL Final    Sodium 12/03/2022 136  136 - 145 mmol/L Final    Potassium 12/03/2022 4.8  3.5 - 5.1 mmol/L Final    Chloride 12/03/2022 99 99 - 110 mmol/L Final    CO2 12/03/2022 27  21 - 32 mmol/L Final    Anion Gap 12/03/2022 10  3 - 16 Final    Glucose 12/03/2022 209 (A)  70 - 99 mg/dL Final    BUN 12/03/2022 17  7 - 20 mg/dL Final    Creatinine 12/03/2022 0.6 (A)  0.8 - 1.3 mg/dL Final    Est, Glom Filt Rate 12/03/2022 >60  >60 Final    Comment: Pediatric calculator link  [email or web address]_[email or web address]  Effective Oct 3, 2022  These results are not intended for use in patients  <25years of age. eGFR results are calculated without  a race factor using the 2021 CKD-EPI equation. Careful  clinical correlation is recommended, particularly when  comparing to results calculated using previous equations. The CKD-EPI equation is less accurate in patients with  extremes of muscle mass, extra-renal metabolism of  creatinine, excessive creatinine ingestion, or following  therapy that affects renal tubular secretion. Calcium 12/03/2022 9.2  8.3 - 10.6 mg/dL Final    Sodium 12/04/2022 137  136 - 145 mmol/L Final    Potassium 12/04/2022 4.8  3.5 - 5.1 mmol/L Final    Chloride 12/04/2022 97 (A)  99 - 110 mmol/L Final    CO2 12/04/2022 30  21 - 32 mmol/L Final    Anion Gap 12/04/2022 10  3 - 16 Final    Glucose 12/04/2022 167 (A)  70 - 99 mg/dL Final    BUN 12/04/2022 15  7 - 20 mg/dL Final    Creatinine 12/04/2022 0.9  0.8 - 1.3 mg/dL Final    Est, Glom Filt Rate 12/04/2022 >60  >60 Final    Comment: Pediatric calculator link  [email or web address]_[email or web address]  Effective Oct 3, 2022  These results are not intended for use in patients  <25years of age. eGFR results are calculated without  a race factor using the 2021 CKD-EPI equation. Careful  clinical correlation is recommended, particularly when  comparing to results calculated using previous equations.   The CKD-EPI equation is less accurate in patients with  extremes of muscle mass, extra-renal metabolism of  creatinine, excessive creatinine ingestion, or following  therapy that affects renal tubular secretion. Calcium 12/04/2022 9.5  8.3 - 10.6 mg/dL Final    WBC 12/04/2022 17.5 (A)  4.0 - 11.0 K/uL Final    RBC 12/04/2022 5.87  4.20 - 5.90 M/uL Final    Hemoglobin 12/04/2022 16.9  13.5 - 17.5 g/dL Final    Hematocrit 12/04/2022 52.0  40.5 - 52.5 % Final    MCV 12/04/2022 88.7  80.0 - 100.0 fL Final    MCH 12/04/2022 28.8  26.0 - 34.0 pg Final    MCHC 12/04/2022 32.5  31.0 - 36.0 g/dL Final    RDW 12/04/2022 15.6 (A)  12.4 - 15.4 % Final    Platelets 10/16/4932 351  135 - 450 K/uL Final    MPV 12/04/2022 6.7  5.0 - 10.5 fL Final    Neutrophils % 12/04/2022 90.8  % Final    Lymphocytes % 12/04/2022 2.9  % Final    Monocytes % 12/04/2022 6.1  % Final    Eosinophils % 12/04/2022 0.0  % Final    Basophils % 12/04/2022 0.2  % Final    Neutrophils Absolute 12/04/2022 15.9 (A)  1.7 - 7.7 K/uL Final    Lymphocytes Absolute 12/04/2022 0.5 (A)  1.0 - 5.1 K/uL Final    Monocytes Absolute 12/04/2022 1.1  0.0 - 1.3 K/uL Final    Eosinophils Absolute 12/04/2022 0.0  0.0 - 0.6 K/uL Final    Basophils Absolute 12/04/2022 0.0  0.0 - 0.2 K/uL Final    Sodium 12/05/2022 134 (A)  136 - 145 mmol/L Final    Potassium 12/05/2022 4.9  3.5 - 5.1 mmol/L Final    Chloride 12/05/2022 95 (A)  99 - 110 mmol/L Final    CO2 12/05/2022 25  21 - 32 mmol/L Final    Anion Gap 12/05/2022 14  3 - 16 Final    Glucose 12/05/2022 230 (A)  70 - 99 mg/dL Final    BUN 12/05/2022 17  7 - 20 mg/dL Final    Creatinine 12/05/2022 0.9  0.8 - 1.3 mg/dL Final    Est, Glom Filt Rate 12/05/2022 >60  >60 Final    Comment: Pediatric calculator link  [email or web address].[email or web address]_[email or web address]  Effective Oct 3, 2022  These results are not intended for use in patients  <25years of age. eGFR results are calculated without  a race factor using the 2021 CKD-EPI equation.   Careful  clinical correlation is recommended, particularly when  comparing to results calculated using previous equations. The CKD-EPI equation is less accurate in patients with  extremes of muscle mass, extra-renal metabolism of  creatinine, excessive creatinine ingestion, or following  therapy that affects renal tubular secretion. Calcium 12/05/2022 9.5  8.3 - 10.6 mg/dL Final    WBC 12/05/2022 14.5 (A)  4.0 - 11.0 K/uL Final    RBC 12/05/2022 5.69  4.20 - 5.90 M/uL Final    Hemoglobin 12/05/2022 16.5  13.5 - 17.5 g/dL Final    Hematocrit 12/05/2022 50.5  40.5 - 52.5 % Final    MCV 12/05/2022 88.8  80.0 - 100.0 fL Final    MCH 12/05/2022 29.0  26.0 - 34.0 pg Final    MCHC 12/05/2022 32.7  31.0 - 36.0 g/dL Final    RDW 12/05/2022 15.2  12.4 - 15.4 % Final    Platelets 45/94/2516 336  135 - 450 K/uL Final    MPV 12/05/2022 6.9  5.0 - 10.5 fL Final    Neutrophils % 12/05/2022 89.6  % Final    Lymphocytes % 12/05/2022 4.9  % Final    Monocytes % 12/05/2022 5.4  % Final    Eosinophils % 12/05/2022 0.0  % Final    Basophils % 12/05/2022 0.1  % Final    Neutrophils Absolute 12/05/2022 13.0 (A)  1.7 - 7.7 K/uL Final    Lymphocytes Absolute 12/05/2022 0.7 (A)  1.0 - 5.1 K/uL Final    Monocytes Absolute 12/05/2022 0.8  0.0 - 1.3 K/uL Final    Eosinophils Absolute 12/05/2022 0.0  0.0 - 0.6 K/uL Final    Basophils Absolute 12/05/2022 0.0  0.0 - 0.2 K/uL Final    Sodium 12/06/2022 138  136 - 145 mmol/L Final    Potassium 12/06/2022 4.4  3.5 - 5.1 mmol/L Final    Chloride 12/06/2022 99  99 - 110 mmol/L Final    CO2 12/06/2022 27  21 - 32 mmol/L Final    Anion Gap 12/06/2022 12  3 - 16 Final    Glucose 12/06/2022 126 (A)  70 - 99 mg/dL Final    BUN 12/06/2022 16  7 - 20 mg/dL Final    Creatinine 12/06/2022 0.7 (A)  0.8 - 1.3 mg/dL Final    Est, Glom Filt Rate 12/06/2022 >60  >60 Final    Comment: Pediatric calculator link  [email or web address]. [email or web address]/gfr_calculatorped  Effective Oct 3, 2022  These results are not intended for use in patients  <25years of age.   eGFR results are calculated without  a race factor using the 2021 CKD-EPI equation. Careful  clinical correlation is recommended, particularly when  comparing to results calculated using previous equations. The CKD-EPI equation is less accurate in patients with  extremes of muscle mass, extra-renal metabolism of  creatinine, excessive creatinine ingestion, or following  therapy that affects renal tubular secretion.       Calcium 12/06/2022 8.8  8.3 - 10.6 mg/dL Final    WBC 12/06/2022 13.3 (A)  4.0 - 11.0 K/uL Final    RBC 12/06/2022 5.30  4.20 - 5.90 M/uL Final    Hemoglobin 12/06/2022 15.2  13.5 - 17.5 g/dL Final    Hematocrit 12/06/2022 46.8  40.5 - 52.5 % Final    MCV 12/06/2022 88.5  80.0 - 100.0 fL Final    MCH 12/06/2022 28.7  26.0 - 34.0 pg Final    MCHC 12/06/2022 32.4  31.0 - 36.0 g/dL Final    RDW 12/06/2022 15.4  12.4 - 15.4 % Final    Platelets 74/12/9834 326  135 - 450 K/uL Final    MPV 12/06/2022 6.8  5.0 - 10.5 fL Final    Neutrophils % 12/06/2022 80.6  % Final    Lymphocytes % 12/06/2022 10.4  % Final    Monocytes % 12/06/2022 8.9  % Final    Eosinophils % 12/06/2022 0.0  % Final    Basophils % 12/06/2022 0.1  % Final    Neutrophils Absolute 12/06/2022 10.8 (A)  1.7 - 7.7 K/uL Final    Lymphocytes Absolute 12/06/2022 1.4  1.0 - 5.1 K/uL Final    Monocytes Absolute 12/06/2022 1.2  0.0 - 1.3 K/uL Final    Eosinophils Absolute 12/06/2022 0.0  0.0 - 0.6 K/uL Final    Basophils Absolute 12/06/2022 0.0  0.0 - 0.2 K/uL Final    Theophylline Lvl 12/07/2022 4.8 (A)  8.0 - 20.0 ug/mL Final    CULTURE, RESPIRATORY 12/07/2022 Moderate growth normal respiratory rowan with (A)   Final    Gram Stain Result 12/07/2022    Final                    Value:1+ Epithelial Cells  2+ WBC's (Polymorphonuclear)  1+ Yeast  3+ Gram positive cocci  1+ Gram negative rods  1+ Gram positive rods      Organism 12/07/2022 Aspergillus species (A)   Final    CULTURE, RESPIRATORY 12/07/2022    Final                    Value:POSITIVE for  No further workup      Pro-BNP 12/09/2022 140  0 - 449 pg/mL Final    Comment: Methodology by NT-proBNP    An age-independent cutoff point of 300 pg/ml has a 98%  negative predictive value excluding acute heart failure. Values exceeding the age-related cutoff values (450 pg/mL if  age<50, 900 if 50-75 and 1800 if >75) has 90% sensitivity and  84% specificity for diagnosing acute HF. In patients with  renal compromise (eGFR<60) values greater than 1200pg/ml have  a diagnostic sensitivity and specificity of 89% and 72% for  acute HF. Assessment & Plan:   Principal Problem:    Influenza A--completed tamiflu --no infilt on cxr   Active Problems:    Hypoxemia--Cont O2     COPD exacerbation (HCC)    COPD, very severe (HCC)    Acute respiratory failure with hypoxia (HCC)--O2/HHN/vest/iv solumedrol     DNR (do not resuscitate)--he req full code     Essential hypertension--cont cardizem     Chronic respiratory failure with hypoxia ([address]  Resolved Problems:    * No resolved hospital problems.  *  Cont current tx--he is sl improved today-dw wife and patient at  bedside--he still req ventilator if needed    Please note that over 35 minutes was spent in evaluating the patient, review of records and results, discussion with staff/family, etc.    Cole Hanks MD

## 2022-12-10 NOTE — PLAN OF CARE
Problem: Discharge Planning  Goal: Discharge to home or other facility with appropriate resources  12/10/2022 1547 by Claudette Benton RN  Outcome: Progressing  12/10/2022 0353 by Mara Radford RN  Outcome: Progressing     Problem: Safety - Adult  Goal: Free from fall injury  12/10/2022 1547 by Claudette Benton RN  Outcome: Progressing  12/10/2022 0353 by Mara Radford RN  Outcome: Progressing     Problem: Pain  Goal: Verbalizes/displays adequate comfort level or baseline comfort level  12/10/2022 1547 by Claudette Benton RN  Outcome: Progressing  12/10/2022 0353 by Mara Radford RN  Outcome: Progressing     Problem: ABCDS Injury Assessment  Goal: Absence of physical injury  12/10/2022 1547 by Claudette Benton RN  Outcome: Progressing  12/10/2022 0353 by Mara Radford RN  Outcome: Progressing     Problem: Infection - Adult  Goal: Absence of infection at discharge  12/10/2022 1547 by Claudette Benton RN  Outcome: Progressing  12/10/2022 0353 by Mara Radford RN  Outcome: Progressing  Goal: Absence of infection during hospitalization  12/10/2022 1547 by Claudette Benton RN  Outcome: Progressing  12/10/2022 0353 by Mara Radford RN  Outcome: Progressing  Goal: Absence of fever/infection during anticipated neutropenic period  12/10/2022 1547 by Claudette Benton RN  Outcome: Progressing  12/10/2022 0353 by Mara Radford RN  Outcome: Progressing     Problem: Respiratory - Adult  Goal: Achieves optimal ventilation and oxygenation  12/10/2022 1547 by Claudette Benton RN  Outcome: Progressing  12/10/2022 0353 by Mara Radford RN  Outcome: Progressing

## 2022-12-11 NOTE — PROGRESS NOTES
Pulmonary Progress Note    Date of Admission: 12/1/2022   LOS: 10 days     Chief Complaint   Patient presents with    Shortness of Breath     Pt states he has had increased sob x 2-3 days. Pt states he is coughing up green sputum. Pt has history of copd and is on home oxygen of 2 liters. Assessment/Plan:       Acute hypoxemic respiratory failure   Acute exacerbation of COPD, due to influenza and a  -Completed Tamiflu  -Scheduled duo nebs, budesonide  -Theophylline  -Sputum culture pending  -Wean oxygen goal saturation 90%  -Aspergillus growing from sputum. Given his severe COPD could theoretically be a colonizer but I do not see that he is going this before and is having ongoing respiratory failure.  -We will consult ID for their opinion on treatment    ZAHEER  -Home biPAP at night and with naps      24 Hour Events/Subjective  No acute events overnight. Down to 2 L of oxygen. Still wheezing.  -Aspergillus in the sputum    ROS:   No nausea  No Vomiting  No chest pain      Intake/Output Summary (Last 24 hours) at 12/11/2022 1137  Last data filed at 12/11/2022 0541  Gross per 24 hour   Intake 480 ml   Output 1400 ml   Net -920 ml         PHYSICAL EXAM:   Blood pressure (!) 192/75, pulse (!) 109, temperature 97.9 °F (36.6 °C), temperature source Oral, resp. rate 18, height 5' 7\" (1.702 m), weight 188 lb 0.8 oz (85.3 kg), SpO2 94 %.'  Gen:  No acute distress. Eyes: PERRL. Anicteric sclera. No conjunctival injection. ENT: No discharge. Posterior oropharynx clear. External appearance of ears and nose normal.  Neck: Trachea midline. No mass   Resp:  No crackles. + Bilateral wheezes. No rhonchi. No dullness on percussion. CV: Tachycardic rate. Regular rhythm. No murmur or rub. No edema. GI: Soft, Non-tender. Non-distended. +BS  Skin: Warm, dry, w/o erythema. Lymph: No cervical or supraclavicular LAD. M/S: No cyanosis. No clubbing. Neuro:  no focal neurologic deficit.   Moves all extremities  Psych: Awake and alert, Oriented x 3. Judgement and insight appropriate. Mood stable. Medications:    Scheduled Meds:   methylPREDNISolone sodium  60 mg IntraVENous Q8H    budesonide  1 mg Nebulization BID    dilTIAZem  300 mg Oral Daily    ipratropium-albuterol  1 ampule Inhalation Q6H    polyethylene glycol  17 g Oral Daily    guaiFENesin  600 mg Oral BID    theophylline  200 mg Oral BID    sodium chloride (Inhalant)  15 mL Nebulization 3 times per day    sodium chloride flush  5-40 mL IntraVENous 2 times per day    enoxaparin  40 mg SubCUTAneous Nightly    polyvinyl alcohol  2 drop Both Eyes BID    pantoprazole  40 mg Oral QAM AC    atorvastatin  10 mg Oral Daily    losartan  50 mg Oral Daily       Continuous Infusions:   sodium chloride         PRN Meds:  sodium chloride flush, sodium chloride, ondansetron **OR** ondansetron, acetaminophen **OR** acetaminophen, albuterol    Labs reviewed:  CBC: No results for input(s): WBC, HGB, HCT, MCV, PLT in the last 72 hours. BMP: No results for input(s): NA, K, CL, CO2, PHOS, BUN, CREATININE, CA in the last 72 hours. LIVER PROFILE: No results for input(s): AST, ALT, LIPASE, BILIDIR, BILITOT, ALKPHOS in the last 72 hours. Invalid input(s): AMYLASE,  ALB  PT/INR: No results for input(s): PROTIME, INR in the last 72 hours. Films:  Radiology Review:  Pertinent images / reports were reviewed as a part of this visit. This note was transcribed using [id number] [employer]. Please disregard any translational errors.     Thank you for this consult,    Christin Abreu MD  [facility] Pulmonary, Critical Care, and Sleep Medicine

## 2022-12-11 NOTE — PROGRESS NOTES
Lani GRIMES [facility] Progress Note  12/11/2022 9:43 AM  Subjective:   Admit Date: 12/1/2022      Chief Complaint: I am still SOB feeling pretty bad amd fairly disgusted been here 11 days! Up in chair holding himself up    Interval History: resp insuffic persists --but less purse lipped breqthing--is able to eat and converse   Resting tachycardai (102-104) sec to resp load   Sputum cx---nl rowan  CXR--no infiltrate     Diet: ADULT DIET; Regular  Medications:   Scheduled Meds:   budesonide  1 mg Nebulization BID    methylPREDNISolone sodium  40 mg IntraVENous Q8H    dilTIAZem  300 mg Oral Daily    ipratropium-albuterol  1 ampule Inhalation Q6H    polyethylene glycol  17 g Oral Daily    guaiFENesin  600 mg Oral BID    theophylline  200 mg Oral BID    sodium chloride (Inhalant)  15 mL Nebulization 3 times per day    sodium chloride flush  5-40 mL IntraVENous 2 times per day    enoxaparin  40 mg SubCUTAneous Nightly    polyvinyl alcohol  2 drop Both Eyes BID    pantoprazole  40 mg Oral QAM AC    atorvastatin  10 mg Oral Daily    losartan  50 mg Oral Daily     Continuous Infusions:   sodium chloride         Review of Systems -   General ROS: afebrile  Respiratory ROS: positive for - cough and shortness of breath  Cardiovascular ROS: no chest pain  Musculoskeletal ROS:positive for - low back pain  Neurological ROS: no TIA or stroke symptoms    Objective:   Vitals: BP (!) 192/75   Pulse (!) 109   Temp 97.9 °F (36.6 °C) (Oral)   Resp 18   Ht 5' 7\" (1.702 m)   Wt 188 lb 0.8 oz (85.3 kg)   SpO2 94%   BMI 29.45 kg/m²   General appearance: alert and cooperative with exam up in chair depressed affect  HEENT: Head: Normocephalic, no lesions, without obvious abnormality.   Neck: no adenopathy, no carotid bruit, no JVD, supple, symmetrical, trachea midline, and thyroid not enlarged, symmetric, no tenderness/mass/nodules  Lungs: diffuse insp and exp wheezing decreased breath sounds throughout no accessory muscles  Heart: resting tachycardia   Abdomen: soft, non-tender; bowel sounds normal; no masses,  no organomegaly  Extremities: tr edema legs wrapped  Neurologic: Mental status: Alert, oriented, thought content appropriate    Admission on 12/01/2022   Component Date Value Ref Range Status    Ventricular Rate 12/01/2022 107  BPM Final    Atrial Rate 12/01/2022 107  BPM Final    P-R Interval 12/01/2022 124  ms Final    QRS Duration 12/01/2022 84  ms Final    Q-T Interval 12/01/2022 312  ms Final    QTc Calculation (Bazett) 12/01/2022 416  ms Final    P Axis 12/01/2022 76  degrees Final    R Axis 12/01/2022 98  degrees Final    T Axis 12/01/2022 41  degrees Final    Diagnosis 12/01/2022    Final                    Value:Sinus tachycardiaBaseline artifactPoor R wave progression likely from lead position [id number] axisBorderline ECGWhen compared with ECG of 10/20/2021Poor data quality , butNo significant change likelyConfirmed by [facility] Cadence CANCHOLA MD ([id number]) on 12/1/2022 4:16:12 PM      WBC 12/01/2022 15.6 (A)  4.0 - 11.0 K/uL Final    RBC 12/01/2022 6.07 (A)  4.20 - 5.90 M/uL Final    Hemoglobin 12/01/2022 17.6 (A)  13.5 - 17.5 g/dL Final    Hematocrit 12/01/2022 52.7 (A)  40.5 - 52.5 % Final    MCV 12/01/2022 86.9  80.0 - 100.0 fL Final    MCH 12/01/2022 29.0  26.0 - 34.0 pg Final    MCHC 12/01/2022 33.3  31.0 - 36.0 g/dL Final    RDW 12/01/2022 15.5 (A)  12.4 - 15.4 % Final    Platelets 99/17/6007 303  135 - 450 K/uL Final    MPV 12/01/2022 6.9  5.0 - 10.5 fL Final    Neutrophils % 12/01/2022 88.8  % Final    Lymphocytes % 12/01/2022 3.2  % Final    Monocytes % 12/01/2022 7.9  % Final    Eosinophils % 12/01/2022 0.0  % Final    Basophils % 12/01/2022 0.1  % Final    Neutrophils Absolute 12/01/2022 13.8 (A)  1.7 - 7.7 K/uL Final    Lymphocytes Absolute 12/01/2022 0.5 (A)  1.0 - 5.1 K/uL Final    Monocytes Absolute 12/01/2022 1.2  0.0 - 1.3 K/uL Final    Eosinophils Absolute 12/01/2022 0.0  0.0 - 0.6 K/uL Final    Basophils Absolute 12/01/2022 0.0  0.0 - 0.2 K/uL Final    Pro-BNP 12/01/2022 113  0 - 449 pg/mL Final    Comment: Methodology by NT-proBNP    An age-independent cutoff point of 300 pg/ml has a 98%  negative predictive value excluding acute heart failure. Values exceeding the age-related cutoff values (450 pg/mL if  age<50, 900 if 50-75 and 1800 if >75) has 90% sensitivity and  84% specificity for diagnosing acute HF. In patients with  renal compromise (eGFR<60) values greater than 1200pg/ml have  a diagnostic sensitivity and specificity of 89% and 72% for  acute HF. Troponin 12/01/2022 <0.01  <0.01 ng/mL Final    Methodology by Troponin T    Sodium 12/01/2022 132 (A)  136 - 145 mmol/L Final    Potassium reflex Magnesium 12/01/2022 4.2  3.5 - 5.1 mmol/L Final    Chloride 12/01/2022 94 (A)  99 - 110 mmol/L Final    CO2 12/01/2022 26  21 - 32 mmol/L Final    Anion Gap 12/01/2022 12  3 - 16 Final    Glucose 12/01/2022 128 (A)  70 - 99 mg/dL Final    BUN 12/01/2022 15  7 - 20 mg/dL Final    Creatinine 12/01/2022 0.7 (A)  0.8 - 1.3 mg/dL Final    Est, Glom Filt Rate 12/01/2022 >60  >60 Final    Comment: Pediatric calculator link  [email or web address]  Effective Oct 3, 2022  These results are not intended for use in patients  <25years of age. eGFR results are calculated without  a race factor using the 2021 CKD-EPI equation. Careful  clinical correlation is recommended, particularly when  comparing to results calculated using previous equations. The CKD-EPI equation is less accurate in patients with  extremes of muscle mass, extra-renal metabolism of  creatinine, excessive creatinine ingestion, or following  therapy that affects renal tubular secretion.       Calcium 12/01/2022 9.7  8.3 - 10.6 mg/dL Final    Total Protein 12/01/2022 6.9  6.4 - 8.2 g/dL Final    Albumin 12/01/2022 3.6  3.4 - 5.0 g/dL Final    Albumin/Globulin Ratio 12/01/2022 1.1  1.1 - 2.2 Final    Total Bilirubin 12/01/2022 0.4  0.0 - 1.0 mg/dL Final    Alkaline Phosphatase 12/01/2022 75  40 - 129 U/L Final    ALT 12/01/2022 35  10 - 40 U/L Final    AST 12/01/2022 26  15 - 37 U/L Final    pH, Seb 12/01/2022 7.394  7.350 - 7.450 Final    pCO2, Seb 12/01/2022 49.8  40.0 - 50.0 mmHg Final    pO2, Seb 12/01/2022 37  Not Established mmHg Final    HCO3, Venous 12/01/2022 30 (A)  23 - 29 mmol/L Final    Base Excess, Seb 12/01/2022 4.0  Not Established mmol/L Final    O2 Sat, Seb 12/01/2022 71  Not Established % Final    Carboxyhemoglobin 12/01/2022 1.3  % Final    Comment:      0.0-1.5  (Smokers 1.5-5.0)      MetHgb, Seb 12/01/2022 0.3  <1.5 % Final    TC02 (Calc), Seb 12/01/2022 32  Not Established mmol/L Final    O2 Therapy 12/01/2022 Unknown   Final    Rapid Influenza A Ag 12/01/2022 POSITIVE (A)  Negative Final    Rapid Influenza B Ag 12/01/2022 Negative  Negative Final    SARS-CoV-2, NAAT 12/01/2022 Not Detected  Not Detected Final    Comment: Rapid NAAT:   Negative results should be treated as presumptive and,  if inconsistent with clinical signs and symptoms or necessary for  patient management, should be tested with an alternative molecular  assay. Negative results do not preclude SARS-CoV-2 infection and  should not be used as the sole basis for patient management decisions. This test has been authorized by the FDA under an Emergency Use  Authorization (EUA) for use by authorized laboratories.     Fact sheet for Healthcare Providers:  http://www.beti-holly.bimarylou/  Fact sheet for Patients: http://www.bang-barrera.biz/    METHODOLOGY: Isothermal Nucleic Acid Amplification      Procalcitonin 12/02/2022 0.09  0.00 - 0.15 ng/mL Final    Comment: Suspected Sepsis:  Low likelihood of sepsis  <.50 ng/mL    Increased likelihood of sepsis 0.50-2.00 ng/mL  Antibiotics encouraged    High risk of sepsis/shock   >2.00 ng/mL  Antibiotics strongly encouraged    Suspected Lower Respiratory Tract Infections:  Low likelihood of bacterial infection  <0.24 ng/mL    Increased likelihood of bacterial infection >0.24 ng/mL  Antibiotics encouraged    With successful antibiotic therapy, PCT levels should decrease  rapidly. (Half-life of 24 to 36 hours.)    Procalcitonin values from samples collected within the first  6 hours of systemic infection may still be low. Retesting may be indicated. Values from day 1 and day 4 can be entered into the Change in  Procalcitonin Calculator to determine the patient's  Mortality Risk Prognosis  ([email or web address])    In healthy neonates, plasma Procalcitonin (PCT) concentrations  increase gradually after birth, reaching peak values at about  24 hours of age then decrease to normal values below 0.5                            ng/mL  by 48-72 hours of age.       CULTURE, RESPIRATORY 12/02/2022 Normal respiratory rowan   Final    Gram Stain Result 12/02/2022    Final                    Value:2+ Epithelial Cells  4+ WBC's (Polymorphonuclear)  3+ Gram positive cocci  2+ Yeast      WBC 12/03/2022 16.3 (A)  4.0 - 11.0 K/uL Final    RBC 12/03/2022 5.43  4.20 - 5.90 M/uL Final    Hemoglobin 12/03/2022 15.6  13.5 - 17.5 g/dL Final    Hematocrit 12/03/2022 48.1  40.5 - 52.5 % Final    MCV 12/03/2022 88.7  80.0 - 100.0 fL Final    MCH 12/03/2022 28.8  26.0 - 34.0 pg Final    MCHC 12/03/2022 32.4  31.0 - 36.0 g/dL Final    RDW 12/03/2022 15.1  12.4 - 15.4 % Final    Platelets 49/70/7998 298  135 - 450 K/uL Final    MPV 12/03/2022 6.7  5.0 - 10.5 fL Final    Neutrophils % 12/03/2022 92.8  % Final    Lymphocytes % 12/03/2022 3.2  % Final    Monocytes % 12/03/2022 3.9  % Final    Eosinophils % 12/03/2022 0.0  % Final    Basophils % 12/03/2022 0.1  % Final    Neutrophils Absolute 12/03/2022 15.2 (A)  1.7 - 7.7 K/uL Final    Lymphocytes Absolute 12/03/2022 0.5 (A)  1.0 - 5.1 K/uL Final    Monocytes Absolute 12/03/2022 0.6  0.0 - 1.3 K/uL Final    Eosinophils Absolute 12/03/2022 0.0  0.0 - 0.6 K/uL Final Basophils Absolute 12/03/2022 0.0  0.0 - 0.2 K/uL Final    Sodium 12/03/2022 136  136 - 145 mmol/L Final    Potassium 12/03/2022 4.8  3.5 - 5.1 mmol/L Final    Chloride 12/03/2022 99  99 - 110 mmol/L Final    CO2 12/03/2022 27  21 - 32 mmol/L Final    Anion Gap 12/03/2022 10  3 - 16 Final    Glucose 12/03/2022 209 (A)  70 - 99 mg/dL Final    BUN 12/03/2022 17  7 - 20 mg/dL Final    Creatinine 12/03/2022 0.6 (A)  0.8 - 1.3 mg/dL Final    Est, Glom Filt Rate 12/03/2022 >60  >60 Final    Comment: Pediatric calculator link  [email or web address]  Effective Oct 3, 2022  These results are not intended for use in patients  <25years of age. eGFR results are calculated without  a race factor using the 2021 CKD-EPI equation. Careful  clinical correlation is recommended, particularly when  comparing to results calculated using previous equations. The CKD-EPI equation is less accurate in patients with  extremes of muscle mass, extra-renal metabolism of  creatinine, excessive creatinine ingestion, or following  therapy that affects renal tubular secretion. Calcium 12/03/2022 9.2  8.3 - 10.6 mg/dL Final    Sodium 12/04/2022 137  136 - 145 mmol/L Final    Potassium 12/04/2022 4.8  3.5 - 5.1 mmol/L Final    Chloride 12/04/2022 97 (A)  99 - 110 mmol/L Final    CO2 12/04/2022 30  21 - 32 mmol/L Final    Anion Gap 12/04/2022 10  3 - 16 Final    Glucose 12/04/2022 167 (A)  70 - 99 mg/dL Final    BUN 12/04/2022 15  7 - 20 mg/dL Final    Creatinine 12/04/2022 0.9  0.8 - 1.3 mg/dL Final    Est, Glom Filt Rate 12/04/2022 >60  >60 Final    Comment: Pediatric calculator link  [email or web address]  Effective Oct 3, 2022  These results are not intended for use in patients  <25years of age. eGFR results are calculated without  a race factor using the 2021 CKD-EPI equation.   Careful  clinical correlation is recommended, particularly when  comparing to results calculated using previous equations. The CKD-EPI equation is less accurate in patients with  extremes of muscle mass, extra-renal metabolism of  creatinine, excessive creatinine ingestion, or following  therapy that affects renal tubular secretion. Calcium 12/04/2022 9.5  8.3 - 10.6 mg/dL Final    WBC 12/04/2022 17.5 (A)  4.0 - 11.0 K/uL Final    RBC 12/04/2022 5.87  4.20 - 5.90 M/uL Final    Hemoglobin 12/04/2022 16.9  13.5 - 17.5 g/dL Final    Hematocrit 12/04/2022 52.0  40.5 - 52.5 % Final    MCV 12/04/2022 88.7  80.0 - 100.0 fL Final    MCH 12/04/2022 28.8  26.0 - 34.0 pg Final    MCHC 12/04/2022 32.5  31.0 - 36.0 g/dL Final    RDW 12/04/2022 15.6 (A)  12.4 - 15.4 % Final    Platelets 33/68/9830 351  135 - 450 K/uL Final    MPV 12/04/2022 6.7  5.0 - 10.5 fL Final    Neutrophils % 12/04/2022 90.8  % Final    Lymphocytes % 12/04/2022 2.9  % Final    Monocytes % 12/04/2022 6.1  % Final    Eosinophils % 12/04/2022 0.0  % Final    Basophils % 12/04/2022 0.2  % Final    Neutrophils Absolute 12/04/2022 15.9 (A)  1.7 - 7.7 K/uL Final    Lymphocytes Absolute 12/04/2022 0.5 (A)  1.0 - 5.1 K/uL Final    Monocytes Absolute 12/04/2022 1.1  0.0 - 1.3 K/uL Final    Eosinophils Absolute 12/04/2022 0.0  0.0 - 0.6 K/uL Final    Basophils Absolute 12/04/2022 0.0  0.0 - 0.2 K/uL Final    Sodium 12/05/2022 134 (A)  136 - 145 mmol/L Final    Potassium 12/05/2022 4.9  3.5 - 5.1 mmol/L Final    Chloride 12/05/2022 95 (A)  99 - 110 mmol/L Final    CO2 12/05/2022 25  21 - 32 mmol/L Final    Anion Gap 12/05/2022 14  3 - 16 Final    Glucose 12/05/2022 230 (A)  70 - 99 mg/dL Final    BUN 12/05/2022 17  7 - 20 mg/dL Final    Creatinine 12/05/2022 0.9  0.8 - 1.3 mg/dL Final    Est, Glom Filt Rate 12/05/2022 >60  >60 Final    Comment: Pediatric calculator link  [email or web address]_[email or web address]  Effective Oct 3, 2022  These results are not intended for use in patients  <25years of age.   eGFR results are calculated without  a race factor using the 2021 CKD-EPI equation. Careful  clinical correlation is recommended, particularly when  comparing to results calculated using previous equations. The CKD-EPI equation is less accurate in patients with  extremes of muscle mass, extra-renal metabolism of  creatinine, excessive creatinine ingestion, or following  therapy that affects renal tubular secretion.       Calcium 12/05/2022 9.5  8.3 - 10.6 mg/dL Final    WBC 12/05/2022 14.5 (A)  4.0 - 11.0 K/uL Final    RBC 12/05/2022 5.69  4.20 - 5.90 M/uL Final    Hemoglobin 12/05/2022 16.5  13.5 - 17.5 g/dL Final    Hematocrit 12/05/2022 50.5  40.5 - 52.5 % Final    MCV 12/05/2022 88.8  80.0 - 100.0 fL Final    MCH 12/05/2022 29.0  26.0 - 34.0 pg Final    MCHC 12/05/2022 32.7  31.0 - 36.0 g/dL Final    RDW 12/05/2022 15.2  12.4 - 15.4 % Final    Platelets 64/78/4528 336  135 - 450 K/uL Final    MPV 12/05/2022 6.9  5.0 - 10.5 fL Final    Neutrophils % 12/05/2022 89.6  % Final    Lymphocytes % 12/05/2022 4.9  % Final    Monocytes % 12/05/2022 5.4  % Final    Eosinophils % 12/05/2022 0.0  % Final    Basophils % 12/05/2022 0.1  % Final    Neutrophils Absolute 12/05/2022 13.0 (A)  1.7 - 7.7 K/uL Final    Lymphocytes Absolute 12/05/2022 0.7 (A)  1.0 - 5.1 K/uL Final    Monocytes Absolute 12/05/2022 0.8  0.0 - 1.3 K/uL Final    Eosinophils Absolute 12/05/2022 0.0  0.0 - 0.6 K/uL Final    Basophils Absolute 12/05/2022 0.0  0.0 - 0.2 K/uL Final    Sodium 12/06/2022 138  136 - 145 mmol/L Final    Potassium 12/06/2022 4.4  3.5 - 5.1 mmol/L Final    Chloride 12/06/2022 99  99 - 110 mmol/L Final    CO2 12/06/2022 27  21 - 32 mmol/L Final    Anion Gap 12/06/2022 12  3 - 16 Final    Glucose 12/06/2022 126 (A)  70 - 99 mg/dL Final    BUN 12/06/2022 16  7 - 20 mg/dL Final    Creatinine 12/06/2022 0.7 (A)  0.8 - 1.3 mg/dL Final    Est, Glom Filt Rate 12/06/2022 >60  >60 Final    Comment: Pediatric calculator link  [email or web address]_[email or web address]  Effective Oct 3, 2022  These results are not intended for use in patients  <25years of age. eGFR results are calculated without  a race factor using the 2021 CKD-EPI equation. Careful  clinical correlation is recommended, particularly when  comparing to results calculated using previous equations. The CKD-EPI equation is less accurate in patients with  extremes of muscle mass, extra-renal metabolism of  creatinine, excessive creatinine ingestion, or following  therapy that affects renal tubular secretion.       Calcium 12/06/2022 8.8  8.3 - 10.6 mg/dL Final    WBC 12/06/2022 13.3 (A)  4.0 - 11.0 K/uL Final    RBC 12/06/2022 5.30  4.20 - 5.90 M/uL Final    Hemoglobin 12/06/2022 15.2  13.5 - 17.5 g/dL Final    Hematocrit 12/06/2022 46.8  40.5 - 52.5 % Final    MCV 12/06/2022 88.5  80.0 - 100.0 fL Final    MCH 12/06/2022 28.7  26.0 - 34.0 pg Final    MCHC 12/06/2022 32.4  31.0 - 36.0 g/dL Final    RDW 12/06/2022 15.4  12.4 - 15.4 % Final    Platelets 18/12/0395 326  135 - 450 K/uL Final    MPV 12/06/2022 6.8  5.0 - 10.5 fL Final    Neutrophils % 12/06/2022 80.6  % Final    Lymphocytes % 12/06/2022 10.4  % Final    Monocytes % 12/06/2022 8.9  % Final    Eosinophils % 12/06/2022 0.0  % Final    Basophils % 12/06/2022 0.1  % Final    Neutrophils Absolute 12/06/2022 10.8 (A)  1.7 - 7.7 K/uL Final    Lymphocytes Absolute 12/06/2022 1.4  1.0 - 5.1 K/uL Final    Monocytes Absolute 12/06/2022 1.2  0.0 - 1.3 K/uL Final    Eosinophils Absolute 12/06/2022 0.0  0.0 - 0.6 K/uL Final    Basophils Absolute 12/06/2022 0.0  0.0 - 0.2 K/uL Final    Theophylline Lvl 12/07/2022 4.8 (A)  8.0 - 20.0 ug/mL Final    CULTURE, RESPIRATORY 12/07/2022 Moderate growth normal respiratory rowan with (A)   Final    Gram Stain Result 12/07/2022    Final                    Value:1+ Epithelial Cells  2+ WBC's (Polymorphonuclear)  1+ Yeast  3+ Gram positive cocci  1+ Gram negative rods  1+ Gram positive rods      Organism 12/07/2022 Aspergillus species (A)   Final    CULTURE, RESPIRATORY 12/07/2022    Final                    Value:POSITIVE for  No further workup      Pro-BNP 12/09/2022 140  0 - 449 pg/mL Final    Comment: Methodology by NT-proBNP    An age-independent cutoff point of 300 pg/ml has a 98%  negative predictive value excluding acute heart failure. Values exceeding the age-related cutoff values (450 pg/mL if  age<50, 900 if 50-75 and 1800 if >75) has 90% sensitivity and  84% specificity for diagnosing acute HF. In patients with  renal compromise (eGFR<60) values greater than 1200pg/ml have  a diagnostic sensitivity and specificity of 89% and 72% for  acute HF. Assessment & Plan:   Principal Problem:    Influenza A--completed tamiflu --no infilt on cxr   Active Problems:    Hypoxemia--Cont O2     COPD exacerbation (HCC)    COPD, very severe (HCC)    Acute respiratory failure with hypoxia (HCC)--O2/HHN/vest/iv solumedrol     DNR (do not resuscitate)--he req full code     Essential hypertension--cont cardizem     Chronic respiratory failure with hypoxia ([facility] [address]  Resolved Problems:    * No resolved hospital problems.  *  Cont current tx--he is sl improved today-dw wife and patient at  bedside--he still req ventilator if needed    Please note that over 35 minutes was spent in evaluating the patient, review of records and results, discussion with staff/family, etc.    Gayle Cuenca MD

## 2022-12-11 NOTE — CONSULTS
Infectious Diseases   Consult Note      Reason for Consult:  evaluate significance of Aspergillus in expectorated sputum culture    Requesting Physician:   Dr. Andrzej France       Date of Admission: 12/1/2022  Subjective:   CHIEF COMPLAINT:  none given       HPI:    Cleve White is an 81yoM with history of HTN, HLD, COPD, ZAHEER. History of SOB associated with nodular infiltrate in 2/2018. Aspergillus was isolated in BAL culture   He was treated by ID then for ~ 8 months total.                He has been feeling SOB for ~2 months. No improvement with steroid bursts. He attended a family wedding week of [employer]. Multiple attendees later fell ill with influenza. He was admitted through the ED 12/1/22 with SOB. No fever. CXR was clear  +Influenza A test  He completed a course of oseltamivir   Also on solumedrol    He is not feeling any better    Expectorated sputum culture collected 12/7/22 is positive for Aspergillus  We are consulted for treatment recommendations.   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 His smoking use included cigarettes. He has a 45.00 pack-year smoking history. He has never used smokeless tobacco.  ETOH:   reports current alcohol use. There is no history of illicit drug use or other significant epidemiologic exposures.       Family History:       Problem Relation Age of Onset    Diabetes Mother         DM    Coronary Art Dis Mother        Current Medications:    Current Facility-Administered Medications: methylPREDNISolone sodium (SOLU-MEDROL) injection 60 mg, 60 mg, IntraVENous, Q8H  perflutren lipid microspheres (DEFINITY) injection 1.5 mL, 1.5 mL, IntraVENous, ONCE PRN  budesonide (PULMICORT) nebulizer suspension 1,000 mcg, 1 mg, Nebulization, BID  dilTIAZem (CARDIZEM CD) extended release capsule 300 mg, 300 mg, Oral, Daily  ipratropium-albuterol (DUONEB) nebulizer solution 1 ampule, 1 ampule, Inhalation, Q6H  polyethylene glycol (GLYCOLAX) packet 17 g, 17 g, Oral, Daily  guaiFENesin (MUCINEX) extended release tablet 600 mg, 600 mg, Oral, BID  theophylline (UNIPHYL) extended release tablet 200 mg, 200 mg, Oral, BID  sodium chloride (Inhalant) 3 % nebulizer solution 15 mL, 15 mL, Nebulization, 3 times per day  sodium chloride flush 0.9 % injection 5-40 mL, 5-40 mL, IntraVENous, 2 times per day  sodium chloride flush 0.9 % injection 5-40 mL, 5-40 mL, IntraVENous, PRN  0.9 % sodium chloride infusion, , IntraVENous, PRN  enoxaparin (LOVENOX) injection 40 mg, 40 mg, SubCUTAneous, Nightly  ondansetron (ZOFRAN-ODT) disintegrating tablet 4 mg, 4 mg, Oral, Q8H PRN **OR** ondansetron (ZOFRAN) injection 4 mg, 4 mg, IntraVENous, Q6H PRN  acetaminophen (TYLENOL) tablet 650 mg, 650 mg, Oral, Q6H PRN **OR** acetaminophen (TYLENOL) suppository 650 mg, 650 mg, Rectal, Q6H PRN  albuterol (PROVENTIL) nebulizer solution 2.5 mg, 2.5 mg, Nebulization, Q4H PRN  polyvinyl alcohol (LIQUIFILM TEARS) 1.4 % ophthalmic solution 2 drop, 2 drop, Both Eyes, BID  pantoprazole (PROTONIX) tablet 40 mg, 40 mg, Oral, QAM AC  atorvastatin (LIPITOR) tablet 10 mg, 10 mg, Oral, Daily  losartan (COZAAR) tablet 50 mg, 50 mg, Oral, Daily      Allergies   Allergen Reactions    Aquaphor [Albolene] Rash        REVIEW OF SYSTEMS:    CONSTITUTIONAL:   per HPI   No F/C/NS   EYES:  negative for blurred vision, eye discharge, visual disturbance and icterus  HEENT:  negative for acute hearing loss, tinnitus, ear drainage, sinus pressure, nasal congestion, epistaxis and snoring  RESPIRATORY:   per HPI   CARDIOVASCULAR:  negative for chest pain, palpitations, exertional chest pressure/discomfort, syncope  GASTROINTESTINAL:  negative for nausea, vomiting, diarrhea, constipation, blood in stool and abdominal pain  GENITOURINARY:  negative for frequency, dysuria, urinary incontinence, decreased urine volume, and hematuria  HEMATOLOGIC/LYMPHATIC:  negative for easy bruising, bleeding and lymphadenopathy  ALLERGIC/IMMUNOLOGIC:  negative for recurrent infections, angioedema, anaphylaxis and drug reactions  ENDOCRINE:  negative for weight changes and diabetic symptoms including polyuria, polydipsia and polyphagia  MUSCULOSKELETAL:  negative for acute joint swelling, decreased range of motion and muscle weakness  NEUROLOGICAL:  negative for headaches, slurred speech, unilateral weakness  PSYCHIATRIC/BEHAVIORAL: negative for hallucinations, behavioral problems, confusion and agitation.        Objective:   PHYSICAL EXAM:      VITALS:  BP (!) 162/74   Pulse (!) 102   Temp 97.5 °F (36.4 °C) (Oral)   Resp 16   Ht 5' 7\" (1.702 m)   Wt 188 lb 0.8 oz (85.3 kg)   SpO2 93%   BMI 29.45 kg/m²      24HR INTAKE/OUTPUT:    Intake/Output Summary (Last 24 hours) at 12/11/2022 1420  Last data filed at 12/11/2022 1321  Gross per 24 hour   Intake 960 ml   Output 1400 ml   Net -440 ml     CONSTITUTIONAL:  Awake, alert, cooperative, no apparent distress, and appears stated age  Morbid obese  Appears uncomfortable but no acute distress  HEENT: NCAT, PERRL, EOMI. Sclera white, conjunctiva full. OP with moist mucosal membranes, no thrush, tongue protrudes midline  NECK:  Supple, symmetrical, trachea midline, no adenopathy  LUNGS:   non-labored breathing  +Rhonchi   No wheezing   CARDIOVASCULAR:  RRR  ABDOMEN:  normal bowel sounds, soft  Protuberant, NT   PSYCHIATRIC: Oriented to person place and time. No obvious depression or anxiety. MUSCULOSKELETAL: No obvious misalignment or effusion of the joints. No clubbing, cyanosis of the digits. SKIN:  normal skin color, texture, turgor and no redness, warmth, or swelling. No palpable nodules or stigmata of embolic phenomenon  NEUROLOGIC: nonfocal exam  ACCESS:  PIV in place       DATA:    Old records have been reviewed      Cultures:   12/1 Flu A ag positive   12/2 Expectorted sputum cx neg   12/7 Expectorated sputum cx Aspergillus       Radiology Review:  All pertinent images / reports were reviewed as a part of this visit.    CXR clear     Assessment:     Patient Active Problem List   Diagnosis    Gastroenteritis    Chronic diarrhea    GERD (gastroesophageal reflux disease)    Thrush    Benign prostatic hyperplasia    Allergic state    Hypercholesterolemia    Neck pain    Essential hypertension    Anxiety    Abdominal pain    Hypoxemia    COPD exacerbation (HCC)    PSVT (paroxysmal supraventricular tachycardia) (HCC)    Dyspnea    ZAHEER (obstructive sleep apnea)    Abnormal glucose    Hypoxia    Bronchitis    Abnormal CT of the chest    COPD, very severe ([address]    Pulmonary nodules    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    Elevated lactic acid level    Hypokalemia    Acute respiratory failure with hypoxia (HCC)    PLMD (periodic limb movement disorder)    Chronic hypercapnic respiratory failure (HCC)    Otalgia    Asthma    Other allergic rhinitis    COVID-19    Influenza A    Chronic respiratory failure with hypoxia ([address]       COPD     Admitted 121/22 with worsening SOB  Influenza A infection for which he completed a course of oseltamivir   Symptomatically, he is not feeling any better though no fever. CXR has been clear serially     Aspergillus is isolated in expectorated sputum culture  He was previously treated for Aspergillus infection back in 2018, at that time had nodular infiltrate on CT     Leukocytosis - likely steroid mediated    No abx allergies       Recs:  No urgent indication to start antifungal treatment   Will get CT chest.  If that is clear, would regard the Aspergillus as a colonizer. For completeness sake, will update TTE     Can DC isolation for influenza at this point from my perspective     D/w patient and wife, questions addressed    Will follow       Shalonda Sims M.D. Thank you for the opportunity to participate in the care of your patient.     Please do not hesitate to contact me:   [phone] office

## 2022-12-11 NOTE — PLAN OF CARE
Problem: Discharge Planning  Goal: Discharge to home or other facility with appropriate resources  12/11/2022 0307 by Lissett Harris RN  Outcome: Progressing  12/10/2022 1547 by Portia Marin RN  Outcome: Progressing  Flowsheets (Taken 12/10/2022 0830)  Discharge to home or other facility with appropriate resources: Identify barriers to discharge with patient and caregiver     Problem: Safety - Adult  Goal: Free from fall injury  12/11/2022 0307 by Lissett Harris RN  Outcome: Progressing  12/10/2022 1547 by Portia Marin RN  Outcome: Progressing     Problem: Pain  Goal: Verbalizes/displays adequate comfort level or baseline comfort level  12/11/2022 0307 by Lissett Harris RN  Outcome: Progressing  12/10/2022 1547 by Portia Marin RN  Outcome: Progressing     Problem: ABCDS Injury Assessment  Goal: Absence of physical injury  12/11/2022 0307 by Lissett Harris RN  Outcome: Progressing  12/10/2022 1547 by Portia Marin RN  Outcome: Progressing     Problem: Respiratory - Adult  Goal: Achieves optimal ventilation and oxygenation  12/11/2022 0307 by Lissett Harris RN  Outcome: Progressing  12/10/2022 1547 by Portia Marin RN  Outcome: Progressing  Flowsheets (Taken 12/10/2022 0830)  Achieves optimal ventilation and oxygenation: Assess for changes in respiratory status     Problem: Infection - Adult  Goal: Absence of infection at discharge  12/11/2022 0307 by Lissett Harris RN  Outcome: Progressing  12/10/2022 1547 by Portia Marin RN  Outcome: Progressing  Flowsheets (Taken 12/10/2022 0830)  Absence of infection at discharge: Assess and monitor for signs and symptoms of infection  Goal: Absence of infection during hospitalization  12/11/2022 0307 by Lissett Harris RN  Outcome: Progressing  12/10/2022 1547 by Portia Marin RN  Outcome: Progressing  Flowsheets (Taken 12/10/2022 0830)  Absence of infection during hospitalization: Assess and monitor for signs and symptoms of infection  Goal: Absence of fever/infection during anticipated neutropenic period  12/11/2022 0307 by Maday Olivas RN  Outcome: Progressing  12/10/2022 1547 by Norma Elizabeth RN  Outcome: Progressing  Flowsheets (Taken 12/10/2022 0830)  Absence of fever/infection during anticipated neutropenic period: Monitor white blood cell count

## 2022-12-12 PROBLEM — J96.21 ACUTE ON CHRONIC RESPIRATORY FAILURE WITH HYPOXIA (HCC): Status: ACTIVE | Noted: 2022-01-01

## 2022-12-12 PROBLEM — D72.9 NEUTROPHILIA: Status: ACTIVE | Noted: 2022-01-01

## 2022-12-12 NOTE — PROGRESS NOTES
Infectious Disease Follow up Notes  Admit Date: 12/1/2022  Hospital Day: 12    Antibiotics :   Steroids  Completed Levofloxacin course  Completed Tamiflu course     CHIEF COMPLAINT:     COPD  PNA  Influenz A infection   WBC elevation   Aspergillus PNA    Subjective interval History :  81 y.o. known to me from previous Aspergillus Lung infection and PNA from BAL cx +Ve was treated with Cresemba with good clinical response now admitted with recent worsening of sob and influenza A infection - was on nearly x 3 rounds of steroid bursts with out improvement he is normally not on Home Oxygen uses only when needed now using x 5lts cough + sob + feels bad and worsening sob + sputum now with Aspergillus and CT chest with new nodular changes           Past Medical History:    Past Medical History:   Diagnosis Date    Aspergillus pneumonia ([facility] [address]     Basal cell carcinoma of skin 06/16/2017    right cheek    COPD (chronic obstructive pulmonary disease) ([facility] [address]     DNR (do not resuscitate)     no intubation or chest compressions    Hyperlipidemia     Hypertension 10/2012    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/04/2018    Skin cancer 2014    both upper thighs    Squamous cell cancer of skin of nose 5/2016       Past Surgical History:    Past Surgical History:   Procedure Laterality Date    BRONCHOSCOPY N/A 7/25/2019    BRONCHOSCOPY WITH BRONCHOALVEOLAR LAVAGE performed by Pasha Alberts MD at [address] Bilateral     5/2016    COLONOSCOPY  08/04/2016    dr Alex Malcolm    3 years    SKIN BIOPSY  5/2016    Nose     TONSILLECTOMY AND ADENOIDECTOMY      TOOTH EXTRACTION  5/2016    UPPER GASTROINTESTINAL ENDOSCOPY  12/2/2015    dr Haider Ayala       Current Medications:    No outpatient medications have been marked as taking for the 12/1/22 encounter [facility] Encounter).        Allergies:  Aquaphor [albolene]    Immunizations : Immunization History   Administered Date(s) Administered    COVID-19, PFIZER PURPLE top, DILUTE for use, (age 15 y+), 30mcg/0.3mL 2021, 2021, 2021    Influenza A (N2V6-62) Vaccine PF IM 2010    Influenza Virus Vaccine 10/01/2011, 10/13/2013, 10/22/2014, 10/19/2015    Influenza Whole 2010    Influenza, FLUAD, (age 72 y+), Adjuvanted, 0.5mL 10/06/2020    Influenza, FLUARIX, FLULAVAL, FLUZONE (age 10 mo+) AND AFLURIA, (age 1 y+), PF, 0.5mL 2016    Influenza, High Dose (Fluzone 65 yrs and older) 2017, 10/08/2018, 10/01/2019    Influenza, Triv, inactivated, subunit, adjuvanted, IM (Fluad 65 yrs and older) 10/01/2019    Pneumococcal Conjugate 13-valent (Mfutdcv39) 2015    Pneumococcal Polysaccharide (Budlojwvo13) 2016    Tdap (Boostrix, Adacel) 2012    Zoster Live (Zostavax) 2012    Zoster Recombinant (Shingrix) 2019, 10/11/2019       Social History:     Social History     Tobacco Use    Smoking status: Former     Packs/day: 1.00     Years: 45.00     Pack years: 45.00     Types: Cigarettes     Quit date: 2002     Years since quittin.9    Smokeless tobacco: Never   Vaping Use    Vaping Use: Never used   Substance Use Topics    Alcohol use: Yes     Comment: rarely     Drug use: No     Social History     Tobacco Use   Smoking Status Former    Packs/day: 1.00    Years: 45.00    Pack years: 45.00    Types: Cigarettes    Quit date: 2002    Years since quittin.9   Smokeless Tobacco Never      Family History   Problem Relation Age of Onset    Diabetes Mother         DM    Coronary Art Dis Mother           REVIEW OF SYSTEMS:     Constitutional:  negative for fevers, chills, night sweats  Eyes:  negative for blurred vision, eye discharge, visual disturbance   HEENT:  negative for hearing loss, ear drainage,nasal congestion  Respiratory:   cough,+  shortness of breath +  or hemoptysis   Cardiovascular:  negative for chest pain, palpitations, cranial nerve deficit  Psych:  Orientation, sensorium, mood normal  Lines:  iv      Data Review:    CBC:   Lab Results   Component Value Date    WBC 13.3 (H) 12/06/2022    HGB 15.2 12/06/2022    HCT 46.8 12/06/2022    MCV 88.5 12/06/2022     12/06/2022     RENAL:   Lab Results   Component Value Date    CREATININE 0.7 (L) 12/06/2022    BUN 16 12/06/2022     12/06/2022    K 4.4 12/06/2022    CL 99 12/06/2022    CO2 27 12/06/2022     SED RATE: No results found for: SEDRATE  CK: No results found for: CKTOTAL  CRP:   Lab Results   Component Value Date/Time    CRP 64.3 02/05/2018 08:08 AM     Hepatic Function Panel:   Lab Results   Component Value Date/Time    ALKPHOS 75 12/01/2022 03:22 PM    ALT 35 12/01/2022 03:22 PM    AST 26 12/01/2022 03:22 PM    PROT 6.9 12/01/2022 03:22 PM    PROT 6.7 03/14/2013 09:40 AM    BILITOT 0.4 12/01/2022 03:22 PM    LABALBU 3.6 12/01/2022 03:22 PM     UA:  Lab Results   Component Value Date/Time    COLORU YELLOW 07/24/2019 01:03 PM    CLARITYU Clear 07/24/2019 01:03 PM    GLUCOSEU Negative 07/24/2019 01:03 PM    BILIRUBINUR Negative 07/24/2019 01:03 PM    KETUA Negative 07/24/2019 01:03 PM    SPECGRAV 1.010 07/24/2019 01:03 PM    BLOODU Negative 07/24/2019 01:03 PM    PHUR 7.0 07/24/2019 01:03 PM    PROTEINU Negative 07/24/2019 01:03 PM    UROBILINOGEN 0.2 07/24/2019 01:03 PM    NITRU Negative 07/24/2019 01:03 PM    LEUKOCYTESUR Negative 07/24/2019 01:03 PM    LABMICR Not Indicated 07/24/2019 01:03 PM    URINETYPE Not Specified 07/24/2019 01:03 PM      Urine Microscopic:   Lab Results   Component Value Date/Time    HYALCAST 0 03/08/2017 12:15 AM    WBCUA 6 03/08/2017 12:15 AM    RBCUA 2 03/08/2017 12:15 AM    EPIU 1 03/08/2017 12:15 AM     Urine Reflex to Culture:   Lab Results   Component Value Date/Time    URRFLXCULT Not Indicated 07/24/2019 01:03 PM     WBC 17.5     pROCAL  0.06       MICRO: cultures reviewed and updated by me   Blood Culture:          Micro results (current encounter only)    Procedure Component Value Units Date/Time   Culture, Respiratory [[id number]] (Abnormal) Collected: 12/07/22 1239   Order Status: Completed Specimen: Sputum Expectorated Updated: 12/09/22 1444    CULTURE, RESPIRATORY Moderate growth normal respiratory rowan with Abnormal     Gram Stain Result 1+ Epithelial Cells   2+ WBC's (Polymorphonuclear)   1+ Yeast   3+ Gram positive cocci   1+ Gram negative rods   1+ Gram positive rods     Organism Aspergillus species Abnormal     CULTURE, RESPIRATORY --    POSITIVE for   No further workup    Narrative:     ORDER#: [id number]                          ORDERED BY: Wolf Ca   SOURCE: Sputum Expectorated                COLLECTED:  12/07/22 12:39   ANTIBIOTICS AT RAYO.:                      RECEIVED :  12/07/22 12:53   Culture, Respiratory [[id number]] Collected: 12/02/22 1716   Order Status: Completed Specimen: Sputum Expectorated Updated: 12/04/22 1009    CULTURE, RESPIRATORY Normal respiratory rowan    Gram Stain Result 2+ Epithelial Cells   4+ WBC's (Polymorphonuclear)   3+ Gram positive cocci   2+ Yeast    Narrative:     ORDER#: [id number]                          ORDERED BY: CAMILA ROBLERO   SOURCE: Sputum Expectorated                COLLECTED:  12/02/22 17:16   ANTIBIOTICS AT RAYO.:                      RECEIVED :  12/02/22 17:19   Rapid influenza A/B antigens [[id number]] (Abnormal) Collected: 12/01/22 1532   Order Status: Completed Specimen: Nasopharyngeal Updated: 12/01/22 1650    Rapid Influenza A Ag POSITIVE Abnormal     Rapid Influenza B Ag Negative   COVID-19, Rapid [[id number]] Collected: 12/01/22 1532   Order Status: Completed Specimen: Nasopharyngeal Swab Updated: 12/01/22 1643    SARS-CoV-2, NAAT Not Detected    Comment: Rapid NAAT:   Negative results should be treated as presumptive and,   if inconsistent with clinical signs and symptoms or necessary for   patient management, should be tested with an alternative molecular assay. Negative results do not preclude SARS-CoV-2 infection and   should not be used as the sole basis for patient management decisions. This test has been authorized by the FDA under an Emergency Use   Authorization (EUA) for use by authorized laboratories. Fact sheet for Healthcare Providers:   http://www.marck.erasto/   Fact sheet for Patients: http://www.marck.erasto/     METHODOLOGY: Isothermal Nucleic Acid Amplification         Lab Results   Component Value Date/Time    BC No growth after 5 days of incubation. 07/24/2019 01:03 PM    BLOODCULT2 No growth after 5 days of incubation. 07/24/2019 01:03 PM       Respiratory Culture:  Lab Results   Component Value Date/Time    CULTRESP Moderate growth normal respiratory rowan with 12/07/2022 12:39 PM    CULTRESP POSITIVE for  No further workup   12/07/2022 12:39 PM    LABGRAM  12/07/2022 12:39 PM     1+ Epithelial Cells  2+ WBC's (Polymorphonuclear)  1+ Yeast  3+ Gram positive cocci  1+ Gram negative rods  1+ Gram positive rods       AFB:  Lab Results   Component Value Date/Time    AFBSMEAR No AFB observed by Fluorescent stain 07/25/2019 09:13 AM     Viral Culture:  Lab Results   Component Value Date/Time    COVID19 Not Detected 12/01/2022 03:32 PM     Urine Culture: No results for input(s): LABURIN in the last 72 hours. IMAGING:    CT CHEST WO CONTRAST   Final Result   Nodular infiltrates and masslike consolidation within the right lower lobe,   consistent with pneumonia in the appropriate clinical setting. Additional bilateral pulmonary nodules. Recommend follow-up to complete imaging resolution to exclude the possibility   of underlying malignancy, especially given presence of background pulmonary   emphysema.       RECOMMENDATIONS:   Fleischner Society guidelines for follow-up and management of incidentally   detected pulmonary nodules:      Multiple Solid Nodules:      Nodule size greater than 8 mm   In a low-risk patient, CT at 3-6 months, then consider CT at 18-24 months. In a high-risk patient, CT at 3-6 months, then CT at 18-24 months. - Low risk patients include individuals with minimal or absent history of   smoking and other known risk factors. - High risk patients include individuals with a history or smoking or known   risk factors. Radiology 2017 http://pubs. rsna.org/doi/full/10.1148/radiol. 0936760491         XR CHEST PORTABLE   Final Result   No acute cardiopulmonary findings         XR CHEST PORTABLE   Final Result   1. No acute abnormality. XR CHEST PORTABLE   Final Result   Asymmetric ground-glass opacification at the right lung base may represent a   small pleural effusion or developing pneumonitis.          XR CHEST (2 VW)   Final Result   No acute abnormality               All the pertinent images and reports for the current Hospitalization were reviewed by me     Scheduled Meds:   methylPREDNISolone sodium  40 mg IntraVENous Q8H    budesonide  1 mg Nebulization BID    dilTIAZem  300 mg Oral Daily    ipratropium-albuterol  1 ampule Inhalation Q6H    polyethylene glycol  17 g Oral Daily    guaiFENesin  600 mg Oral BID    theophylline  200 mg Oral BID    sodium chloride (Inhalant)  15 mL Nebulization 3 times per day    sodium chloride flush  5-40 mL IntraVENous 2 times per day    enoxaparin  40 mg SubCUTAneous Nightly    polyvinyl alcohol  2 drop Both Eyes BID    pantoprazole  40 mg Oral QAM AC    atorvastatin  10 mg Oral Daily    losartan  50 mg Oral Daily       Continuous Infusions:   sodium chloride         PRN Meds:  perflutren lipid microspheres, sodium chloride flush, sodium chloride, ondansetron **OR** ondansetron, acetaminophen **OR** acetaminophen, albuterol      Assessment:     Patient Active Problem List   Diagnosis    Gastroenteritis    Chronic diarrhea    GERD (gastroesophageal reflux disease)    Thrush    Benign prostatic hyperplasia    Allergic state Hypercholesterolemia    Neck pain    Essential hypertension    Anxiety    Abdominal pain    Hypoxemia    COPD exacerbation (HCC)    PSVT (paroxysmal supraventricular tachycardia) (HCC)    Dyspnea    ZAHEER (obstructive sleep apnea)    Abnormal glucose    Hypoxia    Bronchitis    Abnormal CT of the chest    COPD, very severe (HCC)    Pulmonary nodules    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    Elevated lactic acid level    Aspergillus pneumonia (HCC)    Hypokalemia    Acute respiratory failure with hypoxia (HCC)    PLMD (periodic limb movement disorder)    Chronic hypercapnic respiratory failure (HCC)    Otalgia    Asthma    Other allergic rhinitis    COVID-19    Influenza A    Chronic respiratory failure with hypoxia (HCC)     COPD exacerbation   Recent INFLUENNZA A infection   Pneumonia  Acute hypoxic resp failure using 5 lts nasal cannula   Severe COPD  Previous Aspergillus PNA treated with Isuvocozonium ( Cresemba) with good clinical response in 2018   Now admitted with worsening SOB  CT chest now with New Nodular changes and PNA -   CT images reviewed with Pt and his wife in the room    I am concerned about either relapse or New infection from Aspergillus - We have seen Post INFLUENZA worsening of Fungal and BACTERIAL  infection due to damaged resp epithelial lining in any case he needs therapy    Bronchoscopy would help but given his resp status he is some what high risk and may end up on the Vent    Will proceed with IV V fend and will check serologies to see if they guide treatment     Labs, Microbiology, Radiology and all the pertinent results from current hospitalization and  care every where were reviewed  by me as a part of the evaluation   Plan:   Start IV V fend x 6 mg /kg Q 12 HR loading   Followed by V fend x  4 mg /kg Q 12 hrs   Check Aspergillus antigen, Antibodies, Beta D glucan   Wean off STEROID - I  lowered the dose on Methyl prednisone to x 40 mg q 12 with a view to wean off soon given the PNA concern   Will d/w Pulmonary team   D/C Droplet isolation     Discussed with patient/Family and Nursing staff  d/w wife at bed side   Risk of Complications/Morbidity: High      Illness(es)/ Infection present that pose threat to bodily function. There is potential for severe exacerbation of infection/side effects of treatment. Therapy requires intensive monitoring for antimicrobial agent toxicity. Thanks for allowing me to participate in your patient's care and please call me with any questions or concerns.     Konrad Ruelas MD  Infectious Disease  [address] [facility]  Phone: [phone]   Fax : [phone]

## 2022-12-12 NOTE — PROGRESS NOTES
[facility] Progress Note  12/12/2022 7:55 AM  Subjective:   Admit Date: 12/1/2022      Chief Complaint: I still feel SOB --I am not getting better     Interval History: Sputum cx--Aspergillus   Chest CT--?? RLL infiltrate--will defer to ID if needs anti-fungal rx --he has similar infection in 2018   Remains with resp insuffic and requiring O2     Diet: ADULT DIET; Regular  Medications:   Scheduled Meds:   methylPREDNISolone sodium  60 mg IntraVENous Q8H    budesonide  1 mg Nebulization BID    dilTIAZem  300 mg Oral Daily    ipratropium-albuterol  1 ampule Inhalation Q6H    polyethylene glycol  17 g Oral Daily    guaiFENesin  600 mg Oral BID    theophylline  200 mg Oral BID    sodium chloride (Inhalant)  15 mL Nebulization 3 times per day    sodium chloride flush  5-40 mL IntraVENous 2 times per day    enoxaparin  40 mg SubCUTAneous Nightly    polyvinyl alcohol  2 drop Both Eyes BID    pantoprazole  40 mg Oral QAM AC    atorvastatin  10 mg Oral Daily    losartan  50 mg Oral Daily     Continuous Infusions:   sodium chloride         Review of Systems -   General ROS: afebrile  Respiratory ROS: positive for - cough and shortness of breath  Cardiovascular ROS: no chest pain  Musculoskeletal ROS:positive for - :joint pain  Neurological ROS: no TIA or stroke symptoms    Objective:   Vitals: BP (!) 126/54   Pulse (!) 101   Temp 97.7 °F (36.5 °C) (Axillary)   Resp 18   Ht 5' 7\" (1.702 m)   Wt 187 lb 2.7 oz (84.9 kg)   SpO2 90%   BMI 29.32 kg/m²   General appearance: alert and cooperative with exam  HEENT: Head: Normocephalic, no lesions, without obvious abnormality.   Neck: no adenopathy, no carotid bruit, no JVD, supple, symmetrical, trachea midline, and thyroid not enlarged, symmetric, no tenderness/mass/nodules  Lungs: rhonchi bilaterally  Heart: regular rate and rhythm, S1, S2 normal, no murmur, click, rub or gallop  Abdomen: soft, non-tender; bowel sounds normal; no masses,  no organomegaly  Extremities: tr edema Neurologic: Mental status: Alert, oriented, thought content appropriate    No results displayed because visit has over 200 results. Assessment & Plan:   Principal Problem:    Influenza A--completed tamilfu--OK to dc isolation per ID   Active Problems:    Hypoxemia---cont O2     COPD exacerbation (HCC)    COPD, very severe (HCC)    Acute respiratory failure with hypoxia (HCC)    Essential hypertension    Chronic respiratory failure with hypoxia (HCC)    Aspergillus pneumonia (HCC)--noted on chest CT--ID invilved   Resolved Problems:    * No resolved hospital problems.  *  Defer to ID regarding anti-fungal rx for aspergillus --Decr steroids --no help with breathing and may aggravte aspergillus --remains in tenuous resp status   Please note that over 35 minutes was spent in evaluating the patient, review of records and results, discussion with staff/family, etc.    Hai Olsen MD

## 2022-12-12 NOTE — PROGRESS NOTES
Pulmonary Progress Note    Date of Admission: 12/1/2022   LOS: 11 days     Chief Complaint   Patient presents with    Shortness of Breath     Pt states he has had increased sob x 2-3 days. Pt states he is coughing up green sputum. Pt has history of copd and is on home oxygen of 2 liters. Assessment/Plan:       Acute hypoxemic respiratory failure   Acute exacerbation of COPD, due to influenza and a  -Completed Tamiflu  -Scheduled duo nebs, budesonide  -Theophylline  -Sputum culture growing Aspergillus  -Wean oxygen goal saturation 90%    Pneumonia, right lower lobe  -Nodular consolidation in the right lower lobe  -Has a history of aspergillus infection and treated for, Aspergillus again growing from sputum  -Defer antibiotics/antifungal to infectious disease      ZAHEER  -Home biPAP at night and with naps      24 Hour Events/Subjective  No acute events overnight. -CT chest yesterday with nodular densities predominantly in the right lower lobe    ROS:   No nausea  No Vomiting  No chest pain      Intake/Output Summary (Last 24 hours) at 12/12/2022 1236  Last data filed at 12/12/2022 1100  Gross per 24 hour   Intake 660 ml   Output 1500 ml   Net -840 ml         PHYSICAL EXAM:   Blood pressure (!) 146/67, pulse (!) 117, temperature 98.2 °F (36.8 °C), temperature source Oral, resp. rate 18, height 5' 7\" (1.702 m), weight 187 lb 2.7 oz (84.9 kg), SpO2 94 %.'  Gen:  No acute distress. Eyes: PERRL. Anicteric sclera. No conjunctival injection. ENT: No discharge. Posterior oropharynx clear. External appearance of ears and nose normal.  Neck: Trachea midline. No mass   Resp:  No crackles. + Bilateral wheezes. No rhonchi. No dullness on percussion. CV: Tachycardic rate. Regular rhythm. No murmur or rub. No edema. GI: Soft, Non-tender. Non-distended. +BS  Skin: Warm, dry, w/o erythema. Lymph: No cervical or supraclavicular LAD. M/S: No cyanosis. No clubbing. Neuro:  no focal neurologic deficit.   Moves all extremities  Psych: Awake and alert, Oriented x 3. Judgement and insight appropriate. Mood stable. Medications:    Scheduled Meds:   methylPREDNISolone sodium  40 mg IntraVENous Q8H    budesonide  1 mg Nebulization BID    dilTIAZem  300 mg Oral Daily    ipratropium-albuterol  1 ampule Inhalation Q6H    polyethylene glycol  17 g Oral Daily    guaiFENesin  600 mg Oral BID    theophylline  200 mg Oral BID    sodium chloride (Inhalant)  15 mL Nebulization 3 times per day    sodium chloride flush  5-40 mL IntraVENous 2 times per day    enoxaparin  40 mg SubCUTAneous Nightly    polyvinyl alcohol  2 drop Both Eyes BID    pantoprazole  40 mg Oral QAM AC    atorvastatin  10 mg Oral Daily    losartan  50 mg Oral Daily       Continuous Infusions:   sodium chloride         PRN Meds:  sodium chloride flush, sodium chloride, ondansetron **OR** ondansetron, acetaminophen **OR** acetaminophen, albuterol    Labs reviewed:  CBC:   Recent Labs     12/12/22  1133   WBC 26.5*   HGB 16.9   HCT 52.9*   MCV 87.9        BMP: No results for input(s): NA, K, CL, CO2, PHOS, BUN, CREATININE, CA in the last 72 hours. LIVER PROFILE: No results for input(s): AST, ALT, LIPASE, BILIDIR, BILITOT, ALKPHOS in the last 72 hours. Invalid input(s): AMYLASE,  ALB  PT/INR: No results for input(s): PROTIME, INR in the last 72 hours. Films:  Radiology Review:  Pertinent images / reports were reviewed as a part of this visit. This note was transcribed using [id number] [employer]. Please disregard any translational errors.     Thank you for this consult,    Boris Catalan MD  [facility] Pulmonary, Critical Care, and Sleep Medicine

## 2022-12-13 NOTE — PROGRESS NOTES
Pulmonary Progress Note    Date of Admission: 12/1/2022   LOS: 12 days     Chief Complaint   Patient presents with    Shortness of Breath     Pt states he has had increased sob x 2-3 days. Pt states he is coughing up green sputum. Pt has history of copd and is on home oxygen of 2 liters. Assessment/Plan:       Acute hypoxemic respiratory failure   Acute exacerbation of COPD, due to influenza and a  -Completed Tamiflu  -Scheduled duo nebs, budesonide  -Weaning steroids  -Theophylline  -Wean oxygen goal saturation 90%    Pneumonia, Aspergillus  -Nodular consolidation in the right lower lobe  -Started on voriconazole    ZAHEER  -Home biPAP at night and with naps      24 Hour Events/Subjective  No acute events overnight. -CT chest yesterday with nodular densities predominantly in the right lower lobe    ROS:   No nausea  No Vomiting  No chest pain      Intake/Output Summary (Last 24 hours) at 12/13/2022 0816  Last data filed at 12/13/2022 0605  Gross per 24 hour   Intake 420 ml   Output 1700 ml   Net -1280 ml         PHYSICAL EXAM:   Blood pressure (!) 180/85, pulse (!) 112, temperature 97.5 °F (36.4 °C), resp. rate 22, height 5' 7\" (1.702 m), weight 186 lb 15.2 oz (84.8 kg), SpO2 90 %.'  Gen:  No acute distress. Eyes: PERRL. Anicteric sclera. No conjunctival injection. ENT: No discharge. Posterior oropharynx clear. External appearance of ears and nose normal.  Neck: Trachea midline. No mass   Resp:  No crackles. + Bilateral wheezes. No rhonchi. No dullness on percussion. CV: Tachycardic rate. Regular rhythm. No murmur or rub. No edema. GI: Soft, Non-tender. Non-distended. +BS  Skin: Warm, dry, w/o erythema. Lymph: No cervical or supraclavicular LAD. M/S: No cyanosis. No clubbing. Neuro:  no focal neurologic deficit. Moves all extremities  Psych: Awake and alert, Oriented x 3. Judgement and insight appropriate. Mood stable.       Medications:    Scheduled Meds:   nystatin  5 mL Oral 4x Daily insulin lispro  0-4 Units SubCUTAneous TID WC    insulin lispro  0-4 Units SubCUTAneous Nightly    methylPREDNISolone sodium  20 mg IntraVENous Q12H    voriconazole  4 mg/kg IntraVENous Q12H    budesonide  1 mg Nebulization BID    dilTIAZem  300 mg Oral Daily    ipratropium-albuterol  1 ampule Inhalation Q6H    polyethylene glycol  17 g Oral Daily    guaiFENesin  600 mg Oral BID    theophylline  200 mg Oral BID    sodium chloride (Inhalant)  15 mL Nebulization 3 times per day    sodium chloride flush  5-40 mL IntraVENous 2 times per day    enoxaparin  40 mg SubCUTAneous Nightly    polyvinyl alcohol  2 drop Both Eyes BID    pantoprazole  40 mg Oral QAM AC    atorvastatin  10 mg Oral Daily    losartan  50 mg Oral Daily       Continuous Infusions:   dextrose      sodium chloride         PRN Meds:  glucose, dextrose bolus **OR** dextrose bolus, glucagon (rDNA), dextrose, sodium chloride flush, sodium chloride, ondansetron **OR** ondansetron, acetaminophen **OR** acetaminophen, albuterol    Labs reviewed:  CBC:   Recent Labs     12/12/22  1133   WBC 26.5*   HGB 16.9   HCT 52.9*   MCV 87.9        BMP:   Recent Labs     12/12/22  1133      K 4.9      CO2 25   BUN 18   CREATININE 0.7*     LIVER PROFILE:   Recent Labs     12/12/22  1133   AST 18   ALT 43*   BILITOT 0.5   ALKPHOS 69     PT/INR: No results for input(s): PROTIME, INR in the last 72 hours. Films:  Radiology Review:  Pertinent images / reports were reviewed as a part of this visit. This note was transcribed using [id number] [facility]. Please disregard any translational errors.     Thank you for this consult,    Deena Araujo MD  [facility] Pulmonary, Critical Care, and Sleep Medicine

## 2022-12-13 NOTE — PROGRESS NOTES
PCA reported patients /68, , Sp02 88% on 5L via NC. This writer observed patients HR on tele/ monitor 111-117. Patient observed reclined in chair and reported that he had just waken up from a nap. Patient Sp02 increased to 89 on 5L. Oxygen increased to 6L via NC, Sp02 90%. Patient sat up right in chair. Wheezing on ascultation. SOB at rest. Denies chest pain. Patient reported feeling tingling sensation in face and having occasional visual disturbances since last night. Pt stated \"Sometimes, I see fingers in my face\" Neuro assessment negative for deficits. Charge notified and assessed patient. Patient said facial tingling had gone away. No visual disturbances at this time. Respiratory notified for breathing treatments. Dr. Jona Simmons notified. New order to increase Solumedrol to 40mg Q12.    Message sent to Dr. Mila Pedroza with pulmonology

## 2022-12-13 NOTE — PROGRESS NOTES
Infectious Disease Follow up Notes  Admit Date: 12/1/2022  Hospital Day: 13    Antibiotics :   Steroids  Completed Levofloxacin course  Completed Tamiflu course   IV V fend    CHIEF COMPLAINT:     COPD  PNA  Influenz A infection   WBC elevation   Aspergillus PNA    Subjective interval History :  81 y.o. known to me from previous Aspergillus Lung infection and PNA from BAL cx +Ve was treated with Cresemba with good clinical response now admitted with recent worsening of sob and influenza A infection - was on nearly x 3 rounds of steroid bursts with out improvement he is normally not on Home Oxygen uses only when needed now using x 5lts     cough + sob + feels bad has been sitting in chair and SOB with minimal movement         Past Medical History:    Past Medical History:   Diagnosis Date    Aspergillus pneumonia ([facility] [address]     Basal cell carcinoma of skin 06/16/2017    right cheek    COPD (chronic obstructive pulmonary disease) ([facility] [address]     DNR (do not resuscitate)     no intubation or chest compressions    Hyperlipidemia     Hypertension 10/2012    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/04/2018    Skin cancer 2014    both upper thighs    Squamous cell cancer of skin of nose 5/2016       Past Surgical History:    Past Surgical History:   Procedure Laterality Date    BRONCHOSCOPY N/A 7/25/2019    BRONCHOSCOPY WITH BRONCHOALVEOLAR LAVAGE performed by Bess Pisano MD at [address] Bilateral     5/2016    COLONOSCOPY  08/04/2016    dr Olegario Welch    3 years    SKIN BIOPSY  5/2016    Nose     TONSILLECTOMY AND ADENOIDECTOMY      TOOTH EXTRACTION  5/2016    UPPER GASTROINTESTINAL ENDOSCOPY  12/2/2015    dr Yancey Estimable       Current Medications:    No outpatient medications have been marked as taking for the 12/1/22 encounter [facility] Encounter).        Allergies:  Aquaphor [albolene]    Immunizations :   Immunization History Administered Date(s) Administered    COVID-19, PFIZER PURPLE top, DILUTE for use, (age 15 y+), 30mcg/0.3mL 2021, 2021, 2021    Influenza A ([id number]) Vaccine PF IM 2010    Influenza Virus Vaccine 10/01/2011, 10/13/2013, 10/22/2014, 10/19/2015    Influenza Whole 2010    Influenza, FLUAD, (age 72 y+), Adjuvanted, 0.5mL 10/06/2020    Influenza, FLUARIX, FLULAVAL, FLUZONE (age 10 mo+) AND AFLURIA, (age 1 y+), PF, 0.5mL 2016    Influenza, High Dose (Fluzone 65 yrs and older) 2017, 10/08/2018, 10/01/2019    Influenza, Triv, inactivated, subunit, adjuvanted, IM (Fluad 65 yrs and older) 10/01/2019    Pneumococcal Conjugate 13-valent (Mbykllc02) 2015    Pneumococcal Polysaccharide (Vapcddtiq52) 2016    Tdap (Boostrix, Adacel) 2012    Zoster Live (Zostavax) 2012    Zoster Recombinant (Shingrix) 2019, 10/11/2019       Social History:     Social History     Tobacco Use    Smoking status: Former     Packs/day: 1.00     Years: 45.00     Pack years: 45.00     Types: Cigarettes     Quit date: 2002     Years since quittin.9    Smokeless tobacco: Never   Vaping Use    Vaping Use: Never used   Substance Use Topics    Alcohol use: Yes     Comment: rarely     Drug use: No     Social History     Tobacco Use   Smoking Status Former    Packs/day: 1.00    Years: 45.00    Pack years: 45.00    Types: Cigarettes    Quit date: 2002    Years since quittin.9   Smokeless Tobacco Never      Family History   Problem Relation Age of Onset    Diabetes Mother         DM    Coronary Art Dis Mother           REVIEW OF SYSTEMS:     Constitutional:  negative for fevers, chills, night sweats  Eyes:  negative for blurred vision, eye discharge, visual disturbance   HEENT:  negative for hearing loss, ear drainage,nasal congestion  Respiratory:   cough,+  shortness of breath +  or hemoptysis   Cardiovascular:  negative for chest pain, palpitations, syncope  Gastrointestinal:  negative for nausea, vomiting, diarrhea, constipation, abdominal pain  Genitourinary:  negative for frequency, dysuria, urinary incontinence, hematuria  Hematologic/Lymphatic:  negative for easy bruising, bleeding and lymphadenopathy  Allergic/Immunologic:  negative for recurrent infections, angioedema, anaphylaxis   Endocrine:  negative for weight changes, polyuria, polydipsia and polyphagia  Musculoskeletal:  negative for joint  pain, swelling, decreased range of motion  Integumentary: No rashes, skin lesions  Neurological:  negative for headaches, slurred speech, unilateral weakness  Psychiatric: negative for hallucinations,confusion,agitation.                 PHYSICAL EXAM:      Vitals:    BP (!) 180/85   Pulse (!) 115   Temp 97.5 °F (36.4 °C)   Resp 24   Ht 5' 7\" (1.702 m)   Wt 186 lb 15.2 oz (84.8 kg)   SpO2 90%   BMI 29.28 kg/m²     General Appearance: alert,in acute distress, +  pallor, no icterus using accessory muscles++ tachy++   Skin: warm and dry, no rash or erythema  Head: normocephalic and atraumatic  Eyes: pupils equal, round, and reactive to light, conjunctivae normal  ENT: tympanic membrane, external ear and ear canal normal bilaterally, nose without deformity, nasal mucosa and turbinates normal without polyps  Neck: supple and non-tender without mass, no thyromegaly  no cervical lymphadenopathy  Pulmonary/Chest:  bI BASAL CREPTS+ - exp  wheezes,++ rales or rhonchi, normal air movement, in  respiratory distress  Cardiovascular: normal rate, regular rhythm, normal S1 and S2, no murmurs, rubs, clicks, or gallops, no carotid bruits  Abdomen: soft, non-tender, non-distended, normal bowel sounds, no masses or organomegaly  Extremities: no cyanosis, clubbing or edema  Musculoskeletal: normal range of motion, no joint swelling, deformity or tenderness  Integumentary: No rashes, no abnormal skin lesions, no petechiae  Neurologic: reflexes normal and symmetric, no cranial nerve deficit  Psych:  Orientation, sensorium, mood normal  Lines:  iv      Data Review:    CBC:   Lab Results   Component Value Date    WBC 26.5 (H) 12/12/2022    HGB 16.9 12/12/2022    HCT 52.9 (H) 12/12/2022    MCV 87.9 12/12/2022     12/12/2022     RENAL:   Lab Results   Component Value Date    CREATININE 0.7 (L) 12/12/2022    BUN 18 12/12/2022     12/12/2022    K 4.9 12/12/2022     12/12/2022    CO2 25 12/12/2022     SED RATE: No results found for: SEDRATE  CK: No results found for: CKTOTAL  CRP:   Lab Results   Component Value Date/Time    CRP 64.3 02/05/2018 08:08 AM     Hepatic Function Panel:   Lab Results   Component Value Date/Time    ALKPHOS 69 12/12/2022 11:33 AM    ALT 43 12/12/2022 11:33 AM    AST 18 12/12/2022 11:33 AM    PROT 5.9 12/12/2022 11:33 AM    PROT 6.7 03/14/2013 09:40 AM    BILITOT 0.5 12/12/2022 11:33 AM    LABALBU 3.2 12/12/2022 11:33 AM     UA:  Lab Results   Component Value Date/Time    COLORU YELLOW 07/24/2019 01:03 PM    CLARITYU Clear 07/24/2019 01:03 PM    GLUCOSEU Negative 07/24/2019 01:03 PM    BILIRUBINUR Negative 07/24/2019 01:03 PM    KETUA Negative 07/24/2019 01:03 PM    SPECGRAV 1.010 07/24/2019 01:03 PM    BLOODU Negative 07/24/2019 01:03 PM    PHUR 7.0 07/24/2019 01:03 PM    PROTEINU Negative 07/24/2019 01:03 PM    UROBILINOGEN 0.2 07/24/2019 01:03 PM    NITRU Negative 07/24/2019 01:03 PM    LEUKOCYTESUR Negative 07/24/2019 01:03 PM    LABMICR Not Indicated 07/24/2019 01:03 PM    URINETYPE Not Specified 07/24/2019 01:03 PM      Urine Microscopic:   Lab Results   Component Value Date/Time    HYALCAST 0 03/08/2017 12:15 AM    WBCUA 6 03/08/2017 12:15 AM    RBCUA 2 03/08/2017 12:15 AM    EPIU 1 03/08/2017 12:15 AM     Urine Reflex to Culture:   Lab Results   Component Value Date/Time    URRFLXCULT Not Indicated 07/24/2019 01:03 PM     WBC 17.5     pROCAL  0.06       MICRO: cultures reviewed and updated by me   Blood Culture:          Micro results (current encounter only)    Procedure Component Value Units Date/Time   Culture, Respiratory [[id number]] (Abnormal) Collected: 12/07/22 1239   Order Status: Completed Specimen: Sputum Expectorated Updated: 12/09/22 1444    CULTURE, RESPIRATORY Moderate growth normal respiratory rwoan with Abnormal     Gram Stain Result 1+ Epithelial Cells   2+ WBC's (Polymorphonuclear)   1+ Yeast   3+ Gram positive cocci   1+ Gram negative rods   1+ Gram positive rods     Organism Aspergillus species Abnormal     CULTURE, RESPIRATORY --    POSITIVE for   No further workup    Narrative:     ORDER#: [id number]                          ORDERED BY: Rai Hardin   SOURCE: Sputum Expectorated                COLLECTED:  12/07/22 12:39   ANTIBIOTICS AT RAYO.:                      RECEIVED :  12/07/22 12:53   Culture, Respiratory [[id number]] Collected: 12/02/22 1716   Order Status: Completed Specimen: Sputum Expectorated Updated: 12/04/22 1009    CULTURE, RESPIRATORY Normal respiratory rowan    Gram Stain Result 2+ Epithelial Cells   4+ WBC's (Polymorphonuclear)   3+ Gram positive cocci   2+ Yeast    Narrative:     ORDER#: [id number]                          ORDERED BY: CAMILA ROBLERO   SOURCE: Sputum Expectorated                COLLECTED:  12/02/22 17:16   ANTIBIOTICS AT RAYO.:                      RECEIVED :  12/02/22 17:19   Rapid influenza A/B antigens [[id number]] (Abnormal) Collected: 12/01/22 1532   Order Status: Completed Specimen: Nasopharyngeal Updated: 12/01/22 1650    Rapid Influenza A Ag POSITIVE Abnormal     Rapid Influenza B Ag Negative   COVID-19, Rapid [[id number]] Collected: 12/01/22 1532   Order Status: Completed Specimen: Nasopharyngeal Swab Updated: 12/01/22 1643    SARS-CoV-2, NAAT Not Detected    Comment: Rapid NAAT:   Negative results should be treated as presumptive and,   if inconsistent with clinical signs and symptoms or necessary for   patient management, should be tested with an alternative molecular   assay. Negative results do not preclude SARS-CoV-2 infection and   should not be used as the sole basis for patient management decisions. This test has been authorized by the FDA under an Emergency Use   Authorization (EUA) for use by authorized laboratories. Fact sheet for Healthcare Providers:   http://www.marck.erasto/   Fact sheet for Patients: http://www.marck.erasto/     METHODOLOGY: Isothermal Nucleic Acid Amplification         Lab Results   Component Value Date/Time    BC No growth after 5 days of incubation. 07/24/2019 01:03 PM    BLOODCULT2 No growth after 5 days of incubation. 07/24/2019 01:03 PM       Respiratory Culture:  Lab Results   Component Value Date/Time    CULTRESP Moderate growth normal respiratory rowan with 12/07/2022 12:39 PM    CULTRESP POSITIVE for  No further workup   12/07/2022 12:39 PM    LABGRAM  12/07/2022 12:39 PM     1+ Epithelial Cells  2+ WBC's (Polymorphonuclear)  1+ Yeast  3+ Gram positive cocci  1+ Gram negative rods  1+ Gram positive rods       AFB:  Lab Results   Component Value Date/Time    AFBSMEAR No AFB observed by Fluorescent stain 07/25/2019 09:13 AM     Viral Culture:  Lab Results   Component Value Date/Time    COVID19 Not Detected 12/01/2022 03:32 PM     Urine Culture: No results for input(s): LABURIN in the last 72 hours. IMAGING:    CT CHEST WO CONTRAST   Final Result   Nodular infiltrates and masslike consolidation within the right lower lobe,   consistent with pneumonia in the appropriate clinical setting. Additional bilateral pulmonary nodules. Recommend follow-up to complete imaging resolution to exclude the possibility   of underlying malignancy, especially given presence of background pulmonary   emphysema.       RECOMMENDATIONS:   Fleischner Society guidelines for follow-up and management of incidentally   detected pulmonary nodules:      Multiple Solid Nodules:      Nodule size greater than 8 mm   In a low-risk patient, CT at 3-6 months, then consider CT at 18-24 months. In a high-risk patient, CT at 3-6 months, then CT at 18-24 months. - Low risk patients include individuals with minimal or absent history of   smoking and other known risk factors. - High risk patients include individuals with a history or smoking or known   risk factors. Radiology 2017 http://pubs. rsna.org/doi/full/10.1148/radiol. 8223558197         XR CHEST PORTABLE   Final Result   No acute cardiopulmonary findings         XR CHEST PORTABLE   Final Result   1. No acute abnormality. XR CHEST PORTABLE   Final Result   Asymmetric ground-glass opacification at the right lung base may represent a   small pleural effusion or developing pneumonitis.          XR CHEST (2 VW)   Final Result   No acute abnormality               All the pertinent images and reports for the current Hospitalization were reviewed by me     Scheduled Meds:   nystatin  5 mL Oral 4x Daily    insulin lispro  0-4 Units SubCUTAneous TID WC    insulin lispro  0-4 Units SubCUTAneous Nightly    methylPREDNISolone sodium  20 mg IntraVENous Q12H    voriconazole  4 mg/kg IntraVENous Q12H    budesonide  1 mg Nebulization BID    dilTIAZem  300 mg Oral Daily    ipratropium-albuterol  1 ampule Inhalation Q6H    polyethylene glycol  17 g Oral Daily    guaiFENesin  600 mg Oral BID    theophylline  200 mg Oral BID    sodium chloride (Inhalant)  15 mL Nebulization 3 times per day    sodium chloride flush  5-40 mL IntraVENous 2 times per day    enoxaparin  40 mg SubCUTAneous Nightly    polyvinyl alcohol  2 drop Both Eyes BID    pantoprazole  40 mg Oral QAM AC    atorvastatin  10 mg Oral Daily    losartan  50 mg Oral Daily       Continuous Infusions:   dextrose      sodium chloride         PRN Meds:  glucose, dextrose bolus **OR** dextrose bolus, glucagon (rDNA), dextrose, sodium chloride flush, sodium chloride, ondansetron **OR** ondansetron, acetaminophen **OR** acetaminophen, albuterol      Assessment:     Patient Active Problem List   Diagnosis    Gastroenteritis    Chronic diarrhea    GERD (gastroesophageal reflux disease)    Thrush    Benign prostatic hyperplasia    Allergic state    Hypercholesterolemia    Neck pain    Essential hypertension    Anxiety    Abdominal pain    Hypoxemia    COPD exacerbation (HCC)    PSVT (paroxysmal supraventricular tachycardia) (HCC)    Dyspnea    ZAHEER (obstructive sleep apnea)    Abnormal glucose    Hypoxia    Bronchitis    Abnormal CT of the chest    COPD, very severe ([address]    Pulmonary nodules    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    Elevated lactic acid level    Aspergillus pneumonia (HCC)    Hypokalemia    Acute respiratory failure with hypoxia (HCC)    PLMD (periodic limb movement disorder)    Chronic hypercapnic respiratory failure (HCC)    Otalgia    Asthma    Other allergic rhinitis    COVID-19    Influenza A    Chronic respiratory failure with hypoxia (HCC)    Neutrophilia    Acute on chronic respiratory failure with hypoxia (HCC)     COPD exacerbation   Recent INFLUENNZA A infection   Pneumonia  Acute hypoxic resp failure using 5 lts nasal cannula   Severe COPD  Previous Aspergillus PNA treated with Isuvocozonium ( Cresemba) with good clinical response in 2018   Now admitted with worsening SOB  CT chest now with New Nodular changes and PNA -   CT images reviewed with Pt and his wife in the room    I am concerned about either relapse or New infection from Aspergillus - We have seen Post INFLUENZA worsening of Fungal and BACTERIAL  infection due to damaged resp epithelial lining in any case he needs therapy    Bronchoscopy would help but given his resp status he is some what high risk and may end up on the Vent    Will proceed with IV V fend and will check serologies to see if they guide treatment   \  May change to [place] like before as he had some side effects while on V fend -     Labs, Microbiology, Radiology and all the pertinent results from current hospitalization and  care every where were reviewed  by me as a part of the evaluation   Plan:   Cont  V fend x  4 mg /kg Q 12 hrs   Check Aspergillus antigen, Antibodies, Beta D glucan   Wean off STEROID -   He had problems with oral V fend  with some side effects  May choose Cresemba at d/c due to side effects from V fend     Discussed with patient/Family and Nursing staff  d/w wife at bed side   Risk of Complications/Morbidity: High      Illness(es)/ Infection present that pose threat to bodily function. There is potential for severe exacerbation of infection/side effects of treatment. Therapy requires intensive monitoring for antimicrobial agent toxicity. Thanks for allowing me to participate in your patient's care and please call me with any questions or concerns.     Konrad Ruelas MD  Infectious Disease  [facility] ([facility]) Physician  Phone: [phone]   Fax : [phone]

## 2022-12-13 NOTE — PLAN OF CARE
Problem: Discharge Planning  Goal: Discharge to home or other facility with appropriate resources  Outcome: Progressing     Problem: Safety - Adult  Goal: Free from fall injury  Outcome: Progressing     Problem: Pain  Goal: Verbalizes/displays adequate comfort level or baseline comfort level  Outcome: Progressing     Problem: ABCDS Injury Assessment  Goal: Absence of physical injury  Outcome: Progressing     Problem: Respiratory - Adult  Goal: Achieves optimal ventilation and oxygenation  Outcome: Progressing     Problem: Infection - Adult  Goal: Absence of infection at discharge  Outcome: Progressing     Problem: Infection - Adult  Goal: Absence of infection during hospitalization  Outcome: Progressing     Problem: Infection - Adult  Goal: Absence of fever/infection during anticipated neutropenic period  Outcome: Progressing

## 2022-12-13 NOTE — PROGRESS NOTES
[facility] Progress Note  12/13/2022 7:00 AM  Subjective:   Admit Date: 12/1/2022      Chief Complaint: I am still SOB     Interval History: ID has started IV anti-fungal for aspergillus--V-fend--  Will try to wean steroids as resp status allows   Echo nl lv fxn--   Some incr leg edema -add hose and anti-thrombotic boots --wife request   Diet: ADULT DIET; Regular  Medications:   Scheduled Meds:   methylPREDNISolone sodium  40 mg IntraVENous Q12H    voriconazole  500 mg IntraVENous Q12H    voriconazole  4 mg/kg IntraVENous Q12H    budesonide  1 mg Nebulization BID    dilTIAZem  300 mg Oral Daily    ipratropium-albuterol  1 ampule Inhalation Q6H    polyethylene glycol  17 g Oral Daily    guaiFENesin  600 mg Oral BID    theophylline  200 mg Oral BID    sodium chloride (Inhalant)  15 mL Nebulization 3 times per day    sodium chloride flush  5-40 mL IntraVENous 2 times per day    enoxaparin  40 mg SubCUTAneous Nightly    polyvinyl alcohol  2 drop Both Eyes BID    pantoprazole  40 mg Oral QAM AC    atorvastatin  10 mg Oral Daily    losartan  50 mg Oral Daily     Continuous Infusions:   sodium chloride         Review of Systems -   General ROS: afebrile  Respiratory ROS: positive for - cough, shortness of breath, and wheezing  Cardiovascular ROS: no chest pain  Musculoskeletal ROS:positive for - :joint pain  Neurological ROS: no TIA or stroke symptoms    Objective:   Vitals: BP (!) 162/75   Pulse (!) 103   Temp 97.8 °F (36.6 °C) (Oral)   Resp 16   Ht 5' 7\" (1.702 m)   Wt 186 lb 15.2 oz (84.8 kg)   SpO2 90%   BMI 29.28 kg/m²   General appearance: alert and cooperative with exam  HEENT: Head: Normocephalic, no lesions, without obvious abnormality.   Neck: no adenopathy, no carotid bruit, no JVD, supple, symmetrical, trachea midline, and thyroid not enlarged, symmetric, no tenderness/mass/nodules  Lungs: wheezes bilaterally  Heart: regular rate and rhythm, S1, S2 normal, no murmur, click, rub or gallop  Abdomen: soft, non-tender; bowel sounds normal; no masses,  no organomegaly  Extremities: 1+ edema   Neurologic: Mental status: Alert, oriented, thought content appropriate    No results displayed because visit has over 200 results. Assessment & Plan:   Principal Problem:    Influenza A--completed rx --dc isolation   Active Problems:    Hypoxemia    COPD exacerbation (HCC)--taper steroids as julee     COPD, very severe (HCC)    Acute respiratory failure with hypoxia (HCC)    Essential hypertension--Continue current therapy      Chronic respiratory failure with hypoxia (HCC)    Neutrophilia    Acute on chronic respiratory failure with hypoxia (HCC)    Aspergillus pneumonia (HCC)--iv v-fend --ID following   Resolved Problems:    * No resolved hospital problems.  *  DW at bedside---will order rx for thrush--vane hose decr solunedrol as wheezing allows --decr solumedrol to 20 mg q 12h    Please note that over 35 minutes was spent in evaluating the patient, review of records and results, discussion with staff/family, etc.    Cole Hanks MD

## 2022-12-14 PROBLEM — Z99.89 BIPAP (BIPHASIC POSITIVE AIRWAY PRESSURE) DEPENDENCE: Status: ACTIVE | Noted: 2022-01-01

## 2022-12-14 NOTE — PROGRESS NOTES
Patient , Dr. Brown Hence notified.  New order lantus 20 units now and Lantus 20 units daily starting tomorrow

## 2022-12-14 NOTE — CARE COORDINATION
Per chart review, patient placed on bipap at 60% today due to worsening pulmonary condition. Has refused vest therapy. Patient no longer has a concentrator at home and no longer active with [facility]. Does have an inogen machine that he has been using on a PRN basis. Will be able to choose any O2 company of his choice if oxygen is needed at discharge. Dr. rCuz Morataya was informed by the  on 12/6/22 patient will need a home O2 eval within 24 hours of discharge if is needed and will require an O2 DME order. Patient does have a CPAP machine at home. Plan: Pt. Plans to return to home with spouse. Will require home O2 eval and order for O2 if needed. Spouse will transport home at discharge. Has been up ad shivani in the room.      Marysue Bloch BSN RN  Case Management  [phone]    Electronically signed by Marysue Bloch, RN on 12/14/2022 at 11:18 AM

## 2022-12-14 NOTE — RT PROTOCOL NOTE
RT Inhaler-Nebulizer Bronchodilator Protocol Note    There is a bronchodilator order in the chart from a provider indicating to follow the RT Bronchodilator Protocol and there is an Initiate RT Inhaler-Nebulizer Bronchodilator Protocol order as well (see protocol at bottom of note). CXR Findings:  No results found. The findings from the last RT Protocol Assessment were as follows:   History Pulmonary Disease: Chronic pulmonary disease  Respiratory Pattern: Mild dyspnea at rest, irregular pattern, or RR 21-25 bpm  Breath Sounds: Slightly diminished and/or crackles  Cough: Strong, productive  Indication for Bronchodilator Therapy: Decreased or absent breath sounds  Bronchodilator Assessment Score: 9    Aerosolized bronchodilator medication orders have been revised according to the RT Inhaler-Nebulizer Bronchodilator Protocol below. Respiratory Therapist to perform RT Therapy Protocol Assessment initially then follow the protocol. Repeat RT Therapy Protocol Assessment PRN for score 0-3 or on second treatment, BID, and PRN for scores above 3. No Indications - adjust the frequency to every 6 hours PRN wheezing or bronchospasm, if no treatments needed after 48 hours then discontinue using Per Protocol order mode. If indication present, adjust the RT bronchodilator orders based on the Bronchodilator Assessment Score as indicated below. Use Inhaler orders unless patient has one or more of the following: on home nebulizer, not able to hold breath for 10 seconds, is not alert and oriented, cannot activate and use MDI correctly, or respiratory rate 25 breaths per minute or more, then use the equivalent nebulizer order(s) with same Frequency and PRN reasons based on the score. If a patient is on this medication at home then do not decrease Frequency below that used at home.     0-3 - enter or revise RT bronchodilator order(s) to equivalent RT Bronchodilator order with Frequency of every 4 hours PRN for wheezing or increased work of breathing using Per Protocol order mode. 4-6 - enter or revise RT Bronchodilator order(s) to two equivalent RT bronchodilator orders with one order with BID Frequency and one order with Frequency of every 4 hours PRN wheezing or increased work of breathing using Per Protocol order mode. 7-10 - enter or revise RT Bronchodilator order(s) to two equivalent RT bronchodilator orders with one order with TID Frequency and one order with Frequency of every 4 hours PRN wheezing or increased work of breathing using Per Protocol order mode. 11-13 - enter or revise RT Bronchodilator order(s) to one equivalent RT bronchodilator order with QID Frequency and an Albuterol order with Frequency of every 4 hours PRN wheezing or increased work of breathing using Per Protocol order mode. Greater than 13 - enter or revise RT Bronchodilator order(s) to one equivalent RT bronchodilator order with every 4 hours Frequency and an Albuterol order with Frequency of every 2 hours PRN wheezing or increased work of breathing using Per Protocol order mode. RT to enter RT Home Evaluation for COPD & MDI Assessment order using Per Protocol order mode.     Electronically signed by Mariana Castellon RCP on 12/14/2022 at 8:09 AM

## 2022-12-14 NOTE — PROGRESS NOTES
Infectious Disease Follow up Notes  Admit Date: 12/1/2022  Hospital Day: 14    Antibiotics :   Steroids  Completed Levofloxacin course  Completed Tamiflu course   IV V fend    CHIEF COMPLAINT:     COPD  PNA  Influenz A infection   WBC elevation   Aspergillus PNA    Subjective interval History :  81 y.o. known to me from previous Aspergillus Lung infection and PNA from BAL cx +Ve was treated with Cresemba with good clinical response now admitted with recent worsening of sob and influenza A infection - was on nearly x 3 rounds of steroid bursts with out improvement he is normally not on Home Oxygen uses only when needed now using x 5lts     cough + sob + feels worse using BIPAp not doing well family at bed side - they want to discuss ventilation status and wants to check how he would do if he has  to be intubated -     Past Medical History:    Past Medical History:   Diagnosis Date    Aspergillus pneumonia ([facility] [address]     Basal cell carcinoma of skin 06/16/2017    right cheek    COPD (chronic obstructive pulmonary disease) ([facility] [address]     DNR (do not resuscitate)     no intubation or chest compressions    Hyperlipidemia     Hypertension 10/2012    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/04/2018    Skin cancer 2014    both upper thighs    Squamous cell cancer of skin of nose 5/2016       Past Surgical History:    Past Surgical History:   Procedure Laterality Date    BRONCHOSCOPY N/A 7/25/2019    BRONCHOSCOPY WITH BRONCHOALVEOLAR LAVAGE performed by Mary Ricks MD at [address] Bilateral     5/2016    COLONOSCOPY  08/04/2016    dr Steve Winchester    3 years    SKIN BIOPSY  5/2016    Nose     TONSILLECTOMY AND ADENOIDECTOMY      TOOTH EXTRACTION  5/2016    UPPER GASTROINTESTINAL ENDOSCOPY  12/2/2015    dr Crystal Loving       Current Medications:    No outpatient medications have been marked as taking for the 12/1/22 encounter [facility] Encounter).        Allergies:  Aquaphor [albolene]    Immunizations :   Immunization History   Administered Date(s) Administered    COVID-19, PFIZER PURPLE top, DILUTE for use, (age 15 y+), 30mcg/0.3mL 2021, 2021, 2021    Influenza A (V2O4-78) Vaccine PF IM 2010    Influenza Virus Vaccine 10/01/2011, 10/13/2013, 10/22/2014, 10/19/2015    Influenza Whole 2010    Influenza, FLUAD, (age 72 y+), Adjuvanted, 0.5mL 10/06/2020    Influenza, FLUARIX, FLULAVAL, FLUZONE (age 10 mo+) AND AFLURIA, (age 1 y+), PF, 0.5mL 2016    Influenza, High Dose (Fluzone 65 yrs and older) 2017, 10/08/2018, 10/01/2019    Influenza, Triv, inactivated, subunit, adjuvanted, IM (Fluad 65 yrs and older) 10/01/2019    Pneumococcal Conjugate 13-valent (Owuthru16) 2015    Pneumococcal Polysaccharide (Dhnrmzwiu77) 2016    Tdap (Boostrix, Adacel) 2012    Zoster Live (Zostavax) 2012    Zoster Recombinant (Shingrix) 2019, 10/11/2019       Social History:     Social History     Tobacco Use    Smoking status: Former     Packs/day: 1.00     Years: 45.00     Pack years: 45.00     Types: Cigarettes     Quit date: 2002     Years since quittin.9    Smokeless tobacco: Never   Vaping Use    Vaping Use: Never used   Substance Use Topics    Alcohol use: Yes     Comment: rarely     Drug use: No     Social History     Tobacco Use   Smoking Status Former    Packs/day: 1.00    Years: 45.00    Pack years: 45.00    Types: Cigarettes    Quit date: 2002    Years since quittin.9   Smokeless Tobacco Never      Family History   Problem Relation Age of Onset    Diabetes Mother         DM    Coronary Art Dis Mother           REVIEW OF SYSTEMS:     Constitutional:  negative for fevers, chills, night sweats  Eyes:  negative for blurred vision, eye discharge, visual disturbance   HEENT:  negative for hearing loss, ear drainage,nasal congestion  Respiratory:   cough,+  shortness of breath +  or hemoptysis   Cardiovascular:  negative for chest pain, palpitations, syncope  Gastrointestinal:  negative for nausea, vomiting, diarrhea, constipation, abdominal pain  Genitourinary:  negative for frequency, dysuria, urinary incontinence, hematuria  Hematologic/Lymphatic:  negative for easy bruising, bleeding and lymphadenopathy  Allergic/Immunologic:  negative for recurrent infections, angioedema, anaphylaxis   Endocrine:  negative for weight changes, polyuria, polydipsia and polyphagia  Musculoskeletal:  negative for joint  pain, swelling, decreased range of motion  Integumentary: No rashes, skin lesions  Neurological:  negative for headaches, slurred speech, unilateral weakness  Psychiatric: negative for hallucinations,confusion,agitation.                 PHYSICAL EXAM:      Vitals:    BP (!) 153/88   Pulse (!) 125   Temp 98 °F (36.7 °C) (Axillary)   Resp 22   Ht 5' 7\" (1.702 m)   Wt 183 lb 13.8 oz (83.4 kg)   SpO2 95%   BMI 28.80 kg/m²     General Appearance: awake and in acute distress, +  pallor, no icterus using accessory muscles++ tachy++   Skin: warm and dry, no rash or erythema  Head: normocephalic and atraumatic  Eyes: pupils equal, round, and reactive to light, conjunctivae normal  ENT: tympanic membrane, external ear and ear canal normal bilaterally, nose without deformity, nasal mucosa and turbinates normal without polyps  Neck: supple and non-tender without mass, no thyromegaly  no cervical lymphadenopathy  Pulmonary/Chest:  bI BASAL CREPTS+ - exp  wheezes,++ rales or rhonchi, normal air movement, in  respiratory distress  Cardiovascular: normal rate, regular rhythm, normal S1 and S2, no murmurs, rubs, clicks, or gallops, no carotid bruits  Abdomen: soft, non-tender, non-distended, normal bowel sounds, no masses or organomegaly  Extremities: no cyanosis, clubbing or edema  Musculoskeletal: normal range of motion, no joint swelling, deformity or tenderness  Integumentary: No rashes, no abnormal skin lesions, no petechiae  Neurologic: reflexes normal and symmetric, no cranial nerve deficit  Psych:  Orientation, sensorium, mood normal  Lines:  iv      Data Review:    CBC:   Lab Results   Component Value Date    WBC 33.1 (H) 12/14/2022    HGB 17.1 12/14/2022    HCT 53.7 (H) 12/14/2022    MCV 89.1 12/14/2022     12/14/2022     RENAL:   Lab Results   Component Value Date    CREATININE 0.7 (L) 12/14/2022    BUN 25 (H) 12/14/2022     12/14/2022    K 4.5 12/14/2022    CL 96 (L) 12/14/2022    CO2 26 12/14/2022     SED RATE: No results found for: SEDRATE  CK: No results found for: CKTOTAL  CRP:   Lab Results   Component Value Date/Time    CRP 64.3 02/05/2018 08:08 AM     Hepatic Function Panel:   Lab Results   Component Value Date/Time    ALKPHOS 82 12/14/2022 08:42 AM    ALT 37 12/14/2022 08:42 AM    AST 18 12/14/2022 08:42 AM    PROT 5.4 12/14/2022 08:42 AM    PROT 6.7 03/14/2013 09:40 AM    BILITOT 0.5 12/14/2022 08:42 AM    LABALBU 2.9 12/14/2022 08:42 AM     UA:  Lab Results   Component Value Date/Time    COLORU YELLOW 07/24/2019 01:03 PM    CLARITYU Clear 07/24/2019 01:03 PM    GLUCOSEU Negative 07/24/2019 01:03 PM    BILIRUBINUR Negative 07/24/2019 01:03 PM    KETUA Negative 07/24/2019 01:03 PM    SPECGRAV 1.010 07/24/2019 01:03 PM    BLOODU Negative 07/24/2019 01:03 PM    PHUR 7.0 07/24/2019 01:03 PM    PROTEINU Negative 07/24/2019 01:03 PM    UROBILINOGEN 0.2 07/24/2019 01:03 PM    NITRU Negative 07/24/2019 01:03 PM    LEUKOCYTESUR Negative 07/24/2019 01:03 PM    LABMICR Not Indicated 07/24/2019 01:03 PM    URINETYPE Not Specified 07/24/2019 01:03 PM      Urine Microscopic:   Lab Results   Component Value Date/Time    HYALCAST 0 03/08/2017 12:15 AM    WBCUA 6 03/08/2017 12:15 AM    RBCUA 2 03/08/2017 12:15 AM    EPIU 1 03/08/2017 12:15 AM     Urine Reflex to Culture:   Lab Results   Component Value Date/Time    URRFLXCULT Not Indicated 07/24/2019 01:03 PM     WBC 17.5     pROCAL  0.06 MICRO: cultures reviewed and updated by me   Blood Culture:          Micro results (current encounter only)    Procedure Component Value Units Date/Time   Culture, Respiratory [[id number]] (Abnormal) Collected: 12/07/22 1239   Order Status: Completed Specimen: Sputum Expectorated Updated: 12/09/22 1444    CULTURE, RESPIRATORY Moderate growth normal respiratory rowan with Abnormal     Gram Stain Result 1+ Epithelial Cells   2+ WBC's (Polymorphonuclear)   1+ Yeast   3+ Gram positive cocci   1+ Gram negative rods   1+ Gram positive rods     Organism Aspergillus species Abnormal     CULTURE, RESPIRATORY --    POSITIVE for   No further workup    Narrative:     ORDER#: [id number]                          ORDERED BY: Kath Tamayo   SOURCE: Sputum Expectorated                COLLECTED:  12/07/22 12:39   ANTIBIOTICS AT RAYO.:                      RECEIVED :  12/07/22 12:53   Culture, Respiratory [[id number]] Collected: 12/02/22 1716   Order Status: Completed Specimen: Sputum Expectorated Updated: 12/04/22 1009    CULTURE, RESPIRATORY Normal respiratory rowan    Gram Stain Result 2+ Epithelial Cells   4+ WBC's (Polymorphonuclear)   3+ Gram positive cocci   2+ Yeast    Narrative:     ORDER#: [id number]                          ORDERED BY: CAMILA ROBLERO   SOURCE: Sputum Expectorated                COLLECTED:  12/02/22 17:16   ANTIBIOTICS AT RAYO.:                      RECEIVED :  12/02/22 17:19   Rapid influenza A/B antigens [[id number]] (Abnormal) Collected: 12/01/22 1532   Order Status: Completed Specimen: Nasopharyngeal Updated: 12/01/22 1650    Rapid Influenza A Ag POSITIVE Abnormal     Rapid Influenza B Ag Negative   COVID-19, Rapid [[id number]] Collected: 12/01/22 1532   Order Status: Completed Specimen: Nasopharyngeal Swab Updated: 12/01/22 1643    SARS-CoV-2, NAAT Not Detected    Comment: Rapid NAAT:   Negative results should be treated as presumptive and,   if inconsistent with clinical signs and symptoms or necessary for   patient management, should be tested with an alternative molecular   assay. Negative results do not preclude SARS-CoV-2 infection and   should not be used as the sole basis for patient management decisions. This test has been authorized by the FDA under an Emergency Use   Authorization (EUA) for use by authorized laboratories. Fact sheet for Healthcare Providers:   http://www.marck.erasto/   Fact sheet for Patients: http://www.marck.erasto/     METHODOLOGY: Isothermal Nucleic Acid Amplification         Lab Results   Component Value Date/Time    BC No growth after 5 days of incubation. 07/24/2019 01:03 PM    BLOODCULT2 No growth after 5 days of incubation. 07/24/2019 01:03 PM       Respiratory Culture:  Lab Results   Component Value Date/Time    CULTRESP Moderate growth normal respiratory rowan with 12/07/2022 12:39 PM    CULTRESP POSITIVE for  No further workup   12/07/2022 12:39 PM    LABGRAM  12/07/2022 12:39 PM     1+ Epithelial Cells  2+ WBC's (Polymorphonuclear)  1+ Yeast  3+ Gram positive cocci  1+ Gram negative rods  1+ Gram positive rods       AFB:  Lab Results   Component Value Date/Time    AFBSMEAR No AFB observed by Fluorescent stain 07/25/2019 09:13 AM     Viral Culture:  Lab Results   Component Value Date/Time    COVID19 Not Detected 12/01/2022 03:32 PM     Urine Culture: No results for input(s): LABURIN in the last 72 hours. IMAGING:    XR CHEST PORTABLE   Final Result   Limited study as described above. Right lower lobe infiltrate with numerous focal nodules. Right perihilar consolidation, cannot exclude mass or adenopathy. CT CHEST WO CONTRAST   Final Result   Nodular infiltrates and masslike consolidation within the right lower lobe,   consistent with pneumonia in the appropriate clinical setting. Additional bilateral pulmonary nodules.       Recommend follow-up to complete imaging resolution to exclude the possibility   of underlying malignancy, especially given presence of background pulmonary   emphysema. RECOMMENDATIONS:   Fleischner Society guidelines for follow-up and management of incidentally   detected pulmonary nodules:      Multiple Solid Nodules:      Nodule size greater than 8 mm   In a low-risk patient, CT at 3-6 months, then consider CT at 18-24 months. In a high-risk patient, CT at 3-6 months, then CT at 18-24 months. - Low risk patients include individuals with minimal or absent history of   smoking and other known risk factors. - High risk patients include individuals with a history or smoking or known   risk factors. Radiology 2017 http://pubs. rsna.org/doi/full/10.1148/radiol. 1785129937         XR CHEST PORTABLE   Final Result   No acute cardiopulmonary findings         XR CHEST PORTABLE   Final Result   1. No acute abnormality. XR CHEST PORTABLE   Final Result   Asymmetric ground-glass opacification at the right lung base may represent a   small pleural effusion or developing pneumonitis.          XR CHEST (2 VW)   Final Result   No acute abnormality               All the pertinent images and reports for the current Hospitalization were reviewed by me     Scheduled Meds:   insulin lispro  0-16 Units SubCUTAneous TID     insulin lispro  0-4 Units SubCUTAneous Nightly    ipratropium-albuterol  1 ampule Inhalation Q4H WA    furosemide  40 mg IntraVENous BID    nystatin  5 mL Oral 4x Daily    methylPREDNISolone sodium  40 mg IntraVENous Q12H    voriconazole  4 mg/kg IntraVENous Q12H    budesonide  1 mg Nebulization BID    dilTIAZem  300 mg Oral Daily    polyethylene glycol  17 g Oral Daily    guaiFENesin  600 mg Oral BID    theophylline  200 mg Oral BID    sodium chloride (Inhalant)  15 mL Nebulization 3 times per day    sodium chloride flush  5-40 mL IntraVENous 2 times per day    enoxaparin  40 mg SubCUTAneous Nightly    polyvinyl alcohol  2 drop Both Eyes BID pantoprazole  40 mg Oral QAM AC    atorvastatin  10 mg Oral Daily    losartan  50 mg Oral Daily       Continuous Infusions:   dextrose      dextrose      sodium chloride         PRN Meds:  glucose, dextrose bolus **OR** dextrose bolus, glucagon (rDNA), dextrose, glucose, dextrose bolus **OR** dextrose bolus, glucagon (rDNA), dextrose, sodium chloride flush, sodium chloride, ondansetron **OR** ondansetron, acetaminophen **OR** acetaminophen, albuterol      Assessment:     Patient Active Problem List   Diagnosis    Gastroenteritis    Chronic diarrhea    GERD (gastroesophageal reflux disease)    Thrush    Benign prostatic hyperplasia    Allergic state    Hypercholesterolemia    Neck pain    Essential hypertension    Anxiety    Abdominal pain    Hypoxemia    COPD exacerbation (HCC)    PSVT (paroxysmal supraventricular tachycardia) (HCC)    Dyspnea    ZAHEER (obstructive sleep apnea)    Abnormal glucose    Hypoxia    Bronchitis    Abnormal CT of the chest    COPD, very severe (HCC)    Pulmonary nodules    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    Elevated lactic acid level    Aspergillus pneumonia (HCC)    Hypokalemia    Acute respiratory failure with hypoxia (HCC)    PLMD (periodic limb movement disorder)    Chronic hypercapnic respiratory failure (HCC)    Otalgia    Asthma    Other allergic rhinitis    COVID-19    Influenza A    Chronic respiratory failure with hypoxia (HCC)    Neutrophilia    Acute on chronic respiratory failure with hypoxia (HCC)     COPD exacerbation   Recent INFLUENNZA A infection   Pneumonia  Acute hypoxic resp failure using 5 lts nasal cannula   Severe COPD  Previous Aspergillus PNA treated with Isuvocozonium ( Cresemba) with good clinical response in 2018   Now admitted with worsening SOB  CT chest now with New Nodular changes and PNA -   CT images reviewed with Pt and his wife in the room    I am concerned about either relapse or New infection from Aspergillus - We have seen Post INFLUENZA worsening of Fungal and BACTERIAL  infection due to damaged resp epithelial lining in any case he needs therapy    Bronchoscopy would help but given his resp status he is some what high risk and may end up on the Vent    Will proceed with IV V fend and will check serologies to see if they guide treatment   \  Not doing well declining and CXR worse will add IV Meropenem for Bacterial coverage    WBC elevation from steroids and infection      Labs, Microbiology, Radiology and all the pertinent results from current hospitalization and  care every where were reviewed  by me as a part of the evaluation   Plan:   Cont  V fend x  4 mg /kg Q 12 hrs  maintenance dosing   Add IV Meropenem x1 gm q 8 hrs to cover HAP  Not doing well risk for INTUBATION high   Pt want to consider intubation if there is short term recovery he would like to d/w  and family at bed side    Check Aspergillus antigen, Antibodies, Beta D glucan  +Ve   Wean off STEROID -   He had problems with oral V fend  with some side effects  May choose Cresemba at d/c due to side effects from V fend   Prognosis poor from severe COPD   -    Discussed with patient/Family and Nursing staff  d/w wife at bed side   Risk of Complications/Morbidity: High      Illness(es)/ Infection present that pose threat to bodily function. There is potential for severe exacerbation of infection/side effects of treatment. Therapy requires intensive monitoring for antimicrobial agent toxicity. Thanks for allowing me to participate in your patient's care and please call me with any questions or concerns.     Jim Hernandez MD  Infectious Disease  [facility] ([facility]) Physician  Phone: [phone]   Fax : [phone]

## 2022-12-14 NOTE — PROGRESS NOTES
Pulmonary Progress Note    Date of Admission: 12/1/2022   LOS: 13 days     Chief Complaint   Patient presents with    Shortness of Breath     Pt states he has had increased sob x 2-3 days. Pt states he is coughing up green sputum. Pt has history of copd and is on home oxygen of 2 liters. Assessment/Plan:       Acute hypoxemic respiratory failure   -Worsened overnight. ABGs ordered stat  -Chest x-ray stat, concern for worsening fungal pneumonia  -Okay to transition to our BiPAP to help with work of breathing.  -Lasix twice daily  -On Vfend for Aspergillus pneumonia, ID following may transition to [place]  -Check Pro-Alfonzo    Acute exacerbation of COPD, due to influenza and a  -Completed Tamiflu  -Scheduled duo nebs, budesonide  -IV steroids, was weaned yesterday but now back to twice daily  -Theophylline  -Wean oxygen goal saturation 90%    Pneumonia, Aspergillus  -Nodular consolidation in the right lower lobe  -Started on voriconazole    ZAHEER  -biPAP at night and with naps    High risk due to need for NIPPV and worsening oxygen requirement    24 Hour Events/Subjective  Rising oxygen requirements overnight. Dyspnea worsened. Diuresed yesterday. ROS:   No nausea  No Vomiting  No chest pain      Intake/Output Summary (Last 24 hours) at 12/14/2022 1003  Last data filed at 12/14/2022 1484  Gross per 24 hour   Intake 600 ml   Output 2325 ml   Net -1725 ml         PHYSICAL EXAM:   Blood pressure (!) 157/68, pulse (!) 116, temperature 97.9 °F (36.6 °C), temperature source Oral, resp. rate 22, height 5' 7\" (1.702 m), weight 183 lb 13.8 oz (83.4 kg), SpO2 90 %.'  Gen:  No acute distress. Eyes: PERRL. Anicteric sclera. No conjunctival injection. ENT: No discharge. Posterior oropharynx clear. External appearance of ears and nose normal.  Neck: Trachea midline. No mass   Resp:  No crackles. + Bilateral wheezes. No rhonchi. No dullness on percussion. CV: Tachycardic rate. Regular rhythm.  No murmur or rub.+ edema.   GI: Soft, Non-tender. Non-distended. +BS  Skin: Warm, dry, w/o erythema. Lymph: No cervical or supraclavicular LAD. M/S: No cyanosis. No clubbing. Neuro:  no focal neurologic deficit. Moves all extremities  Psych: Awake and alert, Oriented x 3. Judgement and insight appropriate. Mood stable.       Medications:    Scheduled Meds:   insulin lispro  0-16 Units SubCUTAneous TID WC    insulin lispro  0-4 Units SubCUTAneous Nightly    ipratropium-albuterol  1 ampule Inhalation Q4H WA    furosemide  40 mg IntraVENous BID    nystatin  5 mL Oral 4x Daily    methylPREDNISolone sodium  40 mg IntraVENous Q12H    voriconazole  4 mg/kg IntraVENous Q12H    budesonide  1 mg Nebulization BID    dilTIAZem  300 mg Oral Daily    polyethylene glycol  17 g Oral Daily    guaiFENesin  600 mg Oral BID    theophylline  200 mg Oral BID    sodium chloride (Inhalant)  15 mL Nebulization 3 times per day    sodium chloride flush  5-40 mL IntraVENous 2 times per day    enoxaparin  40 mg SubCUTAneous Nightly    polyvinyl alcohol  2 drop Both Eyes BID    pantoprazole  40 mg Oral QAM AC    atorvastatin  10 mg Oral Daily    losartan  50 mg Oral Daily       Continuous Infusions:   dextrose      dextrose      sodium chloride         PRN Meds:  glucose, dextrose bolus **OR** dextrose bolus, glucagon (rDNA), dextrose, glucose, dextrose bolus **OR** dextrose bolus, glucagon (rDNA), dextrose, sodium chloride flush, sodium chloride, ondansetron **OR** ondansetron, acetaminophen **OR** acetaminophen, albuterol    Labs reviewed:  CBC:   Recent Labs     12/12/22  1133   WBC 26.5*   HGB 16.9   HCT 52.9*   MCV 87.9        BMP:   Recent Labs     12/12/22  1133 12/14/22  0842    138   K 4.9 4.5    96*   CO2 25 26   BUN 18 25*   CREATININE 0.7* 0.7*     LIVER PROFILE:   Recent Labs     12/12/22  1133 12/14/22  0842   AST 18 18   ALT 43* 37   BILITOT 0.5 0.5   ALKPHOS 69 82     PT/INR: No results for input(s): PROTIME, INR in the last 72 hours. Films:  Radiology Review:  Pertinent images / reports were reviewed as a part of this visit. This note was transcribed using [email or web address]. Please disregard any translational errors.     Thank you for this consult,    Yann Arellano MD  [facility] Pulmonary, Critical Care, and Sleep Medicine

## 2022-12-14 NOTE — PROGRESS NOTES
[facility] Progress Note  12/14/2022 8:08 AM  Subjective:   Admit Date: 12/1/2022      Chief Complaint: I am struggling     Interval History: Resp status is declining--req higher O2/incr tachycardia  BS are incr--sec to steroids   Diet: ADULT DIET; Regular  Medications:   Scheduled Meds:   insulin lispro  0-16 Units SubCUTAneous TID WC    insulin lispro  0-4 Units SubCUTAneous Nightly    nystatin  5 mL Oral 4x Daily    methylPREDNISolone sodium  40 mg IntraVENous Q12H    voriconazole  4 mg/kg IntraVENous Q12H    budesonide  1 mg Nebulization BID    dilTIAZem  300 mg Oral Daily    ipratropium-albuterol  1 ampule Inhalation Q6H    polyethylene glycol  17 g Oral Daily    guaiFENesin  600 mg Oral BID    theophylline  200 mg Oral BID    sodium chloride (Inhalant)  15 mL Nebulization 3 times per day    sodium chloride flush  5-40 mL IntraVENous 2 times per day    enoxaparin  40 mg SubCUTAneous Nightly    polyvinyl alcohol  2 drop Both Eyes BID    pantoprazole  40 mg Oral QAM AC    atorvastatin  10 mg Oral Daily    losartan  50 mg Oral Daily     Continuous Infusions:   dextrose      dextrose      sodium chloride         Review of Systems -   General ROS: afebrile  Respiratory ROS: positive for - cough and shortness of breath  Cardiovascular ROS: no chest pain  Musculoskeletal ROS:positive for - :joint pain  Neurological ROS: no TIA or stroke symptoms    Objective:   Vitals: BP (!) 157/68   Pulse (!) 116   Temp 97.9 °F (36.6 °C) (Oral)   Resp 22   Ht 5' 7\" (1.702 m)   Wt 183 lb 13.8 oz (83.4 kg)   SpO2 90%   BMI 28.80 kg/m²   General appearance: alert and cooperative with exam  HEENT: Head: Normocephalic, no lesions, without obvious abnormality.   Neck: no adenopathy, no carotid bruit, no JVD, supple, symmetrical, trachea midline, and thyroid not enlarged, symmetric, no tenderness/mass/nodules  Lungs: wheezes bilaterally  Heart: resting tachycardia   Abdomen: soft, non-tender; bowel sounds normal; no masses,  no organomegaly  Extremities: 1+ edema   Neurologic: Mental status: limited conversation--due to resp insuffic     No results displayed because visit has over 200 results. 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  *  Very guarded prognosis--dwp and wife at bedside--he still req ventilation --short-term --will await pulm thoughts --incr HHN to q4h WA -incr SSI to high   Please note that over 35 minutes was spent in evaluating the patient, review of records and results, discussion with staff/family, etc.    Ashlee Myers MD

## 2022-12-14 NOTE — PROGRESS NOTES
12/14/22 1010   NIV Type   $NIV $Daily Charge   Skin Assessment Clean, dry, & intact   NIV Started/Stopped On   Equipment Type (S)  V60   Mode Bilevel   Mask Type Full face mask   Mask Size Large   Settings/Measurements   PIP Observed 24 cm H20   IPAP 24 cmH20   CPAP/EPAP 8 cmH2O   Vt (Measured) 649 mL   Rate Ordered 10   Resp 21   FiO2  60 %   I Time/ I Time % 0.85 s   Minute Volume (L/min) 16.2 Liters   Mask Leak (lpm) 7 lpm   Comfort Level Good   Using Accessory Muscles No   SpO2 94   Alarm Settings   Alarms On Y   Low Pressure (cmH2O) 4 cmH2O   High Pressure (cmH2O) 30 cmH2O   Apnea (secs) 20 secs   RR Low (bpm) 10   RR High (bpm) 50 br/min

## 2022-12-15 NOTE — PROGRESS NOTES
Arrived to place PICC line in patient with, Georgina Ta RN at bedside, pre procedure and allergies reviewed, no issues accessing brachial vein, pt tolerated well, blood return and flushed well, tip verified with 3cg technology with peaked p-waves, OK to use PICC. Pt left in stable condition and bed braked and in lowest position. Pt call light within reach.  [facility]

## 2022-12-15 NOTE — PROGRESS NOTES
4 Eyes Skin Assessment     NAME:  Jhoan Samaniego  YOB: 1940  MEDICAL RECORD NUMBER:  [id number]    The patient is being assessed for  Transfer to New Unit    I agree that One RN have performed a thorough Head to Toe Skin Assessment on the patient. ALL assessment sites listed below have been assessed. Areas assessed by both nurses:    Head, Face, Ears, Shoulders, Back, Chest, Arms, Elbows, Hands, Sacrum. Buttock, Coccyx, Ischium, and Legs. Feet and Heels        Does the Patient have a Wound?  No noted wound(s)       Romulo Prevention initiated by RN: No   Wound Care Orders initiated by RN: No    Pressure Injury (Stage 3,4, Unstageable, DTI, NWPT, and Complex wounds) if present place referral order by RN under : NA    New and Established Ostomies, if present place, referral order under : NA      Nurse 1 eSignature: Electronically signed by Shamika Romeo RN on 12/15/22 at 6:54 PM EST    **SHARE this note so that the co-signing nurse is able to place an eSignature**    Nurse 2 eSignature: Electronically signed by Raj Delarosa RN on 12/15/22 at 6:55 PM EST

## 2022-12-15 NOTE — PROGRESS NOTES
Patient alert and oriented X4. Patient refused nightly nystatin medication. Patient stated he no longer needs it. Patient was given tylenol due to temperature. See Flow Sheet and MAR. Patient refused to wear Pneumatic Compression devices on legs. Education given. Patient states they annoy him. Patient tolerated nightly medications well. Patient verbalizes understanding of education. Patient vital signs recorded. Fall precautions in place. Bed in lowest position, side rails X2. Non slip socks on. Needed items within reach. Call light within reach.

## 2022-12-15 NOTE — PLAN OF CARE
Problem: Discharge Planning  Goal: Discharge to home or other facility with appropriate resources  12/15/2022 0018 by Mason Heredia RN  Outcome: Progressing     Problem: Safety - Adult  Goal: Free from fall injury  12/15/2022 0018 by Mason Heredia RN  Outcome: Progressing  Flowsheets (Taken 12/15/2022 0017)  Free From Fall Injury: Instruct family/caregiver on patient safety     Problem: Pain  Goal: Verbalizes/displays adequate comfort level or baseline comfort level  12/15/2022 0018 by Mason Heredia RN  Outcome: Progressing     Problem: ABCDS Injury Assessment  Goal: Absence of physical injury  12/15/2022 0018 by Mason Heredia RN  Outcome: Progressing  Flowsheets (Taken 12/15/2022 0017)  Absence of Physical Injury: Implement safety measures based on patient assessment     Problem: Respiratory - Adult  Goal: Achieves optimal ventilation and oxygenation  12/15/2022 0018 by Mason Heredia RN  Outcome: Progressing  Flowsheets (Taken 12/14/2022 2132)  Achieves optimal ventilation and oxygenation: Assess for changes in respiratory status     Problem: Infection - Adult  Goal: Absence of infection at discharge  12/15/2022 0018 by Mason Heredia RN  Outcome: Progressing  Flowsheets (Taken 12/14/2022 2132)  Absence of infection at discharge: Assess and monitor for signs and symptoms of infection     Problem: Infection - Adult  Goal: Absence of infection during hospitalization  12/15/2022 0018 by Mason Heredia RN  Outcome: Progressing     Problem: Infection - Adult  Goal: Absence of fever/infection during anticipated neutropenic period  12/15/2022 0018 by Mason Heredia RN  Outcome: Progressing

## 2022-12-15 NOTE — PROGRESS NOTES
4 Eyes Skin Assessment     NAME:  Pascale Davalos  YOB: 1940  MEDICAL RECORD NUMBER:  [id number]    The patient is being assessed for  Shift Handoff    I agree that One RN have performed a thorough Head to Toe Skin Assessment on the patient. ALL assessment sites listed below have been assessed. Areas assessed by both nurses:    Head, Face, Ears, Shoulders, Back, Chest, Arms, Elbows, Hands, Sacrum. Buttock, Coccyx, Ischium, and Legs. Feet and Heels        Does the Patient have a Wound?  No noted wound(s)       Romulo Prevention initiated by RN: Yes   Wound Care Orders initiated by RN: No    Pressure Injury (Stage 3,4, Unstageable, DTI, NWPT, and Complex wounds) if present place referral order by RN under : NA    New and Established Ostomies, if present place, referral order under : NA      Nurse 1 eSignature: Electronically signed by Antonio Jang RN on 12/15/22 at 6:55 PM EST    **SHARE this note so that the co-signing nurse is able to place an eSignature**    Nurse 2 eSignature: Electronically signed by Desean Reyes RN on 12/15/22 at 7:45 PM EST

## 2022-12-15 NOTE — PROGRESS NOTES
Infectious Disease Follow up Notes  Admit Date: 12/1/2022  Hospital Day: 15    Antibiotics :   Steroids  Completed Levofloxacin course  Completed Tamiflu course   IV V fend  IV Meropenem     CHIEF COMPLAINT:     COPD  PNA  Influenz A infection   WBC elevation   Aspergillus PNA    Subjective interval History :  81 y.o. known to me from previous Aspergillus Lung infection and PNA from BAL cx +Ve was treated with Cresemba with good clinical response now admitted with recent worsening of sob and influenza A infection - was on nearly x 3 rounds of steroid bursts with out improvement he is normally not on Home Oxygen uses only when needed now using x 5lts     cough + sob + feels worse using BIPAp not doing well family at bed side -transfer to ICU secondary to respiratory distress and ongoing decline in respiratory status.   Patient has discussed with Juan Clarke -opted no for CPR and ventilation he is now DNR CC        Past Medical History:    Past Medical History:   Diagnosis Date    Aspergillus pneumonia (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/04/2018    Skin cancer 2014    both upper thighs    Squamous cell cancer of skin of nose 5/2016       Past Surgical History:    Past Surgical History:   Procedure Laterality Date    BRONCHOSCOPY N/A 7/25/2019    BRONCHOSCOPY WITH BRONCHOALVEOLAR LAVAGE performed by Tristin Coppola MD at [address] [facility] Bilateral     5/2016    COLONOSCOPY  08/04/2016    dr Favian Buck    3 years    SKIN BIOPSY  5/2016    Nose     TONSILLECTOMY AND ADENOIDECTOMY      TOOTH EXTRACTION  5/2016    UPPER GASTROINTESTINAL ENDOSCOPY  12/2/2015    dr Mag Booth       Current Medications:    No outpatient medications have been marked as taking for the 22 encounter [facility] Encounter).        Allergies:  Aquaphor [albolene]    Immunizations :   Immunization History   Administered Date(s) Administered    COVID-19, PFIZER PURPLE top, DILUTE for use, (age 15 y+), 30mcg/0.3mL 2021, 2021, 2021    Influenza A (T2Q7-05) Vaccine PF IM 2010    Influenza Virus Vaccine 10/01/2011, 10/13/2013, 10/22/2014, 10/19/2015    Influenza Whole 2010    Influenza, FLUAD, (age 72 y+), Adjuvanted, 0.5mL 10/06/2020    Influenza, FLUARIX, FLULAVAL, FLUZONE (age 10 mo+) AND AFLURIA, (age 1 y+), PF, 0.5mL 2016    Influenza, High Dose (Fluzone 65 yrs and older) 2017, 10/08/2018, 10/01/2019    Influenza, Triv, inactivated, subunit, adjuvanted, IM (Fluad 65 yrs and older) 10/01/2019    Pneumococcal Conjugate 13-valent ([id number]) 2015    Pneumococcal Polysaccharide (Vedhooxzx82) 2016    Tdap (Boostrix, Adacel) 2012    Zoster Live (Zostavax) 2012    Zoster Recombinant (Shingrix) 2019, 10/11/2019       Social History:     Social History     Tobacco Use    Smoking status: Former     Packs/day: 1.00     Years: 45.00     Pack years: 45.00     Types: Cigarettes     Quit date: 2002     Years since quittin.9    Smokeless tobacco: Never   Vaping Use    Vaping Use: Never used   Substance Use Topics    Alcohol use: Yes     Comment: rarely     Drug use: No     Social History     Tobacco Use   Smoking Status Former    Packs/day: 1.00    Years: 45.00    Pack years: 45.00    Types: Cigarettes    Quit date: 2002    Years since quittin.9   Smokeless Tobacco Never      Family History   Problem Relation Age of Onset    Diabetes Mother         DM    Coronary Art Dis Mother           REVIEW OF SYSTEMS:     Not possible due to lethargy+              PHYSICAL EXAM:      Vitals:    BP (!) 164/76   Pulse (!) 139   Temp 98.4 °F (36.9 °C)   Resp 20 Ht 5' 7\" (1.702 m)   Wt 183 lb 13.8 oz (83.4 kg)   SpO2 94%   BMI 28.80 kg/m²     General Appearance: somnolence on BIPAP IN  acute distress, +  pallor, no icterus using accessory muscles++ tachy++   Skin: warm and dry, no rash or erythema  Head: normocephalic and atraumatic  Eyes: pupils equal, round, and reactive to light, conjunctivae normal  ENT: tympanic membrane, external ear and ear canal normal bilaterally, nose without deformity, nasal mucosa and turbinates normal without polyps  Neck: supple and non-tender without mass, no thyromegaly  no cervical lymphadenopathy  Pulmonary/Chest:  bI BASAL CREPTS+ - exp  wheezes,++ rales or rhonchi, normal air movement, in  respiratory distress  Cardiovascular: normal rate, regular rhythm, normal S1 and S2, no murmurs, rubs, clicks, or gallops, no carotid bruits  Abdomen: soft, non-tender, non-distended, normal bowel sounds, no masses or organomegaly  Extremities: no cyanosis, clubbing or edema  Musculoskeletal: normal range of motion, no joint swelling, deformity or tenderness  Integumentary: No rashes, no abnormal skin lesions, no petechiae  Neurologic: reflexes normal and symmetric, no cranial nerve deficit  Lines:  Picc       Data Review:    CBC:   Lab Results   Component Value Date    WBC 33.1 (H) 12/14/2022    HGB 17.1 12/14/2022    HCT 53.7 (H) 12/14/2022    MCV 89.1 12/14/2022     12/14/2022     RENAL:   Lab Results   Component Value Date    CREATININE 0.7 (L) 12/14/2022    BUN 25 (H) 12/14/2022     12/14/2022    K 4.5 12/14/2022    CL 96 (L) 12/14/2022    CO2 26 12/14/2022     SED RATE: No results found for: SEDRATE  CK: No results found for: CKTOTAL  CRP:   Lab Results   Component Value Date/Time    CRP 64.3 02/05/2018 08:08 AM     Hepatic Function Panel:   Lab Results   Component Value Date/Time    ALKPHOS 82 12/14/2022 08:42 AM    ALT 37 12/14/2022 08:42 AM    AST 18 12/14/2022 08:42 AM    PROT 5.4 12/14/2022 08:42 AM    PROT 6.7 03/14/2013 09:40 AM    BILITOT 0.5 12/14/2022 08:42 AM    LABALBU 2.9 12/14/2022 08:42 AM     UA:  Lab Results   Component Value Date/Time    COLORU YELLOW 07/24/2019 01:03 PM    CLARITYU Clear 07/24/2019 01:03 PM    GLUCOSEU Negative 07/24/2019 01:03 PM    BILIRUBINUR Negative 07/24/2019 01:03 PM    KETUA Negative 07/24/2019 01:03 PM    SPECGRAV 1.010 07/24/2019 01:03 PM    BLOODU Negative 07/24/2019 01:03 PM    PHUR 7.0 07/24/2019 01:03 PM    PROTEINU Negative 07/24/2019 01:03 PM    UROBILINOGEN 0.2 07/24/2019 01:03 PM    NITRU Negative 07/24/2019 01:03 PM    LEUKOCYTESUR Negative 07/24/2019 01:03 PM    LABMICR Not Indicated 07/24/2019 01:03 PM    URINETYPE Not Specified 07/24/2019 01:03 PM      Urine Microscopic:   Lab Results   Component Value Date/Time    HYALCAST 0 03/08/2017 12:15 AM    WBCUA 6 03/08/2017 12:15 AM    RBCUA 2 03/08/2017 12:15 AM    EPIU 1 03/08/2017 12:15 AM     Urine Reflex to Culture:   Lab Results   Component Value Date/Time    URRFLXCULT Not Indicated 07/24/2019 01:03 PM     WBC 17.5     pROCAL  0.06     Component Ref Range & Units 12/12/22 1133 7/24/19 1732 2/8/18 1445   Aspergillus Galacto AG Negative Positive Abnormal   Negative CM  Negative CM    Comment: INTERPRETIVE INFORMATION: Aspergillus Galactomannan Antigen by EIA   Negative results do not exclude the diagnosis of invasive   aspergillosis.  A single positive test result (index equal   to or greater than 0.5) should be clinically correlated   by testing a separate serum specimen because many agents   (e.g. foods, antibiotics) may cross-react with the test.   If invasive aspergillosis is suspected in high-risk             Component Ref Range & Units 12/12/22 1133 9/4/19 1128 7/24/19 1732 10/5/18 0837 2/8/18 1445   (1,3)-Beta-D-Glucan (Fungitell) Interpretation Negative Positive Abnormal   Negative CM  Negative CM  Negative CM  Positive Abnormal  CM    Comment: INTERPRETIVE INFORMATION: (1,3)-beta-D-glucan (Fungitell)     Less than 31 pg/mL Camilla Ochoa ................ Negative     31-59 pg/mL . ......................... Negative     60-79 pg/mL . ......................... Indeterminate     Greater than or equal to 80 pg/mL . ...  Positive         MICRO: cultures reviewed and updated by me   Blood Culture:          Micro results (current encounter only)    Procedure Component Value Units Date/Time   Culture, Respiratory [[id number]] (Abnormal) Collected: 12/07/22 1239   Order Status: Completed Specimen: Sputum Expectorated Updated: 12/09/22 1444    CULTURE, RESPIRATORY Moderate growth normal respiratory rowan with Abnormal     Gram Stain Result 1+ Epithelial Cells   2+ WBC's (Polymorphonuclear)   1+ Yeast   3+ Gram positive cocci   1+ Gram negative rods   1+ Gram positive rods     Organism Aspergillus species Abnormal     CULTURE, RESPIRATORY --    POSITIVE for   No further workup    Narrative:     ORDER#: [id number]                          ORDERED BY: Mitchell Gagnon   SOURCE: Sputum Expectorated                COLLECTED:  12/07/22 12:39   ANTIBIOTICS AT RAYO.:                      RECEIVED :  12/07/22 12:53   Culture, Respiratory [[id number]] Collected: 12/02/22 1716   Order Status: Completed Specimen: Sputum Expectorated Updated: 12/04/22 1009    CULTURE, RESPIRATORY Normal respiratory rowan    Gram Stain Result 2+ Epithelial Cells   4+ WBC's (Polymorphonuclear)   3+ Gram positive cocci   2+ Yeast    Narrative:     ORDER#: [id number]                          ORDERED BY: CAMILA ROBLERO   SOURCE: Sputum Expectorated                COLLECTED:  12/02/22 17:16   ANTIBIOTICS AT RAYO.:                      RECEIVED :  12/02/22 17:19   Rapid influenza A/B antigens [[id number]] (Abnormal) Collected: 12/01/22 1532   Order Status: Completed Specimen: Nasopharyngeal Updated: 12/01/22 1650    Rapid Influenza A Ag POSITIVE Abnormal     Rapid Influenza B Ag Negative   COVID-19, Rapid [[id number]] Collected: 12/01/22 1532   Order Status: Completed Specimen: Nasopharyngeal Swab Updated: 12/01/22 1643    SARS-CoV-2, NAAT Not Detected    Comment: Rapid NAAT:   Negative results should be treated as presumptive and,   if inconsistent with clinical signs and symptoms or necessary for   patient management, should be tested with an alternative molecular   assay. Negative results do not preclude SARS-CoV-2 infection and   should not be used as the sole basis for patient management decisions. This test has been authorized by the FDA under an Emergency Use   Authorization (EUA) for use by authorized laboratories. Fact sheet for Healthcare Providers:   http://www.beti-holly.bimarylou/   Fact sheet for Patients: http://www.beti-holly.biz/     METHODOLOGY: Isothermal Nucleic Acid Amplification         Lab Results   Component Value Date/Time    BC No growth after 5 days of incubation. 07/24/2019 01:03 PM    BLOODCULT2 No growth after 5 days of incubation. 07/24/2019 01:03 PM       Respiratory Culture:  Lab Results   Component Value Date/Time    CULTRESP Moderate growth normal respiratory rowan with 12/07/2022 12:39 PM    CULTRESP POSITIVE for  No further workup   12/07/2022 12:39 PM    LABGRAM  12/07/2022 12:39 PM     1+ Epithelial Cells  2+ WBC's (Polymorphonuclear)  1+ Yeast  3+ Gram positive cocci  1+ Gram negative rods  1+ Gram positive rods       AFB:  Lab Results   Component Value Date/Time    AFBSMEAR No AFB observed by Fluorescent stain 07/25/2019 09:13 AM     Viral Culture:  Lab Results   Component Value Date/Time    COVID19 Not Detected 12/01/2022 03:32 PM     Urine Culture: No results for input(s): LABURIN in the last 72 hours. IMAGING:    XR CHEST PORTABLE   Final Result   Limited study as described above. Right lower lobe infiltrate with numerous focal nodules. Right perihilar consolidation, cannot exclude mass or adenopathy.          CT CHEST WO CONTRAST   Final Result   Nodular infiltrates and masslike consolidation within the right lower lobe,   consistent with pneumonia in the appropriate clinical setting. Additional bilateral pulmonary nodules. Recommend follow-up to complete imaging resolution to exclude the possibility   of underlying malignancy, especially given presence of background pulmonary   emphysema. RECOMMENDATIONS:   Fleischner Society guidelines for follow-up and management of incidentally   detected pulmonary nodules:      Multiple Solid Nodules:      Nodule size greater than 8 mm   In a low-risk patient, CT at 3-6 months, then consider CT at 18-24 months. In a high-risk patient, CT at 3-6 months, then CT at 18-24 months. - Low risk patients include individuals with minimal or absent history of   smoking and other known risk factors. - High risk patients include individuals with a history or smoking or known   risk factors. Radiology 2017 http://pubs. rsna.org/doi/full/10.1148/radiol. 3415927003         XR CHEST PORTABLE   Final Result   No acute cardiopulmonary findings         XR CHEST PORTABLE   Final Result   1. No acute abnormality. XR CHEST PORTABLE   Final Result   Asymmetric ground-glass opacification at the right lung base may represent a   small pleural effusion or developing pneumonitis.          XR CHEST (2 VW)   Final Result   No acute abnormality               All the pertinent images and reports for the current Hospitalization were reviewed by me     Scheduled Meds:   [START ON 12/16/2022] methylPREDNISolone sodium  40 mg IntraVENous Daily    insulin lispro  0-16 Units SubCUTAneous TID WC    insulin lispro  0-4 Units SubCUTAneous Nightly    ipratropium-albuterol  1 ampule Inhalation Q4H WA    meropenem  1,000 mg IntraVENous Q8H    insulin glargine  20 Units SubCUTAneous Nightly    lidocaine 1 % injection  5 mL IntraDERmal Once    sodium chloride flush  5-40 mL IntraVENous 2 times per day    nystatin  5 mL Oral 4x Daily    voriconazole  4 mg/kg IntraVENous Q12H    budesonide  1 mg Nebulization BID    dilTIAZem  300 mg Oral Daily    polyethylene glycol  17 g Oral Daily    guaiFENesin  600 mg Oral BID    theophylline  200 mg Oral BID    sodium chloride (Inhalant)  15 mL Nebulization 3 times per day    sodium chloride flush  5-40 mL IntraVENous 2 times per day    enoxaparin  40 mg SubCUTAneous Nightly    polyvinyl alcohol  2 drop Both Eyes BID    pantoprazole  40 mg Oral QAM AC    atorvastatin  10 mg Oral Daily    losartan  50 mg Oral Daily       Continuous Infusions:   dextrose      sodium chloride      dextrose      sodium chloride         PRN Meds:  glucose, dextrose bolus **OR** dextrose bolus, glucagon (rDNA), dextrose, sodium chloride flush, sodium chloride, glucose, dextrose bolus **OR** dextrose bolus, glucagon (rDNA), dextrose, sodium chloride flush, sodium chloride, ondansetron **OR** ondansetron, acetaminophen **OR** acetaminophen, albuterol      Assessment:     Patient Active Problem List   Diagnosis    Gastroenteritis    Chronic diarrhea    GERD (gastroesophageal reflux disease)    Thrush    Benign prostatic hyperplasia    Allergic state    Hypercholesterolemia    Neck pain    Essential hypertension    Anxiety    Abdominal pain    Hypoxemia    COPD exacerbation (HCC)    PSVT (paroxysmal supraventricular tachycardia) (HCC)    Dyspnea    ZAHEER (obstructive sleep apnea)    Abnormal glucose    Hypoxia    Bronchitis    Abnormal CT of the chest    COPD, very severe ([address]    Pulmonary nodules    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    Elevated lactic acid level    Aspergillus pneumonia (HCC)    Hypokalemia    Acute respiratory failure with hypoxia (HCC)    PLMD (periodic limb movement disorder)    Chronic hypercapnic respiratory failure (HCC)    Otalgia    Asthma    Other allergic rhinitis    COVID-19    Influenza A    Chronic respiratory failure with hypoxia (HCC)    Neutrophilia    Acute on chronic respiratory failure with hypoxia (HCC)    BiPAP (biphasic positive airway pressure) dependence     COPD exacerbation   Recent INFLUENNZA A infection   Pneumonia  Acute hypoxic resp failure using 5 lts nasal cannula   Severe COPD  Previous Aspergillus PNA treated with Isuvocozonium ( Cresemba) with good clinical response in 2018   Now admitted with worsening SOB  CT chest now with New Nodular changes and PNA -   CT images reviewed with Pt and his wife in the room    I am concerned about either relapse or New infection from Aspergillus - We have seen Post INFLUENZA worsening of Fungal and BACTERIAL  infection due to damaged resp epithelial lining in any case he needs therapy    Bronchoscopy would help but given his resp status he is some what high risk and may end up on the Vent    Will proceed with IV V fend and will check serologies to see if they guide treatment   \  Not doing well declining and CXR worse will add IV Meropenem for Bacterial coverage    WBC elevation from steroids and infection     Decline in resp status now tx to ICU on BIPAP pt has decided on comfort care due to COPD and invasive Fungal infection     Serum BetA D glucan and Aspergillus antigen  +Ve     D/w wife and family at bed side they agree to focus on Comfort care only -      Labs, Microbiology, Radiology and all the pertinent results from current hospitalization and  care every where were reviewed  by me as a part of the evaluation   Plan:   Cont  V fend x  4 mg /kg Q 12 hrs  maintenance dosing   Cont IV Meropenem x1 gm q 8 hrs to cover HAP  Not doing well risk for INTUBATION high but now pt opted for DNR CC   Pt  d/w  and family at bed side     Aspergillus antigen +ve , , Beta D glucan  +Ve   Wean off STEROID - as soon as possible   He had problems with oral V fend  with some side effects  May choose Cresemba at d/c due to side effects from V fend   Prognosis poor from severe COPD   -  Pt now opted no escalation of care and DNR CC      Discussed with patient/Family and Nursing staff  d/w wife at bed side   Risk of Complications/Morbidity: High      Illness(es)/ Infection present that pose threat to bodily function. There is potential for severe exacerbation of infection/side effects of treatment. Therapy requires intensive monitoring for antimicrobial agent toxicity. Thanks for allowing me to participate in your patient's care and please call me with any questions or concerns.     Sofie Peoples MD  Infectious Disease  [facility] [place] ([facility]) Physician  Phone: [phone]   Fax : [phone]

## 2022-12-15 NOTE — PROGRESS NOTES
Pulmonary Progress Note    Date of Admission: 12/1/2022   LOS: 14 days     Chief Complaint   Patient presents with    Shortness of Breath     Pt states he has had increased sob x 2-3 days. Pt states he is coughing up green sputum. Pt has history of copd and is on home oxygen of 2 liters. Assessment/Plan:       Acute hypoxemic respiratory failure   Pneumonia, Aspergillus  -Worsening; ABG with hypoxia but no CO2 retention I do not think think this is predominantly COPD, more likely worsening fungal pneumonia  -On BiPAP  -Lasix twice daily  -On Vfend for Aspergillus pneumonia  -Reinitiate meropenem  Cresemba on discharge  -Check Pro-Alfonzo    Acute exacerbation of COPD, due to influenza and a  -Completed Tamiflu  -Scheduled duo nebs, budesonide  -IV steroids wean again today due to fungal pneumonia  -Theophylline  -Wean oxygen goal saturation 90%        ZAHEER  -biPAP at night and with naps    High risk due to need for NIPPV and worsening oxygen requirement    24 Hour Events/Subjective  Rising oxygen requirements overnight. Dyspnea worsened. Diuresed yesterday. ROS:   No nausea  No Vomiting  No chest pain      Intake/Output Summary (Last 24 hours) at 12/15/2022 0834  Last data filed at 12/15/2022 0604  Gross per 24 hour   Intake 561.44 ml   Output 550 ml   Net 11.44 ml         PHYSICAL EXAM:   Blood pressure 137/81, pulse (!) 132, temperature 97.4 °F (36.3 °C), temperature source Axillary, resp. rate (!) 32, height 5' 7\" (1.702 m), weight 183 lb 13.8 oz (83.4 kg), SpO2 96 %.'  Gen:  No acute distress. Eyes: PERRL. Anicteric sclera. No conjunctival injection. ENT: No discharge. Posterior oropharynx clear. External appearance of ears and nose normal.  Neck: Trachea midline. No mass   Resp:  No crackles. + Bilateral wheezes. No rhonchi. No dullness on percussion. CV: Tachycardic rate. Regular rhythm. No murmur or rub.+ edema. GI: Soft, Non-tender. Non-distended. +BS  Skin: Warm, dry, w/o erythema.    Lymph: No cervical or supraclavicular LAD. M/S: No cyanosis. No clubbing. Neuro:  no focal neurologic deficit. Moves all extremities  Psych: Awake and alert, Oriented x 3. Judgement and insight appropriate. Mood stable.       Medications:    Scheduled Meds:   [START ON 12/16/2022] methylPREDNISolone sodium  40 mg IntraVENous Daily    insulin lispro  0-16 Units SubCUTAneous TID WC    insulin lispro  0-4 Units SubCUTAneous Nightly    ipratropium-albuterol  1 ampule Inhalation Q4H WA    meropenem  1,000 mg IntraVENous Q8H    insulin glargine  20 Units SubCUTAneous Nightly    lidocaine 1 % injection  5 mL IntraDERmal Once    sodium chloride flush  5-40 mL IntraVENous 2 times per day    nystatin  5 mL Oral 4x Daily    voriconazole  4 mg/kg IntraVENous Q12H    budesonide  1 mg Nebulization BID    dilTIAZem  300 mg Oral Daily    polyethylene glycol  17 g Oral Daily    guaiFENesin  600 mg Oral BID    theophylline  200 mg Oral BID    sodium chloride (Inhalant)  15 mL Nebulization 3 times per day    sodium chloride flush  5-40 mL IntraVENous 2 times per day    enoxaparin  40 mg SubCUTAneous Nightly    polyvinyl alcohol  2 drop Both Eyes BID    pantoprazole  40 mg Oral QAM AC    atorvastatin  10 mg Oral Daily    losartan  50 mg Oral Daily       Continuous Infusions:   dextrose      sodium chloride      dextrose      sodium chloride         PRN Meds:  glucose, dextrose bolus **OR** dextrose bolus, glucagon (rDNA), dextrose, sodium chloride flush, sodium chloride, glucose, dextrose bolus **OR** dextrose bolus, glucagon (rDNA), dextrose, sodium chloride flush, sodium chloride, ondansetron **OR** ondansetron, acetaminophen **OR** acetaminophen, albuterol    Labs reviewed:  CBC:   Recent Labs     12/12/22  1133 12/14/22  0842   WBC 26.5* 33.1*   HGB 16.9 17.1   HCT 52.9* 53.7*   MCV 87.9 89.1    202     BMP:   Recent Labs     12/12/22  1133 12/14/22  0842    138   K 4.9 4.5    96*   CO2 25 26   BUN 18 25* CREATININE 0.7* 0.7*     LIVER PROFILE:   Recent Labs     12/12/22  1133 12/14/22  0842   AST 18 18   ALT 43* 37   BILITOT 0.5 0.5   ALKPHOS 69 82     PT/INR: No results for input(s): PROTIME, INR in the last 72 hours. Films:  Radiology Review:  Pertinent images / reports were reviewed as a part of this visit. This note was transcribed using Yones. Please disregard any translational errors.     Thank you for this consult,    Raymundo Ferris MD  [facility] Pulmonary, Critical Care, and Sleep Medicine

## 2022-12-15 NOTE — PROGRESS NOTES
Pt arrived to ICU on bipap at 0917 from 4N. Labored breathing noted. Pt requesting to sit in chair, bipap on. Will review orders and discuss plan of care with Dr./family.

## 2022-12-15 NOTE — PROGRESS NOTES
Patients IV currently in Right AC. IV pump beeped frequently while IV Merrem was running. Attempted to place new IV via ultrasound. Patients/Family declined and requested IV PICC line. Call placed to Dr. Luisa Lynn. New order for PICC line.

## 2022-12-15 NOTE — CONSULTS
[facility]  Palliative Care   Consult Note    NAME:  Lennox Méndez  RECORD NUMBER:  [id number]  AGE: 81 y.o. GENDER: male  : 1940  TODAY'S DATE:  12/15/2022    Subjective     Reason for Consult:  goals of care and hospice discussion  Visit Type: Initial Consult      Emma Lewis is a 81 y.o. male referred by:   [x] Physician    PAST MEDICAL HISTORY      Diagnosis Date    Aspergillus pneumonia ([facility] [address]     Basal cell carcinoma of skin 2017    right cheek    COPD (chronic obstructive pulmonary disease) ([facility] [address]     DNR (do not resuscitate)     no intubation or chest compressions    Hyperlipidemia     Hypertension 10/2012    MRSA colonization 2018    Skin cancer 2014    both upper thighs    Squamous cell cancer of skin of nose 2016       PAST SURGICAL HISTORY  Past Surgical History:   Procedure Laterality Date    BRONCHOSCOPY N/A 2019    BRONCHOSCOPY WITH BRONCHOALVEOLAR LAVAGE performed by Juana Best MD at [address] [facility] Bilateral     2016    COLONOSCOPY  2016    dr Darby Garcia    3 years    SKIN BIOPSY  2016    Nose     TONSILLECTOMY AND ADENOIDECTOMY      TOOTH EXTRACTION  2016    UPPER GASTROINTESTINAL ENDOSCOPY  2015    dr Gardner Jeans HISTORY  Family History   Problem Relation Age of Onset    Diabetes Mother         DM    Coronary Art Dis Mother        SOCIAL HISTORY  Social History     Tobacco Use    Smoking status: Former     Packs/day: 1.00     Years: 45.00     Pack years: 45.00     Types: Cigarettes     Quit date: 2002     Years since quittin.9    Smokeless tobacco: Never   Vaping Use    Vaping Use: Never used   Substance Use Topics    Alcohol use: Yes     Comment: rarely     Drug use: No       ALLERGIES  Allergies   Allergen Reactions    Aquaphor [Albolene] Rash       MEDICATIONS  No current facility-administered medications on file prior to encounter.      Current Outpatient Medications on File Prior to Encounter   Medication Sig Dispense Refill    predniSONE (DELTASONE) 10 MG tablet Take 1 tablet by mouth daily 21 tablet 0    TRELEGY ELLIPTA 200-62.5-25 MCG/INH AEPB USE 1 INHALATION ORALLY    DAILY 1 each 11    atorvastatin (LIPITOR) 10 MG tablet TAKE 1 TABLET BY MOUTH DAILY 90 tablet 0    dilTIAZem (CARDIZEM CD) 300 MG extended release capsule TAKE 1 CAPSULE BY MOUTH DAILY 90 capsule 0    gabapentin (NEURONTIN) 100 MG capsule TAKE 1 CAPSULE BY MOUTH EVERY NIGHT (Patient taking differently: Take 100 mg by mouth nightly.) 30 capsule 1    ipratropium (ATROVENT) 0.03 % nasal spray USE 2 SPRAYS IN EACH NOSTRIL FOUR TIMES DAILY 30 mL 5    losartan (COZAAR) 50 MG tablet TAKE 1 TABLET BY MOUTH DAILY 90 tablet 0    theophylline (UNIPHYL) 400 MG extended release tablet TAKE 1/2 TABLET TWICE DAILY 90 tablet 3    albuterol (PROVENTIL) (2.5 MG/3ML) 0.083% nebulizer solution USE 1 VIAL VIA NEBULIZER EVERY 6 HOURS AS NEEDED FOR WHEEZING OR SHORTNESS OF BREATH 360 mL 0    albuterol sulfate HFA (VENTOLIN HFA) 108 (90 Base) MCG/ACT inhaler Inhale 2 puffs into the lungs every 4 hours as needed for Wheezing 1 each 11    omeprazole (PRILOSEC) 40 MG capsule Take 40 mg by mouth daily      Spacer/Aero-Holding Chambers KIT Please dispense 1 spacer 1 kit 0    cycloSPORINE (RESTASIS) 0.05 % ophthalmic emulsion Place 1 drop into both eyes 2 times daily         Objective         BP (!) 167/82   Pulse (!) 131   Temp 98.4 °F (36.9 °C) (Axillary)   Resp 22   Ht 5' 7\" (1.702 m)   Wt 183 lb 13.8 oz (83.4 kg)   SpO2 95%   BMI 28.80 kg/m²     Code Status: DNR-CC    Advanced Directives: not completed his wife would be his decision maker     Assessment        Management and Education    Persons available for education:       [x] Self     [] Caregiver       [x] Spouse       [x] Other Family Member   []  Other    Spiritual History:  notified: Yes,     Does the patient have a Primary Care Physician? Yes    Palliative Performance Scale:  60% [] Ambulation reduced; Significant disease; Can't do hobbies/housework; intake normal or reduced; occasional assist; LOC full/confusion  50% [] Mainly sit/lie; Extensive disease; Can't do any work; Considerable assist; intake normal or reduced; LOC full/confusion  40% [] Mainly in bed; Extensive disease; Mainly assist; intake normal or reduced; occasional assist; LOC full/confusion  30% [x] Chair/Bed Bound; Extensive disease; Total care; intake reduced; LOC full/confusion  20% [] Bed Bound; Extensive disease; Total care; intake minimal; Drowsy/coma  10% [] Bed Bound; Extensive disease; Total care; Mouth care only; Drowsy/coma  0 [] Death      Level of patient/caregiver understanding able to:       [x] Verbalize Understanding   [] Demonstrate Understanding       [] Teach Back       [] Needs Reinforcement     []  Other:      Teaching Time:  1hours  0 minutes     Plan        Social Service Consult Made:  Yes  Assistance filling out Living Will/HPOA:  No      Discharge Plan:  Education/support to family  Education/support to patient  Providing support for coping/adaptation/distress of family  Providing support for coping/adaptation/distress of patient  Decision making regarding life prolonging treatment  Clarification of medical condition to patient and family  Code status clarified: [facility]  Palliative care orders introduced  Provided information about hospice    Discharge Environment:  [x] Hospice Consult Agency: List provided [facility]   [x] Other: Partner patient at hospital if needed. Discussion:  Patient admitted with flu A and resp failure and hypoxemia. He has been in hospital for 2 weeks and required transfer to ICU last night. Patient and family spoke with Dr Brown Hence this AM and they have decided to pursue comfort care with morphine and ativan, with possible hospice tomorrow.  I have met with his wife and children and discussed continued care vs stopping bipap at some point and starting hospice care. She is agreeable to meeting with hospice tomorrow but wants to keep him here even if they enroll in hospice. I did explain when they stop bipap Dr Yanet Bocanegra can increase meds as needed. Referral to [address] [place] [phone] to follow up with family tomorrow if needed. I will continue to follow Mr. Cuca Smallwood care as needed. Thank you for allowing me to participate in the care of Mr. Jayden Arias .      Electronically signed by Toshia Falcon RN, BSN, [facility] on 12/15/2022 at 3:25 PM  [address] [place] [address]  Office: [phone]

## 2022-12-15 NOTE — PROGRESS NOTES
12/15/22 1729   Rituals, Rites and Sacraments   Type Sacrament of Sick  (Fr. Bere Guzmán gave SOS and  Viaticum to pt SM)

## 2022-12-15 NOTE — PLAN OF CARE
Problem: Discharge Planning  Goal: Discharge to home or other facility with appropriate resources  Outcome: Progressing  Flowsheets (Taken 12/15/2022 0930)  Discharge to home or other facility with appropriate resources: Identify barriers to discharge with patient and caregiver     Problem: Safety - Adult  Goal: Free from fall injury  Outcome: Progressing     Problem: Pain  Goal: Verbalizes/displays adequate comfort level or baseline comfort level  Outcome: Progressing     Problem: ABCDS Injury Assessment  Goal: Absence of physical injury  Outcome: Progressing     Problem: Respiratory - Adult  Goal: Achieves optimal ventilation and oxygenation  Outcome: Progressing     Problem: Infection - Adult  Goal: Absence of infection at discharge  Outcome: Progressing  Flowsheets (Taken 12/15/2022 0930)  Absence of infection at discharge:   Assess and monitor for signs and symptoms of infection   Monitor lab/diagnostic results   Monitor all insertion sites i.e., indwelling lines, tubes and drains   Administer medications as ordered  Goal: Absence of infection during hospitalization  Outcome: Progressing  Flowsheets (Taken 12/15/2022 0930)  Absence of infection during hospitalization:   Assess and monitor for signs and symptoms of infection   Monitor lab/diagnostic results   Monitor all insertion sites i.e., indwelling lines, tubes and drains   Administer medications as ordered  Goal: Absence of fever/infection during anticipated neutropenic period  Outcome: Progressing  Flowsheets (Taken 12/15/2022 0930)  Absence of fever/infection during anticipated neutropenic period: Monitor white blood cell count

## 2022-12-15 NOTE — PROGRESS NOTES
Pt c/o SOB ,resp at bedside, 91% on bipap, call placed to dr Juve Lemus, awaiting response. Pt doesn't want to be intubated but is a full code, wife and resp remains at bedside. Pt is on tele as well as cont SPO2 monitoring.  HR 130s       0815: Leisgang to see pt shortly, wife updated

## 2022-12-15 NOTE — PROGRESS NOTES
Patients wife refused morning Protonix. Patients wife stated, \"I just do not think he needs to be on everything they are giving him. I will let Dr. Lori Aburto decide when he comes in.\" Education given to patients wife on medication. Patients wife still refused and stated she will speak to Dr. Lori Aburto when he comes in.  Electronically signed by Oniel Carr RN on 12/15/2022 at 6:33 AM

## 2022-12-15 NOTE — PROGRESS NOTES
Lani Leon Progress Note  12/15/2022 9:52 AM  Subjective:   Admit Date: 12/1/2022      Chief Complaint: Cannot speak due to resp failure     Interval History: Now in ICU-prog resp failure--on BIPAP x 24 hr--still declining--tachypnea and lethargy and fatigue--I did dw patient at bedside --he is agreable to comfort measures--does not want CPR or ventilation--I also dw wife--she agrees     Diet: ADULT DIET;  Regular  Medications:   Scheduled Meds:   [START ON 12/16/2022] methylPREDNISolone sodium  40 mg IntraVENous Daily    insulin lispro  0-16 Units SubCUTAneous TID WC    insulin lispro  0-4 Units SubCUTAneous Nightly    ipratropium-albuterol  1 ampule Inhalation Q4H WA    meropenem  1,000 mg IntraVENous Q8H    insulin glargine  20 Units SubCUTAneous Nightly    lidocaine 1 % injection  5 mL IntraDERmal Once    sodium chloride flush  5-40 mL IntraVENous 2 times per day    nystatin  5 mL Oral 4x Daily    voriconazole  4 mg/kg IntraVENous Q12H    budesonide  1 mg Nebulization BID    dilTIAZem  300 mg Oral Daily    polyethylene glycol  17 g Oral Daily    guaiFENesin  600 mg Oral BID    theophylline  200 mg Oral BID    sodium chloride (Inhalant)  15 mL Nebulization 3 times per day    sodium chloride flush  5-40 mL IntraVENous 2 times per day    enoxaparin  40 mg SubCUTAneous Nightly    polyvinyl alcohol  2 drop Both Eyes BID    pantoprazole  40 mg Oral QAM AC    atorvastatin  10 mg Oral Daily    losartan  50 mg Oral Daily     Continuous Infusions:   dextrose      sodium chloride      dextrose      sodium chloride         Review of Systems -   General ROS: afebrile  Respiratory ROS: positive for - cough, shortness of breath, sputum changes, and tachypnea  Cardiovascular ROS: tachycardia   Musculoskeletal ROS:positive for - :joint pain  Neurological ROS: positive for - confusion and weakness    Objective:   Vitals: BP (!) 164/76   Pulse (!) 139   Temp 98.4 °F (36.9 °C)   Resp 20   Ht 5' 7\" (1.702 m)   Wt 183 lb 13.8 oz (83.4 kg)   SpO2 94%   BMI 28.80 kg/m²   General appearance: alert and cooperative with exam  HEENT: Head: Normocephalic, no lesions, without obvious abnormality. Neck: no adenopathy, no carotid bruit, no JVD, supple, symmetrical, trachea midline, and thyroid not enlarged, symmetric, no tenderness/mass/nodules  Lungs: diminished breath sounds bilaterally  Heart: tachycardia   Abdomen: soft, non-tender; bowel sounds normal; no masses,  no organomegaly  Extremities: 1+ edema   Neurologic: Mental status: weak--able to resp by shaking his head--can understand and agrres to comfort care     No results displayed because visit has over 200 results. Assessment & Plan:   Principal Problem:    Influenza A--completed tamiflu   Active Problems:    Hypoxemia    COPD exacerbation (HCC)    COPD, very severe (HCC)    Acute respiratory failure with hypoxia (HCC)--end=stage--patient now agreable to comfort care--will start ativan and morphine-code status now dnr-cc    Essential hypertension--Continue current therapy      Chronic respiratory failure with hypoxia (HCC)    Neutrophilia    Acute on chronic respiratory failure with hypoxia (HCC)    BiPAP (biphasic positive airway pressure) dependence    Aspergillus pneumonia (HCC)--cont v-fend   Resolved Problems:    * No resolved hospital problems.  *  Comfort measures--dnr-cc --orders written --cont current rx for now also   I will consider [address] consult if needed in 24 hrs---I will handle comfort meds for now ---edwin hardy--will ask palliative care to see also   Please note that over 35 minutes was spent in evaluating the patient, review of records and results, discussion with staff/family, etc.    Ronnie Mims MD

## 2022-12-16 NOTE — PROGRESS NOTES
Pt gargling from secretions. Oncall doctor paged and spoke with Dr. Raciel Sorenson. See new orders.

## 2022-12-16 NOTE — PROGRESS NOTES
RN called to coroners, spoke with [facility] [place] [facility] and updated. Body released by coroners.

## 2022-12-16 NOTE — DISCHARGE SUMMARY
[address] [address]                               DISCHARGE SUMMARY    PATIENT NAME: Danis Mccurdy                :        1940  MED REC NO:   [id number]                          ROOM:       2105  ACCOUNT NO:   [de-identified]                           ADMIT DATE: 2022  PROVIDER:     Rhys Larsen MD                  DISCHARGE DATE:  2022      DEATH SUMMARY    TIME OF DEATH:  0130 hours. DIAGNOSIS ON ADMISSION:  Influenza pneumonia. DIAGNOSES ON DISCHARGE:  1. Influenza pneumonia. 2.  Acute respiratory failure. 3.  Aspergillus pneumonia. 4.  Severe COPD. 5.  Hypertension. HISTORY OF PRESENT ILLNESS:  The patient is an 71-year-old white male  admitted through emergency. He presented with a history of several  weeks of progressive shortness of breath, cough and congestion. He was  followed by the Pulmonary Service. He had been treated with several  rounds of antibiotics and steroids as an outpatient with minimal  improvement. He presented to the ER with progressive respiratory  insufficiency and hypoxemia. Nasal swab was positive for influenza A  and he had tachycardia and significant expiratory wheezing. Past medical history was remarkable for prior history of Aspergillus  pneumonia, COPD, hyperlipidemia, hypertension. [place] He was admitted. He  was given Tamiflu and respiratory support. He was given higher levels  of oxygen. Pulmonary consultation was obtained. He tolerated the  Tamiflu and received the appropriate course of Tamiflu along with  respiratory support. He also received seven days of Levaquin. He  continued improving initially, but then after about a week in the  hospital had progressive respiratory insufficiency, failure with larger  amounts of oxygen being required, increased frequency of handheld  nebulizers.   He was followed by the Pulmonary Service through that  period of time. He was felt to have had progressive respiratory  failure, severe COPD on top of influenza pneumonia. He was seen by Dr. Fatuma Power of the Infectious Disease Service. He did have evidence of  Aspergillus pneumonia and he was also begun on IV D10 to treat this. Despite all the above measures he after about a week began main downhill  clinical course. Dr. Delila Dakins supervised and met with the family on a  regular basis. The patient finally did agree to comfort only status  after he was brought down to the ICU. He was on high-flow oxygen, on  BiPAP continually, steroids, and antibiotics. Comfort measures were  begun with Dr. Delila Dakins prescribing and he continued on a downhill  course and  peacefully on 2022 at 0130 hours. Family was  in attendance. The diagnoses at the time of death was influenza A pneumonia and acute  on chronic respiratory failure.     Alray Homans, MD    D: 2022 12:48:31       T: 2022 12:54:07     [id number]  Job#: [id number]     Doc#: [id number]    CC:  Dionne De Santiago MD

## 2022-12-16 NOTE — PROGRESS NOTES
Bedside handoff with [facility] RNs. The writer and GEE Wyatt took BIPAP from pt per family request. Pt is in 2L NC for comfort. Per report family preferred for the pt to be on the chair than bed and pt is still on the chair. Will continue to monitor.

## 2022-12-20 NOTE — TELEPHONE ENCOUNTER
Per verbal order of Dr. Linda Loza. Will prescribe Doxicycline 100 mg # 14 bid for 7 days. Referring back to Dr. Carmella Ramos. Patient will see Dr. Carmella Ramos next Wednesday, 7/24 at 12:00pm. Pt is ok with appointment date/time. Script called to [facility] today. Yes

## (undated) DEVICE — SINGLE USE BIOPSY VALVE MAJ-210: Brand: SINGLE USE BIOPSY VALVE (STERILE)

## (undated) DEVICE — AIRLIFE™ MISTY MAX 10™ NEBULIZER WITH 7 FOOT (2.1 M) CRUSH RESISTANT OXYGEN TUBING, BAFFLED TEE ADAPTER (22 MM I.D./22 MM O.D.), MOUTHPIECE AND 6 INCH (15 CM) FLEXTUBE: Brand: AIRLIFE™

## (undated) DEVICE — SYRINGE MED 10ML POLYPR LUERSLIP TIP FLAT TOP W/O SFTY DISP

## (undated) DEVICE — MAJ-1414 SINGLE USE ADPATER BIOPSY VALV: Brand: SINGLE USE ADAPTOR BIOPSY VALVE

## (undated) DEVICE — SINGLE USE SUCTION VALVE MAJ-209: Brand: SINGLE USE SUCTION VALVE (STERILE)

## (undated) DEVICE — Z INACTIVE USE 2711638 MASK SURG WHT STD PREM NONOIL HLTH CARE PARTICULATE RESP

## (undated) DEVICE — MASK, AEROSOL, ELONGATED, ADULT: Brand: MEDLINE

## (undated) DEVICE — 60 ML SYRINGE REGULAR TIP: Brand: MONOJECT